# Patient Record
Sex: MALE | Race: BLACK OR AFRICAN AMERICAN | NOT HISPANIC OR LATINO | Employment: FULL TIME | URBAN - METROPOLITAN AREA
[De-identification: names, ages, dates, MRNs, and addresses within clinical notes are randomized per-mention and may not be internally consistent; named-entity substitution may affect disease eponyms.]

---

## 2017-03-06 ENCOUNTER — HOSPITAL ENCOUNTER (EMERGENCY)
Facility: HOSPITAL | Age: 62
Discharge: HOME | End: 2017-03-06
Attending: EMERGENCY MEDICINE
Payer: COMMERCIAL

## 2017-03-06 VITALS
OXYGEN SATURATION: 97 % | HEIGHT: 71 IN | WEIGHT: 180 LBS | HEART RATE: 67 BPM | RESPIRATION RATE: 16 BRPM | TEMPERATURE: 98 F | BODY MASS INDEX: 25.2 KG/M2 | DIASTOLIC BLOOD PRESSURE: 96 MMHG | SYSTOLIC BLOOD PRESSURE: 220 MMHG

## 2017-03-06 DIAGNOSIS — M10.042 ACUTE IDIOPATHIC GOUT OF LEFT HAND: Primary | ICD-10-CM

## 2017-03-06 PROCEDURE — 99283 EMERGENCY DEPT VISIT LOW MDM: CPT | Mod: 25

## 2017-03-06 PROCEDURE — 96372 THER/PROPH/DIAG INJ SC/IM: CPT

## 2017-03-06 PROCEDURE — 63600175 PHARM REV CODE 636 W HCPCS: Performed by: STUDENT IN AN ORGANIZED HEALTH CARE EDUCATION/TRAINING PROGRAM

## 2017-03-06 PROCEDURE — 99284 EMERGENCY DEPT VISIT MOD MDM: CPT | Mod: ,,, | Performed by: EMERGENCY MEDICINE

## 2017-03-06 RX ORDER — PREDNISONE 20 MG/1
40 TABLET ORAL DAILY
Qty: 10 TABLET | Refills: 0 | Status: SHIPPED | OUTPATIENT
Start: 2017-03-06 | End: 2017-03-11

## 2017-03-06 RX ORDER — KETOROLAC TROMETHAMINE 30 MG/ML
10 INJECTION, SOLUTION INTRAMUSCULAR; INTRAVENOUS
Status: COMPLETED | OUTPATIENT
Start: 2017-03-06 | End: 2017-03-06

## 2017-03-06 RX ORDER — TRIAMCINOLONE ACETONIDE 40 MG/ML
40 INJECTION, SUSPENSION INTRA-ARTICULAR; INTRAMUSCULAR
Status: COMPLETED | OUTPATIENT
Start: 2017-03-06 | End: 2017-03-06

## 2017-03-06 RX ADMIN — TRIAMCINOLONE ACETONIDE 40 MG: 40 INJECTION, SUSPENSION INTRA-ARTICULAR; INTRAMUSCULAR at 08:03

## 2017-03-06 RX ADMIN — KETOROLAC TROMETHAMINE 10 MG: 30 INJECTION, SOLUTION INTRAMUSCULAR at 08:03

## 2017-03-06 NOTE — DISCHARGE INSTRUCTIONS
Please take prednisone 40 mg for 5 days.   Continue home colchicine.   Do not take allopurinol during acute gout attack.

## 2017-03-06 NOTE — ED NOTES
Patient identifiers and allergies verified and correct.    LOC: The patient is awake, alert and aware of environment with an appropriate affect, the patient is oriented x 3 and speaking appropriately.    APPEARANCE: Patient is clean and well groomed, patient's clothing is properly fastened.    HEENT: Head/face symmetrical at rest and with movement; lips/oral mucosa pink, moist and intact; no edema; eyes/ears/nose no external drainage.     SKIN: The skin is warm and dry, color consistent with ethnicity, patient has normal skin turgor and moist mucus membranes, skin intact, no breakdown or bruising noted.    MUSCULOSKELETAL: Patient moving all extremities spontaneously, no deformities noted. Swelling to L hand noted. Tenderness to palpation. Decrease in muscle strength to L hand.     RESPIRATORY: Airway is open and patent, respirations are spontaneous, patient has a normal effort, no accessory muscle use noted, bilateral breath sounds clear.    CARDIAC/VASCULAR: Patient has a normal regular rhythm, no periphreal edema noted (besides L hand), capillary refill < 3 seconds. Pulses 2+.    ABDOMEN: Soft and non tender to palpation, no distention noted, normoactive bowel sounds present in all four quadrants.    NEUROLOGIC: PERRL, 3mm bilaterally, eyes open spontaneously, behavior appropriate to situation, follows commands, facial expression symmetrical, purposeful motor response noted, normal sensation in all extremities when touched with a finger.    Refer to flow sheet or progress note for vital signs. Call bell in reach. Bed locked and in lowest position, side rails up X1. Patient in no acute distress. Awaiting additional orders. Will continue to monitor patient closely.

## 2017-03-06 NOTE — ED PROVIDER NOTES
Encounter Date: 3/6/2017       History     Chief Complaint   Patient presents with    Hand Pain     hx gout     Review of patient's allergies indicates:  No Known Allergies  HPI Comments: Mr. Zhang is a 62 yo M with PMHx gout, HTN, and HLD, presents with 1 day hx of hand pain. Patient states he has eaten a lot of crawfish recently and his hand became swollen and painful last night. He put some Bengay on it and took some of his gout medications (he is not sure which ones), with no improvement. He can still move his hand, but it is very painful. Patient's gout history includes swelling in his toes, L elbow, and L hand. This hand swelling is similar to past episodes. Patient denies any bug bites or recent trauma to that hand. Last gout attack was 1-2 years ago and patient states the IM shots that were given helped him.       The history is provided by the patient.     Past Medical History:   Diagnosis Date    Gout 10/8/2015    Hyperlipidemia     Hypertension      Past Surgical History:   Procedure Laterality Date    NONE N/A 10/8/2015     Family History   Problem Relation Age of Onset    Stroke Sister     Cancer Neg Hx     Diabetes Neg Hx     Heart disease Neg Hx     Hypertension Neg Hx      Social History   Substance Use Topics    Smoking status: Never Smoker    Smokeless tobacco: None    Alcohol use 1.2 oz/week     2 Cans of beer per week      Comment: nightly     Review of Systems   Constitutional: Negative for chills and fever.   HENT: Negative.    Eyes: Negative.    Respiratory: Negative for cough and shortness of breath.    Cardiovascular: Negative for chest pain and palpitations.   Gastrointestinal: Negative for abdominal pain, nausea and vomiting.   Endocrine: Negative.    Genitourinary: Negative for difficulty urinating and dysuria.   Musculoskeletal: Positive for arthralgias, joint swelling and myalgias.   Skin: Negative for color change.   Neurological: Negative for weakness and  light-headedness.   Psychiatric/Behavioral: Negative.        Physical Exam   Initial Vitals   BP Pulse Resp Temp SpO2   03/06/17 0705 03/06/17 0705 03/06/17 0705 03/06/17 0705 03/06/17 0705   220/96 67 16 98.4 °F (36.9 °C) 97 %     Physical Exam    Constitutional: He appears well-developed and well-nourished. No distress.   HENT:   Head: Normocephalic and atraumatic.   Mouth/Throat: No oropharyngeal exudate.   No auricular tophi   Eyes: Conjunctivae are normal. Pupils are equal, round, and reactive to light.   Neck: Normal range of motion.   Cardiovascular: Normal rate, regular rhythm and normal heart sounds. Exam reveals no gallop and no friction rub.    No murmur heard.  Pulmonary/Chest: Breath sounds normal. He has no wheezes. He has no rales.   Abdominal: Soft. Bowel sounds are normal. He exhibits no distension. There is no tenderness.   Musculoskeletal:   L hand swelling on dorsal surface from wrist to fingertips. ROM limited due to pain and swelling. No open wounds. No erythema. Warm to touch.    Neurological: He is alert and oriented to person, place, and time.   Skin: Skin is warm. No rash noted.   Psychiatric: He has a normal mood and affect.         ED Course   Procedures  Labs Reviewed - No data to display          Medical Decision Making:   Initial Assessment:   Patient is a 62 yo M with hx of gout presents with L hand swelling similar to previous gout episodes with no hx of trauma.   ED Management:  IM kenalog and IM toradol given. Patient will be discharged with PO prednisone 40 mg for 5 days.   Other:   I have discussed this case with another health care provider.                   ED Course     Clinical Impression:   The encounter diagnosis was Acute idiopathic gout of left hand.    Disposition:   Disposition: Discharged       Sherley Marquez MD  Resident  03/06/17 4998

## 2017-03-06 NOTE — ED NOTES
Injection given as ordered and charted per MAR. Patient instructed to wait 15 additional minutes prior to leaving to monitor for reaction. Patient instructed to notify staff if reaction is suspected. Patient voiced understanding.

## 2017-03-06 NOTE — ED AVS SNAPSHOT
[facility]  [address]  [facility] [address]               Sami Zhang   3/6/2017  7:09 AM   ED    Description:  Male : 1955   Department:  [facility]           Your Care was Coordinated By:     Provider Role From To    Emory Zhang MD Attending Provider 17 0722 --    Sherley Marquez MD Resident 17 --      Reason for Visit     Hand Pain           ED Disposition     ED Disposition Condition Comment    Discharge             To Do List            These Medications        Disp Refills Start End    predniSONE (DELTASONE) 20 MG tablet 10 tablet 0 3/6/2017 3/11/2017    Take 2 tablets (40 mg total) by mouth once daily. - Oral    Pharmacy: [id number] - [facility] [id number] [place] [address] AT [facility] #: [phone]         [facility]     [facility] Care [facility] -  Assistance  Registered nurses in the [facility] provide clinical advisement, health education, appointment booking, and other advisory services.  Call for this free service at [phone].             Medications           Message regarding Medications     Verify the changes and/or additions to your medication regime listed below are the same as discussed with your clinician today.  If any of these changes or additions are incorrect, please notify your healthcare provider.        START taking these NEW medications        Refills    predniSONE (DELTASONE) 20 MG tablet 0    Sig: Take 2 tablets (40 mg total) by mouth once daily.    Class: Normal    Route: Oral      These medications were administered today        Dose Freq    triamcinolone acetonide injection 40 mg 40 mg ED 1 Time    Sig: Inject 1 mL (40 mg total) into the muscle ED 1 Time.    Class: Normal    Route: Intramuscular    ketorolac injection 10 mg 10 mg ED 1 Time    Sig: Inject 10 mg into the muscle ED 1 Time.    Class: Normal    Route:  "Intramuscular           Verify that the below list of medications is an accurate representation of the medications you are currently taking.  If none reported, the list may be blank. If incorrect, please contact your healthcare provider. Carry this list with you in case of emergency.           Current Medications     allopurinol (ZYLOPRIM) 100 MG tablet Take 100 mg by mouth 2 (two) times daily.    colchicine 0.6 mg tablet Take 1 tablet (0.6 mg total) by mouth 2 (two) times daily. FOR GOUT.    hydrocodone-acetaminophen 5-325mg (NORCO) 5-325 mg per tablet Take 1 tablet by mouth every 6 (six) hours as needed for Pain.    indomethacin (INDOCIN) 50 MG capsule Take 1 capsule (50 mg total) by mouth 3 (three) times daily with meals.    ketorolac injection 10 mg Inject 10 mg into the muscle ED 1 Time.    naproxen (NAPROSYN) 500 MG tablet Take 1 tablet (500 mg total) by mouth 2 (two) times daily with meals.    nebivolol (BYSTOLIC) 10 MG Tab Take 10 mg by mouth once daily.    ondansetron (ZOFRAN-ODT) 8 MG TbDL Take 1 tablet (8 mg total) by mouth every 8 (eight) hours as needed.    oxycodone-acetaminophen (PERCOCET) 5-325 mg per tablet Take 2 tablets by mouth every 6 (six) hours as needed for Pain.    predniSONE (DELTASONE) 20 MG tablet Take 2 tablets (40 mg total) by mouth once daily.    rosuvastatin (CRESTOR) 20 MG tablet Take 20 mg by mouth once daily.    triamcinolone acetonide injection 40 mg Inject 1 mL (40 mg total) into the muscle ED 1 Time.    valsartan-hydrochlorothiazide (DIOVAN-HCT) 160-12.5 mg per tablet Take 1 tablet by mouth once daily.    vitamin D 1000 units Tab Take 10,000 Units by mouth once a week.           Clinical Reference Information           Your Vitals Were     BP Pulse Temp Resp Height Weight    220/96 67 98.4 °F (36.9 °C) (Oral) 16 5' 11" (1.803 m) 81.6 kg (180 lb)    SpO2 BMI             97% 25.1 kg/m2         Allergies as of 3/6/2017     No Known Allergies      Immunizations Administered on Date " of Encounter - 3/6/2017     None      ED Micro, Lab, POCT     None      ED Imaging Orders     None        Discharge Instructions       Please take prednisone 40 mg for 5 days.   Continue home colchicine.   Do not take allopurinol during acute gout attack.     Discharge References/Attachments     GOUT (ENGLISH)      [facility] Sign-Up     Activating your [facility] account is as easy as 1-2-3!     1) Visit [email or web address], select Sign Up Now, enter this activation code and your date of birth, then select Next.  [email or web address]  Expires: 4/20/2017  7:50 AM      2) Create a username and password to use when you visit [facility] in the future and select a security question in case you lose your password and select Next.    3) Enter your e-mail address and click Sign Up!    Additional Information  If you have questions, please e-mail [email or web address] or call [phone] to talk to our [facility] staff. Remember, [facility] is NOT to be used for urgent needs. For medical emergencies, dial 911.          [facility] complies with applicable Federal civil rights laws and does not discriminate on the basis of race, color, national origin, age, disability, or sex.        Language Assistance Services     ATTENTION: Language assistance services are available, free of charge. Please call [phone].      ATENCIÓN: Si habla español, tiene a galvan disposición servicios gratuitos de asistencia lingüística. Llame al [phone].     CHÚ Ý: N?u b?n nói Ti?ng Vi?t, có các d?ch v? h? tr? ngôn ng? mi?n phí dành cho b?n. G?i s? [phone].

## 2017-06-26 ENCOUNTER — HOSPITAL ENCOUNTER (EMERGENCY)
Facility: HOSPITAL | Age: 62
Discharge: HOME | End: 2017-06-26
Attending: EMERGENCY MEDICINE
Payer: COMMERCIAL

## 2017-06-26 VITALS
BODY MASS INDEX: 25.2 KG/M2 | TEMPERATURE: 99 F | RESPIRATION RATE: 16 BRPM | HEART RATE: 61 BPM | DIASTOLIC BLOOD PRESSURE: 78 MMHG | WEIGHT: 180 LBS | SYSTOLIC BLOOD PRESSURE: 171 MMHG | OXYGEN SATURATION: 100 % | HEIGHT: 71 IN

## 2017-06-26 DIAGNOSIS — M79.675 PAIN OF LEFT GREAT TOE: ICD-10-CM

## 2017-06-26 DIAGNOSIS — M10.9 ACUTE GOUTY ARTHRITIS: Primary | ICD-10-CM

## 2017-06-26 PROCEDURE — 96372 THER/PROPH/DIAG INJ SC/IM: CPT

## 2017-06-26 PROCEDURE — 63600175 PHARM REV CODE 636 W HCPCS: Performed by: NURSE PRACTITIONER

## 2017-06-26 PROCEDURE — 99284 EMERGENCY DEPT VISIT MOD MDM: CPT | Mod: ,,, | Performed by: NURSE PRACTITIONER

## 2017-06-26 PROCEDURE — 99283 EMERGENCY DEPT VISIT LOW MDM: CPT | Mod: 25

## 2017-06-26 RX ORDER — TRIAMCINOLONE ACETONIDE 40 MG/ML
80 INJECTION, SUSPENSION INTRA-ARTICULAR; INTRAMUSCULAR
Status: COMPLETED | OUTPATIENT
Start: 2017-06-26 | End: 2017-06-26

## 2017-06-26 RX ORDER — COLCHICINE 0.6 MG/1
TABLET ORAL
Qty: 3 TABLET | Refills: 0 | Status: SHIPPED | OUTPATIENT
Start: 2017-06-26 | End: 2022-10-06

## 2017-06-26 RX ADMIN — TRIAMCINOLONE ACETONIDE 80 MG: 40 INJECTION, SUSPENSION INTRA-ARTICULAR; INTRAMUSCULAR at 08:06

## 2017-06-26 NOTE — ED PROVIDER NOTES
Encounter Date: 6/26/2017    SCRIBE #1 NOTE: I, Vicente Valdovinos, am scribing for, and in the presence of,  Mily lopez NP. I have scribed the entire note.       History     Chief Complaint   Patient presents with    Gout     Pt c/o pain to left big toe.     Time seen by provider: 8:48 AM    This is a 61 y.o. male with PMH of GOUT who presents for evaluation of left great toe pain w/ swelling for 2 days duration consistent with GOUT flare. Denies any CP, SOB, fever, chills, calf pain/tenderness, recent injury, falls or wounds. Also denies eating any foods which may have precipitated these sx. On Allopurinol at home which has not provided relief of his pain. Denies Hx of DM or Kidney problems. No further complaints or concerns at this time.         The history is provided by the patient and medical records.     Review of patient's allergies indicates:  No Known Allergies  Past Medical History:   Diagnosis Date    Arthritis     Gout 10/8/2015    Hyperlipidemia     Hypertension      Past Surgical History:   Procedure Laterality Date    NONE N/A 10/8/2015     Family History   Problem Relation Age of Onset    Stroke Sister     Cancer Neg Hx     Diabetes Neg Hx     Heart disease Neg Hx     Hypertension Neg Hx      Social History   Substance Use Topics    Smoking status: Never Smoker    Smokeless tobacco: Never Used    Alcohol use 1.2 oz/week     2 Cans of beer per week      Comment: nightly     Review of Systems   Constitutional: Negative for chills and fever.   HENT: Negative for sore throat.    Respiratory: Negative for shortness of breath.    Cardiovascular: Negative for chest pain and leg swelling.        (-) Calf pain/tenderness   Gastrointestinal: Negative for nausea.   Genitourinary: Negative for dysuria.   Musculoskeletal: Positive for arthralgias (to left great toe) and joint swelling (to left great toe). Negative for back pain.        (-) recent injury or fall   Skin: Negative for rash and wound.    Neurological: Negative for weakness.   Hematological: Does not bruise/bleed easily.       Physical Exam     Initial Vitals [06/26/17 0839]   BP Pulse Resp Temp SpO2   (!) 171/78 61 16 98.5 °F (36.9 °C) 100 %      MAP       109         Physical Exam   Nursing note and vitals reviewed.  Constitutional: Patient appears well-developed and well-nourished. Not diaphoretic. No distress.   HENT:   Head: Normocephalic and atraumatic.   Eyes: Conjunctivae are normal. No scleral icterus.   Neck: Normal range of motion. Neck supple.   Cardiovascular: Normal rate, regular rhythm and normal heart sounds. No calf swelling or tenderness.   Pulmonary/Chest: Breath sounds normal. No respiratory distress.  Abdominal: Soft. There is no tenderness.   Musculoskeletal: Slight swelling with tenderness and warmth to the left great MTP joint without any deformity or wound. Pedal pulse normal. Normal but guarded ROM secondary to pain.  Neurological: Alert and oriented to person, place, and time. Normal strength. No sensory deficit.   Skin: Skin is warm and dry. No rash noted. No erythema. No pallor.   Psychiatric: Normal mood and affect. Thought content normal.       ED Course   Procedures  Labs Reviewed - No data to display          Medical Decision Making:   History:   Old Medical Records: I decided to obtain old medical records.  Initial Assessment:   61 year old male with Hx of GOUT who presents for 2 day history of pain and swelling of the left great toe consistent with prior GOUT flares. Currently taking allopurinol as prescribed without improvement. He is afebrile, non toxic and in NAD. No evidence of septic joint or DVT. Will give IM kenalog in the ED and send home with a short course of colchicine. He is to follow up with his PCP.             Scribe Attestation:   Scribe #1: I performed the above scribed service and the documentation accurately describes the services I performed. I attest to the accuracy of the note.    Attending  Attestation:           Physician Attestation for Scribe:  Physician Attestation Statement for Scribe #1: I, Mily Sheppard NP, reviewed documentation, as scribed by Vicente Valdovinos in my presence, and it is both accurate and complete.                 ED Course     Clinical Impression:   The primary encounter diagnosis was Acute gouty arthritis. A diagnosis of Pain of left great toe was also pertinent to this visit.    Disposition:   Disposition: Discharged  Condition: Stable                        Mily Sheppard NP  06/26/17 1553

## 2021-02-26 ENCOUNTER — OFFICE VISIT (OUTPATIENT)
Dept: OPTOMETRY | Facility: CLINIC | Age: 66
End: 2021-02-26
Payer: COMMERCIAL

## 2021-02-26 DIAGNOSIS — H25.13 NUCLEAR SCLEROSIS, BILATERAL: ICD-10-CM

## 2021-02-26 DIAGNOSIS — H16.223 KERATOCONJUNCTIVITIS SICCA, NOT SPECIFIED AS SJÖGREN'S, BILATERAL: Primary | ICD-10-CM

## 2021-02-26 PROCEDURE — 99999 PR PBB SHADOW E&M-NEW PATIENT-LVL II: CPT | Mod: PBBFAC,,, | Performed by: OPTOMETRIST

## 2021-02-26 PROCEDURE — 99499 RISK ADDL DX/OHS AUDIT: ICD-10-PCS | Mod: S$GLB,,, | Performed by: OPTOMETRIST

## 2021-02-26 PROCEDURE — 99499 UNLISTED E&M SERVICE: CPT | Mod: S$GLB,,, | Performed by: OPTOMETRIST

## 2021-02-26 PROCEDURE — 92004 COMPRE OPH EXAM NEW PT 1/>: CPT | Mod: S$GLB,,, | Performed by: OPTOMETRIST

## 2021-02-26 PROCEDURE — 92004 PR EYE EXAM, NEW PATIENT,COMPREHESV: ICD-10-PCS | Mod: S$GLB,,, | Performed by: OPTOMETRIST

## 2021-02-26 PROCEDURE — 99999 PR PBB SHADOW E&M-NEW PATIENT-LVL II: ICD-10-PCS | Mod: PBBFAC,,, | Performed by: OPTOMETRIST

## 2021-03-15 ENCOUNTER — TELEPHONE (OUTPATIENT)
Dept: ADMINISTRATIVE | Facility: OTHER | Age: 66
End: 2021-03-15

## 2021-03-17 ENCOUNTER — IMMUNIZATION (OUTPATIENT)
Dept: INTERNAL MEDICINE | Facility: CLINIC | Age: 66
End: 2021-03-17
Payer: MEDICARE

## 2021-03-17 DIAGNOSIS — Z23 NEED FOR VACCINATION: Primary | ICD-10-CM

## 2021-03-17 PROCEDURE — 0001A COVID-19, MRNA, LNP-S, PF, 30 MCG/0.3 ML DOSE VACCINE: ICD-10-PCS | Mod: CV19,,, | Performed by: INTERNAL MEDICINE

## 2021-03-17 PROCEDURE — 0001A COVID-19, MRNA, LNP-S, PF, 30 MCG/0.3 ML DOSE VACCINE: CPT | Mod: CV19,,, | Performed by: INTERNAL MEDICINE

## 2021-03-17 PROCEDURE — 91300 COVID-19, MRNA, LNP-S, PF, 30 MCG/0.3 ML DOSE VACCINE: CPT | Mod: ,,, | Performed by: INTERNAL MEDICINE

## 2021-03-17 PROCEDURE — 91300 COVID-19, MRNA, LNP-S, PF, 30 MCG/0.3 ML DOSE VACCINE: ICD-10-PCS | Mod: ,,, | Performed by: INTERNAL MEDICINE

## 2021-04-07 ENCOUNTER — IMMUNIZATION (OUTPATIENT)
Dept: INTERNAL MEDICINE | Facility: CLINIC | Age: 66
End: 2021-04-07
Payer: MEDICARE

## 2021-04-07 DIAGNOSIS — Z23 NEED FOR VACCINATION: Primary | ICD-10-CM

## 2021-04-07 PROCEDURE — 0002A COVID-19, MRNA, LNP-S, PF, 30 MCG/0.3 ML DOSE VACCINE: CPT | Mod: HCNC,PBBFAC | Performed by: INTERNAL MEDICINE

## 2021-04-07 PROCEDURE — 91300 COVID-19, MRNA, LNP-S, PF, 30 MCG/0.3 ML DOSE VACCINE: CPT | Mod: HCNC,PBBFAC | Performed by: INTERNAL MEDICINE

## 2021-07-08 ENCOUNTER — OFFICE VISIT (OUTPATIENT)
Dept: OPTOMETRY | Facility: CLINIC | Age: 66
End: 2021-07-08
Payer: MEDICARE

## 2021-07-08 DIAGNOSIS — H52.201 HYPEROPIA WITH ASTIGMATISM AND PRESBYOPIA, RIGHT: ICD-10-CM

## 2021-07-08 DIAGNOSIS — H04.123 DRY EYE SYNDROME OF BOTH EYES: ICD-10-CM

## 2021-07-08 DIAGNOSIS — H53.021 REFRACTIVE AMBLYOPIA OF RIGHT EYE: ICD-10-CM

## 2021-07-08 DIAGNOSIS — H52.202 ASTIGMATISM OF LEFT EYE WITH PRESBYOPIA: ICD-10-CM

## 2021-07-08 DIAGNOSIS — H25.13 SENILE NUCLEAR SCLEROSIS, BILATERAL: ICD-10-CM

## 2021-07-08 DIAGNOSIS — H52.01 HYPEROPIA WITH ASTIGMATISM AND PRESBYOPIA, RIGHT: ICD-10-CM

## 2021-07-08 DIAGNOSIS — H43.393 VISUAL FLOATERS, BILATERAL: Primary | ICD-10-CM

## 2021-07-08 DIAGNOSIS — H52.4 HYPEROPIA WITH ASTIGMATISM AND PRESBYOPIA, RIGHT: ICD-10-CM

## 2021-07-08 DIAGNOSIS — H52.4 ASTIGMATISM OF LEFT EYE WITH PRESBYOPIA: ICD-10-CM

## 2021-07-08 PROCEDURE — 1126F PR PAIN SEVERITY QUANTIFIED, NO PAIN PRESENT: ICD-10-PCS | Mod: S$GLB,,, | Performed by: OPTOMETRIST

## 2021-07-08 PROCEDURE — 92015 DETERMINE REFRACTIVE STATE: CPT | Mod: S$GLB,,, | Performed by: OPTOMETRIST

## 2021-07-08 PROCEDURE — 99999 PR PBB SHADOW E&M-EST. PATIENT-LVL II: CPT | Mod: PBBFAC,,, | Performed by: OPTOMETRIST

## 2021-07-08 PROCEDURE — 99999 PR PBB SHADOW E&M-EST. PATIENT-LVL II: ICD-10-PCS | Mod: PBBFAC,,, | Performed by: OPTOMETRIST

## 2021-07-08 PROCEDURE — 3288F FALL RISK ASSESSMENT DOCD: CPT | Mod: CPTII,S$GLB,, | Performed by: OPTOMETRIST

## 2021-07-08 PROCEDURE — 92015 PR REFRACTION: ICD-10-PCS | Mod: S$GLB,,, | Performed by: OPTOMETRIST

## 2021-07-08 PROCEDURE — 1101F PT FALLS ASSESS-DOCD LE1/YR: CPT | Mod: CPTII,S$GLB,, | Performed by: OPTOMETRIST

## 2021-07-08 PROCEDURE — 1126F AMNT PAIN NOTED NONE PRSNT: CPT | Mod: S$GLB,,, | Performed by: OPTOMETRIST

## 2021-07-08 PROCEDURE — 3288F PR FALLS RISK ASSESSMENT DOCUMENTED: ICD-10-PCS | Mod: CPTII,S$GLB,, | Performed by: OPTOMETRIST

## 2021-07-08 PROCEDURE — 92014 COMPRE OPH EXAM EST PT 1/>: CPT | Mod: S$GLB,,, | Performed by: OPTOMETRIST

## 2021-07-08 PROCEDURE — 1101F PR PT FALLS ASSESS DOC 0-1 FALLS W/OUT INJ PAST YR: ICD-10-PCS | Mod: CPTII,S$GLB,, | Performed by: OPTOMETRIST

## 2021-07-08 PROCEDURE — 92014 PR EYE EXAM, EST PATIENT,COMPREHESV: ICD-10-PCS | Mod: S$GLB,,, | Performed by: OPTOMETRIST

## 2021-12-08 ENCOUNTER — IMMUNIZATION (OUTPATIENT)
Dept: INTERNAL MEDICINE | Facility: CLINIC | Age: 66
End: 2021-12-08
Payer: MEDICARE

## 2021-12-08 DIAGNOSIS — Z23 NEED FOR VACCINATION: Primary | ICD-10-CM

## 2021-12-08 PROCEDURE — 0004A COVID-19, MRNA, LNP-S, PF, 30 MCG/0.3 ML DOSE VACCINE: CPT | Mod: HCNC,PBBFAC | Performed by: INTERNAL MEDICINE

## 2022-08-29 ENCOUNTER — HOSPITAL ENCOUNTER (EMERGENCY)
Facility: HOSPITAL | Age: 67
Discharge: HOME | End: 2022-08-29
Attending: EMERGENCY MEDICINE
Payer: MEDICARE

## 2022-08-29 VITALS
HEIGHT: 71 IN | SYSTOLIC BLOOD PRESSURE: 168 MMHG | BODY MASS INDEX: 25.9 KG/M2 | OXYGEN SATURATION: 100 % | WEIGHT: 185 LBS | HEART RATE: 49 BPM | DIASTOLIC BLOOD PRESSURE: 72 MMHG | RESPIRATION RATE: 20 BRPM | TEMPERATURE: 98 F

## 2022-08-29 DIAGNOSIS — R11.10 VOMITING AND DIARRHEA: Primary | ICD-10-CM

## 2022-08-29 DIAGNOSIS — R19.7 VOMITING AND DIARRHEA: Primary | ICD-10-CM

## 2022-08-29 DIAGNOSIS — R10.9 ABDOMINAL PAIN: ICD-10-CM

## 2022-08-29 LAB
ALBUMIN SERPL BCP-MCNC: 3.9 G/DL (ref 3.5–5.2)
ALP SERPL-CCNC: 78 U/L (ref 55–135)
ALT SERPL W/O P-5'-P-CCNC: 24 U/L (ref 10–44)
ANION GAP SERPL CALC-SCNC: 8 MMOL/L (ref 8–16)
AST SERPL-CCNC: 29 U/L (ref 10–40)
BASOPHILS # BLD AUTO: 0.05 K/UL (ref 0–0.2)
BASOPHILS NFR BLD: 0.5 % (ref 0–1.9)
BILIRUB SERPL-MCNC: 1.1 MG/DL (ref 0.1–1)
BILIRUB UR QL STRIP: NEGATIVE
BUN SERPL-MCNC: 17 MG/DL (ref 8–23)
CALCIUM SERPL-MCNC: 10.1 MG/DL (ref 8.7–10.5)
CHLORIDE SERPL-SCNC: 107 MMOL/L (ref 95–110)
CLARITY UR REFRACT.AUTO: CLEAR
CO2 SERPL-SCNC: 26 MMOL/L (ref 23–29)
COLOR UR AUTO: YELLOW
CREAT SERPL-MCNC: 1 MG/DL (ref 0.5–1.4)
DIFFERENTIAL METHOD: ABNORMAL
EOSINOPHIL # BLD AUTO: 0.1 K/UL (ref 0–0.5)
EOSINOPHIL NFR BLD: 1.2 % (ref 0–8)
ERYTHROCYTE [DISTWIDTH] IN BLOOD BY AUTOMATED COUNT: 14.1 % (ref 11.5–14.5)
EST. GFR  (NO RACE VARIABLE): >60 ML/MIN/1.73 M^2
GLUCOSE SERPL-MCNC: 95 MG/DL (ref 70–110)
GLUCOSE UR QL STRIP: ABNORMAL
HCT VFR BLD AUTO: 41.5 % (ref 40–54)
HCV AB SERPL QL IA: NORMAL
HGB BLD-MCNC: 13.8 G/DL (ref 14–18)
HGB UR QL STRIP: NEGATIVE
HIV 1+2 AB+HIV1 P24 AG SERPL QL IA: NORMAL
IMM GRANULOCYTES # BLD AUTO: 0.05 K/UL (ref 0–0.04)
IMM GRANULOCYTES NFR BLD AUTO: 0.5 % (ref 0–0.5)
KETONES UR QL STRIP: NEGATIVE
LEUKOCYTE ESTERASE UR QL STRIP: NEGATIVE
LIPASE SERPL-CCNC: 19 U/L (ref 4–60)
LYMPHOCYTES # BLD AUTO: 1.9 K/UL (ref 1–4.8)
LYMPHOCYTES NFR BLD: 19.3 % (ref 18–48)
MCH RBC QN AUTO: 27.6 PG (ref 27–31)
MCHC RBC AUTO-ENTMCNC: 33.3 G/DL (ref 32–36)
MCV RBC AUTO: 83 FL (ref 82–98)
MONOCYTES # BLD AUTO: 1 K/UL (ref 0.3–1)
MONOCYTES NFR BLD: 10.1 % (ref 4–15)
NEUTROPHILS # BLD AUTO: 6.7 K/UL (ref 1.8–7.7)
NEUTROPHILS NFR BLD: 68.4 % (ref 38–73)
NITRITE UR QL STRIP: NEGATIVE
NRBC BLD-RTO: 0 /100 WBC
PH UR STRIP: 6 [PH] (ref 5–8)
PLATELET # BLD AUTO: 271 K/UL (ref 150–450)
PMV BLD AUTO: 10.8 FL (ref 9.2–12.9)
POTASSIUM SERPL-SCNC: 3.7 MMOL/L (ref 3.5–5.1)
PROT SERPL-MCNC: 7.4 G/DL (ref 6–8.4)
PROT UR QL STRIP: NEGATIVE
RBC # BLD AUTO: 5 M/UL (ref 4.6–6.2)
SODIUM SERPL-SCNC: 141 MMOL/L (ref 136–145)
SP GR UR STRIP: 1.02 (ref 1–1.03)
URN SPEC COLLECT METH UR: ABNORMAL
WBC # BLD AUTO: 9.79 K/UL (ref 3.9–12.7)

## 2022-08-29 PROCEDURE — 99284 PR EMERGENCY DEPT VISIT,LEVEL IV: ICD-10-PCS | Mod: ,,, | Performed by: EMERGENCY MEDICINE

## 2022-08-29 PROCEDURE — 99284 EMERGENCY DEPT VISIT MOD MDM: CPT | Mod: ,,, | Performed by: EMERGENCY MEDICINE

## 2022-08-29 PROCEDURE — 87389 HIV-1 AG W/HIV-1&-2 AB AG IA: CPT | Performed by: PHYSICIAN ASSISTANT

## 2022-08-29 PROCEDURE — 93005 ELECTROCARDIOGRAM TRACING: CPT

## 2022-08-29 PROCEDURE — 93010 ELECTROCARDIOGRAM REPORT: CPT | Mod: ,,, | Performed by: INTERNAL MEDICINE

## 2022-08-29 PROCEDURE — 81003 URINALYSIS AUTO W/O SCOPE: CPT | Performed by: EMERGENCY MEDICINE

## 2022-08-29 PROCEDURE — 80053 COMPREHEN METABOLIC PANEL: CPT | Performed by: EMERGENCY MEDICINE

## 2022-08-29 PROCEDURE — 93010 EKG 12-LEAD: ICD-10-PCS | Mod: ,,, | Performed by: INTERNAL MEDICINE

## 2022-08-29 PROCEDURE — 99284 EMERGENCY DEPT VISIT MOD MDM: CPT | Mod: 25

## 2022-08-29 PROCEDURE — 86803 HEPATITIS C AB TEST: CPT | Performed by: PHYSICIAN ASSISTANT

## 2022-08-29 PROCEDURE — 85025 COMPLETE CBC W/AUTO DIFF WBC: CPT | Performed by: EMERGENCY MEDICINE

## 2022-08-29 PROCEDURE — 83690 ASSAY OF LIPASE: CPT | Performed by: EMERGENCY MEDICINE

## 2022-08-29 RX ORDER — ONDANSETRON 4 MG/1
4 TABLET, FILM COATED ORAL EVERY 6 HOURS
Qty: 12 TABLET | Refills: 0 | Status: SHIPPED | OUTPATIENT
Start: 2022-08-29 | End: 2023-04-14 | Stop reason: ALTCHOICE

## 2022-08-29 NOTE — ED NOTES
Sami Zhang, an 66 y.o. male presents to the ED complaining of back pain, nausea and vomiting, abd pain x 3 weeks. Patient explains that his back pain began while he was sitting sideways watching TV. Denies any trauma to his back. Was using icy-hot without relief. The vomiting began around this time and he has not been able to keep any food or drink down.       Chief Complaint   Patient presents with    Abdominal Pain    Vomiting     Started week or 2 ago,lower back been hurting also     Review of patient's allergies indicates:  No Known Allergies  Past Medical History:   Diagnosis Date    Arthritis     Gout 10/8/2015    Hyperlipidemia     Hypertension

## 2022-08-29 NOTE — DISCHARGE INSTRUCTIONS
Your described Zofran for nausea.  Please follow-up with GI and Oncology for your stomach tumor. Please return to the ED if you have severe abdominal pain, uncontrolled vomiting, or other concerns.

## 2022-08-30 NOTE — ED PROVIDER NOTES
Encounter Date: 8/29/2022       History     Chief Complaint   Patient presents with    Abdominal Pain    Vomiting     Started week or 2 ago,lower back been hurting also     66-year-old male with history of arthritis, gout, hyperlipidemia, hypertension, presents to the ED for abdominal bloating, vomiting, diarrhea for 2 days.  Patient reports that he have a 2-3 episodes of vomiting and diarrhea since yesterday and today.  Patient reports intermittent diffuse abdominal pain and distension, that radiated to his lower back. Denies bloody of bile emesis, no hematochezia.  Patient denies fever, chill, shortness of breath.  Denies changes in his diet, or family member with similar symptoms.  Patient endorses chronic alcohol use, 2 shots of liquor per day. He was seen in another ED last month, he was found to have gastric mass, however, he did not follow-up with the specialists due to his insurance coverage.  Patient wishes to be referred to specialist at [facility].    Review of patient's allergies indicates:  No Known Allergies  Past Medical History:   Diagnosis Date    Arthritis     Gout 10/8/2015    Hyperlipidemia     Hypertension      Past Surgical History:   Procedure Laterality Date    NONE N/A 10/8/2015     Family History   Problem Relation Age of Onset    Stroke Sister     Cancer Neg Hx     Diabetes Neg Hx     Heart disease Neg Hx     Hypertension Neg Hx      Social History     Tobacco Use    Smoking status: Never    Smokeless tobacco: Never   Substance Use Topics    Alcohol use: Yes     Alcohol/week: 2.0 standard drinks     Types: 2 Cans of beer per week     Comment: occasionally drinks wine    Drug use: No     Review of Systems   Constitutional:  Negative for chills and fever.   HENT:  Negative for congestion and sore throat.    Eyes:  Negative for discharge and redness.   Respiratory:  Negative for cough and shortness of breath.    Cardiovascular:  Negative for chest pain and leg swelling.   Gastrointestinal:   Positive for abdominal pain, diarrhea, nausea and vomiting.   Genitourinary:  Negative for dysuria and flank pain.   Musculoskeletal:  Negative for back pain and neck pain.   Skin:  Negative for rash.   Neurological:  Negative for weakness and numbness.   Psychiatric/Behavioral:  Negative for behavioral problems and confusion.      Physical Exam     Initial Vitals [08/29/22 0951]   BP Pulse Resp Temp SpO2   (!) 162/74 67 18 98.3 °F (36.8 °C) 100 %      MAP       --         Physical Exam    Nursing note and vitals reviewed.  Constitutional: He appears well-developed. No distress.   HENT:   Head: Normocephalic and atraumatic.   Nose: Nose normal.   Eyes: Conjunctivae and EOM are normal. Pupils are equal, round, and reactive to light.   Neck: No JVD present.   Normal range of motion.  Cardiovascular:  Normal rate and regular rhythm.           Pulmonary/Chest: Breath sounds normal. No respiratory distress.   Abdominal: Abdomen is soft. He exhibits distension. There is no abdominal tenderness.   Musculoskeletal:         General: Normal range of motion.      Cervical back: Normal range of motion.     Neurological: He is alert and oriented to person, place, and time. No cranial nerve deficit.   Skin: Skin is warm and dry. Capillary refill takes less than 2 seconds.       ED Course   Procedures  Labs Reviewed   CBC W/ AUTO DIFFERENTIAL - Abnormal; Notable for the following components:       Result Value    Hemoglobin 13.8 (*)     Immature Grans (Abs) 0.05 (*)     All other components within normal limits   COMPREHENSIVE METABOLIC PANEL - Abnormal; Notable for the following components:    Total Bilirubin 1.1 (*)     All other components within normal limits   URINALYSIS, REFLEX TO URINE CULTURE - Abnormal; Notable for the following components:    Glucose, UA Trace (*)     All other components within normal limits    Narrative:     Specimen Source->Urine   HIV 1 / 2 ANTIBODY    Narrative:     Release to patient->Immediate    HEPATITIS C ANTIBODY    Narrative:     Release to patient->Immediate   LIPASE     EKG Readings: (Independently Interpreted)   Previous EKG: Compared with most recent EKG Rhythm: Normal Sinus Rhythm. Heart Rate: 54. Ectopy: No Ectopy. Conduction: Normal. ST Segments: Normal ST Segments. T Waves: Normal. Axis: Normal. Clinical Impression: Normal Sinus Rhythm     Imaging Results    None          Medications - No data to display  Medical Decision Making:   History:   Old Medical Records: I decided to obtain old medical records.  Old Records Summarized: records from another hospital.       <> Summary of Records: CT abdomen pelvis on July 18: 6.7 x 5.6 x 7.2 cm thick-walled cystic mass along the posterior wall of the gastric fundus resulting in mass effect. 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  Patient is well-appearing, afebrile, abdomen mildly distended, but soft, nontender to palpation, no signs of peritonitis  Differential Diagnosis:   Gastroenteritis, pancreatitis, hepatitis, doubt obstruction, diverticulitis.  Clinical Tests:   Lab Tests: Ordered and Reviewed  Medical Tests: Ordered and Reviewed  ED Management:  Patient EKG is normal sinus rhythm.  Lab results unremarkable, no leukocytosis, or acute anemia.  His presentation is likely a consistent with gastroenteritis.  However, patient has history of a gastric mass but no follow-up.  Refer patient to GI and Oncology for further investigation of the gastric mass.  Patient tolerated p.o. intake in the ED.  Prescribed Zofran for nausea.  Provided strict return to the ER precaution if patient unable to tolerate p.o. intake, increasing abdominal pain, or vomiting.All questions answered, no other concerns.          Attending Attestation:   Physician Attestation Statement for Resident:  As the  supervising MD   Physician Attestation Statement: I have personally seen and examined this patient.   I agree with the above history.  -:   As the supervising MD I agree with the above PE.     As the supervising MD I agree with the above treatment, course, plan, and disposition.                    ED Course as of 08/30/22 1828   Mon Aug 29, 2022   1237 Sodium: 141 [NC]   1237 Potassium: 3.7 [NC]   1237 BUN: 17 [NC]   1237 Creatinine: 1.0 [NC]   1237 WBC: 9.79 [NC]   1237 Hemoglobin(!): 13.8 [NC]   1237 Platelets: 271 [NC]   1237 Lipase: 19 [NC]      ED Course User Index  [NC] Michael Vallejo MD             Clinical Impression:   Final diagnoses:  [R10.9] Abdominal pain  [R11.10, R19.7] Vomiting and diarrhea (Primary)        ED Disposition Condition    Discharge Stable          ED Prescriptions       Medication Sig Dispense Start Date End Date Auth. Provider    ondansetron (ZOFRAN) 4 MG tablet Take 1 tablet (4 mg total) by mouth every 6 (six) hours. 12 tablet 8/29/2022 -- Michael Vallejo MD          Follow-up Information       Follow up With Specialties Details Why Contact Info Additional Information    Mitch Gatica Jr., MD Cardiology In 1 week  [address] [facility] [address]  [phone]       [facility] - Surgery Surgery Schedule an appointment as soon as possible for a visit   [facility] [facility] [phone] Donny Cantu, Todd Cantu and Wali Moreno clinics are located on the 2nd Floor of the [facility] ([address] Entrance). Surface lot parking is available in front of the [facility].    [facility] - Gi Center Atrium 4th Fl Gastroenterology Schedule an appointment as soon as possible for a visit   [facility] [phone] GI Center & Urology - Main Building, 4th Floor Please park in [facility] and take Atrium elevator             Michael Vallejo MD  Resident  08/29/22 2037       Hallie Thurston,  MD  08/30/22 182

## 2022-09-02 ENCOUNTER — TELEPHONE (OUTPATIENT)
Dept: GASTROENTEROLOGY | Facility: CLINIC | Age: 67
End: 2022-09-02
Payer: MEDICARE

## 2022-09-02 NOTE — TELEPHONE ENCOUNTER
----- Message from Donald Ulrich sent at 9/2/2022  9:24 AM CDT -----  Regarding: call bk about the appt you sched      The Pt states that he would like a call back to see if you could sched him for an appt sooner for his Hosp F/U    Please contact the Pt.    Ph # [phone]

## 2022-09-06 ENCOUNTER — TELEPHONE (OUTPATIENT)
Dept: SURGERY | Facility: CLINIC | Age: 67
End: 2022-09-06
Payer: MEDICARE

## 2022-09-06 DIAGNOSIS — K31.89 GASTRIC MASS: Primary | ICD-10-CM

## 2022-09-06 NOTE — TELEPHONE ENCOUNTER
----- Message from Farrah Millan, RN sent at 9/2/2022 11:03 AM CDT -----  Regarding: FW: hosp f/u - referral in chart    ----- Message -----  From: Donald Ulrich  Sent: 9/2/2022   9:32 AM CDT  To: Rene MEDRANO Staff, Alondra Brooks Staff, #  Subject: hosp f/u - referral in chart                         The Pt states that he would like a call back to sched his hosp f/u - referral in chart for-    Abdominal pain [R10.9]  Vomiting and diarrhea [R11.10, R19.7]     # [phone]

## 2022-09-07 ENCOUNTER — TELEPHONE (OUTPATIENT)
Dept: ENDOSCOPY | Facility: HOSPITAL | Age: 67
End: 2022-09-07
Payer: MEDICARE

## 2022-09-07 DIAGNOSIS — K31.89 GASTRIC MASS: Primary | ICD-10-CM

## 2022-09-07 NOTE — TELEPHONE ENCOUNTER
Telephoned pt to schedule EGD/EUS.  Spoke with pt's wife, Bhavesh, and procedure scheduled for.  Pt is fully vaccinated.  Reviewed medical history and medications.  Instructed on procedure and preparation.  Pt's wife verbalized understanding.  Instructions written down by pt's wife and read back.

## 2022-09-09 ENCOUNTER — HOSPITAL ENCOUNTER (OUTPATIENT)
Facility: HOSPITAL | Age: 67
Discharge: HOME | End: 2022-09-09
Attending: INTERNAL MEDICINE | Admitting: INTERNAL MEDICINE
Payer: MEDICARE

## 2022-09-09 ENCOUNTER — ANESTHESIA (OUTPATIENT)
Dept: ENDOSCOPY | Facility: HOSPITAL | Age: 67
End: 2022-09-09
Payer: MEDICARE

## 2022-09-09 ENCOUNTER — ANESTHESIA EVENT (OUTPATIENT)
Dept: ENDOSCOPY | Facility: HOSPITAL | Age: 67
End: 2022-09-09
Payer: MEDICARE

## 2022-09-09 VITALS
BODY MASS INDEX: 25.9 KG/M2 | HEIGHT: 71 IN | RESPIRATION RATE: 16 BRPM | WEIGHT: 185 LBS | HEART RATE: 57 BPM | TEMPERATURE: 98 F | SYSTOLIC BLOOD PRESSURE: 169 MMHG | DIASTOLIC BLOOD PRESSURE: 79 MMHG | OXYGEN SATURATION: 99 %

## 2022-09-09 DIAGNOSIS — K31.89 GASTRIC MASS: ICD-10-CM

## 2022-09-09 PROCEDURE — 88342 IMHCHEM/IMCYTCHM 1ST ANTB: CPT | Performed by: PATHOLOGY

## 2022-09-09 PROCEDURE — D9220A PRA ANESTHESIA: Mod: ANES,,, | Performed by: STUDENT IN AN ORGANIZED HEALTH CARE EDUCATION/TRAINING PROGRAM

## 2022-09-09 PROCEDURE — 25000003 PHARM REV CODE 250: Performed by: INTERNAL MEDICINE

## 2022-09-09 PROCEDURE — 88305 TISSUE EXAM BY PATHOLOGIST: ICD-10-PCS | Mod: 26,,, | Performed by: PATHOLOGY

## 2022-09-09 PROCEDURE — 88341 IMHCHEM/IMCYTCHM EA ADD ANTB: CPT | Mod: 26,,, | Performed by: PATHOLOGY

## 2022-09-09 PROCEDURE — 88305 TISSUE EXAM BY PATHOLOGIST: CPT | Mod: 26,,, | Performed by: PATHOLOGY

## 2022-09-09 PROCEDURE — 63600175 PHARM REV CODE 636 W HCPCS: Performed by: NURSE ANESTHETIST, CERTIFIED REGISTERED

## 2022-09-09 PROCEDURE — D9220A PRA ANESTHESIA: ICD-10-PCS | Mod: CRNA,,, | Performed by: NURSE ANESTHETIST, CERTIFIED REGISTERED

## 2022-09-09 PROCEDURE — 88173 PR  INTERPRETATION OF FNA SMEAR: ICD-10-PCS | Mod: 26,,, | Performed by: PATHOLOGY

## 2022-09-09 PROCEDURE — 88173 CYTOPATH EVAL FNA REPORT: CPT | Mod: 26,,, | Performed by: PATHOLOGY

## 2022-09-09 PROCEDURE — 88305 TISSUE EXAM BY PATHOLOGIST: CPT | Performed by: PATHOLOGY

## 2022-09-09 PROCEDURE — 43242 PR UPGI ENDOSCOPY,FN NEEDLE BX,GUIDED: ICD-10-PCS | Mod: ,,, | Performed by: INTERNAL MEDICINE

## 2022-09-09 PROCEDURE — D9220A PRA ANESTHESIA: ICD-10-PCS | Mod: ANES,,, | Performed by: STUDENT IN AN ORGANIZED HEALTH CARE EDUCATION/TRAINING PROGRAM

## 2022-09-09 PROCEDURE — 88381 MICRODISSECTION MANUAL: CPT | Performed by: PATHOLOGY

## 2022-09-09 PROCEDURE — 27202131 HC NEEDLE, FNB SINGLE (ANY): Performed by: INTERNAL MEDICINE

## 2022-09-09 PROCEDURE — 25000003 PHARM REV CODE 250: Performed by: NURSE ANESTHETIST, CERTIFIED REGISTERED

## 2022-09-09 PROCEDURE — 37000009 HC ANESTHESIA EA ADD 15 MINS: Performed by: INTERNAL MEDICINE

## 2022-09-09 PROCEDURE — 88341 PR IHC OR ICC EACH ADD'L SINGLE ANTIBODY  STAINPR: ICD-10-PCS | Mod: 26,,, | Performed by: PATHOLOGY

## 2022-09-09 PROCEDURE — 88341 IMHCHEM/IMCYTCHM EA ADD ANTB: CPT | Mod: 59 | Performed by: PATHOLOGY

## 2022-09-09 PROCEDURE — D9220A PRA ANESTHESIA: Mod: CRNA,,, | Performed by: NURSE ANESTHETIST, CERTIFIED REGISTERED

## 2022-09-09 PROCEDURE — 37000008 HC ANESTHESIA 1ST 15 MINUTES: Performed by: INTERNAL MEDICINE

## 2022-09-09 PROCEDURE — 88173 CYTOPATH EVAL FNA REPORT: CPT | Performed by: PATHOLOGY

## 2022-09-09 PROCEDURE — 88342 IMHCHEM/IMCYTCHM 1ST ANTB: CPT | Mod: 26,,, | Performed by: PATHOLOGY

## 2022-09-09 PROCEDURE — 81272 KIT GENE TARGETED SEQ ANALYS: CPT | Performed by: PATHOLOGY

## 2022-09-09 PROCEDURE — 88342 CHG IMMUNOCYTOCHEMISTRY: ICD-10-PCS | Mod: 26,,, | Performed by: PATHOLOGY

## 2022-09-09 PROCEDURE — 81314 PDGFRA GENE: CPT | Performed by: PATHOLOGY

## 2022-09-09 PROCEDURE — 43242 EGD US FINE NEEDLE BX/ASPIR: CPT | Performed by: INTERNAL MEDICINE

## 2022-09-09 PROCEDURE — 43242 EGD US FINE NEEDLE BX/ASPIR: CPT | Mod: ,,, | Performed by: INTERNAL MEDICINE

## 2022-09-09 RX ORDER — SODIUM CHLORIDE 9 MG/ML
INJECTION, SOLUTION INTRAVENOUS CONTINUOUS
Status: DISCONTINUED | OUTPATIENT
Start: 2022-09-09 | End: 2022-09-09 | Stop reason: HOSPADM

## 2022-09-09 RX ORDER — PROPOFOL 10 MG/ML
INJECTION, EMULSION INTRAVENOUS
Status: DISCONTINUED | OUTPATIENT
Start: 2022-09-09 | End: 2022-09-09

## 2022-09-09 RX ORDER — LIDOCAINE HCL/PF 100 MG/5ML
SYRINGE (ML) INTRAVENOUS
Status: DISCONTINUED | OUTPATIENT
Start: 2022-09-09 | End: 2022-09-09

## 2022-09-09 RX ORDER — ONDANSETRON 2 MG/ML
4 INJECTION INTRAMUSCULAR; INTRAVENOUS DAILY PRN
Status: DISCONTINUED | OUTPATIENT
Start: 2022-09-09 | End: 2022-09-09 | Stop reason: HOSPADM

## 2022-09-09 RX ORDER — SODIUM CHLORIDE 0.9 % (FLUSH) 0.9 %
10 SYRINGE (ML) INJECTION
Status: DISCONTINUED | OUTPATIENT
Start: 2022-09-09 | End: 2022-09-09 | Stop reason: HOSPADM

## 2022-09-09 RX ORDER — PROPOFOL 10 MG/ML
INJECTION, EMULSION INTRAVENOUS CONTINUOUS PRN
Status: DISCONTINUED | OUTPATIENT
Start: 2022-09-09 | End: 2022-09-09

## 2022-09-09 RX ADMIN — GLYCOPYRROLATE 0.2 MG: 0.2 INJECTION, SOLUTION INTRAMUSCULAR; INTRAVITREAL at 09:09

## 2022-09-09 RX ADMIN — Medication 100 MG: at 09:09

## 2022-09-09 RX ADMIN — PROPOFOL 40 MG: 10 INJECTION, EMULSION INTRAVENOUS at 10:09

## 2022-09-09 RX ADMIN — PROPOFOL 80 MG: 10 INJECTION, EMULSION INTRAVENOUS at 09:09

## 2022-09-09 RX ADMIN — SODIUM CHLORIDE: 0.9 INJECTION, SOLUTION INTRAVENOUS at 09:09

## 2022-09-09 RX ADMIN — PROPOFOL 30 MG: 10 INJECTION, EMULSION INTRAVENOUS at 10:09

## 2022-09-09 RX ADMIN — PROPOFOL 200 MCG/KG/MIN: 10 INJECTION, EMULSION INTRAVENOUS at 09:09

## 2022-09-09 NOTE — H&P
Short Stay Endoscopy History and Physical    PCP - Mitch Gatica Jr, MD  Referring Physician - Todd Cantu MD  [address] [place] [address]    Procedure - eus  ASA - per anesthesia  Mallampati - per anesthesia  History of Anesthesia problems - no  Family history Anesthesia problems -  no   Plan of anesthesia - General    HPI:  This is a 66 y.o. male here for evaluation of: gastric lesion    Reflux - no  Dysphagia - no  Abdominal pain - no  Diarrhea - no    ROS:  Constitutional: No fevers, chills, No weight loss  CV: No chest pain  Pulm: No cough, No shortness of breath  Ophtho: No vision changes  GI: see HPI  Derm: No rash    Medical History:  has a past medical history of Arthritis, Gout (10/8/2015), Hyperlipidemia, and Hypertension.    Surgical History:  has a past surgical history that includes NONE (N/A, 10/8/2015).    Family History: family history includes Stroke in his sister..    Social History:  reports that he has never smoked. He has never used smokeless tobacco. He reports current alcohol use of about 2.0 standard drinks per week. He reports that he does not use drugs.    Review of patient's allergies indicates:  No Known Allergies    Medications:   Medications Prior to Admission   Medication Sig Dispense Refill Last Dose    allopurinol (ZYLOPRIM) 100 MG tablet Take 100 mg by mouth 2 (two) times daily.   9/9/2022    CALCIUM CARBONATE/VITAMIN D3 (VITAMIN D-3 ORAL) Take 10,000 Units by mouth once a week.   9/9/2022    nebivolol (BYSTOLIC) 10 MG Tab Take 10 mg by mouth once daily.   9/9/2022    rosuvastatin (CRESTOR) 20 MG tablet Take 20 mg by mouth once daily.   9/9/2022    valsartan-hydrochlorothiazide (DIOVAN-HCT) 160-12.5 mg per tablet Take 1 tablet by mouth once daily.   9/9/2022    vitamin D 1000 units Tab Take 10,000 Units by mouth once a week.   9/9/2022    colchicine 0.6 mg tablet Take two tablets orally x one dose, then take one tablet orally one hour later. 3 tablet 0  Unknown    ondansetron (ZOFRAN) 4 MG tablet Take 1 tablet (4 mg total) by mouth every 6 (six) hours. 12 tablet 0 More than a month       Physical Exam:    Vital Signs:   Vitals:    09/09/22 0913   BP: (!) 175/82   Pulse: 66   Resp: 18   Temp: 98.1 °F (36.7 °C)       General Appearance: Well appearing in no acute distress    Labs:  Lab Results   Component Value Date    WBC 9.79 08/29/2022    HGB 13.8 (L) 08/29/2022    HCT 41.5 08/29/2022     08/29/2022    ALT 24 08/29/2022    AST 29 08/29/2022     08/29/2022    K 3.7 08/29/2022     08/29/2022    CREATININE 1.0 08/29/2022    BUN 17 08/29/2022    CO2 26 08/29/2022    INR 1.0 07/01/2016       I have explained the risks and benefits of this endoscopic procedure to the patient including but not limited to bleeding, inflammation, infection, perforation, and death.      Yosvany Paula MD

## 2022-09-09 NOTE — TRANSFER OF CARE
"Anesthesia Transfer of Care Note    Patient: Sami Zhang    Procedure(s) Performed: Procedure(s) (LRB):  EGD (ESOPHAGOGASTRODUODENOSCOPY) (N/A)  ULTRASOUND, UPPER GI TRACT, ENDOSCOPIC (N/A)    Patient location: [facility]    Anesthesia Type: general    Transport from OR: Transported from OR on room air with adequate spontaneous ventilation    Post pain: adequate analgesia    Post assessment: no apparent anesthetic complications    Post vital signs: stable    Level of consciousness: awake    Nausea/Vomiting: no nausea/vomiting    Complications: none    Transfer of care protocol was followed      Last vitals:   Visit Vitals  /65   Pulse 69   Temp 36   Resp 16   Ht 5' 11" (1.803 m)   Wt 83.9 kg (185 lb)   SpO2 100%   BMI 25.80 kg/m²     "

## 2022-09-09 NOTE — ANESTHESIA PREPROCEDURE EVALUATION
09/09/2022  Sami Zhang is a 66 y.o., male.  Pre-operative evaluation for Procedure(s) (LRB):  EGD (ESOPHAGOGASTRODUODENOSCOPY) (N/A)  ULTRASOUND, UPPER GI TRACT, ENDOSCOPIC (N/A)    Sami Zhang is a 66 y.o. male     Patient Active Problem List   Diagnosis    Laryngeal nodule       Review of patient's allergies indicates:  No Known Allergies    No current facility-administered medications on file prior to encounter.     Current Outpatient Medications on File Prior to Encounter   Medication Sig Dispense Refill    allopurinol (ZYLOPRIM) 100 MG tablet Take 100 mg by mouth 2 (two) times daily.      CALCIUM CARBONATE/VITAMIN D3 (VITAMIN D-3 ORAL) Take 10,000 Units by mouth once a week.      colchicine 0.6 mg tablet Take two tablets orally x one dose, then take one tablet orally one hour later. 3 tablet 0    nebivolol (BYSTOLIC) 10 MG Tab Take 10 mg by mouth once daily.      ondansetron (ZOFRAN) 4 MG tablet Take 1 tablet (4 mg total) by mouth every 6 (six) hours. 12 tablet 0    rosuvastatin (CRESTOR) 20 MG tablet Take 20 mg by mouth once daily.      valsartan-hydrochlorothiazide (DIOVAN-HCT) 160-12.5 mg per tablet Take 1 tablet by mouth once daily.      vitamin D 1000 units Tab Take 10,000 Units by mouth once a week.         Past Surgical History:   Procedure Laterality Date    NONE N/A 10/8/2015       Social History     Socioeconomic History    Marital status:    Tobacco Use    Smoking status: Never    Smokeless tobacco: Never   Substance and Sexual Activity    Alcohol use: Yes     Alcohol/week: 2.0 standard drinks     Types: 2 Cans of beer per week     Comment: occasionally drinks wine    Drug use: No    Sexual activity: Yes     Partners: Female     Birth control/protection: None         CBC: No results for input(s): WBC, RBC, HGB, HCT, PLT, MCV, MCH, MCHC in the last 72 hours.    CMP: No  results for input(s): NA, K, CL, CO2, BUN, CREATININE, GLU, MG, PHOS, CALCIUM, ALBUMIN, PROT, ALKPHOS, ALT, AST, BILITOT in the last 72 hours.    INR  No results for input(s): PT, INR, PROTIME, APTT in the last 72 hours.        Diagnostic Studies:      EKD Echo:  No results found for this or any previous visit.        Pre-op Assessment    I have reviewed the Patient Summary Reports.     I have reviewed the Nursing Notes. I have reviewed the NPO Status.   I have reviewed the Medications.     Review of Systems      Physical Exam    Airway:  Mallampati: II   Mouth Opening: Normal  TM Distance: Normal  Tongue: Normal        Anesthesia Plan  Type of Anesthesia, risks & benefits discussed:    Anesthesia Type: Gen Natural Airway  Intra-op Monitoring Plan: Standard ASA Monitors  Post Op Pain Control Plan: multimodal analgesia  Induction:  IV  Airway Plan: Direct, Post-Induction  Informed Consent: Informed consent signed with the Patient and all parties understand the risks and agree with anesthesia plan.  All questions answered.   ASA Score: 2  Day of Surgery Review of History & Physical: H&P Update referred to the surgeon/provider.    Ready For Surgery From Anesthesia Perspective.     .

## 2022-09-09 NOTE — PROGRESS NOTES
Discharge instructions reviewed w/ pt and wife, verbalized understanding. Pt in NADN.No complaints at this time. Tolerated liquids w/ no issues. To be d/c'd home w/ wife.

## 2022-09-09 NOTE — [employer]ATION PATIENT INSTRUCTIONS
Discharge Summary/Instructions after an Endoscopic Procedure  Patient Name: Sami Zhang  Patient MRN: [id number]  Patient YOB: 1955 Friday, September 9, 2022  Yosvany Paula MD  Dear patient,  As a result of recent federal legislation (The Federal Cures Act), you may   receive lab or pathology results from your procedure in your [facility]   account before your physician is able to contact you. Your physician or   their representative will relay the results to you with their   recommendations at their soonest availability.  Thank you,  RESTRICTIONS:  During your procedure today, you received medications for sedation.  These   medications may affect your judgment, balance and coordination.  Therefore,   for 24 hours, you have the following restrictions:   - DO NOT drive a car, operate machinery, make legal/financial decisions,   sign important papers or drink alcohol.    ACTIVITY:  Today: no heavy lifting, straining or running due to procedural   sedation/anesthesia.  The following day: return to full activity including work.  DIET:  Eat and drink normally unless instructed otherwise.     TREATMENT FOR COMMON SIDE EFFECTS:  - Mild abdominal pain, nausea, belching, bloating or excessive gas:  rest,   eat lightly and use a heating pad.  - Sore Throat: treat with throat lozenges and/or gargle with warm salt   water.  - Because air was used during the procedure, expelling large amounts of air   from your rectum or belching is normal.  - If a bowel prep was taken, you may not have a bowel movement for 1-3 days.    This is normal.  SYMPTOMS TO WATCH FOR AND REPORT TO YOUR PHYSICIAN:  1. Abdominal pain or bloating, other than gas cramps.  2. Chest pain.  3. Back pain.  4. Signs of infection such as: chills or fever occurring within 24 hours   after the procedure.  5. Rectal bleeding, which would show as bright red, maroon, or black stools.   (A tablespoon of blood from the rectum is not serious, especially if    hemorrhoids are present.)  6. Vomiting.  7. Weakness or dizziness.  GO DIRECTLY TO THE NEAREST EMERGENCY ROOM IF YOU HAVE ANY OF THE FOLLOWING:      Difficulty breathing              Chills and/or fever over 101 F   Persistent vomiting and/or vomiting blood   Severe abdominal pain   Severe chest pain   Black, tarry stools   Bleeding- more than one tablespoon   Any other symptom or condition that you feel may need urgent attention  Your doctor recommends these additional instructions:  If any biopsies were taken, your doctors clinic will contact you in 1 to 2   weeks with any results.  - Discharge patient to home.   - Resume previous diet.   - Continue present medications.   - Return to referring physician as previously scheduled.  For questions, problems or results please call your physician - Yosvany Paula MD at Work:  [phone].  [facility], EMERGENCY ROOM PHONE NUMBER: [phone]  IF A COMPLICATION OR EMERGENCY SITUATION ARISES AND YOU ARE UNABLE TO REACH   YOUR PHYSICIAN - GO DIRECTLY TO THE EMERGENCY ROOM.  Yosvany Paula MD  9/9/2022 10:19:59 AM  This report has been verified and signed electronically.  Dear patient,  As a result of recent federal legislation (The Federal Cures Act), you may   receive lab or pathology results from your procedure in your [facility]   account before your physician is able to contact you. Your physician or   their representative will relay the results to you with their   recommendations at their soonest availability.  Thank you,  PR[employer]ATION

## 2022-09-12 ENCOUNTER — HOSPITAL ENCOUNTER (OUTPATIENT)
Dept: RADIOLOGY | Facility: HOSPITAL | Age: 67
Discharge: HOME | End: 2022-09-12
Attending: SURGERY
Payer: MEDICARE

## 2022-09-12 DIAGNOSIS — K31.89 GASTRIC MASS: ICD-10-CM

## 2022-09-12 PROCEDURE — 74177 CT ABDOMEN PELVIS WITH CONTRAST: ICD-10-PCS | Mod: 26,,, | Performed by: RADIOLOGY

## 2022-09-12 PROCEDURE — A9698 NON-RAD CONTRAST MATERIALNOC: HCPCS | Performed by: SURGERY

## 2022-09-12 PROCEDURE — 74177 CT ABD & PELVIS W/CONTRAST: CPT | Mod: TC

## 2022-09-12 PROCEDURE — 25500020 PHARM REV CODE 255: Performed by: SURGERY

## 2022-09-12 PROCEDURE — 74177 CT ABD & PELVIS W/CONTRAST: CPT | Mod: 26,,, | Performed by: RADIOLOGY

## 2022-09-12 RX ADMIN — IOHEXOL 100 ML: 350 INJECTION, SOLUTION INTRAVENOUS at 03:09

## 2022-09-12 RX ADMIN — Medication 450 ML: at 03:09

## 2022-09-13 ENCOUNTER — OFFICE VISIT (OUTPATIENT)
Dept: SURGERY | Facility: CLINIC | Age: 67
End: 2022-09-13
Payer: MEDICARE

## 2022-09-13 ENCOUNTER — TELEPHONE (OUTPATIENT)
Dept: PREADMISSION TESTING | Facility: HOSPITAL | Age: 67
End: 2022-09-13
Payer: MEDICARE

## 2022-09-13 VITALS
DIASTOLIC BLOOD PRESSURE: 86 MMHG | OXYGEN SATURATION: 99 % | SYSTOLIC BLOOD PRESSURE: 175 MMHG | WEIGHT: 187.19 LBS | HEART RATE: 64 BPM | HEIGHT: 71 IN | BODY MASS INDEX: 26.21 KG/M2

## 2022-09-13 DIAGNOSIS — R11.10 VOMITING AND DIARRHEA: ICD-10-CM

## 2022-09-13 DIAGNOSIS — K31.89 GASTRIC MASS: Primary | ICD-10-CM

## 2022-09-13 DIAGNOSIS — R19.7 VOMITING AND DIARRHEA: ICD-10-CM

## 2022-09-13 PROCEDURE — 99204 PR OFFICE/OUTPT VISIT, NEW, LEVL IV, 45-59 MIN: ICD-10-PCS | Mod: S$GLB,,, | Performed by: SURGERY

## 2022-09-13 PROCEDURE — 99999 PR PBB SHADOW E&M-EST. PATIENT-LVL IV: CPT | Mod: PBBFAC,,, | Performed by: SURGERY

## 2022-09-13 PROCEDURE — 99999 PR PBB SHADOW E&M-EST. PATIENT-LVL IV: ICD-10-PCS | Mod: PBBFAC,,, | Performed by: SURGERY

## 2022-09-13 PROCEDURE — 3079F DIAST BP 80-89 MM HG: CPT | Mod: CPTII,S$GLB,, | Performed by: SURGERY

## 2022-09-13 PROCEDURE — 1101F PT FALLS ASSESS-DOCD LE1/YR: CPT | Mod: CPTII,S$GLB,, | Performed by: SURGERY

## 2022-09-13 PROCEDURE — 1126F AMNT PAIN NOTED NONE PRSNT: CPT | Mod: CPTII,S$GLB,, | Performed by: SURGERY

## 2022-09-13 PROCEDURE — 3288F PR FALLS RISK ASSESSMENT DOCUMENTED: ICD-10-PCS | Mod: CPTII,S$GLB,, | Performed by: SURGERY

## 2022-09-13 PROCEDURE — 3079F PR MOST RECENT DIASTOLIC BLOOD PRESSURE 80-89 MM HG: ICD-10-PCS | Mod: CPTII,S$GLB,, | Performed by: SURGERY

## 2022-09-13 PROCEDURE — 3008F BODY MASS INDEX DOCD: CPT | Mod: CPTII,S$GLB,, | Performed by: SURGERY

## 2022-09-13 PROCEDURE — 4010F PR ACE/ARB THEARPY RXD/TAKEN: ICD-10-PCS | Mod: CPTII,S$GLB,, | Performed by: SURGERY

## 2022-09-13 PROCEDURE — 3077F PR MOST RECENT SYSTOLIC BLOOD PRESSURE >= 140 MM HG: ICD-10-PCS | Mod: CPTII,S$GLB,, | Performed by: SURGERY

## 2022-09-13 PROCEDURE — 1126F PR PAIN SEVERITY QUANTIFIED, NO PAIN PRESENT: ICD-10-PCS | Mod: CPTII,S$GLB,, | Performed by: SURGERY

## 2022-09-13 PROCEDURE — 3288F FALL RISK ASSESSMENT DOCD: CPT | Mod: CPTII,S$GLB,, | Performed by: SURGERY

## 2022-09-13 PROCEDURE — 4010F ACE/ARB THERAPY RXD/TAKEN: CPT | Mod: CPTII,S$GLB,, | Performed by: SURGERY

## 2022-09-13 PROCEDURE — 3077F SYST BP >= 140 MM HG: CPT | Mod: CPTII,S$GLB,, | Performed by: SURGERY

## 2022-09-13 PROCEDURE — 1101F PR PT FALLS ASSESS DOC 0-1 FALLS W/OUT INJ PAST YR: ICD-10-PCS | Mod: CPTII,S$GLB,, | Performed by: SURGERY

## 2022-09-13 PROCEDURE — 3008F PR BODY MASS INDEX (BMI) DOCUMENTED: ICD-10-PCS | Mod: CPTII,S$GLB,, | Performed by: SURGERY

## 2022-09-13 PROCEDURE — 99204 OFFICE O/P NEW MOD 45 MIN: CPT | Mod: S$GLB,,, | Performed by: SURGERY

## 2022-09-13 NOTE — ANESTHESIA POSTPROCEDURE EVALUATION
Anesthesia Post Evaluation    Patient: Sami Zhang    Procedure(s) Performed: Procedure(s) (LRB):  EGD (ESOPHAGOGASTRODUODENOSCOPY) (N/A)  ULTRASOUND, UPPER GI TRACT, ENDOSCOPIC (N/A)    Final Anesthesia Type: general      Patient location during evaluation: PACU  Patient participation: Yes- Able to Participate  Level of consciousness: awake and alert, awake and oriented  Post-procedure vital signs: reviewed and stable  Pain management: adequate  Airway patency: patent    PONV status at discharge: No PONV  Anesthetic complications: no      Cardiovascular status: blood pressure returned to baseline, hemodynamically stable and stable  Respiratory status: unassisted, spontaneous ventilation and room air  Hydration status: euvolemic  Follow-up not needed.          Vitals Value Taken Time   /79 09/09/22 1054   Temp 36.4 °C (97.5 °F) 09/09/22 1054   Pulse 57 09/09/22 1055   Resp 33 09/09/22 1055   SpO2 100 % 09/09/22 1055   Vitals shown include unvalidated device data.      No case tracking events are documented in the log.      Pain/Ayde Score: No data recorded

## 2022-09-13 NOTE — PROGRESS NOTES
Encounter Date:  2022    Patient ID: Sami Zhang  Age:  66 y.o. :  1955    Chief Complaint:  No chief complaint on file.      History:    Mr. Zhang is a 66 y.o. male who presents with cystic gastric mass (8x9cm) with central necrosis identified first in 2022. He has epigastric gas pains beginning at the beginning of that month. Care delayed due to insurance issues.    Has mild night sweats off and on for a few months. 6 lbs weight loss over the last couple months.    He has undergone EUS with biopsy. Path pending.    HTN, HLD and gout on meds    Bilateral knee surgery. No problems with anesthesia.    No family history of cancer.    Worked with [employer] for [employer]. Now retired. Mows grass with push mower. Does need to take a break for SOB occasionally, but no chest pain.    Has two lila-tzu.        Past Medical History:   Diagnosis Date    Arthritis     Gout 10/8/2015    Hyperlipidemia     Hypertension      Past Surgical History:   Procedure Laterality Date    ENDOSCOPIC ULTRASOUND OF UPPER GASTROINTESTINAL TRACT N/A 2022    Procedure: ULTRASOUND, UPPER GI TRACT, ENDOSCOPIC;  Surgeon: Yosvany Paula MD;  [facility]  Service: Endoscopy;  Laterality: N/A;  The patient need an EGD/EUS (radial) for gastric mass. Main. Urgent. 60 minutes. Referring:   Todd Cantu MD   Thanks,   Evan Farr MD    ESOPHAGOGASTRODUODENOSCOPY N/A 2022    Procedure: EGD (ESOPHAGOGASTRODUODENOSCOPY);  Surgeon: Yosvany Paula MD;  Location: [facility] ([facility]);  Service: Endoscopy;  Laterality: N/A;  The patient need an EGD/EUS (radial) for gastric mass. Main. Urgent. 60 minutes. Referring:   Todd Cantu MD   Thanks,   Evan Farr MD    Pt is fully vaccinated-DS  22-Instructions written down by pt's wife and read back.-DS    NONE N/A 10/8/2015     Current Outpatient Medications on File Prior to Visit   Medication Sig Dispense Refill    allopurinol (ZYLOPRIM) 100 MG tablet Take  100 mg by mouth 2 (two) times daily.      CALCIUM CARBONATE/VITAMIN D3 (VITAMIN D-3 ORAL) Take 10,000 Units by mouth once a week.      colchicine 0.6 mg tablet Take two tablets orally x one dose, then take one tablet orally one hour later. 3 tablet 0    nebivolol (BYSTOLIC) 10 MG Tab Take 10 mg by mouth once daily.      ondansetron (ZOFRAN) 4 MG tablet Take 1 tablet (4 mg total) by mouth every 6 (six) hours. 12 tablet 0    rosuvastatin (CRESTOR) 20 MG tablet Take 20 mg by mouth once daily.      valsartan-hydrochlorothiazide (DIOVAN-HCT) 160-12.5 mg per tablet Take 1 tablet by mouth once daily.      vitamin D 1000 units Tab Take 10,000 Units by mouth once a week.       Current Facility-Administered Medications on File Prior to Visit   Medication Dose Route Frequency Provider Last Rate Last Admin    [COMPLETED] barium (READI-CAT 2) suspension 450 mL  450 mL Oral ONCE PRN Todd Cantu MD   450 mL at 09/12/22 1536    [COMPLETED] iohexoL (OMNIPAQUE 350) injection 100 mL  100 mL Intravenous ONCE PRN Todd Cantu MD   100 mL at 09/12/22 1535     Review of patient's allergies indicates:  No Known Allergies    Family History:  His family history includes Stroke in his sister.     Social History:  He reports that he has never smoked. He has never used smokeless tobacco. He reports current alcohol use of about 2.0 standard drinks per week. He reports that he does not use drugs.     ROS:     Review of Systems   Constitutional:  Positive for chills and unexpected weight change. Negative for activity change and fever.   HENT: Negative.     Eyes: Negative.    Respiratory:  Positive for shortness of breath. Negative for cough.    Cardiovascular: Negative.  Negative for chest pain and palpitations.   Gastrointestinal:  Positive for abdominal pain. Negative for abdominal distention, constipation, diarrhea, nausea and vomiting.   Endocrine: Negative.    Genitourinary: Negative.  Negative for dysuria.   Musculoskeletal:  "Negative.    Skin: Negative.    Allergic/Immunologic: Negative.    Neurological: Negative.    Hematological: Negative.    Psychiatric/Behavioral: Negative.     Pertinent positive/negatives detailed in HPI, all other systems negative.     Physical Exam:  BP (!) 175/86 (BP Location: Left arm, Patient Position: Sitting)   Pulse 64   Ht 5' 11" (1.803 m)   Wt 84.9 kg (187 lb 2.7 oz)   SpO2 99%   BMI 26.11 kg/m²     Physical Exam  Constitutional:       Appearance: Normal appearance.   Cardiovascular:      Rate and Rhythm: Normal rate and regular rhythm.   Pulmonary:      Effort: Pulmonary effort is normal. No respiratory distress.   Abdominal:      Palpations: Abdomen is soft.      Comments: Umbilical hernia  No incisions   Musculoskeletal:      Comments: No lower extremity ederma   Skin:     General: Skin is warm and dry.      Capillary Refill: Capillary refill takes less than 2 seconds.   Neurological:      General: No focal deficit present.      Mental Status: He is alert and oriented to person, place, and time.     Data:     Radiology:  I personally reviewed these images:   CT 9/12 - 9 x 8 cm gastric mass with central necrosis with imaging differential of adenocarcinoma, sarcoma or lymphoma    Endoscopy:  EUS 9/9 shows submucosal mass with no bleeding stigmata, intedeterminate layer of origin, FNA performed    Labs:  Labs reviewed. Mild anemia (Hgb 13.8) and mild hyperbilirubinema (Tbili 1.1)    Pathology:  pending FNA      ICD-10-CM ICD-9-CM    1. Gastric mass  K31.89 537.89       Plan       Plan:  F/u biopsy results  Plan pending       W. Klaus Jones MD   General Surgery, PGY-5      I have seen the patient, reviewed the attached resident or CONRADO's history and physical, assessment and plan. I have personally interviewed and examined the patient at bedside, reviewed the chart, relevant imaging/labs and agree with the findings.      Assuming GIST would favor neoadj gleevac given size/location.  There are two " indeterminate liver lesion that need attention on f/u.      Kostas López MD, FACS  Surgical Oncology  [facility] [place], [place]  Office: [phone]  Fax: [phone]

## 2022-09-15 ENCOUNTER — TELEPHONE (OUTPATIENT)
Dept: GASTROENTEROLOGY | Facility: HOSPITAL | Age: 67
End: 2022-09-15
Payer: MEDICARE

## 2022-09-15 ENCOUNTER — TELEPHONE (OUTPATIENT)
Dept: PREADMISSION TESTING | Facility: HOSPITAL | Age: 67
End: 2022-09-15
Payer: MEDICARE

## 2022-09-15 NOTE — TELEPHONE ENCOUNTER
The FNA of the lesion revealed a GIST  I called and relayed the information.     He has an appt with Dr López on 9/27.  He should keep that appt to discuss therapy

## 2022-09-19 ENCOUNTER — DOCUMENTATION ONLY (OUTPATIENT)
Dept: HEMATOLOGY/ONCOLOGY | Facility: CLINIC | Age: 67
End: 2022-09-19
Payer: MEDICARE

## 2022-09-19 DIAGNOSIS — C49.A0 GASTROINTESTINAL STROMAL TUMOR (GIST): Primary | ICD-10-CM

## 2022-09-19 NOTE — PROGRESS NOTES
Upper GI Tumor Board  Cancer Genetics Summary    Cancer Genetics Provider Present:  Rodrigo Riddle DNP    Patient ID:  Rich Zhang     1955    MRN [id number]      Date of Upper GI Tumor Board:  2022  Presenting Provider(s):  QIAN López MD    Cancer Genetics Impression:  66 y.o. biological male diagnosed with GIST.    Cancer Genetics Recommendation:  Cancer Genetics consult for further cancer genetic risk assessment, genetic counseling, and potentially genetic testing.  Regarding GIST, genes to consider testing (list not exhaustive):  SDHB, NF1.    Intervention:  Reached out to the presenting provider with this recommendation.

## 2022-09-26 DIAGNOSIS — D63.0 ANEMIA IN NEOPLASTIC DISEASE: ICD-10-CM

## 2022-09-26 DIAGNOSIS — C49.A0 GASTROINTESTINAL STROMAL TUMOR (GIST): Primary | ICD-10-CM

## 2022-09-26 PROBLEM — C49.A2 GASTROINTESTINAL STROMAL TUMOR (GIST) OF STOMACH: Status: ACTIVE | Noted: 2022-09-13

## 2022-09-26 NOTE — ASSESSMENT & PLAN NOTE
Labs drawn from  Right antecubital area. 23 gauge WONC used. Gauze and Tape/Band-Aid dressing applied. Site appears clean dry and intact. Patient tolerated procedure well, no adverse reactions noted. Instructed patient to call with any questions or concerns.     Next appointment scheduled: 7/08/19              - c-KIT and PDGFRA mtuational analyses pending  - Will start neoadjuvant imatinib 400mg po daily while awaiting mutational analyses  - FU one to two weeks after starting imatinib  - Repeat CT torso three months after starting imatinib to re-evaluate for surgery

## 2022-09-26 NOTE — PLAN OF CARE
START ON PATHWAY REGIMEN - Sarcoma    [id number]        Imatinib (Gleevec)     **Always confirm dose/schedule in your pharmacy ordering system**    Patient Characteristics:  GIST: Gastric and Omental, Surgical Candidate - Preoperative Therapy, Tumor Size    > 3 cm  Histology/Anatomic Site: GIST: Gastric and Omental  Mitotic rate: High  Therapeutic Status: Surgical Candidate - Preoperative Therapy  AJCC T Category: T3  AJCC M Category: M0  AJCC N Category: N0  AJCC 8 Stage Grouping: IIIA  Tumor Size: > 3 cm  Intent of Therapy:  Curative Intent, Discussed with Patient

## 2022-09-27 ENCOUNTER — OFFICE VISIT (OUTPATIENT)
Dept: HEMATOLOGY/ONCOLOGY | Facility: CLINIC | Age: 67
End: 2022-09-27
Payer: MEDICARE

## 2022-09-27 ENCOUNTER — LAB VISIT (OUTPATIENT)
Dept: LAB | Facility: HOSPITAL | Age: 67
End: 2022-09-27
Attending: HOSPITALIST
Payer: MEDICARE

## 2022-09-27 ENCOUNTER — SPECIALTY PHARMACY (OUTPATIENT)
Dept: PHARMACY | Facility: CLINIC | Age: 67
End: 2022-09-27
Payer: MEDICARE

## 2022-09-27 VITALS
BODY MASS INDEX: 26.54 KG/M2 | SYSTOLIC BLOOD PRESSURE: 169 MMHG | OXYGEN SATURATION: 100 % | WEIGHT: 190.25 LBS | RESPIRATION RATE: 16 BRPM | DIASTOLIC BLOOD PRESSURE: 87 MMHG | HEART RATE: 57 BPM

## 2022-09-27 DIAGNOSIS — C49.A2 GASTROINTESTINAL STROMAL TUMOR (GIST) OF STOMACH: Primary | ICD-10-CM

## 2022-09-27 DIAGNOSIS — I10 PRIMARY HYPERTENSION: ICD-10-CM

## 2022-09-27 DIAGNOSIS — C49.A0 GASTROINTESTINAL STROMAL TUMOR (GIST): ICD-10-CM

## 2022-09-27 LAB
ALBUMIN SERPL BCP-MCNC: 3.6 G/DL (ref 3.5–5.2)
ALP SERPL-CCNC: 87 U/L (ref 55–135)
ALT SERPL W/O P-5'-P-CCNC: 19 U/L (ref 10–44)
ANION GAP SERPL CALC-SCNC: 8 MMOL/L (ref 8–16)
AST SERPL-CCNC: 21 U/L (ref 10–40)
BASOPHILS # BLD AUTO: 0.06 K/UL (ref 0–0.2)
BASOPHILS NFR BLD: 0.6 % (ref 0–1.9)
BILIRUB SERPL-MCNC: 0.4 MG/DL (ref 0.1–1)
BUN SERPL-MCNC: 12 MG/DL (ref 8–23)
CALCIUM SERPL-MCNC: 9.3 MG/DL (ref 8.7–10.5)
CHLORIDE SERPL-SCNC: 107 MMOL/L (ref 95–110)
CO2 SERPL-SCNC: 25 MMOL/L (ref 23–29)
CREAT SERPL-MCNC: 1 MG/DL (ref 0.5–1.4)
DIFFERENTIAL METHOD: ABNORMAL
EOSINOPHIL # BLD AUTO: 0.3 K/UL (ref 0–0.5)
EOSINOPHIL NFR BLD: 2.6 % (ref 0–8)
ERYTHROCYTE [DISTWIDTH] IN BLOOD BY AUTOMATED COUNT: 14 % (ref 11.5–14.5)
EST. GFR  (NO RACE VARIABLE): >60 ML/MIN/1.73 M^2
GLUCOSE SERPL-MCNC: 80 MG/DL (ref 70–110)
HCT VFR BLD AUTO: 39.3 % (ref 40–54)
HGB BLD-MCNC: 12 G/DL (ref 14–18)
IMM GRANULOCYTES # BLD AUTO: 0.06 K/UL (ref 0–0.04)
IMM GRANULOCYTES NFR BLD AUTO: 0.6 % (ref 0–0.5)
LYMPHOCYTES # BLD AUTO: 2.2 K/UL (ref 1–4.8)
LYMPHOCYTES NFR BLD: 22.1 % (ref 18–48)
MCH RBC QN AUTO: 25.9 PG (ref 27–31)
MCHC RBC AUTO-ENTMCNC: 30.5 G/DL (ref 32–36)
MCV RBC AUTO: 85 FL (ref 82–98)
MONOCYTES # BLD AUTO: 0.9 K/UL (ref 0.3–1)
MONOCYTES NFR BLD: 9.3 % (ref 4–15)
NEUTROPHILS # BLD AUTO: 6.4 K/UL (ref 1.8–7.7)
NEUTROPHILS NFR BLD: 64.8 % (ref 38–73)
NRBC BLD-RTO: 0 /100 WBC
PLATELET # BLD AUTO: 272 K/UL (ref 150–450)
PMV BLD AUTO: 11.7 FL (ref 9.2–12.9)
POTASSIUM SERPL-SCNC: 3.4 MMOL/L (ref 3.5–5.1)
PROT SERPL-MCNC: 6.7 G/DL (ref 6–8.4)
RBC # BLD AUTO: 4.64 M/UL (ref 4.6–6.2)
SODIUM SERPL-SCNC: 140 MMOL/L (ref 136–145)
WBC # BLD AUTO: 9.87 K/UL (ref 3.9–12.7)

## 2022-09-27 PROCEDURE — 1160F PR REVIEW ALL MEDS BY PRESCRIBER/CLIN PHARMACIST DOCUMENTED: ICD-10-PCS | Mod: CPTII,S$GLB,, | Performed by: HOSPITALIST

## 2022-09-27 PROCEDURE — 3288F FALL RISK ASSESSMENT DOCD: CPT | Mod: CPTII,S$GLB,, | Performed by: HOSPITALIST

## 2022-09-27 PROCEDURE — 99999 PR PBB SHADOW E&M-EST. PATIENT-LVL IV: CPT | Mod: PBBFAC,,, | Performed by: HOSPITALIST

## 2022-09-27 PROCEDURE — 3079F DIAST BP 80-89 MM HG: CPT | Mod: CPTII,S$GLB,, | Performed by: HOSPITALIST

## 2022-09-27 PROCEDURE — 1101F PR PT FALLS ASSESS DOC 0-1 FALLS W/OUT INJ PAST YR: ICD-10-PCS | Mod: CPTII,S$GLB,, | Performed by: HOSPITALIST

## 2022-09-27 PROCEDURE — 3008F PR BODY MASS INDEX (BMI) DOCUMENTED: ICD-10-PCS | Mod: CPTII,S$GLB,, | Performed by: HOSPITALIST

## 2022-09-27 PROCEDURE — 1160F RVW MEDS BY RX/DR IN RCRD: CPT | Mod: CPTII,S$GLB,, | Performed by: HOSPITALIST

## 2022-09-27 PROCEDURE — 99499 UNLISTED E&M SERVICE: CPT | Mod: HCNC,S$GLB,, | Performed by: HOSPITALIST

## 2022-09-27 PROCEDURE — 99205 OFFICE O/P NEW HI 60 MIN: CPT | Mod: S$GLB,,, | Performed by: HOSPITALIST

## 2022-09-27 PROCEDURE — 80053 COMPREHEN METABOLIC PANEL: CPT | Performed by: HOSPITALIST

## 2022-09-27 PROCEDURE — 3079F PR MOST RECENT DIASTOLIC BLOOD PRESSURE 80-89 MM HG: ICD-10-PCS | Mod: CPTII,S$GLB,, | Performed by: HOSPITALIST

## 2022-09-27 PROCEDURE — 1126F PR PAIN SEVERITY QUANTIFIED, NO PAIN PRESENT: ICD-10-PCS | Mod: CPTII,S$GLB,, | Performed by: HOSPITALIST

## 2022-09-27 PROCEDURE — 3008F BODY MASS INDEX DOCD: CPT | Mod: CPTII,S$GLB,, | Performed by: HOSPITALIST

## 2022-09-27 PROCEDURE — 1159F MED LIST DOCD IN RCRD: CPT | Mod: CPTII,S$GLB,, | Performed by: HOSPITALIST

## 2022-09-27 PROCEDURE — 1126F AMNT PAIN NOTED NONE PRSNT: CPT | Mod: CPTII,S$GLB,, | Performed by: HOSPITALIST

## 2022-09-27 PROCEDURE — 3288F PR FALLS RISK ASSESSMENT DOCUMENTED: ICD-10-PCS | Mod: CPTII,S$GLB,, | Performed by: HOSPITALIST

## 2022-09-27 PROCEDURE — 3077F PR MOST RECENT SYSTOLIC BLOOD PRESSURE >= 140 MM HG: ICD-10-PCS | Mod: CPTII,S$GLB,, | Performed by: HOSPITALIST

## 2022-09-27 PROCEDURE — 36415 COLL VENOUS BLD VENIPUNCTURE: CPT | Performed by: HOSPITALIST

## 2022-09-27 PROCEDURE — 3077F SYST BP >= 140 MM HG: CPT | Mod: CPTII,S$GLB,, | Performed by: HOSPITALIST

## 2022-09-27 PROCEDURE — 1159F PR MEDICATION LIST DOCUMENTED IN MEDICAL RECORD: ICD-10-PCS | Mod: CPTII,S$GLB,, | Performed by: HOSPITALIST

## 2022-09-27 PROCEDURE — 99205 PR OFFICE/OUTPT VISIT, NEW, LEVL V, 60-74 MIN: ICD-10-PCS | Mod: S$GLB,,, | Performed by: HOSPITALIST

## 2022-09-27 PROCEDURE — 99499 RISK ADDL DX/OHS AUDIT: ICD-10-PCS | Mod: HCNC,S$GLB,, | Performed by: HOSPITALIST

## 2022-09-27 PROCEDURE — 1101F PT FALLS ASSESS-DOCD LE1/YR: CPT | Mod: CPTII,S$GLB,, | Performed by: HOSPITALIST

## 2022-09-27 PROCEDURE — 85025 COMPLETE CBC W/AUTO DIFF WBC: CPT | Performed by: HOSPITALIST

## 2022-09-27 PROCEDURE — 99999 PR PBB SHADOW E&M-EST. PATIENT-LVL IV: ICD-10-PCS | Mod: PBBFAC,,, | Performed by: HOSPITALIST

## 2022-09-27 RX ORDER — IMATINIB MESYLATE 400 MG/1
400 TABLET, FILM COATED ORAL DAILY
Qty: 30 TABLET | Refills: 11 | Status: SHIPPED | OUTPATIENT
Start: 2022-09-27 | End: 2022-11-08 | Stop reason: SDUPTHER

## 2022-09-27 NOTE — PROGRESS NOTES
MEDICAL ONCOLOGY - NEW PATIENT VISIT    Best Contact Phone Number(s): [phone] (home) [phone] (work)     Cancer/Stage/TNM:    Cancer Staging   Gastrointestinal stromal tumor (GIST) of stomach  Staging form: Gastrointestinal Stromal Tumor - Gastric and Omental GIST, AJCC 8th Edition  - Clinical stage from 9/9/2022: Stage IIIA (cT3, cN0, cM0, Mitotic Rate: High) - Signed by Enrique Mcnair MD on 9/26/2022       Reason for visit: Mr. Zhang is a 66 year old man with HTN diagnosed with GIST by EUS on 9/9/22. He presents to medical oncology clinic for initial visit and to consider neoadjuvant imatinib.    History has been obtained by chart review and discussion with the patient.    HPI:   Patient reports several months of progressive abdominal bloating and 'gassiness' after eating. He reports weight loss during this time, but cannot quantify. No mel nausea or abdominal pain. His appetite has actually been quite good. Otherwise no fevers or chills. No CP, SOB or cough. He does note dark stool over the last three days, but no hematochezia. Stool is not tarry or sticky. No dizziness or light headedness. No other concerns.    Oncology History   Gastrointestinal stromal tumor (GIST) of stomach   7/18/2022 Imaging Significant Findings    Presented to [facility] ED with abdominal pain. CT a/p found a 6.7 x 5.6 x 7.2 cm cystic mass in the gastric fundus.     9/9/2022 Cancer Staged    Staging form: Gastrointestinal Stromal Tumor - Gastric and Omental GIST, AJCC 8th Edition  - Clinical stage from 9/9/2022: Stage IIIA (cT3, cN0, cM0, Mitotic Rate: High)       9/9/2022 Procedure    EGD/EUS fins a 6.2x8.0 cm cystic mass in the cardia. Pathology shows GIST with 'focal/frequent mitoses' although exact quantification could not be determined.      9/12/2022 Imaging Significant Findings    CT a/p redemonstrates 9.3x8 cm gastric mass with associated necrosis. No LAD. No liver lesions.     9/13/2022 Initial Diagnosis     Gastrointestinal stromal tumor (GIST) of stomach     9/15/2022 Imaging Significant Findings    CT chest only shows incidental 3mm LLL nodule. No evidence of metastatic disease.          Review of Systems   Constitutional:  Positive for unexpected weight change. Negative for appetite change, chills and fever.   HENT:  Negative for hearing loss, mouth sores, nosebleeds, sore throat and trouble swallowing.    Eyes:  Negative for pain and visual disturbance.   Respiratory:  Negative for cough and shortness of breath.    Cardiovascular:  Negative for chest pain, palpitations and leg swelling.   Gastrointestinal:  Positive for abdominal distention (with post prandial bloating). Negative for abdominal pain, blood in stool, constipation, diarrhea, nausea and vomiting.   Genitourinary:  Negative for difficulty urinating and dysuria.   Musculoskeletal:  Negative for arthralgias and joint swelling.   Skin:  Negative for rash.   Neurological:  Negative for dizziness, weakness, light-headedness and headaches.   Hematological:  Negative for adenopathy. Does not bruise/bleed easily.      Past Medical History:   Diagnosis Date    Arthritis     Gout 10/8/2015    Hyperlipidemia     Hypertension         Past Surgical History:   Procedure Laterality Date    ENDOSCOPIC ULTRASOUND OF UPPER GASTROINTESTINAL TRACT N/A 9/9/2022    Procedure: ULTRASOUND, UPPER GI TRACT, ENDOSCOPIC;  Surgeon: Yosvany Paula MD;  Location: [facility] ([address] [place] [address]);  Service: Endoscopy;  Laterality: N/A;  The patient need an EGD/EUS (radial) for gastric mass. Main. Urgent. 60 minutes. Referring:   Todd Cantu MD   ThanksEvan MD    ESOPHAGOGASTRODUODENOSCOPY N/A 9/9/2022    Procedure: EGD (ESOPHAGOGASTRODUODENOSCOPY);  Surgeon: Yosvany Paula MD;  Location: [address]);  Service: Endoscopy;  Laterality: N/A;  The patient need an EGD/EUS (radial) for gastric mass. Main. Urgent. 60 minutes. Referring:   MD Estefani Campos,   Evan  MD Yordan    Pt is fully vaccinated-DS  9/7/22-Instructions written down by pt's wife and read back.-DS    NONE N/A 10/8/2015        Family History   Problem Relation Age of Onset    Stroke Sister 60    Cancer Neg Hx     Diabetes Neg Hx     Heart disease Neg Hx     Hypertension Neg Hx         Social History     Tobacco Use    Smoking status: Never    Smokeless tobacco: Never   Substance Use Topics    Alcohol use: Yes     Alcohol/week: 2.0 standard drinks     Types: 2 Cans of beer per week     Comment: occasionally drinks wine        I have reviewed and updated the patient's past medical, surgical, family and social histories.    Review of patient's allergies indicates:  No Known Allergies     Current Outpatient Medications   Medication Sig Dispense Refill    allopurinol (ZYLOPRIM) 100 MG tablet Take 100 mg by mouth 2 (two) times daily.      CALCIUM CARBONATE/VITAMIN D3 (VITAMIN D-3 ORAL) Take 10,000 Units by mouth once a week.      colchicine 0.6 mg tablet Take two tablets orally x one dose, then take one tablet orally one hour later. 3 tablet 0    nebivolol (BYSTOLIC) 10 MG Tab Take 10 mg by mouth once daily.      ondansetron (ZOFRAN) 4 MG tablet Take 1 tablet (4 mg total) by mouth every 6 (six) hours. 12 tablet 0    rosuvastatin (CRESTOR) 20 MG tablet Take 20 mg by mouth once daily.      valsartan-hydrochlorothiazide (DIOVAN-HCT) 160-12.5 mg per tablet Take 1 tablet by mouth once daily.      vitamin D 1000 units Tab Take 10,000 Units by mouth once a week.      imatinib (GLEEVEC) 400 MG Tab Take 1 tablet (400 mg total) by mouth once daily. 30 tablet 11     No current facility-administered medications for this visit.        Physical Exam:   BP (!) 169/87 (BP Location: Left arm, Patient Position: Sitting)   Pulse (!) 57   Resp 16   Wt 86.3 kg (190 lb 4.1 oz)   SpO2 100%   BMI 26.54 kg/m²      ECOG Performance status: 0            Physical Exam  Constitutional:       General: He is not in acute distress.      Appearance: Normal appearance.   HENT:      Head: Normocephalic.      Nose: Nose normal.      Mouth/Throat:      Mouth: Mucous membranes are moist.      Pharynx: Oropharynx is clear. No oropharyngeal exudate or posterior oropharyngeal erythema.   Eyes:      General: No scleral icterus.     Extraocular Movements: Extraocular movements intact.      Conjunctiva/sclera: Conjunctivae normal.      Pupils: Pupils are equal, round, and reactive to light.   Cardiovascular:      Rate and Rhythm: Normal rate and regular rhythm.      Heart sounds: No murmur heard.    No friction rub. No gallop.   Pulmonary:      Effort: Pulmonary effort is normal. No respiratory distress.      Breath sounds: Normal breath sounds. No wheezing, rhonchi or rales.   Abdominal:      General: There is no distension.      Palpations: Abdomen is soft.      Tenderness: There is no abdominal tenderness. There is no guarding or rebound.   Musculoskeletal:         General: No swelling. Normal range of motion.      Cervical back: Normal range of motion and neck supple.      Right lower leg: No edema.      Left lower leg: No edema.   Lymphadenopathy:      Cervical: No cervical adenopathy.   Skin:     General: Skin is warm and dry.      Coloration: Skin is not jaundiced.      Findings: No lesion or rash.   Neurological:      General: No focal deficit present.      Mental Status: He is alert and oriented to person, place, and time.      Cranial Nerves: No cranial nerve deficit.      Motor: No weakness.   Psychiatric:         Mood and Affect: Mood normal.         Behavior: Behavior normal.         Thought Content: Thought content normal.         Labs:   Recent Results (from the past 48 hour(s))   CBC Auto Differential    Collection Time: 09/27/22  1:16 PM   Result Value Ref Range    WBC 9.87 3.90 - 12.70 K/uL    RBC 4.64 4.60 - 6.20 M/uL    Hemoglobin 12.0 (L) 14.0 - 18.0 g/dL    Hematocrit 39.3 (L) 40.0 - 54.0 %    MCV 85 82 - 98 fL    MCH 25.9 (L) 27.0 - 31.0  pg    MCHC 30.5 (L) 32.0 - 36.0 g/dL    RDW 14.0 11.5 - 14.5 %    Platelets 272 150 - 450 K/uL    MPV 11.7 9.2 - 12.9 fL    Immature Granulocytes 0.6 (H) 0.0 - 0.5 %    Gran # (ANC) 6.4 1.8 - 7.7 K/uL    Immature Grans (Abs) 0.06 (H) 0.00 - 0.04 K/uL    Lymph # 2.2 1.0 - 4.8 K/uL    Mono # 0.9 0.3 - 1.0 K/uL    Eos # 0.3 0.0 - 0.5 K/uL    Baso # 0.06 0.00 - 0.20 K/uL    nRBC 0 0 /100 WBC    Gran % 64.8 38.0 - 73.0 %    Lymph % 22.1 18.0 - 48.0 %    Mono % 9.3 4.0 - 15.0 %    Eosinophil % 2.6 0.0 - 8.0 %    Basophil % 0.6 0.0 - 1.9 %    Differential Method Automated    CMP    Collection Time: 09/27/22  1:16 PM   Result Value Ref Range    Sodium 140 136 - 145 mmol/L    Potassium 3.4 (L) 3.5 - 5.1 mmol/L    Chloride 107 95 - 110 mmol/L    CO2 25 23 - 29 mmol/L    Glucose 80 70 - 110 mg/dL    BUN 12 8 - 23 mg/dL    Creatinine 1.0 0.5 - 1.4 mg/dL    Calcium 9.3 8.7 - 10.5 mg/dL    Total Protein 6.7 6.0 - 8.4 g/dL    Albumin 3.6 3.5 - 5.2 g/dL    Total Bilirubin 0.4 0.1 - 1.0 mg/dL    Alkaline Phosphatase 87 55 - 135 U/L    AST 21 10 - 40 U/L    ALT 19 10 - 44 U/L    Anion Gap 8 8 - 16 mmol/L    eGFR >60.0 >60 mL/min/1.73 m^2        I have reviewed the pertinent labs which are notable for mildly progressive normocytic anemia.    Imaging:       CT  I have personally reviewed the imaging which is notable for CT a/9 on 9/12/22 with necrotic 9.3 x 8 cm gastric mass. No LAD. CT chest on 9/15 with no metastatic disease. Incidental 3mm lung nodule.    Path:   Reviewed pathology as documented in oncology history      Assessment and Plan:   1. Gastrointestinal stromal tumor (GIST) of stomach  Overview:  Patient with relatively large and symptomatic gastric GIST, which has grown on imaging from 7/18/22 to 9/12/22. No imaging evidence of luis or distant metastatic disease. Mitotic rate not quantified, but given clinical behavior and noted cellularity and frequent mitosis would consider higher risk.     Assessment & Plan:  - c-KIT  and PDGFRA mtuational analyses pending  - Will start neoadjuvant imatinib 400mg po daily while awaiting mutational analyses  - FU one to two weeks after starting imatinib  - Repeat CT torso three months after starting imatinib to re-evaluate for surgery    Orders:  -     imatinib (GLEEVEC) 400 MG Tab    2. Primary hypertension  Overview:  Patient on HCTZ, nebivolol, and valsartan.    Assessment & Plan:  - Will need close monitoring on imatinib             Follow up:   Route Chart for Scheduling    Med Onc Chart Routing      Follow up with physician 2 weeks. FU with Dr. Mcnair on 10/13/22 (or approximately 1 week after starting imatinb)   Follow up with CONRADO    Infusion scheduling note    Injection scheduling note    Labs CBC and CMP   Lab interval:  ferritin, TIBC, iron   Imaging    Pharmacy appointment    Other referrals               The above information has been reviewed with the patient and all questions have been answered to their apparent satisfaction.  They understand that they can call the clinic with any questions.    Enrique Mcnair MD  Hematology/Oncology  [facility]

## 2022-09-27 NOTE — TELEPHONE ENCOUNTER
Therapy appropriate.     PA submitted via Epic portal. Awaiting determination.     Tracy, this is DANIA HUTTON with [facility].  We are working on your prescription that your doctor has sent us. We will be working with your insurance to get this approved for you. We will be calling you along the way with updates on your medication.  If you have any questions, you can reach us at [phone].    Welcome call outcome: Patient/caregiver reached

## 2022-09-27 NOTE — PATIENT INSTRUCTIONS
We are treating a cancer called GI-stromal tumor (GIST). We have no evidence that the cancer has spread. The treatment will ultimately surgery. However we oftentimes use a pill called imatinib to shrink the tumor prior to surgery and reduce the risk of the cancer coming back.    - Start imatinib 400mg by mouth daily - our pharmacy team will coordinate with your insurance. You will hear from them in a few days  - See me ~1-2 weeks after starting imatinib  - Repeat CT scan in 3 months and then re-evaluate for surgery

## 2022-09-28 NOTE — TELEPHONE ENCOUNTER
BENEFIT INVESTIGATION:  [employer] Medicare plan.   [facility] in network  Patient is in initial stage of coverage.   Estimated co pay: $498.96  Co pay assistance required. Forwarded to FA team for review.

## 2022-09-28 NOTE — TELEPHONE ENCOUNTER
Imatinib PA approved. Authorized from January 1, 2022 to December 31, 2022    Copay $498.96. routing to BI/FA

## 2022-09-30 NOTE — TELEPHONE ENCOUNTER
Outgoing call to patient regarding Gleevec prescription we received. Informed patients wife that Gleevec has been approved through insurance with a high copay. No grants available currently and no  assistance for Gleevec. CAGNO/OCI may be an option for first fill. Explained that CAGNO is not guaranteed every month and can assit with  max of $125 per fill and OCI is limited to $1000 per patient/per lifetime. Wife voiced understanding and would like [facility] to proceed with CAGNO/OCI. She did not have income information on hand and says she will callback today to provide annual income for CAGNO application.    IF PATIENT CALLS BACK WE WILL NEED HH SIZE AND ANNUAL INCOME FOR CAGNO APPLICATION.

## 2022-10-04 ENCOUNTER — DOCUMENTATION ONLY (OUTPATIENT)
Dept: HEMATOLOGY/ONCOLOGY | Facility: CLINIC | Age: 67
End: 2022-10-04
Payer: MEDICARE

## 2022-10-04 ENCOUNTER — TELEPHONE (OUTPATIENT)
Dept: HEMATOLOGY/ONCOLOGY | Facility: CLINIC | Age: 67
End: 2022-10-04
Payer: MEDICARE

## 2022-10-04 NOTE — TELEPHONE ENCOUNTER
Sent request to [employer] to see if they can assist with $498.96 copay for patients refill. Determination pending.

## 2022-10-04 NOTE — TELEPHONE ENCOUNTER
Called and spoke teri Gallardo and his wife. Appointment scheduled for Wednesday 11/30 for 9:30am. They voiced thanks and understanding of the appt date, time, and location.    ----- Message from Rodrigo Riddle DNP sent at 9/22/2022 11:35 AM CDT -----  Alyssa, can you please schedule pt in cancer genetics per the below and my tumor board note? Thanks!  -MD Rodrigo Arzola DNP  Go for it         Previous Messages     ----- Message -----   From: Rodrigo Riddle DNP   Sent: 9/19/2022   2:22 PM CDT   To: Kostas López MD     Hi Dr. López,     I attended [facility] Tumor Board today and recommend the following patients be seen in Cancer Genetics (please refer to my notes in Epic as needed):       [id number]       Please let me know if you have any questions.   Otherwise, please place referral to Cancer Genetics ([id number]) and we'll schedule the patient from the work queue, or simply reply to this message letting me know it's OK for my team to contact the patient to set up their visit.     Thank you,   Rodrigo Riddle

## 2022-10-04 NOTE — TELEPHONE ENCOUNTER
[employer] approved cost transfer in the amount of $250 to assist with patients Gleevec copay. [employer] will no longer be able to assist after this fill.      Sent message to [employer] SW, Babita Cordova to see if [employer] can assist with remaining copay amount of $248.96 this month. Determination pending.

## 2022-10-04 NOTE — PROGRESS NOTES
Social Work Consult    Name:Sami Zhang  : 1955  MRN: [id number]    Referral:Medication Assistance    SW received consult from Jaymie Gomez CPhT. Patient having difficulties paying for gleevec, $489.96. [facility] assisting with $250 and Jaymie is asking if PAF can cover remaining $239.96. Patient has not used PAF prior, Cost Transfer complete and emailed back to Jaymie.     Jaymie planning on covering cost next 2 scripts and will then reach out to [facility].

## 2022-10-04 NOTE — TELEPHONE ENCOUNTER
OCI approved cost transfer in the amount of $248.96 to assist with patients Gleevec copay. Once patient exhaust OCI funds RX should be transferred to [facility] where Gleevec would cost $39 for 30 day supply . Pending for initial consult.

## 2022-10-05 ENCOUNTER — SPECIALTY PHARMACY (OUTPATIENT)
Dept: PHARMACY | Facility: CLINIC | Age: 67
End: 2022-10-05
Payer: MEDICARE

## 2022-10-05 DIAGNOSIS — C49.A2 GASTROINTESTINAL STROMAL TUMOR (GIST) OF STOMACH: Primary | ICD-10-CM

## 2022-10-06 LAB
ONEOME COMMENT: NORMAL
ONEOME METHOD: NORMAL

## 2022-10-06 RX ORDER — ICOSAPENT ETHYL 500 MG/1
2 CAPSULE ORAL 2 TIMES DAILY
COMMUNITY

## 2022-10-06 NOTE — TELEPHONE ENCOUNTER
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 male, who is followed by the specialty pharmacy service for management and education.    Recent Encounters       Date Type Provider Description    10/05/2022 Specialty Pharmacy Nayely Moyer PharmD Initial Clinical Assessment    09/27/2022 Specialty Pharmacy DANIA HUTTON PharmD Referral Authorization          Clinical call attempts since last clinical assessment   10/5/2022  7:32 PM - Specialty Pharmacy - Clinical Assessment by Nayely Moyer PharmD     Current Outpatient Medications   Medication Sig Note    icosapent ethyL (VASCEPA) 0.5 gram Cap Take 2 capsules by mouth once daily.     multivitamin capsule Take 1 capsule by mouth once daily.     allopurinol (ZYLOPRIM) 100 MG tablet Take 100 mg by mouth 2 (two) times daily.     CALCIUM CARBONATE/VITAMIN D3 (VITAMIN D-3 ORAL) Take 10,000 Units by mouth once a week.     colchicine 0.6 mg tablet Take two tablets orally x one dose, then take one tablet orally one hour later. 10/6/2022: Pt not taking    imatinib (GLEEVEC) 400 MG Tab Take 1 tablet (400 mg total) by mouth once daily.     nebivolol (BYSTOLIC) 10 MG Tab Take 10 mg by mouth once daily.     ondansetron (ZOFRAN) 4 MG tablet Take 1 tablet (4 mg total) by mouth every 6 (six) hours.     rosuvastatin (CRESTOR) 20 MG tablet Take 20 mg by mouth once daily.     valsartan-hydrochlorothiazide (DIOVAN-HCT) 160-12.5 mg per tablet Take 1 tablet by mouth once daily.     vitamin D 1000 units Tab Take 10,000 Units by mouth once a week.    Last reviewed on 10/6/2022 10:50 AM by Nayely Moyer PharmD    Review of patient's allergies indicates:  No Known AllergiesLast reviewed on   10/6/2022 10:50 AM by Nayely Moyer    Drug Interactions    Drug interactions evaluated: yes  Clinically relevant drug interactions identified: no  Provided the patient with educational material regarding drug interactions: not applicable         Adverse Effects    *All other systems reviewed and are negative       Assessment Questions - Documented Responses      Flowsheet Row Most Recent Value   Assessment    Medication Reconciliation completed for patient Yes   During the past 4 weeks, has patient missed any activities due to condition or medication? No   During the past 4 weeks, did patient have any of the following urgent care visits? None   Goals of Therapy Status Discussed (new start)   Status of the patients ability to self-administer: Is Able   All education points have been covered with patient? Yes, supplemental printed education provided   Welcome packet contents reviewed and discussed with patient? Yes   Assesment completed? Yes   Plan Therapy being initiated   Do you need to open a clinical intervention (i-vent)? No   Do you want to schedule first shipment? Yes   Medication #1 Assessment Info    Patient status New medication, New to [facility]   Is this medication appropriate for the patient? Yes   Is this medication effective? Not yet started          Refill Questions - Documented Responses      Flowsheet Row Most Recent Value   Patient Availability and HIPAA Verification    Does patient want to proceed with activity? Yes   HIPAA/medical authority confirmed? Yes   Relationship to patient of person spoken to? Self   Refill Screening Questions    When does the patient need to receive the medication? 10/07/22   Refill Delivery Questions    How will the patient receive the medication? [facility]   When does the patient need to receive the medication? 10/07/22   Shipping Address Home   Address in [facility] confirmed and updated if neccessary? Yes   Expected Copay ($) 498.96   Is the patient able to afford  "the medication copay? Yes   Payment Method invoice (approval required)  [CAG[employer] for $250 then [employer] for the rest]   Days supply of Refill 30   Supplies needed? No supplies needed   Refill activity completed? Yes   Refill activity plan Refill scheduled   Shipment/Pickup Date: 10/06/22            Objective    He has a past medical history of Arthritis, Gout (10/8/2015), Hyperlipidemia, and Hypertension.    Tried/failed medications: none    BP Readings from Last 4 Encounters:   09/27/22 (!) 169/87   09/13/22 (!) 175/86   09/09/22 (!) 169/79   08/29/22 (!) 168/72     Ht Readings from Last 4 Encounters:   09/13/22 5' 11" (1.803 m)   09/09/22 5' 11" (1.803 m)   08/29/22 5' 11" (1.803 m)   06/26/17 5' 11" (1.803 m)     Wt Readings from Last 4 Encounters:   09/27/22 86.3 kg (190 lb 4.1 oz)   09/13/22 84.9 kg (187 lb 2.7 oz)   09/09/22 83.9 kg (185 lb)   08/29/22 83.9 kg (185 lb)     Recent Labs   Lab Result Units 09/27/22  1316 08/29/22  1145   RBC M/uL 4.64 5.00   Hemoglobin g/dL 12.0 L 13.8 L   Hematocrit % 39.3 L 41.5   WBC K/uL 9.87 9.79   Gran # (ANC) K/uL 6.4 6.7   Gran % % 64.8 68.4   Platelets K/uL 272 271   Sodium mmol/L 140 141   Potassium mmol/L 3.4 L 3.7   Chloride mmol/L 107 107   Glucose mg/dL 80 95   BUN mg/dL 12 17   Creatinine mg/dL 1.0 1.0   Calcium mg/dL 9.3 10.1   Total Protein g/dL 6.7 7.4   Albumin g/dL 3.6 3.9   Total Bilirubin mg/dL 0.4 1.1 H   Alkaline Phosphatase U/L 87 78   AST U/L 21 29   ALT U/L 19 24     The goals of cancer treatment include:  Achieving remission of cancer, if possible  Reducing tumor size and spread of cancer, if remission is not possible  Minimizing pain and symptoms of the cancer  Preventing infection and other complications of treatment  Promoting adequate nutrition  Encouraging proper hydration  Improving or maintaining quality of life  Maintaining optimal therapy adherence  Minimizing and managing side effects    Goals of Therapy Status: Discussed (new " start)    Assessment/Plan  Patient plans to start therapy on 10/07/22      Indication, dosage, appropriateness, effectiveness, safety and convenience of his specialty medication(s) were reviewed today.     Patient Education   Patient received education on the following:   Expectations and possible outcomes of therapy  Proper use, timely administration, and missed dose management  Duration of therapy  Side effects, including prevention, minimization, and management  Contraindications and safety precautions  New or changed medications, including prescribe and over the counter medications and supplements  Reviews recommended vaccinations, as appropriate  Storage, safe handling, and disposal    Pt not taking colchicine. No other DDI.    Tasks added this encounter   10/30/2022 - Refill Call (Auto Added)  3/28/2023 - Clinical - Follow Up Assesement (180 day)   Tasks due within next 3 months   No tasks due.     Nayely Moyer, PharmD  [facility]  [address] [place] [id number]  Phone: [phone]  Fax: [phone]

## 2022-10-07 ENCOUNTER — TELEPHONE (OUTPATIENT)
Dept: HEMATOLOGY/ONCOLOGY | Facility: CLINIC | Age: 67
End: 2022-10-07
Payer: MEDICARE

## 2022-10-07 NOTE — TELEPHONE ENCOUNTER
Spoke to wife, told her they can start it today. She asked if they could wait until tomorrow morning as he supposed to take it at the same time every day. I told her that would be fine as well.

## 2022-10-07 NOTE — TELEPHONE ENCOUNTER
"----- Message from Cassie Mejia sent at 10/7/2022  3:00 PM CDT -----  Regarding: Medication  Contact: Sami  Consult/Advisory:       Name Of Caller: Sami    Contact Preference?:[phone]         Does patient feel the need to be seen today?No      What is the nature of the call?:Pt wife is calling to found out if pt start his medication today?       Additional Notes:  "Thank you for all that you do for our patients'"      "

## 2022-10-12 NOTE — ASSESSMENT & PLAN NOTE
- FU cKIT and PDGFRA mutational analysis  - Con't imatinib 400mg po daily  - Repeat labs weekly for first month of treatment  - FU with MD in two weeks to monitor BP and volume status  - Repeat CT torso in 3 months to evaluate response and reconsider surgery

## 2022-10-12 NOTE — PROGRESS NOTES
MEDICAL ONCOLOGY FOLLOW-UP VISIT.     Best Contact Phone Number(s): [phone] (home) [phone] (work)     Cancer/Stage/TNM:    Cancer Staging   Gastrointestinal stromal tumor (GIST) of stomach  Staging form: Gastrointestinal Stromal Tumor - Gastric and Omental GIST, AJCC 8th Edition  - Clinical stage from 9/9/2022: Stage IIIA (cT3, cN0, cM0, Mitotic Rate: High) - Signed by Enrique Mcnair MD on 9/26/2022       Reason for visit: Mr. Zhang is a 66 year old man with HTN diagnosed with GIST by EUS on 9/9/22. He presents to medical oncology clinic after starting neoadjuvant imatinib.      Interval History:   Today patient reports starting taking the imatinib 5 days ago. Denies any new symptoms. No leg swelling. No new shortness of breath. No eye pain or increased lacrimation. No nausea or diarrhea. Appetite is good. Energy is good. Weight is stable, slightly up ~5 lbs. No other concerns.     Review of Systems   Constitutional:  Negative for appetite change, chills and fever.   HENT:  Negative for mouth sores, nosebleeds, sore throat and trouble swallowing.    Eyes:  Negative for pain, discharge, redness and visual disturbance.   Respiratory:  Negative for cough and shortness of breath.    Cardiovascular:  Negative for chest pain, palpitations and leg swelling.   Gastrointestinal:  Negative for abdominal pain, blood in stool, constipation, diarrhea, nausea and vomiting.   Genitourinary:  Negative for difficulty urinating and dysuria.   Musculoskeletal:  Negative for arthralgias and joint swelling.   Skin:  Negative for rash.   Neurological:  Negative for dizziness, weakness, light-headedness and headaches.   Hematological:  Negative for adenopathy. Does not bruise/bleed easily.      Oncology History   Gastrointestinal stromal tumor (GIST) of stomach   7/18/2022 Imaging Significant Findings    Presented to [facility] ED with abdominal pain. CT a/p found a 6.7 x 5.6 x 7.2 cm cystic mass in the gastric fundus.     9/9/2022  "Procedure    EGD/EUS fins a 6.2x8.0 cm cystic mass in the cardia. Pathology shows GIST with 'focal/frequent mitoses' although exact quantification could not be determined.      9/12/2022 Imaging Significant Findings    CT a/p redemonstrates 9.3x8 cm gastric mass with associated necrosis. No LAD. No liver lesions.     9/15/2022 Imaging Significant Findings    CT chest only shows incidental 3mm LLL nodule. No evidence of metastatic disease.     10/8/2022 -  Chemotherapy    Start imatinib            Physical Exam:   /62 (BP Location: Left arm, Patient Position: Sitting, BP Method: Medium (Automatic))   Pulse (!) 50   Temp 98.2 °F (36.8 °C) (Oral)   Resp 18   Ht 5' 11" (1.803 m)   Wt 86.5 kg (190 lb 11.2 oz)   BMI 26.60 kg/m²      ECOG Performance Status: (foot note - ECOG PS provided by [employer]cology [employer]) 0 - Asymptomatic    Physical Exam  Constitutional:       General: He is not in acute distress.     Appearance: Normal appearance.   HENT:      Head: Normocephalic.      Nose: Nose normal.      Mouth/Throat:      Mouth: Mucous membranes are moist.      Pharynx: Oropharynx is clear. No oropharyngeal exudate or posterior oropharyngeal erythema.   Eyes:      General: No scleral icterus.     Extraocular Movements: Extraocular movements intact.      Conjunctiva/sclera: Conjunctivae normal.      Pupils: Pupils are equal, round, and reactive to light.   Cardiovascular:      Rate and Rhythm: Normal rate and regular rhythm.      Heart sounds: No murmur heard.    No friction rub. No gallop.   Pulmonary:      Effort: Pulmonary effort is normal. No respiratory distress.      Breath sounds: Normal breath sounds. No wheezing, rhonchi or rales.   Abdominal:      General: There is no distension.      Palpations: Abdomen is soft.      Tenderness: There is no abdominal tenderness. There is no guarding or rebound.   Musculoskeletal:         General: No swelling. Normal range of motion.      Cervical back: " Normal range of motion and neck supple.      Right lower leg: No edema.      Left lower leg: No edema.   Lymphadenopathy:      Cervical: No cervical adenopathy.   Skin:     General: Skin is warm and dry.      Coloration: Skin is not jaundiced.      Findings: No lesion or rash.   Neurological:      General: No focal deficit present.      Mental Status: He is alert and oriented to person, place, and time.      Cranial Nerves: No cranial nerve deficit.      Motor: No weakness.   Psychiatric:         Mood and Affect: Mood normal.         Behavior: Behavior normal.         Thought Content: Thought content normal.         Labs:   Recent Results (from the past 48 hour(s))   IRON AND TIBC    Collection Time: 10/13/22  2:31 PM   Result Value Ref Range    Iron 27 (L) 45 - 160 ug/dL    Transferrin 273 200 - 375 mg/dL    TIBC 404 250 - 450 ug/dL    Saturated Iron 7 (L) 20 - 50 %   Reticulocytes    Collection Time: 10/13/22  2:31 PM   Result Value Ref Range    Retic 3.3 (H) 0.4 - 2.0 %   CBC Oncology    Collection Time: 10/13/22  2:31 PM   Result Value Ref Range    WBC 8.35 3.90 - 12.70 K/uL    RBC 3.98 (L) 4.60 - 6.20 M/uL    Hemoglobin 10.4 (L) 14.0 - 18.0 g/dL    Hematocrit 34.0 (L) 40.0 - 54.0 %    MCV 85 82 - 98 fL    MCH 26.1 (L) 27.0 - 31.0 pg    MCHC 30.6 (L) 32.0 - 36.0 g/dL    RDW 14.6 (H) 11.5 - 14.5 %    Platelets 284 150 - 450 K/uL    MPV 10.9 9.2 - 12.9 fL    Gran # (ANC) 5.5 1.8 - 7.7 K/uL    Immature Grans (Abs) 0.02 0.00 - 0.04 K/uL   Comprehensive Metabolic Panel    Collection Time: 10/13/22  2:31 PM   Result Value Ref Range    Sodium 136 136 - 145 mmol/L    Potassium 3.5 3.5 - 5.1 mmol/L    Chloride 105 95 - 110 mmol/L    CO2 27 23 - 29 mmol/L    Glucose 106 70 - 110 mg/dL    BUN 12 8 - 23 mg/dL    Creatinine 1.3 0.5 - 1.4 mg/dL    Calcium 9.4 8.7 - 10.5 mg/dL    Total Protein 6.6 6.0 - 8.4 g/dL    Albumin 3.5 3.5 - 5.2 g/dL    Total Bilirubin 0.5 0.1 - 1.0 mg/dL    Alkaline Phosphatase 87 55 - 135 U/L    AST  22 10 - 40 U/L    ALT 17 10 - 44 U/L    Anion Gap 4 (L) 8 - 16 mmol/L    eGFR 59.8 (A) >60 mL/min/1.73 m^2        Imaging: No new imaging    CT Chest Without Contrast  Narrative: EXAMINATION:  CT CHEST WITHOUT CONTRAST    CLINICAL HISTORY:  gastric mass; Other diseases of stomach and duodenum    TECHNIQUE:  Low dose axial images, sagittal and coronal reformations were obtained from the thoracic inlet to the lung bases. Contrast was not administered.    COMPARISON:  CT 09/12/2022    FINDINGS:  Structures at the base of the neck show no significant abnormalities.    No abnormal axillary or mediastinal lymph node enlargement.  Thoracic aorta is normal in caliber.  There is mild coronary and aortic calcifications.  Heart is normal in size.  No significant pericardial fluid.    Trachea and central airways are patent.  Lungs are expanded and show no mass, consolidation, effusion or pneumothorax.  Mild bandlike densities in the right middle lobe, left lower lobe and lingula either relate to mild atelectasis or scarring.  3 mm right lower lobe pulmonary nodule (image 346 series 4).  No other worrisome nodules or masses.    Limited review of the upper abdomen shows patient's known large gastric mass consistent with gastrointestinal stromal tumor (GIST).  Impression: No evidence of metastatic disease to the chest.    3 mm right lower lobe pulmonary nodule.  This is likely incidental and can be followed with routine surveillance imaging.    Large mass in the stomach consistent with patient's known gastrointestinal stromal tumor.    Electronically signed by: Yuri Frost MD  Date:    09/15/2022  Time:    15:41            Diagnoses:       1. Gastrointestinal stromal tumor (GIST) of stomach    2. Primary hypertension          Assessment and Plan:     1. Gastrointestinal stromal tumor (GIST) of stomach  Overview:  Patient with relatively large and symptomatic gastric GIST, which has grown on imaging from 7/18/22 to 9/12/22. No  imaging evidence of luis or distant metastatic disease. Mitotic rate not quantified, but given clinical behavior and noted cellularity and frequent mitosis we consider higher risk. Started neoadjuvant imatinib 10/08/2022.    Assessment & Plan:  - FU cKIT and PDGFRA mutational analysis  - Con't imatinib 400mg po daily  - FU two weeks to monitor BP and volume status  - Repeat CT torso in 3 months to evaluate response and reconsider surgery      2. Primary hypertension  Overview:  Patient on HCTZ, nebivolol, and valsartan.    Assessment & Plan:  - Close monitoring on imatinib           Route Chart for Scheduling    Med Onc Chart Routing      Follow up with physician 2 weeks. FU Dr. Mcnair in two weeks   Follow up with [facility]    Infusion scheduling note    Injection scheduling note    Labs CBC and CMP   Lab interval:     Imaging    Pharmacy appointment    Other referrals              Enrique Mcnair MD  Hematology/Oncology  [facility]

## 2022-10-13 ENCOUNTER — OFFICE VISIT (OUTPATIENT)
Dept: HEMATOLOGY/ONCOLOGY | Facility: CLINIC | Age: 67
End: 2022-10-13
Payer: MEDICARE

## 2022-10-13 ENCOUNTER — LAB VISIT (OUTPATIENT)
Dept: LAB | Facility: HOSPITAL | Age: 67
End: 2022-10-13
Attending: HOSPITALIST
Payer: MEDICARE

## 2022-10-13 VITALS
SYSTOLIC BLOOD PRESSURE: 138 MMHG | HEART RATE: 50 BPM | BODY MASS INDEX: 26.7 KG/M2 | WEIGHT: 190.69 LBS | TEMPERATURE: 98 F | DIASTOLIC BLOOD PRESSURE: 62 MMHG | RESPIRATION RATE: 18 BRPM | HEIGHT: 71 IN

## 2022-10-13 DIAGNOSIS — I10 PRIMARY HYPERTENSION: ICD-10-CM

## 2022-10-13 DIAGNOSIS — C49.A2 GASTROINTESTINAL STROMAL TUMOR (GIST) OF STOMACH: ICD-10-CM

## 2022-10-13 DIAGNOSIS — C49.A2 GASTROINTESTINAL STROMAL TUMOR (GIST) OF STOMACH: Primary | ICD-10-CM

## 2022-10-13 DIAGNOSIS — D63.0 ANEMIA IN NEOPLASTIC DISEASE: ICD-10-CM

## 2022-10-13 LAB
ALBUMIN SERPL BCP-MCNC: 3.5 G/DL (ref 3.5–5.2)
ALP SERPL-CCNC: 87 U/L (ref 55–135)
ALT SERPL W/O P-5'-P-CCNC: 17 U/L (ref 10–44)
ANION GAP SERPL CALC-SCNC: 4 MMOL/L (ref 8–16)
AST SERPL-CCNC: 22 U/L (ref 10–40)
BILIRUB SERPL-MCNC: 0.5 MG/DL (ref 0.1–1)
BUN SERPL-MCNC: 12 MG/DL (ref 8–23)
CALCIUM SERPL-MCNC: 9.4 MG/DL (ref 8.7–10.5)
CHLORIDE SERPL-SCNC: 105 MMOL/L (ref 95–110)
CO2 SERPL-SCNC: 27 MMOL/L (ref 23–29)
CREAT SERPL-MCNC: 1.3 MG/DL (ref 0.5–1.4)
ERYTHROCYTE [DISTWIDTH] IN BLOOD BY AUTOMATED COUNT: 14.6 % (ref 11.5–14.5)
EST. GFR  (NO RACE VARIABLE): 59.8 ML/MIN/1.73 M^2
FERRITIN SERPL-MCNC: 233 NG/ML (ref 20–300)
GLUCOSE SERPL-MCNC: 106 MG/DL (ref 70–110)
HCT VFR BLD AUTO: 34 % (ref 40–54)
HGB BLD-MCNC: 10.4 G/DL (ref 14–18)
IMM GRANULOCYTES # BLD AUTO: 0.02 K/UL (ref 0–0.04)
IRON SERPL-MCNC: 27 UG/DL (ref 45–160)
MCH RBC QN AUTO: 26.1 PG (ref 27–31)
MCHC RBC AUTO-ENTMCNC: 30.6 G/DL (ref 32–36)
MCV RBC AUTO: 85 FL (ref 82–98)
NEUTROPHILS # BLD AUTO: 5.5 K/UL (ref 1.8–7.7)
PLATELET # BLD AUTO: 284 K/UL (ref 150–450)
PMV BLD AUTO: 10.9 FL (ref 9.2–12.9)
POTASSIUM SERPL-SCNC: 3.5 MMOL/L (ref 3.5–5.1)
PROT SERPL-MCNC: 6.6 G/DL (ref 6–8.4)
RBC # BLD AUTO: 3.98 M/UL (ref 4.6–6.2)
RETICS/RBC NFR AUTO: 3.3 % (ref 0.4–2)
SATURATED IRON: 7 % (ref 20–50)
SODIUM SERPL-SCNC: 136 MMOL/L (ref 136–145)
TOTAL IRON BINDING CAPACITY: 404 UG/DL (ref 250–450)
TRANSFERRIN SERPL-MCNC: 273 MG/DL (ref 200–375)
WBC # BLD AUTO: 8.35 K/UL (ref 3.9–12.7)

## 2022-10-13 PROCEDURE — 85027 COMPLETE CBC AUTOMATED: CPT | Performed by: HOSPITALIST

## 2022-10-13 PROCEDURE — 99999 PR PBB SHADOW E&M-EST. PATIENT-LVL IV: ICD-10-PCS | Mod: PBBFAC,,, | Performed by: HOSPITALIST

## 2022-10-13 PROCEDURE — 85045 AUTOMATED RETICULOCYTE COUNT: CPT | Performed by: HOSPITALIST

## 2022-10-13 PROCEDURE — 3288F FALL RISK ASSESSMENT DOCD: CPT | Mod: CPTII,S$GLB,, | Performed by: HOSPITALIST

## 2022-10-13 PROCEDURE — 1160F RVW MEDS BY RX/DR IN RCRD: CPT | Mod: CPTII,S$GLB,, | Performed by: HOSPITALIST

## 2022-10-13 PROCEDURE — 99214 OFFICE O/P EST MOD 30 MIN: CPT | Mod: S$GLB,,, | Performed by: HOSPITALIST

## 2022-10-13 PROCEDURE — 3075F SYST BP GE 130 - 139MM HG: CPT | Mod: CPTII,S$GLB,, | Performed by: HOSPITALIST

## 2022-10-13 PROCEDURE — 3078F PR MOST RECENT DIASTOLIC BLOOD PRESSURE < 80 MM HG: ICD-10-PCS | Mod: CPTII,S$GLB,, | Performed by: HOSPITALIST

## 2022-10-13 PROCEDURE — 99499 UNLISTED E&M SERVICE: CPT | Mod: S$GLB,,, | Performed by: HOSPITALIST

## 2022-10-13 PROCEDURE — 1159F MED LIST DOCD IN RCRD: CPT | Mod: CPTII,S$GLB,, | Performed by: HOSPITALIST

## 2022-10-13 PROCEDURE — 3078F DIAST BP <80 MM HG: CPT | Mod: CPTII,S$GLB,, | Performed by: HOSPITALIST

## 2022-10-13 PROCEDURE — 3075F PR MOST RECENT SYSTOLIC BLOOD PRESS GE 130-139MM HG: ICD-10-PCS | Mod: CPTII,S$GLB,, | Performed by: HOSPITALIST

## 2022-10-13 PROCEDURE — 99499 RISK ADDL DX/OHS AUDIT: ICD-10-PCS | Mod: S$GLB,,, | Performed by: HOSPITALIST

## 2022-10-13 PROCEDURE — 80053 COMPREHEN METABOLIC PANEL: CPT | Performed by: HOSPITALIST

## 2022-10-13 PROCEDURE — 1160F PR REVIEW ALL MEDS BY PRESCRIBER/CLIN PHARMACIST DOCUMENTED: ICD-10-PCS | Mod: CPTII,S$GLB,, | Performed by: HOSPITALIST

## 2022-10-13 PROCEDURE — 1126F AMNT PAIN NOTED NONE PRSNT: CPT | Mod: CPTII,S$GLB,, | Performed by: HOSPITALIST

## 2022-10-13 PROCEDURE — 1101F PT FALLS ASSESS-DOCD LE1/YR: CPT | Mod: CPTII,S$GLB,, | Performed by: HOSPITALIST

## 2022-10-13 PROCEDURE — 1101F PR PT FALLS ASSESS DOC 0-1 FALLS W/OUT INJ PAST YR: ICD-10-PCS | Mod: CPTII,S$GLB,, | Performed by: HOSPITALIST

## 2022-10-13 PROCEDURE — 1126F PR PAIN SEVERITY QUANTIFIED, NO PAIN PRESENT: ICD-10-PCS | Mod: CPTII,S$GLB,, | Performed by: HOSPITALIST

## 2022-10-13 PROCEDURE — 99999 PR PBB SHADOW E&M-EST. PATIENT-LVL IV: CPT | Mod: PBBFAC,,, | Performed by: HOSPITALIST

## 2022-10-13 PROCEDURE — 82728 ASSAY OF FERRITIN: CPT | Performed by: HOSPITALIST

## 2022-10-13 PROCEDURE — 84466 ASSAY OF TRANSFERRIN: CPT | Performed by: HOSPITALIST

## 2022-10-13 PROCEDURE — 3288F PR FALLS RISK ASSESSMENT DOCUMENTED: ICD-10-PCS | Mod: CPTII,S$GLB,, | Performed by: HOSPITALIST

## 2022-10-13 PROCEDURE — 36415 COLL VENOUS BLD VENIPUNCTURE: CPT | Performed by: HOSPITALIST

## 2022-10-13 PROCEDURE — 99214 PR OFFICE/OUTPT VISIT, EST, LEVL IV, 30-39 MIN: ICD-10-PCS | Mod: S$GLB,,, | Performed by: HOSPITALIST

## 2022-10-13 PROCEDURE — 1159F PR MEDICATION LIST DOCUMENTED IN MEDICAL RECORD: ICD-10-PCS | Mod: CPTII,S$GLB,, | Performed by: HOSPITALIST

## 2022-10-13 NOTE — PATIENT INSTRUCTIONS
We are treating a GIST (GI stromal tumor) with imatinib for several months before re-evaluating for surgical resection. You have started the imatinib and are tolerating it well.    - Please check bloodwork next week on 10/20/22 to evaluate for worsening anemia  - Monitor for any leg swelling or increasing shortness of breath  - Follow up in two weeks with Dr. Mcnair on 10/27/22

## 2022-10-19 LAB
FINAL PATHOLOGIC DIAGNOSIS: ABNORMAL
Lab: ABNORMAL
SUPPLEMENTAL DIAGNOSIS: ABNORMAL

## 2022-10-20 ENCOUNTER — LAB VISIT (OUTPATIENT)
Dept: LAB | Facility: HOSPITAL | Age: 67
End: 2022-10-20
Attending: HOSPITALIST
Payer: MEDICARE

## 2022-10-20 DIAGNOSIS — C49.A2 GASTROINTESTINAL STROMAL TUMOR (GIST) OF STOMACH: ICD-10-CM

## 2022-10-20 LAB
ALBUMIN SERPL BCP-MCNC: 3.7 G/DL (ref 3.5–5.2)
ALP SERPL-CCNC: 96 U/L (ref 55–135)
ALT SERPL W/O P-5'-P-CCNC: 17 U/L (ref 10–44)
ANION GAP SERPL CALC-SCNC: 6 MMOL/L (ref 8–16)
AST SERPL-CCNC: 21 U/L (ref 10–40)
BILIRUB SERPL-MCNC: 0.6 MG/DL (ref 0.1–1)
BUN SERPL-MCNC: 12 MG/DL (ref 8–23)
CALCIUM SERPL-MCNC: 9.4 MG/DL (ref 8.7–10.5)
CHLORIDE SERPL-SCNC: 108 MMOL/L (ref 95–110)
CO2 SERPL-SCNC: 27 MMOL/L (ref 23–29)
CREAT SERPL-MCNC: 1.3 MG/DL (ref 0.5–1.4)
ERYTHROCYTE [DISTWIDTH] IN BLOOD BY AUTOMATED COUNT: 14 % (ref 11.5–14.5)
EST. GFR  (NO RACE VARIABLE): 59.8 ML/MIN/1.73 M^2
GLUCOSE SERPL-MCNC: 102 MG/DL (ref 70–110)
HCT VFR BLD AUTO: 36.9 % (ref 40–54)
HGB BLD-MCNC: 10.9 G/DL (ref 14–18)
IMM GRANULOCYTES # BLD AUTO: 0.01 K/UL (ref 0–0.04)
MCH RBC QN AUTO: 25.6 PG (ref 27–31)
MCHC RBC AUTO-ENTMCNC: 29.5 G/DL (ref 32–36)
MCV RBC AUTO: 87 FL (ref 82–98)
NEUTROPHILS # BLD AUTO: 4.8 K/UL (ref 1.8–7.7)
PLATELET # BLD AUTO: 350 K/UL (ref 150–450)
PMV BLD AUTO: 11.1 FL (ref 9.2–12.9)
POTASSIUM SERPL-SCNC: 3.8 MMOL/L (ref 3.5–5.1)
PROT SERPL-MCNC: 7.1 G/DL (ref 6–8.4)
RBC # BLD AUTO: 4.26 M/UL (ref 4.6–6.2)
SODIUM SERPL-SCNC: 141 MMOL/L (ref 136–145)
WBC # BLD AUTO: 6.94 K/UL (ref 3.9–12.7)

## 2022-10-20 PROCEDURE — 36415 COLL VENOUS BLD VENIPUNCTURE: CPT | Performed by: HOSPITALIST

## 2022-10-20 PROCEDURE — 85027 COMPLETE CBC AUTOMATED: CPT | Performed by: HOSPITALIST

## 2022-10-20 PROCEDURE — 80053 COMPREHEN METABOLIC PANEL: CPT | Performed by: HOSPITALIST

## 2022-10-26 ENCOUNTER — TELEPHONE (OUTPATIENT)
Dept: HEMATOLOGY/ONCOLOGY | Facility: CLINIC | Age: 67
End: 2022-10-26
Payer: MEDICARE

## 2022-10-26 NOTE — PROGRESS NOTES
MEDICAL ONCOLOGY FOLLOW-UP VISIT.     Best Contact Phone Number(s): [phone] (home) [phone] (work)     Cancer/Stage/TNM:    Cancer Staging   Gastrointestinal stromal tumor (GIST) of stomach  Staging form: Gastrointestinal Stromal Tumor - Gastric and Omental GIST, AJCC 8th Edition  - Clinical stage from 9/9/2022: Stage IIIA (cT3, cN0, cM0, Mitotic Rate: High) - Signed by Enrique Mcnair MD on 9/26/2022       Reason for visit: Mr. Zhang is a 66 year old man with HTN diagnosed with GIST by EUS on 9/9/22. He presents to medical oncology clinic for routine follow up on neoadjuvant imatinib.      Interval History:     Patient continues to tolerate imatinib well. He has no leg edema or shortness of breath. No eye discomfort. No N/V. No diarrhea. No rash. Weight stable at 190.  He does have a dry cough over the last three weeks. No fevers. No SOB. No sick contacts. No other concerns.    Review of Systems   Constitutional:  Negative for appetite change, chills and fever.   HENT:  Negative for mouth sores, nosebleeds, sore throat and trouble swallowing.    Eyes:  Negative for pain, discharge, redness and visual disturbance.   Respiratory:  Positive for cough. Negative for shortness of breath.    Cardiovascular:  Negative for chest pain, palpitations and leg swelling.   Gastrointestinal:  Negative for abdominal pain, blood in stool, constipation, diarrhea, nausea and vomiting.   Genitourinary:  Negative for difficulty urinating and dysuria.   Musculoskeletal:  Negative for arthralgias and joint swelling.   Skin:  Negative for rash.   Neurological:  Negative for dizziness, weakness, light-headedness and headaches.   Hematological:  Negative for adenopathy. Does not bruise/bleed easily.      Oncology History   Gastrointestinal stromal tumor (GIST) of stomach   7/18/2022 Imaging Significant Findings    Presented to [facility] with abdominal pain. CT a/p found a 6.7 x 5.6 x 7.2 cm cystic mass in the gastric fundus.    "  9/9/2022 Procedure    EGD/EUS fins a 6.2x8.0 cm cystic mass in the cardia. Pathology shows GIST with 'focal/frequent mitoses' although exact quantification could not be determined.      9/12/2022 Imaging Significant Findings    CT a/p redemonstrates 9.3x8 cm gastric mass with associated necrosis. No LAD. No liver lesions.     9/15/2022 Imaging Significant Findings    CT chest only shows incidental 3mm LLL nodule. No evidence of metastatic disease.     10/8/2022 -  Chemotherapy    Start imatinib            Physical Exam:   BP (!) 155/70 (BP Location: Left arm, Patient Position: Sitting, BP Method: Large (Automatic))   Pulse (!) 56   Temp 99 °F (37.2 °C) (Oral)   Resp 18   Ht 5' 11" (1.803 m)   Wt 86.6 kg (190 lb 14.7 oz)   SpO2 99%   BMI 26.63 kg/m²      ECOG Performance Status: (foot note - ECOG PS provided by Eastern Cooperative Oncology Group) 0 - Asymptomatic    Physical Exam  Constitutional:       General: He is not in acute distress.     Appearance: Normal appearance.   HENT:      Head: Normocephalic.      Nose: Nose normal.      Mouth/Throat:      Mouth: Mucous membranes are moist.      Pharynx: Oropharynx is clear. No oropharyngeal exudate or posterior oropharyngeal erythema.   Eyes:      General: No scleral icterus.     Extraocular Movements: Extraocular movements intact.      Conjunctiva/sclera: Conjunctivae normal.      Pupils: Pupils are equal, round, and reactive to light.   Cardiovascular:      Rate and Rhythm: Normal rate and regular rhythm.   Pulmonary:      Effort: Pulmonary effort is normal. No respiratory distress.   Abdominal:      General: There is no distension.      Palpations: Abdomen is soft.      Tenderness: There is no abdominal tenderness.   Musculoskeletal:         General: No swelling. Normal range of motion.      Cervical back: Normal range of motion and neck supple.      Right lower leg: No edema.      Left lower leg: No edema.   Lymphadenopathy:      Cervical: No cervical " adenopathy.   Skin:     General: Skin is warm and dry.      Coloration: Skin is not jaundiced.      Findings: No rash.   Neurological:      General: No focal deficit present.      Mental Status: He is alert and oriented to person, place, and time.      Cranial Nerves: No cranial nerve deficit.      Motor: No weakness.   Psychiatric:         Mood and Affect: Mood normal.         Behavior: Behavior normal.         Thought Content: Thought content normal.         Labs:  Lab Visit on 10/27/2022   Component Date Value Ref Range Status    WBC 10/27/2022 7.29  3.90 - 12.70 K/uL Final    RBC 10/27/2022 4.04 (L)  4.60 - 6.20 M/uL Final    Hemoglobin 10/27/2022 10.2 (L)  14.0 - 18.0 g/dL Final    Hematocrit 10/27/2022 34.3 (L)  40.0 - 54.0 % Final    MCV 10/27/2022 85  82 - 98 fL Final    MCH 10/27/2022 25.2 (L)  27.0 - 31.0 pg Final    MCHC 10/27/2022 29.7 (L)  32.0 - 36.0 g/dL Final    RDW 10/27/2022 13.8  11.5 - 14.5 % Final    Platelets 10/27/2022 300  150 - 450 K/uL Final    MPV 10/27/2022 11.2  9.2 - 12.9 fL Final    Gran # (ANC) 10/27/2022 4.4  1.8 - 7.7 K/uL Final    Comment: The ANC is based on a white cell differential from an   automated cell counter. It has not been microscopically   reviewed for the presence of abnormal cells. Clinical   correlation is required.      Immature Grans (Abs) 10/27/2022 0.02  0.00 - 0.04 K/uL Final    Comment: Mild elevation in immature granulocytes is non specific and   can be seen in a variety of conditions including stress response,   acute inflammation, trauma and pregnancy. Correlation with other   laboratory and clinical findings is essential.             Imaging: Reviewed CXR from today with no acute process    X-Ray Chest PA And Lateral  Narrative: EXAMINATION:  XR CHEST PA AND LATERAL    CLINICAL HISTORY:  Cough, unspecified    TECHNIQUE:  PA and lateral views of the chest were performed.    COMPARISON:  07/01/2016    FINDINGS:  The heart size is upper normal.  Mediastinal  contour is normal.  Lungs are expanded and clear.  No lung consolidation or pleural fluid is identified.  The skeletal structures are intact.  Impression: No acute cardiopulmonary disease    Electronically signed by: Donald Perez MD  Date:    10/27/2022  Time:    15:02            Diagnoses:       1. Gastrointestinal stromal tumor (GIST) of stomach    2. Primary hypertension    3. Cough, unspecified type            Assessment and Plan:     1. Gastrointestinal stromal tumor (GIST) of stomach  Overview:  Patient with relatively large and symptomatic gastric GIST, which has grown on imaging from 7/18/22 to 9/12/22. No imaging evidence of luis or distant metastatic disease. Mitotic rate not quantified, but given clinical behavior and noted cellularity and frequent mitosis we consider higher risk. 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 Started neoadjuvant imatinib 400mg po daily 10/08/2022.    Assessment & Plan:  Continues to tolerate treatment well.    - Continue imatinib at current dose  - Repeat labs in 2 weeks  - FU MD in 4 weeks  - Repeat CT scan and surgical evaluation in 01/2023      2. Primary hypertension  Overview:  Patient on HCTZ, nebivolol, and valsartan.    Assessment & Plan:  - Slightly elevated today  - Patient to monitor closely      3. Cough, unspecified type  Overview:  Acute cough noted 10/2022    Assessment & Plan:  CXR today negative  - CTM    Orders:  -     X-Ray Chest PA And Lateral; Future; Expected date: 10/27/2022           Route Chart for Scheduling    Med Onc Chart Routing      Follow up with physician 4 weeks.   Follow up with [facility]    Infusion scheduling note    Injection scheduling note    Labs CBC and CMP   Lab interval:  Lab appt in two weeks; again in 4 weeks with MD visit   Imaging    Pharmacy appointment    Other referrals              Enrique Mcnair MD  Hematology/Oncology  [facility]

## 2022-10-27 ENCOUNTER — HOSPITAL ENCOUNTER (OUTPATIENT)
Dept: RADIOLOGY | Facility: HOSPITAL | Age: 67
Discharge: HOME | End: 2022-10-27
Attending: HOSPITALIST
Payer: MEDICARE

## 2022-10-27 ENCOUNTER — OFFICE VISIT (OUTPATIENT)
Dept: HEMATOLOGY/ONCOLOGY | Facility: CLINIC | Age: 67
End: 2022-10-27
Payer: MEDICARE

## 2022-10-27 VITALS
TEMPERATURE: 99 F | HEART RATE: 56 BPM | HEIGHT: 71 IN | DIASTOLIC BLOOD PRESSURE: 70 MMHG | WEIGHT: 190.94 LBS | RESPIRATION RATE: 18 BRPM | SYSTOLIC BLOOD PRESSURE: 155 MMHG | BODY MASS INDEX: 26.73 KG/M2 | OXYGEN SATURATION: 99 %

## 2022-10-27 DIAGNOSIS — R05.9 COUGH, UNSPECIFIED TYPE: ICD-10-CM

## 2022-10-27 DIAGNOSIS — C49.A2 GASTROINTESTINAL STROMAL TUMOR (GIST) OF STOMACH: Primary | ICD-10-CM

## 2022-10-27 DIAGNOSIS — I10 PRIMARY HYPERTENSION: ICD-10-CM

## 2022-10-27 PROCEDURE — 3078F PR MOST RECENT DIASTOLIC BLOOD PRESSURE < 80 MM HG: ICD-10-PCS | Mod: CPTII,S$GLB,, | Performed by: HOSPITALIST

## 2022-10-27 PROCEDURE — 99999 PR PBB SHADOW E&M-EST. PATIENT-LVL V: CPT | Mod: PBBFAC,,, | Performed by: HOSPITALIST

## 2022-10-27 PROCEDURE — 3288F PR FALLS RISK ASSESSMENT DOCUMENTED: ICD-10-PCS | Mod: CPTII,S$GLB,, | Performed by: HOSPITALIST

## 2022-10-27 PROCEDURE — 1159F MED LIST DOCD IN RCRD: CPT | Mod: CPTII,S$GLB,, | Performed by: HOSPITALIST

## 2022-10-27 PROCEDURE — 1126F AMNT PAIN NOTED NONE PRSNT: CPT | Mod: CPTII,S$GLB,, | Performed by: HOSPITALIST

## 2022-10-27 PROCEDURE — 1101F PR PT FALLS ASSESS DOC 0-1 FALLS W/OUT INJ PAST YR: ICD-10-PCS | Mod: CPTII,S$GLB,, | Performed by: HOSPITALIST

## 2022-10-27 PROCEDURE — 3077F SYST BP >= 140 MM HG: CPT | Mod: CPTII,S$GLB,, | Performed by: HOSPITALIST

## 2022-10-27 PROCEDURE — 3077F PR MOST RECENT SYSTOLIC BLOOD PRESSURE >= 140 MM HG: ICD-10-PCS | Mod: CPTII,S$GLB,, | Performed by: HOSPITALIST

## 2022-10-27 PROCEDURE — 71046 X-RAY EXAM CHEST 2 VIEWS: CPT | Mod: TC

## 2022-10-27 PROCEDURE — 99215 OFFICE O/P EST HI 40 MIN: CPT | Mod: S$GLB,,, | Performed by: HOSPITALIST

## 2022-10-27 PROCEDURE — 3078F DIAST BP <80 MM HG: CPT | Mod: CPTII,S$GLB,, | Performed by: HOSPITALIST

## 2022-10-27 PROCEDURE — 3288F FALL RISK ASSESSMENT DOCD: CPT | Mod: CPTII,S$GLB,, | Performed by: HOSPITALIST

## 2022-10-27 PROCEDURE — 99499 RISK ADDL DX/OHS AUDIT: ICD-10-PCS | Mod: S$GLB,,, | Performed by: HOSPITALIST

## 2022-10-27 PROCEDURE — 99215 PR OFFICE/OUTPT VISIT, EST, LEVL V, 40-54 MIN: ICD-10-PCS | Mod: S$GLB,,, | Performed by: HOSPITALIST

## 2022-10-27 PROCEDURE — 1159F PR MEDICATION LIST DOCUMENTED IN MEDICAL RECORD: ICD-10-PCS | Mod: CPTII,S$GLB,, | Performed by: HOSPITALIST

## 2022-10-27 PROCEDURE — 1101F PT FALLS ASSESS-DOCD LE1/YR: CPT | Mod: CPTII,S$GLB,, | Performed by: HOSPITALIST

## 2022-10-27 PROCEDURE — 71046 X-RAY EXAM CHEST 2 VIEWS: CPT | Mod: 26,,, | Performed by: RADIOLOGY

## 2022-10-27 PROCEDURE — 99999 PR PBB SHADOW E&M-EST. PATIENT-LVL V: ICD-10-PCS | Mod: PBBFAC,,, | Performed by: HOSPITALIST

## 2022-10-27 PROCEDURE — 71046 XR CHEST PA AND LATERAL: ICD-10-PCS | Mod: 26,,, | Performed by: RADIOLOGY

## 2022-10-27 PROCEDURE — 1126F PR PAIN SEVERITY QUANTIFIED, NO PAIN PRESENT: ICD-10-PCS | Mod: CPTII,S$GLB,, | Performed by: HOSPITALIST

## 2022-10-27 PROCEDURE — 99499 UNLISTED E&M SERVICE: CPT | Mod: S$GLB,,, | Performed by: HOSPITALIST

## 2022-10-27 NOTE — PATIENT INSTRUCTIONS
Tolerating imatinib well. Continue current dose of 400mg daily.    - Chest xray today for cough  - Repeat labs in two weeks  - FU with Dr. Mcnair in 4 weeks  - Repeat CT scan and surgery appointment in January 2023

## 2022-10-27 NOTE — ASSESSMENT & PLAN NOTE
Continues to tolerate treatment well.    - Continue imatinib at current dose  - Repeat labs in 2 weeks  - FU MD in 4 weeks  - Repeat CT scan and surgical evaluation in 01/2023

## 2022-10-28 ENCOUNTER — TELEPHONE (OUTPATIENT)
Dept: HEMATOLOGY/ONCOLOGY | Facility: CLINIC | Age: 67
End: 2022-10-28
Payer: MEDICARE

## 2022-10-28 NOTE — TELEPHONE ENCOUNTER
----- Message from Tiny Rodriguez RN sent at 10/28/2022 12:03 PM CDT -----  Regarding: FW: Pharmacy change    ----- Message -----  From: Alexandra Joseph  Sent: 10/28/2022  11:01 AM CDT  To: Enrique Mcnair Staff  Subject: Pharmacy change                                  Reason for Call:    Pharmacy calling for assistance    Name of caller: Griselda    [facility] [phone]       Need new prescription as pt requests to change pharmacy

## 2022-10-28 NOTE — TELEPHONE ENCOUNTER
Returned call to [facility] pharmacy. They were asking for another prescription for his Gleevec. I asked if it could just be transferred from [facility] because it has already been approved. She said the patient would have to call to get it transferred. I told her I would contact patient to tell them to call Specialty. I called and spoke to patient's wife and gave her the number to call for transfer.

## 2022-10-31 ENCOUNTER — SPECIALTY PHARMACY (OUTPATIENT)
Dept: PHARMACY | Facility: CLINIC | Age: 67
End: 2022-10-31
Payer: MEDICARE

## 2022-10-31 NOTE — TELEPHONE ENCOUNTER
Specialty Pharmacy - Refill Coordination    Specialty Medication Orders Linked to Encounter      Flowsheet Row Most Recent Value   Medication #1 imatinib (GLEEVEC) 400 MG Tab (Order#[id number], Rx#[id number])            Refill Questions - Documented Responses      Flowsheet Row Most Recent Value   Patient Availability and HIPAA Verification    Does patient want to proceed with activity? Yes   HIPAA/medical authority confirmed? Yes   Relationship to patient of person spoken to? Self   Refill Screening Questions    Changes to allergies? No   Changes to medications? No   New conditions since last clinic visit? No   Unplanned office visit, urgent care, ED, or hospital admission in the last 4 weeks? No   How does patient/caregiver feel medication is working? Good   Financial problems or insurance changes? No   How many doses of your specialty medications were missed in the last 4 weeks? 0   Would patient like to speak to a pharmacist? No   When does the patient need to receive the medication? 11/06/22   Refill Delivery Questions    How will the patient receive the medication? [facility]   When does the patient need to receive the medication? 11/06/22   Shipping Address Home   Address in [facility] confirmed and updated if neccessary? Yes   Expected Copay ($) 498.96   Is the patient able to afford the medication copay? Yes   Days supply of Refill 30   Supplies needed? No supplies needed   Refill activity completed? Yes   Refill activity plan Refill scheduled   Shipment/Pickup Date: 11/03/22            Current Outpatient Medications   Medication Sig    allopurinol (ZYLOPRIM) 100 MG tablet Take 100 mg by mouth 2 (two) times daily.    CALCIUM CARBONATE/VITAMIN D3 (VITAMIN D-3 ORAL) Take 10,000 Units by mouth once a week.    icosapent ethyL (VASCEPA) 0.5 gram Cap Take 2 capsules by mouth once daily.    imatinib (GLEEVEC) 400 MG Tab Take 1 tablet (400 mg total) by mouth once daily.    multivitamin capsule Take 1 capsule by  mouth once daily.    nebivolol (BYSTOLIC) 10 MG Tab Take 10 mg by mouth once daily.    ondansetron (ZOFRAN) 4 MG tablet Take 1 tablet (4 mg total) by mouth every 6 (six) hours.    rosuvastatin (CRESTOR) 20 MG tablet Take 20 mg by mouth once daily.    valsartan-hydrochlorothiazide (DIOVAN-HCT) 160-12.5 mg per tablet Take 1 tablet by mouth once daily.    vitamin D 1000 units Tab Take 10,000 Units by mouth once a week.   Last reviewed on 10/27/2022  2:22 PM by Belia Zhang MA    Review of patient's allergies indicates:  No Known Allergies Last reviewed on  10/27/2022 2:22 PM by Belia Zhang      Tasks added this encounter   11/29/2022 - Refill Call (Auto Added)   Tasks due within next 3 months   No tasks due.     Karen Marrero, PharmD  [facility]  [address] [place] [id number]  Phone: [phone]  Fax: [phone]

## 2022-11-01 NOTE — TELEPHONE ENCOUNTER
Sent message to [employer] to request [employer] funds for patients refill. Determination pending. [facility] no longer able to assist.

## 2022-11-02 ENCOUNTER — DOCUMENTATION ONLY (OUTPATIENT)
Dept: HEMATOLOGY/ONCOLOGY | Facility: CLINIC | Age: 67
End: 2022-11-02
Payer: MEDICARE

## 2022-11-02 NOTE — PROGRESS NOTES
Social Work Consult    Name:Sami Zhang  : 1955  MRN: [id number]    Referral:Medication Assistance    SW received consult from Laila Anglin CPhT. Patient is requesting assistance with the cost of his Gleevec, $498.96.    SW completed cost transfer and emailed to Laila Agnlin and Jaymie Gomez CPhT.     Patient has $261.08 remaining in PAF.

## 2022-11-03 NOTE — TELEPHONE ENCOUNTER
[employer] approved cost transfer in the amount of $498.96 for patients refill. Cost transfer form received.

## 2022-11-08 ENCOUNTER — TELEPHONE (OUTPATIENT)
Dept: ENDOSCOPY | Facility: HOSPITAL | Age: 67
End: 2022-11-08
Payer: MEDICARE

## 2022-11-08 ENCOUNTER — OFFICE VISIT (OUTPATIENT)
Dept: GASTROENTEROLOGY | Facility: CLINIC | Age: 67
End: 2022-11-08
Payer: MEDICARE

## 2022-11-08 VITALS
DIASTOLIC BLOOD PRESSURE: 72 MMHG | BODY MASS INDEX: 26.74 KG/M2 | HEIGHT: 71 IN | WEIGHT: 191 LBS | SYSTOLIC BLOOD PRESSURE: 133 MMHG | HEART RATE: 55 BPM

## 2022-11-08 DIAGNOSIS — Z12.11 COLON CANCER SCREENING: Primary | ICD-10-CM

## 2022-11-08 DIAGNOSIS — C49.A2 GASTROINTESTINAL STROMAL TUMOR (GIST) OF STOMACH: ICD-10-CM

## 2022-11-08 DIAGNOSIS — Z12.11 SPECIAL SCREENING FOR MALIGNANT NEOPLASMS, COLON: Primary | ICD-10-CM

## 2022-11-08 PROCEDURE — 99999 PR PBB SHADOW E&M-EST. PATIENT-LVL III: CPT | Mod: PBBFAC,,, | Performed by: INTERNAL MEDICINE

## 2022-11-08 PROCEDURE — 3288F PR FALLS RISK ASSESSMENT DOCUMENTED: ICD-10-PCS | Mod: CPTII,S$GLB,, | Performed by: INTERNAL MEDICINE

## 2022-11-08 PROCEDURE — 3078F DIAST BP <80 MM HG: CPT | Mod: CPTII,S$GLB,, | Performed by: INTERNAL MEDICINE

## 2022-11-08 PROCEDURE — 1101F PT FALLS ASSESS-DOCD LE1/YR: CPT | Mod: CPTII,S$GLB,, | Performed by: INTERNAL MEDICINE

## 2022-11-08 PROCEDURE — 3078F PR MOST RECENT DIASTOLIC BLOOD PRESSURE < 80 MM HG: ICD-10-PCS | Mod: CPTII,S$GLB,, | Performed by: INTERNAL MEDICINE

## 2022-11-08 PROCEDURE — 99999 PR PBB SHADOW E&M-EST. PATIENT-LVL III: ICD-10-PCS | Mod: PBBFAC,,, | Performed by: INTERNAL MEDICINE

## 2022-11-08 PROCEDURE — 3075F PR MOST RECENT SYSTOLIC BLOOD PRESS GE 130-139MM HG: ICD-10-PCS | Mod: CPTII,S$GLB,, | Performed by: INTERNAL MEDICINE

## 2022-11-08 PROCEDURE — 99203 PR OFFICE/OUTPT VISIT, NEW, LEVL III, 30-44 MIN: ICD-10-PCS | Mod: S$GLB,,, | Performed by: INTERNAL MEDICINE

## 2022-11-08 PROCEDURE — 1159F MED LIST DOCD IN RCRD: CPT | Mod: CPTII,S$GLB,, | Performed by: INTERNAL MEDICINE

## 2022-11-08 PROCEDURE — 1101F PR PT FALLS ASSESS DOC 0-1 FALLS W/OUT INJ PAST YR: ICD-10-PCS | Mod: CPTII,S$GLB,, | Performed by: INTERNAL MEDICINE

## 2022-11-08 PROCEDURE — 3288F FALL RISK ASSESSMENT DOCD: CPT | Mod: CPTII,S$GLB,, | Performed by: INTERNAL MEDICINE

## 2022-11-08 PROCEDURE — 1126F PR PAIN SEVERITY QUANTIFIED, NO PAIN PRESENT: ICD-10-PCS | Mod: CPTII,S$GLB,, | Performed by: INTERNAL MEDICINE

## 2022-11-08 PROCEDURE — 1126F AMNT PAIN NOTED NONE PRSNT: CPT | Mod: CPTII,S$GLB,, | Performed by: INTERNAL MEDICINE

## 2022-11-08 PROCEDURE — 3008F PR BODY MASS INDEX (BMI) DOCUMENTED: ICD-10-PCS | Mod: CPTII,S$GLB,, | Performed by: INTERNAL MEDICINE

## 2022-11-08 PROCEDURE — 1159F PR MEDICATION LIST DOCUMENTED IN MEDICAL RECORD: ICD-10-PCS | Mod: CPTII,S$GLB,, | Performed by: INTERNAL MEDICINE

## 2022-11-08 PROCEDURE — 3008F BODY MASS INDEX DOCD: CPT | Mod: CPTII,S$GLB,, | Performed by: INTERNAL MEDICINE

## 2022-11-08 PROCEDURE — 3075F SYST BP GE 130 - 139MM HG: CPT | Mod: CPTII,S$GLB,, | Performed by: INTERNAL MEDICINE

## 2022-11-08 PROCEDURE — 99203 OFFICE O/P NEW LOW 30 MIN: CPT | Mod: S$GLB,,, | Performed by: INTERNAL MEDICINE

## 2022-11-08 RX ORDER — IMATINIB MESYLATE 400 MG/1
400 TABLET, FILM COATED ORAL DAILY
Qty: 30 TABLET | Refills: 11 | Status: SHIPPED | OUTPATIENT
Start: 2022-11-08 | End: 2022-11-29 | Stop reason: SDUPTHER

## 2022-11-08 RX ORDER — POLYETHYLENE GLYCOL 3350, SODIUM SULFATE ANHYDROUS, SODIUM BICARBONATE, SODIUM CHLORIDE, POTASSIUM CHLORIDE 236; 22.74; 6.74; 5.86; 2.97 G/4L; G/4L; G/4L; G/4L; G/4L
4 POWDER, FOR SOLUTION ORAL ONCE
Qty: 4000 ML | Refills: 0 | Status: SHIPPED | OUTPATIENT
Start: 2022-11-08 | End: 2022-11-08

## 2022-11-08 NOTE — PROGRESS NOTES
Reason for visit:Colon cancer screening, Gastric GIST    HPI:  is a 66 year old gentleman with gastric GIST. Medical history includes HTN, HLP, Gout and hypertensive heart disease. He has a large and symptomatic gastric GIST, which has grown on imaging from 7/18/22 to 9/12/22. No imaging evidence of luis or distant metastatic disease. He is currently undergoing neoadjuvant imatinib with surgical consult in Jan 2023.  Denies any dysphagia, odynophagia, nausea, vomiting, heartburn or acid regurgitation. No abdominal pains, changes in bowel pattern, blood/ mucus in stool or unintentional weight loss. No melena or maroon stools. No recent changes in diet or medications. No family history of IBD, Celiac disease or GI malignancy. No regular NSAIDs (none) , alcohol (occasional) or tobacco use (none). No recent antibiotic use, travels or sick contacts. No prior history of C.diff.    Past medical, surgical, social and family history reviewed in epic    Medication allergies reviewed in epic    Review of systems:    Constitutional:  No fever, no chills, no weight loss, appetite is normal  Eyes:  No visual changes or red eyes  ENT:  No odynophagia or hoarseness of voice  Cardiovascular:  No angina or palpitation  Respiratory:  No shortness breath or wheezing  Genitourinary:  No dysuria or frequency  Musculoskeletal:  No myalgias or arthralgias  Skin:  No pruritus or eczema  Neurologic:  No headache or seizures  Psychiatric:  No anxiety depression  Gastrointestinal:  See HPI    Physical exam:  Vitals see epic, awake, alert, oriented x3 in no acute distress    Neck:  Supple, no carotid bruit, no cervical adenopathy  Abdomen:  Obese, soft, nontender, nondistended, no masses palpable, no hepatosplenomegaly detected, bowel sounds are normal, no ascites clinically detectable  Eyes:  Conjunctivae anicteric, not injected  ENT:  Oral mucosa moist  Cardiovascular:  S1, S2 normal, no murmurs, no gallops, no abdominal bruits  heard  Respiratory:  Bilateral air entry equal, no rhonchi, no crackles, normal effort  Skin:  No palmar erythema or spider angiomata  Neurologic:  No asterixis or tremors  Psychiatric:  Affect appropriate, proper judgment, proper insight, oriented to place and time  Lower extremities:  No pedal edema    Recent labs, imaging and endoscopy reports reviewed.      Impression: Average risk for CRC    Recommendations:   Schedule Colonoscopy

## 2022-11-08 NOTE — TELEPHONE ENCOUNTER
"----- Message from Alvaro Russo sent at 11/8/2022  9:53 AM CST -----  RX Name and Strength:  imatinib (GLEEVEC) 400 MG Tab           How is the patient currently taking it?  Take 1 tablet (400 mg total) by mouth once daily. - Oral          Is this a 30 day or 90 day Rx?  30 tablet        Preferred Pharmacy with phone number:    [facility] - [place], [place] - [address]  [address] [address]  Phone: [phone] Fax: [phone]          Local or Mail Order:  Mail        Ordering Provider: Xu        Contact Preference: [phone]          Additional Information: Pharmacy request      "Thank you for all that you do for our patients"       "

## 2022-11-11 ENCOUNTER — TELEPHONE (OUTPATIENT)
Dept: HEMATOLOGY/ONCOLOGY | Facility: CLINIC | Age: 67
End: 2022-11-11

## 2022-11-16 NOTE — ADDENDUM NOTE
Addended by: MAXINE NELSON on: 10/6/2022 10:58 AM     Modules accepted: Orders    
16-Nov-2022 21:24

## 2022-11-25 ENCOUNTER — LAB VISIT (OUTPATIENT)
Dept: LAB | Facility: HOSPITAL | Age: 67
End: 2022-11-25
Attending: HOSPITALIST
Payer: MEDICARE

## 2022-11-25 ENCOUNTER — OFFICE VISIT (OUTPATIENT)
Dept: HEMATOLOGY/ONCOLOGY | Facility: CLINIC | Age: 67
End: 2022-11-25
Payer: MEDICARE

## 2022-11-25 ENCOUNTER — PATIENT MESSAGE (OUTPATIENT)
Dept: HEMATOLOGY/ONCOLOGY | Facility: CLINIC | Age: 67
End: 2022-11-25
Payer: MEDICARE

## 2022-11-25 VITALS
HEART RATE: 55 BPM | WEIGHT: 191.81 LBS | TEMPERATURE: 99 F | HEIGHT: 71 IN | BODY MASS INDEX: 26.85 KG/M2 | RESPIRATION RATE: 18 BRPM | DIASTOLIC BLOOD PRESSURE: 63 MMHG | OXYGEN SATURATION: 99 % | SYSTOLIC BLOOD PRESSURE: 135 MMHG

## 2022-11-25 DIAGNOSIS — C49.A2 GASTROINTESTINAL STROMAL TUMOR (GIST) OF STOMACH: Primary | ICD-10-CM

## 2022-11-25 DIAGNOSIS — I10 PRIMARY HYPERTENSION: ICD-10-CM

## 2022-11-25 DIAGNOSIS — C49.A2 GASTROINTESTINAL STROMAL TUMOR (GIST) OF STOMACH: ICD-10-CM

## 2022-11-25 PROBLEM — R05.9 COUGH: Status: RESOLVED | Noted: 2022-10-27 | Resolved: 2022-11-25

## 2022-11-25 LAB
ALBUMIN SERPL BCP-MCNC: 3.7 G/DL (ref 3.5–5.2)
ALP SERPL-CCNC: 87 U/L (ref 55–135)
ALT SERPL W/O P-5'-P-CCNC: 21 U/L (ref 10–44)
ANION GAP SERPL CALC-SCNC: 9 MMOL/L (ref 8–16)
AST SERPL-CCNC: 21 U/L (ref 10–40)
BILIRUB SERPL-MCNC: 0.9 MG/DL (ref 0.1–1)
BUN SERPL-MCNC: 11 MG/DL (ref 8–23)
CALCIUM SERPL-MCNC: 9.2 MG/DL (ref 8.7–10.5)
CHLORIDE SERPL-SCNC: 107 MMOL/L (ref 95–110)
CO2 SERPL-SCNC: 25 MMOL/L (ref 23–29)
CREAT SERPL-MCNC: 1.1 MG/DL (ref 0.5–1.4)
ERYTHROCYTE [DISTWIDTH] IN BLOOD BY AUTOMATED COUNT: 15 % (ref 11.5–14.5)
EST. GFR  (NO RACE VARIABLE): >60 ML/MIN/1.73 M^2
GLUCOSE SERPL-MCNC: 124 MG/DL (ref 70–110)
HCT VFR BLD AUTO: 35.9 % (ref 40–54)
HGB BLD-MCNC: 10.9 G/DL (ref 14–18)
IMM GRANULOCYTES # BLD AUTO: 0.01 K/UL (ref 0–0.04)
MCH RBC QN AUTO: 25.5 PG (ref 27–31)
MCHC RBC AUTO-ENTMCNC: 30.4 G/DL (ref 32–36)
MCV RBC AUTO: 84 FL (ref 82–98)
NEUTROPHILS # BLD AUTO: 3.5 K/UL (ref 1.8–7.7)
PLATELET # BLD AUTO: 238 K/UL (ref 150–450)
PMV BLD AUTO: 11 FL (ref 9.2–12.9)
POTASSIUM SERPL-SCNC: 3.5 MMOL/L (ref 3.5–5.1)
PROT SERPL-MCNC: 6.7 G/DL (ref 6–8.4)
RBC # BLD AUTO: 4.27 M/UL (ref 4.6–6.2)
SODIUM SERPL-SCNC: 141 MMOL/L (ref 136–145)
WBC # BLD AUTO: 5.71 K/UL (ref 3.9–12.7)

## 2022-11-25 PROCEDURE — 85027 COMPLETE CBC AUTOMATED: CPT | Performed by: HOSPITALIST

## 2022-11-25 PROCEDURE — 1159F MED LIST DOCD IN RCRD: CPT | Mod: CPTII,S$GLB,, | Performed by: HOSPITALIST

## 2022-11-25 PROCEDURE — 3075F PR MOST RECENT SYSTOLIC BLOOD PRESS GE 130-139MM HG: ICD-10-PCS | Mod: CPTII,S$GLB,, | Performed by: HOSPITALIST

## 2022-11-25 PROCEDURE — 99499 UNLISTED E&M SERVICE: CPT | Mod: S$GLB,,, | Performed by: HOSPITALIST

## 2022-11-25 PROCEDURE — 1126F AMNT PAIN NOTED NONE PRSNT: CPT | Mod: CPTII,S$GLB,, | Performed by: HOSPITALIST

## 2022-11-25 PROCEDURE — 3288F FALL RISK ASSESSMENT DOCD: CPT | Mod: CPTII,S$GLB,, | Performed by: HOSPITALIST

## 2022-11-25 PROCEDURE — 99214 OFFICE O/P EST MOD 30 MIN: CPT | Mod: S$GLB,,, | Performed by: HOSPITALIST

## 2022-11-25 PROCEDURE — 3075F SYST BP GE 130 - 139MM HG: CPT | Mod: CPTII,S$GLB,, | Performed by: HOSPITALIST

## 2022-11-25 PROCEDURE — 3078F DIAST BP <80 MM HG: CPT | Mod: CPTII,S$GLB,, | Performed by: HOSPITALIST

## 2022-11-25 PROCEDURE — 1101F PR PT FALLS ASSESS DOC 0-1 FALLS W/OUT INJ PAST YR: ICD-10-PCS | Mod: CPTII,S$GLB,, | Performed by: HOSPITALIST

## 2022-11-25 PROCEDURE — 36415 COLL VENOUS BLD VENIPUNCTURE: CPT | Performed by: HOSPITALIST

## 2022-11-25 PROCEDURE — 80053 COMPREHEN METABOLIC PANEL: CPT | Performed by: HOSPITALIST

## 2022-11-25 PROCEDURE — 3008F BODY MASS INDEX DOCD: CPT | Mod: CPTII,S$GLB,, | Performed by: HOSPITALIST

## 2022-11-25 PROCEDURE — 1126F PR PAIN SEVERITY QUANTIFIED, NO PAIN PRESENT: ICD-10-PCS | Mod: CPTII,S$GLB,, | Performed by: HOSPITALIST

## 2022-11-25 PROCEDURE — 99499 RISK ADDL DX/OHS AUDIT: ICD-10-PCS | Mod: S$GLB,,, | Performed by: HOSPITALIST

## 2022-11-25 PROCEDURE — 1101F PT FALLS ASSESS-DOCD LE1/YR: CPT | Mod: CPTII,S$GLB,, | Performed by: HOSPITALIST

## 2022-11-25 PROCEDURE — 3288F PR FALLS RISK ASSESSMENT DOCUMENTED: ICD-10-PCS | Mod: CPTII,S$GLB,, | Performed by: HOSPITALIST

## 2022-11-25 PROCEDURE — 3008F PR BODY MASS INDEX (BMI) DOCUMENTED: ICD-10-PCS | Mod: CPTII,S$GLB,, | Performed by: HOSPITALIST

## 2022-11-25 PROCEDURE — 99214 PR OFFICE/OUTPT VISIT, EST, LEVL IV, 30-39 MIN: ICD-10-PCS | Mod: S$GLB,,, | Performed by: HOSPITALIST

## 2022-11-25 PROCEDURE — 99999 PR PBB SHADOW E&M-EST. PATIENT-LVL IV: CPT | Mod: PBBFAC,,, | Performed by: HOSPITALIST

## 2022-11-25 PROCEDURE — 1159F PR MEDICATION LIST DOCUMENTED IN MEDICAL RECORD: ICD-10-PCS | Mod: CPTII,S$GLB,, | Performed by: HOSPITALIST

## 2022-11-25 PROCEDURE — 3078F PR MOST RECENT DIASTOLIC BLOOD PRESSURE < 80 MM HG: ICD-10-PCS | Mod: CPTII,S$GLB,, | Performed by: HOSPITALIST

## 2022-11-25 PROCEDURE — 99999 PR PBB SHADOW E&M-EST. PATIENT-LVL IV: ICD-10-PCS | Mod: PBBFAC,,, | Performed by: HOSPITALIST

## 2022-11-25 RX ORDER — LOPERAMIDE HCL 2 MG
TABLET ORAL
Qty: 90 TABLET | Refills: 11 | Status: SHIPPED | OUTPATIENT
Start: 2022-11-25 | End: 2023-04-14 | Stop reason: SDUPTHER

## 2022-11-25 NOTE — PROGRESS NOTES
MEDICAL ONCOLOGY FOLLOW-UP VISIT.     Best Contact Phone Number(s): [phone] (home) [phone] (work)     Cancer/Stage/TNM:    Cancer Staging   Gastrointestinal stromal tumor (GIST) of stomach  Staging form: Gastrointestinal Stromal Tumor - Gastric and Omental GIST, AJCC 8th Edition  - Clinical stage from 9/9/2022: Stage IIIA (cT3, cN0, cM0, Mitotic Rate: High) - Signed by Enrique Mcnair MD on 9/26/2022       Reason for visit: Mr. Zhang is a 66 year old man with HTN diagnosed with GIST by EUS on 9/9/22. He started neoadjuvant imatinib on 10/8/22. He presents to medical oncology clinic for routine follow up on neoadjuvant imatinib.      Interval History:     Reports loose bowels over the last two weeks. Three to four episodes medium volume watery stool. No blood. No abdominal pain. No fevers or chills. Weight stable at 190. Using Pepto Bismol.    Mild perioral rash. No increased lacrimation or eye issues. No cracking or peeling of hand and feet. BP well controlled on current regimen. Recently got flu and COVID booster shot without incident.      Review of Systems   Constitutional:  Negative for appetite change, chills and fever.   HENT:  Negative for mouth sores, sore throat and trouble swallowing.    Eyes:  Negative for pain, discharge, redness and visual disturbance.   Respiratory:  Negative for cough and shortness of breath.    Cardiovascular:  Negative for chest pain, palpitations and leg swelling.   Gastrointestinal:  Positive for diarrhea. Negative for abdominal pain, blood in stool, constipation, nausea and vomiting.   Genitourinary:  Negative for difficulty urinating and dysuria.   Musculoskeletal:  Negative for arthralgias and joint swelling.   Skin:  Negative for rash.   Neurological:  Negative for dizziness, weakness, light-headedness and headaches.      Oncology History   Gastrointestinal stromal tumor (GIST) of stomach   7/18/2022 Imaging Significant Findings    Presented to [facility] with  "abdominal pain. CT a/p found a 6.7 x 5.6 x 7.2 cm cystic mass in the gastric fundus.     9/9/2022 Procedure    EGD/EUS fins a 6.2x8.0 cm cystic mass in the cardia. Pathology shows GIST with 'focal/frequent mitoses' although exact quantification could not be determined.      9/12/2022 Imaging Significant Findings    CT a/p redemonstrates 9.3x8 cm gastric mass with associated necrosis. No LAD. No liver lesions.     9/15/2022 Imaging Significant Findings    CT chest only shows incidental 3mm LLL nodule. No evidence of metastatic disease.     10/8/2022 -  Chemotherapy    Start imatinib            Physical Exam:   /63 (BP Location: Left arm, Patient Position: Sitting, BP Method: Medium (Automatic))   Pulse (!) 55   Temp 98.8 °F (37.1 °C) (Oral)   Resp 18   Ht 5' 11" (1.803 m)   Wt 87 kg (191 lb 12.8 oz)   SpO2 99%   BMI 26.75 kg/m²      ECOG Performance Status: (foot note - ECOG PS provided by Eastern Cooperative Oncology Group) 0 - Asymptomatic    Physical Exam  Constitutional:       General: He is not in acute distress.     Appearance: Normal appearance.   HENT:      Head: Normocephalic.      Nose: Nose normal.      Mouth/Throat:      Mouth: Mucous membranes are moist.      Pharynx: Oropharynx is clear. No oropharyngeal exudate or posterior oropharyngeal erythema.   Eyes:      General: No scleral icterus.     Extraocular Movements: Extraocular movements intact.      Conjunctiva/sclera: Conjunctivae normal.      Pupils: Pupils are equal, round, and reactive to light.   Cardiovascular:      Rate and Rhythm: Normal rate and regular rhythm.   Pulmonary:      Effort: Pulmonary effort is normal. No respiratory distress.   Abdominal:      General: There is no distension.      Palpations: Abdomen is soft.      Tenderness: There is no abdominal tenderness.   Musculoskeletal:         General: No swelling. Normal range of motion.      Cervical back: Normal range of motion and neck supple.      Right lower leg: No " edema.      Left lower leg: No edema.   Lymphadenopathy:      Cervical: No cervical adenopathy.   Skin:     General: Skin is warm and dry.      Coloration: Skin is not jaundiced.      Findings: No rash.   Neurological:      General: No focal deficit present.      Mental Status: He is alert and oriented to person, place, and time.      Motor: No weakness.   Psychiatric:         Mood and Affect: Mood normal.         Behavior: Behavior normal.         Thought Content: Thought content normal.         Labs:  Lab Visit on 11/25/2022   Component Date Value Ref Range Status    WBC 11/25/2022 5.71  3.90 - 12.70 K/uL Final    RBC 11/25/2022 4.27 (L)  4.60 - 6.20 M/uL Final    Hemoglobin 11/25/2022 10.9 (L)  14.0 - 18.0 g/dL Final    Hematocrit 11/25/2022 35.9 (L)  40.0 - 54.0 % Final    MCV 11/25/2022 84  82 - 98 fL Final    MCH 11/25/2022 25.5 (L)  27.0 - 31.0 pg Final    MCHC 11/25/2022 30.4 (L)  32.0 - 36.0 g/dL Final    RDW 11/25/2022 15.0 (H)  11.5 - 14.5 % Final    Platelets 11/25/2022 238  150 - 450 K/uL Final    MPV 11/25/2022 11.0  9.2 - 12.9 fL Final    Gran # (ANC) 11/25/2022 3.5  1.8 - 7.7 K/uL Final    Comment: The ANC is based on a white cell differential from an   automated cell counter. It has not been microscopically   reviewed for the presence of abnormal cells. Clinical   correlation is required.      Immature Grans (Abs) 11/25/2022 0.01  0.00 - 0.04 K/uL Final    Comment: Mild elevation in immature granulocytes is non specific and   can be seen in a variety of conditions including stress response,   acute inflammation, trauma and pregnancy. Correlation with other   laboratory and clinical findings is essential.      Sodium 11/25/2022 141  136 - 145 mmol/L Final    Potassium 11/25/2022 3.5  3.5 - 5.1 mmol/L Final    Chloride 11/25/2022 107  95 - 110 mmol/L Final    CO2 11/25/2022 25  23 - 29 mmol/L Final    Glucose 11/25/2022 124 (H)  70 - 110 mg/dL Final    BUN 11/25/2022 11  8 - 23 mg/dL Final     Creatinine 11/25/2022 1.1  0.5 - 1.4 mg/dL Final    Calcium 11/25/2022 9.2  8.7 - 10.5 mg/dL Final    Total Protein 11/25/2022 6.7  6.0 - 8.4 g/dL Final    Albumin 11/25/2022 3.7  3.5 - 5.2 g/dL Final    Total Bilirubin 11/25/2022 0.9  0.1 - 1.0 mg/dL Final    Comment: For infants and newborns, interpretation of results should be based  on gestational age, weight and in agreement with clinical  observations.    Premature Infant recommended reference ranges:  Up to 24 hours.............<8.0 mg/dL  Up to 48 hours............<12.0 mg/dL  3-5 days..................<15.0 mg/dL  6-29 days.................<15.0 mg/dL      Alkaline Phosphatase 11/25/2022 87  55 - 135 U/L Final    AST 11/25/2022 21  10 - 40 U/L Final    ALT 11/25/2022 21  10 - 44 U/L Final    Anion Gap 11/25/2022 9  8 - 16 mmol/L Final    eGFR 11/25/2022 >60.0  >60 mL/min/1.73 m^2 Final           Imaging: Reviewed CXR from today with no acute process    X-Ray Chest PA And Lateral  Narrative: EXAMINATION:  XR CHEST PA AND LATERAL    CLINICAL HISTORY:  Cough, unspecified    TECHNIQUE:  PA and lateral views of the chest were performed.    COMPARISON:  07/01/2016    FINDINGS:  The heart size is upper normal.  Mediastinal contour is normal.  Lungs are expanded and clear.  No lung consolidation or pleural fluid is identified.  The skeletal structures are intact.  Impression: No acute cardiopulmonary disease    Electronically signed by: Donald Perez MD  Date:    10/27/2022  Time:    15:02            Diagnoses:       1. Gastrointestinal stromal tumor (GIST) of stomach    2. Primary hypertension              Assessment and Plan:     1. Gastrointestinal stromal tumor (GIST) of stomach  Overview:  Patient with relatively large and symptomatic gastric GIST, which has grown on imaging from 7/18/22 to 9/12/22. No imaging evidence of luis or distant metastatic disease. Mitotic rate not quantified, but given clinical behavior and noted cellularity and frequent mitosis  we consider higher risk. 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 Started neoadjuvant imatinib 400mg po daily 10/08/2022.    Assessment & Plan:  Continues to tolerate treatment relatively well. Diarrhea likely side effect of imatinib; seems relatively mild.    - Immodium prn for diarrhea  - Otherwise continue current dose  - FU in 4 weeks  - Plan to reimage first week of January followed by surgical re-evaluation    Orders:  -     loperamide (IMODIUM A-D) 2 mg Tab; Take two tabs at the onset of loose stools.  Then take one tab after every subsequent loose stool.  Dispense: 90 tablet; Refill: 11    2. Primary hypertension  Overview:  Patient on HCTZ, nebivolol, and valsartan.    Assessment & Plan:  - Relatively well controlled today; CTM               Route Chart for Scheduling    Med Onc Chart Routing      Follow up with physician . GASPER Mcnair 12/22/22   Follow up with [facility]    Infusion scheduling note    Injection scheduling note    Labs CBC and CMP   Lab interval:     Imaging    Pharmacy appointment    Other referrals              Enrique Mcnair MD  Hematology/Oncology  [facility]

## 2022-11-25 NOTE — PATIENT INSTRUCTIONS
Continue to tolerate imatinib therapy well. Mild to moderate diarrhea is likely due to the medication. Recommend starting loperamide. Monitor for increasing abdominal pain, fever, or worsening/severe diarrhea. Otherwise continue imatinib at current dose.    Will follow up in ~4 weeks with Dr. Mcnair before [place].    Repeat CT imaging the first week of January to evaluate response to treatment.

## 2022-11-25 NOTE — ASSESSMENT & PLAN NOTE
Continues to tolerate treatment relatively well. Diarrhea likely side effect of imatinib; seems relatively mild.    - Immodium prn for diarrhea  - Otherwise continue current dose  - FU in 4 weeks  - Plan to reimage first week of January followed by surgical re-evaluation

## 2022-11-29 ENCOUNTER — SPECIALTY PHARMACY (OUTPATIENT)
Dept: PHARMACY | Facility: CLINIC | Age: 67
End: 2022-11-29
Payer: MEDICARE

## 2022-11-29 DIAGNOSIS — C49.A2 GASTROINTESTINAL STROMAL TUMOR (GIST) OF STOMACH: ICD-10-CM

## 2022-11-29 RX ORDER — IMATINIB MESYLATE 400 MG/1
400 TABLET, FILM COATED ORAL DAILY
Qty: 30 TABLET | Refills: 11 | Status: SHIPPED | OUTPATIENT
Start: 2022-11-29 | End: 2022-11-30 | Stop reason: SDUPTHER

## 2022-11-29 RX ORDER — IMATINIB MESYLATE 400 MG/1
400 TABLET, FILM COATED ORAL DAILY
Qty: 30 TABLET | Refills: 11 | Status: CANCELLED | OUTPATIENT
Start: 2022-11-29 | End: 2023-11-29

## 2022-11-29 NOTE — TELEPHONE ENCOUNTER
Outgoing to call regarding Gleevec refill, rx needs to be resent it was reg, Jeana Moyer request for resend of rx,. Informed pt spouse that we are in contact to get rx resent, and will; follow up

## 2022-11-29 NOTE — PROGRESS NOTES
"Cancer Genetics  Hereditary and High-Risk Clinic  Department of Hematology and Oncology  [facility]    [facility]    Date of Service:  22  Visit Provider:  Rodrigo Riddle DNP  Collaborating Physician:  Cely Dixon MD    Patient ID  Name: Sami Zhang    : 1955    MRN: [id number]      Referring Provider  Kostas López MD  [address] [place] [address]    SUBJECTIVE      Chief Complaint: Genetic Evaluation    History of Present Illness (HPI):  Sami Zhang ("Sami"), 66 y.o., assigned male sex at birth, is established with the [facility] of [facility] but new to me.  He was referred by  Dr. López with Surgery  for cancer genetic risk assessment, genetic counseling, and potentially genetic testing.      Focused Medical History  Germline cancer genetic testing:  No  Cancer:  See immediately below  Gastrointestinal stromal tumor (GIST) of the gastric cardia, diagnosed via biopsy performed on 2022 (age 66y) upper endoscopic ultrasound (EUS), with subsequent tumor testing revealing KIT alteration [id number] (exon11)/p.[id number] ([id number]) and no reportable PDGFRA alteration; according to medical oncology provider's note, "relatively large and symptomatic gastric GIST, which has grown on imaging from 22 to 22. No imaging evidence of luis or distant metastatic disease. Mitotic rate not quantified, but given clinical behavior and noted cellularity and frequent mitosis we consider higher risk."  Undergoing neoadjuvant imatinib therapy.  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  If it is appearing small/illegible on your screen, expand this note window horizontally. **    Hereditary cancer genetic testing in blood relatives:  No  Other than noted, no known history of cancer in relatives depicted in the pedigree or in:  maternal extended family; descendants of nieces/nephews.  Other than noted, no known family benign tumors or colon polyps.  Unaware of or limited knowledge of cancer/tumor history of:  paternal first cousins, paternal extended family.    Review of Systems  See HPI.       OBJECTIVE      Past Medical History:   Diagnosis Date    Arthritis     Gout 10/8/2015    Hyperlipidemia     Hypertension      Patient Active Problem List    Diagnosis Date Noted    Primary hypertension 09/27/2022    Gastrointestinal stromal tumor (GIST) of stomach 09/13/2022    Laryngeal nodule 07/05/2016      Physical Exam  Very pleasant patient.  Accompanied by his wife, Laura, who is also very pleasant.  Vitals signs:  Reviewed:  Vitals:    11/30/22 0926   PainSc: 0-No pain   Distress score:  Reviewed: (0/10).  Constitutional      Appearance:  Appears well developed and well nourished. No distress.   Pulmonary     Effort:  Normal.  Neurological     Mental Status:  Alert and oriented.     Coordination:  Normal.   Psychiatric         Mood and Affect:  Normal.     Thought Content:  Normal.     Speech:  Normal.     Behavior:  Normal.     Judgment:  Normal.  Genetics-specific     It is my assessment that the patient is ready to proceed with cancer genetic testing from a psychosocial perspective.    CANCER GENETIC COUNSELING      Cancer " Genetics     Germline cancer genetic testing is the testing of genes associated with cancer, known as cancer susceptibility genes.  Just as these genes are inherited from parents--one copy of each gene from each parent--mutations in these genes can be inherited, as well.  A mutation in a cancer susceptibility gene adversely affects the gene's ability to prevent cancer; therefore, carriers of cancer susceptibility gene mutations may be at increased risk for certain cancers.     Causes of Cancer    Only approximately 5%-10% of cancers are caused by an inherited cancer susceptibility gene mutation; rather, the majority of cancers are sporadic.  Causes of sporadic cancers may include environmental risk factors, lifestyle risk factors, and non-modifiable risk factors.  It is important to note that members of a family often share not only their genetics but also other risk factors, including environmental and lifestyle risk factors, so cancers can be familial.     Potential Results of Genetic Testing, and Their Implications     Potential results of genetic testing include positive, negative, and variant of unknown significance (VUS).    Positive:  A positive result indicates the presence of at least one clinically significant gene mutation, and the individual's associated cancer risks vary depending upon the cancer susceptibility gene(s) in which there is/are a mutation(s).  With a positive result, depending upon the specific result and the individual's clinical history, modified risk management may be recommended, including measures for risk reduction and/or surveillance; however, there are not always effective strategies for modified risk management.  Negative:  A negative result indicates that no clinically significant mutations were identified in the gene(s) tested.   VUS:  A VUS indicates that there is not presently enough data for the laboratory to make a determination as to whether the genetic variant is clinically  significant.  VUSs are not typically acted upon clinically.       Genetic Mutation Inheritance     When an individual tests positive for a gene mutation, his first-degree relatives each have a 50% chance of carrying the same mutation, and other, more distant blood relatives can also be at risk of carrying the same mutation.       Genetic Discrimination     Genetic discrimination occurs when individuals are discriminated against on the basis of their genetic information.    The Genetic Information Nondiscrimination Act of 2008 (BRENT) is U.S. federal legislation that provides some protections against use of an individual's genetic information by their health insurer and by their employer.      Title I of BRENT prohibits most health insurers from utilizing an individual's genetic information to make decisions regarding insurance eligibility or premium charges.  This protection does not apply to health insurance obtained through a job with the , and it is unclear whether it applies to health insurance obtained through the Federal Employees Health Benefits Plan.    Title II of BRENT prohibits covered entities, in many cases, from requesting or requiring the genetic information of employees and applicants and from using said information to make employment decisions.  This protection does not apply to employers with fewer than 15 employees or to the .    BRENT does not protect individuals from genetic discrimination by any other type of policy or entity, including but not limited to life insurance, disability insurance, long-term care insurance,  benefits, and  Health Services benefits.    Genetic Testing Logistics     An outside laboratory would perform the testing after a blood sample is collected here at the [facility] or a saliva sample is collected.    There is a potential for the patient to incur out-of-pocket costs related to genetic testing.    One can expect this genetic  "testing to take approximately three weeks to result.    Post-test genetic counseling can be conducted once the genetic testing results are available.     Cancer Genetics Impression    From a clinical standpoint, hereditary cancer/tumor susceptibility gene testing related to the patient's personal diagnosis of a GIST, along with the unknowns regarding his paternal family cancer/tumor history, would be reasonable.  Sami would like to pursue a benefits investigation to learn more about his cost for the test prior to proceeding, and he has completed verbal and written informed consent to do so.  Focused versus broad hereditary cancer/tumor susceptibility gene testing was discussed, and patient agrees to proceed with a benefits investigation for the latter.    ASSESSMENT / PLAN        ICD-10-CM ICD-9-CM   1. Encounter for nonprocreative genetic counseling  Z71.83 V26.33   2. Gastrointestinal stromal tumor (GIST) of cardia of stomach  C49.A2 238.1     1. Encounter for nonprocreative genetic counseling  Sami Zhang ("Sami"), 66 y.o., presented today for cancer genetic risk assessment, genetic counseling, and potentially genetic testing.  Cancer genetic risk assessment and pre-test cancer genetic counseling were conducted.  From a clinical standpoint, hereditary cancer/tumor susceptibility gene testing related to the patient's personal diagnosis of a GIST, along with the unknowns regarding his paternal family cancer/tumor history, would be reasonable.  Sami would like to pursue a benefits investigation to learn more about his cost for the test prior to proceeding, and he has completed verbal and written informed consent to do so.  Focused versus broad hereditary cancer/tumor susceptibility gene testing was discussed, and patient agrees to proceed with a benefits investigation for the latter.    PRE-SAMPLE BENEFITS INVESTIGATION  Testing lab: [facility]   Test panel: [facility] Multi-Cancer + RNA Panel (Core:  BRCA 1/2; " Indication for testing:  GIST)   Verbal informed consent: Obtained   Written informed consent: Obtained   Specimen type: N/A  (Patient denies blood disorders that would necessitate a skin fibroblast specimen)   Specimen collection by: N/A   Specimen collection date: N/A   Results expected by: Approximately 2-3 weeks after the genetic testing lab receives the specimen   Results disclosure plan: Post-test visit if positive or complex result; otherwise, results letter or optional post-test visit         - Sent request for pre-sample benefits investigation to [facility] with request for their response; awaiting response.    2. Gastrointestinal stromal tumor (GIST) of cardia of stomach  - See #1 above.     Follow-up:  To be determined.    Questions were encouraged and answered to the patient's satisfaction, and he verbalized understanding of the information and agreement with the plan.           Approximately 42 minutes were spent face-to-face with the patient.  Approximately 52 minutes in total were spent on this encounter, which includes face-to-face time and non-face-to-face time preparing to see the patient (e.g., review of tests), obtaining and/or reviewing separately obtained history, documenting clinical information in the electronic or other health record, independently interpreting results (not separately reported) and communicating results to the patient/family/caregiver, or care coordination (not separately reported).         Rodrigo Riddle, DNP, APRN, FNP-BC, AOCNP, CGRA  Nurse Practitioner, Hereditary and High-Risk Clinic  Department of Hematology and Oncology  [facility]    [facility]

## 2022-11-30 ENCOUNTER — PATIENT MESSAGE (OUTPATIENT)
Dept: HEMATOLOGY/ONCOLOGY | Facility: CLINIC | Age: 67
End: 2022-11-30

## 2022-11-30 ENCOUNTER — OFFICE VISIT (OUTPATIENT)
Dept: HEMATOLOGY/ONCOLOGY | Facility: CLINIC | Age: 67
End: 2022-11-30
Payer: MEDICARE

## 2022-11-30 DIAGNOSIS — C49.A2 GASTROINTESTINAL STROMAL TUMOR (GIST) OF STOMACH: ICD-10-CM

## 2022-11-30 DIAGNOSIS — Z71.83 ENCOUNTER FOR NONPROCREATIVE GENETIC COUNSELING: Primary | ICD-10-CM

## 2022-11-30 DIAGNOSIS — C49.A2 GASTROINTESTINAL STROMAL TUMOR (GIST) OF CARDIA OF STOMACH: ICD-10-CM

## 2022-11-30 PROCEDURE — 99999 PR PBB SHADOW E&M-EST. PATIENT-LVL II: ICD-10-PCS | Mod: PBBFAC,,, | Performed by: NURSE PRACTITIONER

## 2022-11-30 PROCEDURE — 3288F FALL RISK ASSESSMENT DOCD: CPT | Mod: CPTII,S$GLB,, | Performed by: NURSE PRACTITIONER

## 2022-11-30 PROCEDURE — 99215 PR OFFICE/OUTPT VISIT, EST, LEVL V, 40-54 MIN: ICD-10-PCS | Mod: S$GLB,,, | Performed by: NURSE PRACTITIONER

## 2022-11-30 PROCEDURE — 1159F MED LIST DOCD IN RCRD: CPT | Mod: CPTII,S$GLB,, | Performed by: NURSE PRACTITIONER

## 2022-11-30 PROCEDURE — 1101F PR PT FALLS ASSESS DOC 0-1 FALLS W/OUT INJ PAST YR: ICD-10-PCS | Mod: CPTII,S$GLB,, | Performed by: NURSE PRACTITIONER

## 2022-11-30 PROCEDURE — 3288F PR FALLS RISK ASSESSMENT DOCUMENTED: ICD-10-PCS | Mod: CPTII,S$GLB,, | Performed by: NURSE PRACTITIONER

## 2022-11-30 PROCEDURE — 1159F PR MEDICATION LIST DOCUMENTED IN MEDICAL RECORD: ICD-10-PCS | Mod: CPTII,S$GLB,, | Performed by: NURSE PRACTITIONER

## 2022-11-30 PROCEDURE — 99999 PR PBB SHADOW E&M-EST. PATIENT-LVL II: CPT | Mod: PBBFAC,,, | Performed by: NURSE PRACTITIONER

## 2022-11-30 PROCEDURE — 1101F PT FALLS ASSESS-DOCD LE1/YR: CPT | Mod: CPTII,S$GLB,, | Performed by: NURSE PRACTITIONER

## 2022-11-30 PROCEDURE — 1126F PR PAIN SEVERITY QUANTIFIED, NO PAIN PRESENT: ICD-10-PCS | Mod: CPTII,S$GLB,, | Performed by: NURSE PRACTITIONER

## 2022-11-30 PROCEDURE — 1126F AMNT PAIN NOTED NONE PRSNT: CPT | Mod: CPTII,S$GLB,, | Performed by: NURSE PRACTITIONER

## 2022-11-30 PROCEDURE — 99215 OFFICE O/P EST HI 40 MIN: CPT | Mod: S$GLB,,, | Performed by: NURSE PRACTITIONER

## 2022-11-30 RX ORDER — IMATINIB MESYLATE 400 MG/1
400 TABLET, FILM COATED ORAL DAILY
Qty: 30 TABLET | Refills: 11 | Status: SHIPPED | OUTPATIENT
Start: 2022-11-30 | End: 2022-11-30 | Stop reason: SDUPTHER

## 2022-11-30 RX ORDER — IMATINIB MESYLATE 400 MG/1
400 TABLET, FILM COATED ORAL DAILY
Qty: 30 TABLET | Refills: 11 | Status: SHIPPED | OUTPATIENT
Start: 2022-11-30 | End: 2022-12-01 | Stop reason: SDUPTHER

## 2022-11-30 RX ORDER — IMATINIB MESYLATE 400 MG/1
400 TABLET, FILM COATED ORAL DAILY
Qty: 30 TABLET | Refills: 11 | Status: CANCELLED | OUTPATIENT
Start: 2022-11-30 | End: 2023-11-30

## 2022-11-30 NOTE — TELEPHONE ENCOUNTER
----- Message from Mireya Rene PharmD sent at 11/30/2022 12:06 PM CST -----  Regarding: Imatinib refills  Hi,    It looks like the refill request we sent on yesterday was denied and sent to [facility]. Can you send patient's imatinib to [facility]? [facility]     Thank You,     Mireya Rene PharmD

## 2022-12-01 DIAGNOSIS — C49.A2 GASTROINTESTINAL STROMAL TUMOR (GIST) OF STOMACH: ICD-10-CM

## 2022-12-01 RX ORDER — IMATINIB MESYLATE 400 MG/1
400 TABLET, FILM COATED ORAL DAILY
Qty: 30 TABLET | Refills: 11 | Status: CANCELLED | OUTPATIENT
Start: 2022-12-01 | End: 2023-12-01

## 2022-12-01 RX ORDER — IMATINIB MESYLATE 400 MG/1
400 TABLET, FILM COATED ORAL DAILY
Qty: 30 TABLET | Refills: 11 | Status: SHIPPED | OUTPATIENT
Start: 2022-12-01 | End: 2023-08-28 | Stop reason: SDUPTHER

## 2022-12-01 NOTE — TELEPHONE ENCOUNTER
----- Message from Mireya Rene PharmD sent at 12/1/2022  4:03 PM CST -----  Regarding: RE: Imatinib refills  Hey wilber,    This prescription has been discontinued again. Can you all resend to [facility] please?   ----- Message -----  From: ISAEL Thompson  Sent: 11/30/2022   1:45 PM CST  To: Mireya Rene PharmD  Subject: RE: Imatinib refills                             I just resent it to [facility]. Dr. Mcnair is out of the office this week.     ----- Message -----  From: Mireya Rene PharmD  Sent: 11/30/2022  12:08 PM CST  To: Wicho Carpio Staff, Enrique Mcnair Staff  Subject: Imatinib refills                                 Hi,    It looks like the refill request we sent on yesterday was denied and sent to [facility]. Can you send patient's imatinib to [facility]? I sent another refill request to Dr. Mcnair's [facility] for review.     Thank You,     Mireya Rene PharmD

## 2022-12-02 ENCOUNTER — TELEPHONE (OUTPATIENT)
Dept: HEMATOLOGY/ONCOLOGY | Facility: CLINIC | Age: 67
End: 2022-12-02

## 2022-12-02 NOTE — TELEPHONE ENCOUNTER
Imatinib 400 mg has been transferred to [facility]- one of the dispensing pharmacies for [employer]. Patient should receive an e-mail at [email or web address] within 24 hours which he can reply to and have imatinib shipped to him.    I have informed wife of the information above and will also notify patient's provider. I will call pt in 1 week to confirm receipt of imatinib.     Note: [facility] and [facility] are both pharmacies pharmacies for [facility]. When patient's provider chooses to send refills, they will need to send to [facility] at [phone].

## 2022-12-02 NOTE — TELEPHONE ENCOUNTER
----- Message from Mireya Rene PharmD sent at 12/2/2022  2:37 PM CST -----  Regarding: RE: Imatinib refills  Hi,    This message is to notify you that we have transferred Mr. Zhang's imatinib to [facility] pharmacy which is the dispensing pharmacy for [facility].     Mr. Zhang's copay is over $400 when filled at [facility] and he has exhausted available funding to cover the cost. [facility] allows patient's to fill medications like Gleevec without insurance. His estimated cost is ~$39 when filled at [facility]. Please note, If imatinib is needed in the future, please  send to [facility] at [phone].     Thank You,     Mireya Rene PharmD       ----- Message -----  From: ISAEL Thompson  Sent: 12/1/2022   4:35 PM CST  To: Mireya Rene PharmD  Subject: RE: Imatinib refills                             Sent!  ----- Message -----  From: Mireya Rene PharmD  Sent: 12/1/2022   4:04 PM CST  To: ISAEL Thompson, Enrique Mcnair Staff  Subject: RE: Imatinib refills                             Tamara merino,    This prescription has been discontinued again. Can you all resend to [facility] please?   ----- Message -----  From: ISAEL Thompson  Sent: 11/30/2022   1:45 PM CST  To: Mireya Rene PharmD  Subject: RE: Imatinib refills                             I just resent it to [facility]. Dr. Mcnair is out of the office this week.     ----- Message -----  From: Mireya Rene PharmD  Sent: 11/30/2022  12:08 PM CST  To: Wicho Carpio Staff, Enrique Mcnair Staff  Subject: Imatinib refills                                 Hi,    It looks like the refill request we sent on yesterday was denied and sent to [facility]. Can you send patient's imatinib to [facility]? [facility]     Thank You,     Mireya Rene PharmD

## 2022-12-02 NOTE — TELEPHONE ENCOUNTER
Patient's wife confirmed he has 4 tablets on hand, enough through 12/6/2022. Patient has about $252 of [facility] remaining. Patient's copay is $434.80, therefore he will be responsible for 184.80.   Patient's wife states they can not afford this medication ,, therefore she requested the RX should be transferred to [facility] where Gleevec would cost $39 for 30 day supply.     Will transfer then notify pt and provider.

## 2022-12-02 NOTE — TELEPHONE ENCOUNTER
Sobia at [facility] states [facility] is an online self service pharmacy. She suggested patient create an online account at [facility] prior to transfer of Gleevec to there dispensing pharmacy [facility] ([phone]).     Patient's wife agreed to create an online account then call me back so that I may transfer Gleevec to [facility].

## 2022-12-07 NOTE — TELEPHONE ENCOUNTER
Outgoing call to [facility] to check status of imatinib per [facility] request.     According to [facility], he set up delivery online on 12/5 and was emailed a tracking number. [facility] does not deliver overnight for [facility] medications. [facility] uses [facility]PS priority mail, so it takes up to 7 days for delivery. Patient should expect delivery by Monday 12/12.     Pt's wife notified of the information above and I have informed Cyndi Pitt, CNS patient is currently out of medication.

## 2022-12-08 ENCOUNTER — PATIENT MESSAGE (OUTPATIENT)
Dept: HEMATOLOGY/ONCOLOGY | Facility: CLINIC | Age: 67
End: 2022-12-08
Payer: MEDICARE

## 2022-12-09 ENCOUNTER — TELEPHONE (OUTPATIENT)
Dept: HEMATOLOGY/ONCOLOGY | Facility: CLINIC | Age: 67
End: 2022-12-09
Payer: MEDICARE

## 2022-12-09 NOTE — TELEPHONE ENCOUNTER
"----- Message from Alvaro Moneton sent at 12/7/2022 10:05 AM CST -----  Consult/Advisory:          Name Of Caller: Bhavesh Zhang (Spouse)       Contact Preference?:  [phone] (Mobile)      Provider Name: Sweetie / Xu       Does patient feel the need to be seen today? No      What is the nature of the call?: Calling to speak w/ nurse. Stating pt still hasn't received his imatinib (GLEEVEC) 400 MG Tab          Additional Notes:  "Thank you for all that you do for our patients"      "

## 2022-12-09 NOTE — TELEPHONE ENCOUNTER
Spoke with patient's wife. States he ran out of Gleevec on Tuesday but received prescription yesterday. Only missed 1 day. Main side effect is diarrhea, notes improvement today. Reviewed directions for how to take imodium as needed. Will reach out to office for any additional questions/concerns that arise.

## 2022-12-12 NOTE — TELEPHONE ENCOUNTER
Patient's wife confirmed receipt of Gleevec from [facility]. Closing encounter.

## 2022-12-15 ENCOUNTER — ANESTHESIA EVENT (OUTPATIENT)
Dept: ENDOSCOPY | Facility: HOSPITAL | Age: 67
End: 2022-12-15
Payer: MEDICARE

## 2022-12-15 ENCOUNTER — HOSPITAL ENCOUNTER (OUTPATIENT)
Facility: HOSPITAL | Age: 67
Discharge: HOME | End: 2022-12-15
Attending: INTERNAL MEDICINE | Admitting: INTERNAL MEDICINE
Payer: MEDICARE

## 2022-12-15 ENCOUNTER — ANESTHESIA (OUTPATIENT)
Dept: ENDOSCOPY | Facility: HOSPITAL | Age: 67
End: 2022-12-15
Payer: MEDICARE

## 2022-12-15 VITALS
OXYGEN SATURATION: 100 % | WEIGHT: 185 LBS | SYSTOLIC BLOOD PRESSURE: 109 MMHG | DIASTOLIC BLOOD PRESSURE: 61 MMHG | TEMPERATURE: 98 F | HEIGHT: 71 IN | RESPIRATION RATE: 16 BRPM | HEART RATE: 59 BPM | BODY MASS INDEX: 25.9 KG/M2

## 2022-12-15 DIAGNOSIS — Z12.11 SPECIAL SCREENING FOR MALIGNANT NEOPLASMS, COLON: Primary | ICD-10-CM

## 2022-12-15 PROCEDURE — 63600175 PHARM REV CODE 636 W HCPCS: Mod: HCNC | Performed by: NURSE ANESTHETIST, CERTIFIED REGISTERED

## 2022-12-15 PROCEDURE — 45385 COLONOSCOPY W/LESION REMOVAL: CPT | Mod: PT,HCNC | Performed by: INTERNAL MEDICINE

## 2022-12-15 PROCEDURE — E9220 PRA ENDO ANESTHESIA: ICD-10-PCS | Mod: PT,HCNC,ANES, | Performed by: STUDENT IN AN ORGANIZED HEALTH CARE EDUCATION/TRAINING PROGRAM

## 2022-12-15 PROCEDURE — 25000003 PHARM REV CODE 250: Mod: HCNC | Performed by: NURSE ANESTHETIST, CERTIFIED REGISTERED

## 2022-12-15 PROCEDURE — 45385 COLONOSCOPY W/LESION REMOVAL: CPT | Mod: PT,HCNC,, | Performed by: INTERNAL MEDICINE

## 2022-12-15 PROCEDURE — E9220 PRA ENDO ANESTHESIA: ICD-10-PCS | Mod: PT,HCNC,CRNA, | Performed by: NURSE ANESTHETIST, CERTIFIED REGISTERED

## 2022-12-15 PROCEDURE — 45385 PR COLONOSCOPY,REMV LESN,SNARE: ICD-10-PCS | Mod: PT,HCNC,, | Performed by: INTERNAL MEDICINE

## 2022-12-15 PROCEDURE — E9220 PRA ENDO ANESTHESIA: HCPCS | Mod: PT,HCNC,CRNA, | Performed by: NURSE ANESTHETIST, CERTIFIED REGISTERED

## 2022-12-15 PROCEDURE — 88305 TISSUE EXAM BY PATHOLOGIST: CPT | Mod: 59,HCNC | Performed by: PATHOLOGY

## 2022-12-15 PROCEDURE — E9220 PRA ENDO ANESTHESIA: HCPCS | Mod: PT,HCNC,ANES, | Performed by: STUDENT IN AN ORGANIZED HEALTH CARE EDUCATION/TRAINING PROGRAM

## 2022-12-15 PROCEDURE — 88305 TISSUE EXAM BY PATHOLOGIST: ICD-10-PCS | Mod: 26,,, | Performed by: PATHOLOGY

## 2022-12-15 PROCEDURE — 27201089 HC SNARE, DISP (ANY): Mod: HCNC | Performed by: INTERNAL MEDICINE

## 2022-12-15 PROCEDURE — 37000008 HC ANESTHESIA 1ST 15 MINUTES: Mod: HCNC | Performed by: INTERNAL MEDICINE

## 2022-12-15 PROCEDURE — 88305 TISSUE EXAM BY PATHOLOGIST: CPT | Mod: 26,,, | Performed by: PATHOLOGY

## 2022-12-15 PROCEDURE — 37000009 HC ANESTHESIA EA ADD 15 MINS: Mod: HCNC | Performed by: INTERNAL MEDICINE

## 2022-12-15 RX ORDER — SODIUM CHLORIDE 9 MG/ML
INJECTION, SOLUTION INTRAVENOUS CONTINUOUS
Status: DISCONTINUED | OUTPATIENT
Start: 2022-12-15 | End: 2022-12-15 | Stop reason: HOSPADM

## 2022-12-15 RX ORDER — LIDOCAINE HYDROCHLORIDE 20 MG/ML
INJECTION INTRAVENOUS
Status: DISCONTINUED | OUTPATIENT
Start: 2022-12-15 | End: 2022-12-15

## 2022-12-15 RX ORDER — PROPOFOL 10 MG/ML
VIAL (ML) INTRAVENOUS
Status: DISCONTINUED | OUTPATIENT
Start: 2022-12-15 | End: 2022-12-15

## 2022-12-15 RX ORDER — PROPOFOL 10 MG/ML
VIAL (ML) INTRAVENOUS CONTINUOUS PRN
Status: DISCONTINUED | OUTPATIENT
Start: 2022-12-15 | End: 2022-12-15

## 2022-12-15 RX ADMIN — PROPOFOL 60 MG: 10 INJECTION, EMULSION INTRAVENOUS at 10:12

## 2022-12-15 RX ADMIN — Medication 150 MCG/KG/MIN: at 10:12

## 2022-12-15 RX ADMIN — SODIUM CHLORIDE: 9 INJECTION, SOLUTION INTRAVENOUS at 09:12

## 2022-12-15 RX ADMIN — LIDOCAINE HYDROCHLORIDE 30 MG: 20 INJECTION INTRAVENOUS at 10:12

## 2022-12-15 NOTE — ANESTHESIA PREPROCEDURE EVALUATION
12/15/2022  Pre-operative evaluation for Procedure(s) (LRB):  COLONOSCOPY (N/A)    Sami Zhang is a 67 y.o. male     Patient Active Problem List   Diagnosis    Laryngeal nodule    Gastrointestinal stromal tumor (GIST) of stomach    Primary hypertension       Review of patient's allergies indicates:  No Known Allergies    No current facility-administered medications on file prior to encounter.     Current Outpatient Medications on File Prior to Encounter   Medication Sig Dispense Refill    allopurinol (ZYLOPRIM) 100 MG tablet Take 100 mg by mouth 2 (two) times daily.      CALCIUM CARBONATE/VITAMIN D3 (VITAMIN D-3 ORAL) Take 10,000 Units by mouth once a week.      icosapent ethyL (VASCEPA) 0.5 gram Cap Take 2 capsules by mouth once daily.      multivitamin capsule Take 1 capsule by mouth once daily.      nebivolol (BYSTOLIC) 10 MG Tab Take 10 mg by mouth once daily.      ondansetron (ZOFRAN) 4 MG tablet Take 1 tablet (4 mg total) by mouth every 6 (six) hours. 12 tablet 0    rosuvastatin (CRESTOR) 20 MG tablet Take 20 mg by mouth once daily.      valsartan-hydrochlorothiazide (DIOVAN-HCT) 160-12.5 mg per tablet Take 1 tablet by mouth once daily.      vitamin D 1000 units Tab Take 10,000 Units by mouth once a week.         Past Surgical History:   Procedure Laterality Date    ENDOSCOPIC ULTRASOUND OF UPPER GASTROINTESTINAL TRACT N/A 9/9/2022    Procedure: ULTRASOUND, UPPER GI TRACT, ENDOSCOPIC;  Surgeon: Yosvany Paula MD;  Location: [facility] ([address] [place] [address]);  Service: Endoscopy;  Laterality: N/A;  The patient need an EGD/EUS (radial) for gastric mass. Main. Urgent. 60 minutes. Referring:   Todd Cantu MD   Thanks,   Evan Farr MD    ESOPHAGOGASTRODUODENOSCOPY N/A 9/9/2022    Procedure: EGD (ESOPHAGOGASTRODUODENOSCOPY);  Surgeon: Yosvany Paula MD;  Location: [facility] ([address] [place] [address]);  Service:  Endoscopy;  Laterality: N/A;  The patient need an EGD/EUS (radial) for gastric mass. Main. Urgent. 60 minutes. Referring:   Todd Cantu MD   Thanks,   Evan Farr MD    Pt is fully vaccinated-DS  22-Instructions written down by pt's wife and read back.-DS    NONE N/A 10/8/2015       Social History     Socioeconomic History    Marital status:    Occupational History    Occupation: Retired from [employer]   Tobacco Use    Smoking status: Never    Smokeless tobacco: Never   Substance and Sexual Activity    Alcohol use: Not Currently     Alcohol/week: 2.0 standard drinks     Types: 2 Cans of beer per week     Comment: occasionally drinks wine    Drug use: No    Sexual activity: Not Currently     Partners: Female     Birth control/protection: None         CBC: No results for input(s): WBC, RBC, HGB, HCT, PLT, MCV, MCH, MCHC in the last 72 hours.    CMP: No results for input(s): NA, K, CL, CO2, BUN, CREATININE, GLU, MG, PHOS, CALCIUM, ALBUMIN, PROT, ALKPHOS, ALT, AST, BILITOT in the last 72 hours.    INR  No results for input(s): PT, INR, PROTIME, APTT in the last 72 hours.        Diagnostic Studies:      EKD Echo:  No results found for this or any previous visit.      Pre-op Assessment    I have reviewed the Patient Summary Reports.     I have reviewed the Nursing Notes. I have reviewed the NPO Status.   I have reviewed the Medications.     Review of Systems  Cardiovascular:   Hypertension        Physical Exam  General: Well nourished and Cooperative    Airway:  Mallampati: II   Mouth Opening: Normal  TM Distance: Normal  Tongue: Normal  Neck ROM: Normal ROM    Chest/Lungs:  Clear to auscultation, Normal Respiratory Rate    Heart:  Rate: Normal  Rhythm: Regular Rhythm  Sounds: Normal        Anesthesia Plan  Type of Anesthesia, risks & benefits discussed:    Anesthesia Type: Gen Natural Airway  Intra-op Monitoring Plan: Standard ASA Monitors  Post Op Pain Control Plan: multimodal analgesia  and IV/PO Opioids PRN  Induction:  IV  Airway Plan: Direct and Video, Post-Induction  Informed Consent: Informed consent signed with the Patient and all parties understand the risks and agree with anesthesia plan.  All questions answered.   ASA Score: 2    Ready For Surgery From Anesthesia Perspective.     .

## 2022-12-15 NOTE — H&P
Short Stay Endoscopy History and Physical    PCP - Mitch Gatica Jr, MD    Procedure - Colonoscopy  Sedation: GA  ASA - per anesthesia  Mallampati - per anesthesia  History of Anesthesia problems - no  Family history Anesthesia problems -  no     HPI:  This is a 67 y.o. male here for evaluation of : Screening for CRC    Reflux - no  Dysphagia - no  Abdominal pain - no  Diarrhea - no    ROS:  Constitutional: No fevers, chills, No weight loss  ENT: No allergies  CV: No chest pain  Pulm: No cough, No shortness of breath  Ophtho: No vision changes  GI: see HPI  Medical History:  has a past medical history of Arthritis, Gout (10/8/2015), Hyperlipidemia, and Hypertension.    Surgical History:  has a past surgical history that includes NONE (N/A, 10/8/2015); Esophagogastroduodenoscopy (N/A, 9/9/2022); and Endoscopic ultrasound of upper gastrointestinal tract (N/A, 9/9/2022).    Family History: family history includes Other in an other family member; Stroke (age of onset: 60) in his sister.. Otherwise no colon cancer, inflammatory bowel disease, or GI malignancies.    Social History:  reports that he has never smoked. He has never used smokeless tobacco. He reports that he does not currently use alcohol after a past usage of about 2.0 standard drinks per week. He reports that he does not use drugs.    Review of patient's allergies indicates:  No Known Allergies    Medications:   Medications Prior to Admission   Medication Sig Dispense Refill Last Dose    allopurinol (ZYLOPRIM) 100 MG tablet Take 100 mg by mouth 2 (two) times daily.       CALCIUM CARBONATE/VITAMIN D3 (VITAMIN D-3 ORAL) Take 10,000 Units by mouth once a week.       icosapent ethyL (VASCEPA) 0.5 gram Cap Take 2 capsules by mouth once daily.       imatinib (GLEEVEC) 400 MG Tab Take 1 tablet (400 mg total) by mouth once daily. 30 tablet 11     loperamide (IMODIUM A-D) 2 mg Tab Take two tabs at the onset of loose stools.  Then take one tab after every  subsequent loose stool. 90 tablet 11     multivitamin capsule Take 1 capsule by mouth once daily.       nebivolol (BYSTOLIC) 10 MG Tab Take 10 mg by mouth once daily.       ondansetron (ZOFRAN) 4 MG tablet Take 1 tablet (4 mg total) by mouth every 6 (six) hours. 12 tablet 0     rosuvastatin (CRESTOR) 20 MG tablet Take 20 mg by mouth once daily.       valsartan-hydrochlorothiazide (DIOVAN-HCT) 160-12.5 mg per tablet Take 1 tablet by mouth once daily.       vitamin D 1000 units Tab Take 10,000 Units by mouth once a week.          Objective Findings:    Vital Signs: Per nursing notes.    Physical Exam:  General Appearance: Well appearing in no acute distress  Head:   Normocephalic, without obvious abnormality  Eyes:    No scleral icterus  Airway: Open  Neck: No restriction in mobility  Lungs: CTA bilaterally in anterior and posterior fields, no wheezes, no crackles.  Heart:  Regular rate and rhythm, S1, S2 normal, no murmurs heard  Abdomen: Soft, non tender, non distended      Labs:  Lab Results   Component Value Date    WBC 5.71 11/25/2022    HGB 10.9 (L) 11/25/2022    HCT 35.9 (L) 11/25/2022     11/25/2022    ALT 21 11/25/2022    AST 21 11/25/2022     11/25/2022    K 3.5 11/25/2022     11/25/2022    CREATININE 1.1 11/25/2022    BUN 11 11/25/2022    CO2 25 11/25/2022    INR 1.0 07/01/2016         I have explained the risks and benefits of endoscopy procedures to the patient including but not limited to bleeding, perforation, infection, and death.    Thank you so much for allowing me to participate in the care of Sami Bran MD

## 2022-12-15 NOTE — PROVATION PATIENT INSTRUCTIONS
Discharge Summary/Instructions after an Endoscopic Procedure  Patient Name: Sami Zhang  Patient MRN: [id number]  Patient YOB: 1955  Thursday, December 15, 2022  Sean Bran MD  Dear patient,  As a result of recent federal legislation (The Federal Cures Act), you may   receive lab or pathology results from your procedure in your [facility]   account before your physician is able to contact you. Your physician or   their representative will relay the results to you with their   recommendations at their soonest availability.  Thank you,  RESTRICTIONS:  During your procedure today, you received medications for sedation.  These   medications may affect your judgment, balance and coordination.  Therefore,   for 24 hours, you have the following restrictions:   - DO NOT drive a car, operate machinery, make legal/financial decisions,   sign important papers or drink alcohol.    ACTIVITY:  Today: no heavy lifting, straining or running due to procedural   sedation/anesthesia.  The following day: return to full activity including work.  DIET:  Eat and drink normally unless instructed otherwise.     TREATMENT FOR COMMON SIDE EFFECTS:  - Mild abdominal pain, nausea, belching, bloating or excessive gas:  rest,   eat lightly and use a heating pad.  - Sore Throat: treat with throat lozenges and/or gargle with warm salt   water.  - Because air was used during the procedure, expelling large amounts of air   from your rectum or belching is normal.  - If a bowel prep was taken, you may not have a bowel movement for 1-3 days.    This is normal.  SYMPTOMS TO WATCH FOR AND REPORT TO YOUR PHYSICIAN:  1. Abdominal pain or bloating, other than gas cramps.  2. Chest pain.  3. Back pain.  4. Signs of infection such as: chills or fever occurring within 24 hours   after the procedure.  5. Rectal bleeding, which would show as bright red, maroon, or black stools.   (A tablespoon of blood from the rectum is not serious, especially  if   hemorrhoids are present.)  6. Vomiting.  7. Weakness or dizziness.  GO DIRECTLY TO THE NEAREST EMERGENCY ROOM IF YOU HAVE ANY OF THE FOLLOWING:      Difficulty breathing              Chills and/or fever over 101 F   Persistent vomiting and/or vomiting blood   Severe abdominal pain   Severe chest pain   Black, tarry stools   Bleeding- more than one tablespoon   Any other symptom or condition that you feel may need urgent attention  Your doctor recommends these additional instructions:  If any biopsies were taken, your doctors clinic will contact you in 1 to 2   weeks with any results.  - Patient has a contact number available for emergencies.  The signs and   symptoms of potential delayed complications were discussed with the   patient.  Return to normal activities tomorrow.  Written discharge   instructions were provided to the patient.   - Discharge patient to home.   - Resume previous diet.   - Continue present medications.   - Await pathology results.   - Repeat colonoscopy in 3 years for surveillance.   For questions, problems or results please call your physician - Sean Bran MD at Work:  [phone].  [facility], EMERGENCY ROOM PHONE NUMBER: [phone]  IF A COMPLICATION OR EMERGENCY SITUATION ARISES AND YOU ARE UNABLE TO REACH   YOUR PHYSICIAN - GO DIRECTLY TO THE EMERGENCY ROOM.  Sean Bran MD  12/15/2022 10:25:44 AM  This report has been verified and signed electronically.  Dear patient,  As a result of recent federal legislation (The Federal Cures Act), you may   receive lab or pathology results from your procedure in your [facility]   account before your physician is able to contact you. Your physician or   their representative will relay the results to you with their   recommendations at their soonest availability.  Thank you,  PROVATION

## 2022-12-15 NOTE — ANESTHESIA POSTPROCEDURE EVALUATION
Anesthesia Post Evaluation    Patient: Sami Zhang    Procedure(s) Performed: Procedure(s) (LRB):  COLONOSCOPY (N/A)    Final Anesthesia Type: general      Patient location during evaluation: PACU  Patient participation: Yes- Able to Participate  Level of consciousness: awake and alert  Post-procedure vital signs: reviewed and stable  Pain management: adequate  Airway patency: patent  SIDRA mitigation strategies: Multimodal analgesia  PONV status at discharge: No PONV  Anesthetic complications: no      Cardiovascular status: blood pressure returned to baseline and hemodynamically stable  Respiratory status: unassisted  Hydration status: euvolemic  Follow-up not needed.          Vitals Value Taken Time   /57 12/15/22 1035   Temp 36.5 °C (97.7 °F) 12/15/22 1025   Pulse 79 12/15/22 1035   Resp 16 12/15/22 1035   SpO2 100 % 12/15/22 1035         No case tracking events are documented in the log.      Pain/Ayde Score: Ayde Score: 10 (12/15/2022 10:35 AM)

## 2022-12-15 NOTE — TRANSFER OF CARE
"Anesthesia Transfer of Care Note    Patient: Sami Zhang    Procedure(s) Performed: Procedure(s) (LRB):  COLONOSCOPY (N/A)    Patient location: PACU    Anesthesia Type: general    Transport from OR: Transported from OR on room air with adequate spontaneous ventilation    Post pain: adequate analgesia    Post assessment: no apparent anesthetic complications and tolerated procedure well    Post vital signs: stable    Level of consciousness: awake, alert and oriented    Nausea/Vomiting: no nausea/vomiting    Complications: none    Transfer of care protocol was followed      Last vitals:   Visit Vitals  BP (!) 115/57 (BP Location: Left arm, Patient Position: Lying)   Pulse 64   Temp 36.5 °C (97.7 °F) (Oral)   Resp 16   Ht 5' 11" (1.803 m)   Wt 83.9 kg (185 lb)   SpO2 99%   BMI 25.80 kg/m²     "

## 2022-12-22 ENCOUNTER — LAB VISIT (OUTPATIENT)
Dept: LAB | Facility: HOSPITAL | Age: 67
End: 2022-12-22
Attending: HOSPITALIST
Payer: MEDICARE

## 2022-12-22 ENCOUNTER — OFFICE VISIT (OUTPATIENT)
Dept: HEMATOLOGY/ONCOLOGY | Facility: CLINIC | Age: 67
End: 2022-12-22
Payer: MEDICARE

## 2022-12-22 VITALS
OXYGEN SATURATION: 100 % | RESPIRATION RATE: 18 BRPM | HEART RATE: 50 BPM | SYSTOLIC BLOOD PRESSURE: 124 MMHG | DIASTOLIC BLOOD PRESSURE: 58 MMHG | HEIGHT: 71 IN | WEIGHT: 191.69 LBS | BODY MASS INDEX: 26.84 KG/M2

## 2022-12-22 DIAGNOSIS — I10 PRIMARY HYPERTENSION: Primary | ICD-10-CM

## 2022-12-22 DIAGNOSIS — C49.A2 GASTROINTESTINAL STROMAL TUMOR (GIST) OF STOMACH: ICD-10-CM

## 2022-12-22 LAB
ALBUMIN SERPL BCP-MCNC: 3.7 G/DL (ref 3.5–5.2)
ALP SERPL-CCNC: 93 U/L (ref 55–135)
ALT SERPL W/O P-5'-P-CCNC: 18 U/L (ref 10–44)
ANION GAP SERPL CALC-SCNC: 9 MMOL/L (ref 8–16)
AST SERPL-CCNC: 22 U/L (ref 10–40)
BILIRUB SERPL-MCNC: 0.8 MG/DL (ref 0.1–1)
BUN SERPL-MCNC: 15 MG/DL (ref 8–23)
CALCIUM SERPL-MCNC: 9.5 MG/DL (ref 8.7–10.5)
CHLORIDE SERPL-SCNC: 107 MMOL/L (ref 95–110)
CO2 SERPL-SCNC: 26 MMOL/L (ref 23–29)
CREAT SERPL-MCNC: 1.3 MG/DL (ref 0.5–1.4)
ERYTHROCYTE [DISTWIDTH] IN BLOOD BY AUTOMATED COUNT: 16.4 % (ref 11.5–14.5)
EST. GFR  (NO RACE VARIABLE): 59.5 ML/MIN/1.73 M^2
GLUCOSE SERPL-MCNC: 95 MG/DL (ref 70–110)
HCT VFR BLD AUTO: 35.3 % (ref 40–54)
HGB BLD-MCNC: 10.7 G/DL (ref 14–18)
IMM GRANULOCYTES # BLD AUTO: 0.01 K/UL (ref 0–0.04)
MCH RBC QN AUTO: 24.9 PG (ref 27–31)
MCHC RBC AUTO-ENTMCNC: 30.3 G/DL (ref 32–36)
MCV RBC AUTO: 82 FL (ref 82–98)
NEUTROPHILS # BLD AUTO: 3.9 K/UL (ref 1.8–7.7)
PLATELET # BLD AUTO: 244 K/UL (ref 150–450)
PMV BLD AUTO: 10.8 FL (ref 9.2–12.9)
POTASSIUM SERPL-SCNC: 3.8 MMOL/L (ref 3.5–5.1)
PROT SERPL-MCNC: 6.5 G/DL (ref 6–8.4)
RBC # BLD AUTO: 4.29 M/UL (ref 4.6–6.2)
SODIUM SERPL-SCNC: 142 MMOL/L (ref 136–145)
WBC # BLD AUTO: 7.09 K/UL (ref 3.9–12.7)

## 2022-12-22 PROCEDURE — 99999 PR PBB SHADOW E&M-EST. PATIENT-LVL IV: CPT | Mod: PBBFAC,HCNC,, | Performed by: HOSPITALIST

## 2022-12-22 PROCEDURE — 99499 UNLISTED E&M SERVICE: CPT | Mod: S$GLB,,, | Performed by: HOSPITALIST

## 2022-12-22 PROCEDURE — 3288F FALL RISK ASSESSMENT DOCD: CPT | Mod: HCNC,CPTII,S$GLB, | Performed by: HOSPITALIST

## 2022-12-22 PROCEDURE — 3074F SYST BP LT 130 MM HG: CPT | Mod: HCNC,CPTII,S$GLB, | Performed by: HOSPITALIST

## 2022-12-22 PROCEDURE — 99999 PR PBB SHADOW E&M-EST. PATIENT-LVL IV: ICD-10-PCS | Mod: PBBFAC,HCNC,, | Performed by: HOSPITALIST

## 2022-12-22 PROCEDURE — 3008F BODY MASS INDEX DOCD: CPT | Mod: HCNC,CPTII,S$GLB, | Performed by: HOSPITALIST

## 2022-12-22 PROCEDURE — 1159F PR MEDICATION LIST DOCUMENTED IN MEDICAL RECORD: ICD-10-PCS | Mod: HCNC,CPTII,S$GLB, | Performed by: HOSPITALIST

## 2022-12-22 PROCEDURE — 3008F PR BODY MASS INDEX (BMI) DOCUMENTED: ICD-10-PCS | Mod: HCNC,CPTII,S$GLB, | Performed by: HOSPITALIST

## 2022-12-22 PROCEDURE — 3078F PR MOST RECENT DIASTOLIC BLOOD PRESSURE < 80 MM HG: ICD-10-PCS | Mod: HCNC,CPTII,S$GLB, | Performed by: HOSPITALIST

## 2022-12-22 PROCEDURE — 3288F PR FALLS RISK ASSESSMENT DOCUMENTED: ICD-10-PCS | Mod: HCNC,CPTII,S$GLB, | Performed by: HOSPITALIST

## 2022-12-22 PROCEDURE — 99214 OFFICE O/P EST MOD 30 MIN: CPT | Mod: HCNC,S$GLB,, | Performed by: HOSPITALIST

## 2022-12-22 PROCEDURE — 1126F AMNT PAIN NOTED NONE PRSNT: CPT | Mod: HCNC,CPTII,S$GLB, | Performed by: HOSPITALIST

## 2022-12-22 PROCEDURE — 99499 RISK ADDL DX/OHS AUDIT: ICD-10-PCS | Mod: S$GLB,,, | Performed by: HOSPITALIST

## 2022-12-22 PROCEDURE — 1159F MED LIST DOCD IN RCRD: CPT | Mod: HCNC,CPTII,S$GLB, | Performed by: HOSPITALIST

## 2022-12-22 PROCEDURE — 1101F PT FALLS ASSESS-DOCD LE1/YR: CPT | Mod: HCNC,CPTII,S$GLB, | Performed by: HOSPITALIST

## 2022-12-22 PROCEDURE — 1101F PR PT FALLS ASSESS DOC 0-1 FALLS W/OUT INJ PAST YR: ICD-10-PCS | Mod: HCNC,CPTII,S$GLB, | Performed by: HOSPITALIST

## 2022-12-22 PROCEDURE — 3078F DIAST BP <80 MM HG: CPT | Mod: HCNC,CPTII,S$GLB, | Performed by: HOSPITALIST

## 2022-12-22 PROCEDURE — 80053 COMPREHEN METABOLIC PANEL: CPT | Mod: HCNC | Performed by: HOSPITALIST

## 2022-12-22 PROCEDURE — 1126F PR PAIN SEVERITY QUANTIFIED, NO PAIN PRESENT: ICD-10-PCS | Mod: HCNC,CPTII,S$GLB, | Performed by: HOSPITALIST

## 2022-12-22 PROCEDURE — 85027 COMPLETE CBC AUTOMATED: CPT | Mod: HCNC | Performed by: HOSPITALIST

## 2022-12-22 PROCEDURE — 36415 COLL VENOUS BLD VENIPUNCTURE: CPT | Mod: HCNC | Performed by: HOSPITALIST

## 2022-12-22 PROCEDURE — 3074F PR MOST RECENT SYSTOLIC BLOOD PRESSURE < 130 MM HG: ICD-10-PCS | Mod: HCNC,CPTII,S$GLB, | Performed by: HOSPITALIST

## 2022-12-22 PROCEDURE — 99214 PR OFFICE/OUTPT VISIT, EST, LEVL IV, 30-39 MIN: ICD-10-PCS | Mod: HCNC,S$GLB,, | Performed by: HOSPITALIST

## 2022-12-22 NOTE — PATIENT INSTRUCTIONS
You continue to tolerate imatinib well. No signficant side effects aside from mild to moderate diarrhea controlled with immodium. We will plan to repeat CT scan in one month prior to your next visit. At that time can decide between proceeding to surgery or waiting a few more months to allow the imatinib to further shrink the tumor.    - FU in 4 weeks with repeat CT scan

## 2022-12-22 NOTE — ASSESSMENT & PLAN NOTE
Tolerating imatinb therapy well    - Conttinue imatinib 400mg po daily  - Repeat CT scan in one month  - Will review with surgical oncology after repeat imaging

## 2022-12-23 LAB
FINAL PATHOLOGIC DIAGNOSIS: NORMAL
GROSS: NORMAL
Lab: NORMAL

## 2023-01-05 ENCOUNTER — TELEPHONE (OUTPATIENT)
Dept: GASTROENTEROLOGY | Facility: CLINIC | Age: 68
End: 2023-01-05
Payer: MEDICARE

## 2023-01-05 NOTE — TELEPHONE ENCOUNTER
----- Message from Sean Bran MD sent at 1/5/2023 12:11 PM CST -----  Please notify patient, the colon polyps were benign.

## 2023-01-12 ENCOUNTER — TELEPHONE (OUTPATIENT)
Dept: HEMATOLOGY/ONCOLOGY | Facility: CLINIC | Age: 68
End: 2023-01-12
Payer: MEDICARE

## 2023-01-12 NOTE — TELEPHONE ENCOUNTER
Spoke with patient's wife and scheduled appointment for 01/24/2022 with Dr. López; Provided patient with appointment time and date, address of [facility] facility and direct line to navigator. All questions and concerns addressed.

## 2023-01-19 ENCOUNTER — LAB VISIT (OUTPATIENT)
Dept: LAB | Facility: HOSPITAL | Age: 68
End: 2023-01-19
Attending: HOSPITALIST
Payer: MEDICARE

## 2023-01-19 ENCOUNTER — OFFICE VISIT (OUTPATIENT)
Dept: HEMATOLOGY/ONCOLOGY | Facility: CLINIC | Age: 68
End: 2023-01-19
Payer: MEDICARE

## 2023-01-19 VITALS
OXYGEN SATURATION: 100 % | HEART RATE: 47 BPM | RESPIRATION RATE: 18 BRPM | DIASTOLIC BLOOD PRESSURE: 68 MMHG | WEIGHT: 192 LBS | TEMPERATURE: 99 F | SYSTOLIC BLOOD PRESSURE: 150 MMHG | BODY MASS INDEX: 26.88 KG/M2 | HEIGHT: 71 IN

## 2023-01-19 DIAGNOSIS — C49.A2 GASTROINTESTINAL STROMAL TUMOR (GIST) OF STOMACH: Primary | ICD-10-CM

## 2023-01-19 DIAGNOSIS — C49.A2 GASTROINTESTINAL STROMAL TUMOR (GIST) OF STOMACH: ICD-10-CM

## 2023-01-19 DIAGNOSIS — I10 PRIMARY HYPERTENSION: ICD-10-CM

## 2023-01-19 DIAGNOSIS — E04.1 THYROID NODULE: ICD-10-CM

## 2023-01-19 LAB
ALBUMIN SERPL BCP-MCNC: 4 G/DL (ref 3.5–5.2)
ALP SERPL-CCNC: 79 U/L (ref 55–135)
ALT SERPL W/O P-5'-P-CCNC: 18 U/L (ref 10–44)
ANION GAP SERPL CALC-SCNC: 7 MMOL/L (ref 8–16)
AST SERPL-CCNC: 23 U/L (ref 10–40)
BILIRUB SERPL-MCNC: 1.6 MG/DL (ref 0.1–1)
BUN SERPL-MCNC: 11 MG/DL (ref 8–23)
CALCIUM SERPL-MCNC: 9.7 MG/DL (ref 8.7–10.5)
CHLORIDE SERPL-SCNC: 107 MMOL/L (ref 95–110)
CO2 SERPL-SCNC: 28 MMOL/L (ref 23–29)
CREAT SERPL-MCNC: 1.2 MG/DL (ref 0.5–1.4)
ERYTHROCYTE [DISTWIDTH] IN BLOOD BY AUTOMATED COUNT: 18.8 % (ref 11.5–14.5)
EST. GFR  (NO RACE VARIABLE): >60 ML/MIN/1.73 M^2
GLUCOSE SERPL-MCNC: 114 MG/DL (ref 70–110)
HCT VFR BLD AUTO: 35 % (ref 40–54)
HGB BLD-MCNC: 10.8 G/DL (ref 14–18)
IMM GRANULOCYTES # BLD AUTO: 0.01 K/UL (ref 0–0.04)
MCH RBC QN AUTO: 25.2 PG (ref 27–31)
MCHC RBC AUTO-ENTMCNC: 30.9 G/DL (ref 32–36)
MCV RBC AUTO: 82 FL (ref 82–98)
NEUTROPHILS # BLD AUTO: 3.5 K/UL (ref 1.8–7.7)
PLATELET # BLD AUTO: 223 K/UL (ref 150–450)
PMV BLD AUTO: 10.7 FL (ref 9.2–12.9)
POTASSIUM SERPL-SCNC: 3.7 MMOL/L (ref 3.5–5.1)
PROT SERPL-MCNC: 6.7 G/DL (ref 6–8.4)
RBC # BLD AUTO: 4.28 M/UL (ref 4.6–6.2)
SODIUM SERPL-SCNC: 142 MMOL/L (ref 136–145)
WBC # BLD AUTO: 6.2 K/UL (ref 3.9–12.7)

## 2023-01-19 PROCEDURE — 3288F PR FALLS RISK ASSESSMENT DOCUMENTED: ICD-10-PCS | Mod: HCNC,CPTII,S$GLB, | Performed by: HOSPITALIST

## 2023-01-19 PROCEDURE — 3288F FALL RISK ASSESSMENT DOCD: CPT | Mod: HCNC,CPTII,S$GLB, | Performed by: HOSPITALIST

## 2023-01-19 PROCEDURE — 1101F PR PT FALLS ASSESS DOC 0-1 FALLS W/OUT INJ PAST YR: ICD-10-PCS | Mod: HCNC,CPTII,S$GLB, | Performed by: HOSPITALIST

## 2023-01-19 PROCEDURE — 99999 PR PBB SHADOW E&M-EST. PATIENT-LVL IV: CPT | Mod: PBBFAC,HCNC,, | Performed by: HOSPITALIST

## 2023-01-19 PROCEDURE — 99499 RISK ADDL DX/OHS AUDIT: ICD-10-PCS | Mod: S$GLB,,, | Performed by: HOSPITALIST

## 2023-01-19 PROCEDURE — 99999 PR PBB SHADOW E&M-EST. PATIENT-LVL IV: ICD-10-PCS | Mod: PBBFAC,HCNC,, | Performed by: HOSPITALIST

## 2023-01-19 PROCEDURE — 3077F PR MOST RECENT SYSTOLIC BLOOD PRESSURE >= 140 MM HG: ICD-10-PCS | Mod: HCNC,CPTII,S$GLB, | Performed by: HOSPITALIST

## 2023-01-19 PROCEDURE — 1126F PR PAIN SEVERITY QUANTIFIED, NO PAIN PRESENT: ICD-10-PCS | Mod: HCNC,CPTII,S$GLB, | Performed by: HOSPITALIST

## 2023-01-19 PROCEDURE — 1101F PT FALLS ASSESS-DOCD LE1/YR: CPT | Mod: HCNC,CPTII,S$GLB, | Performed by: HOSPITALIST

## 2023-01-19 PROCEDURE — 3078F DIAST BP <80 MM HG: CPT | Mod: HCNC,CPTII,S$GLB, | Performed by: HOSPITALIST

## 2023-01-19 PROCEDURE — 3077F SYST BP >= 140 MM HG: CPT | Mod: HCNC,CPTII,S$GLB, | Performed by: HOSPITALIST

## 2023-01-19 PROCEDURE — 99214 PR OFFICE/OUTPT VISIT, EST, LEVL IV, 30-39 MIN: ICD-10-PCS | Mod: HCNC,S$GLB,, | Performed by: HOSPITALIST

## 2023-01-19 PROCEDURE — 3008F BODY MASS INDEX DOCD: CPT | Mod: HCNC,CPTII,S$GLB, | Performed by: HOSPITALIST

## 2023-01-19 PROCEDURE — 99499 UNLISTED E&M SERVICE: CPT | Mod: S$GLB,,, | Performed by: HOSPITALIST

## 2023-01-19 PROCEDURE — 4010F PR ACE/ARB THEARPY RXD/TAKEN: ICD-10-PCS | Mod: HCNC,CPTII,S$GLB, | Performed by: HOSPITALIST

## 2023-01-19 PROCEDURE — 36415 COLL VENOUS BLD VENIPUNCTURE: CPT | Mod: HCNC | Performed by: HOSPITALIST

## 2023-01-19 PROCEDURE — 3008F PR BODY MASS INDEX (BMI) DOCUMENTED: ICD-10-PCS | Mod: HCNC,CPTII,S$GLB, | Performed by: HOSPITALIST

## 2023-01-19 PROCEDURE — 4010F ACE/ARB THERAPY RXD/TAKEN: CPT | Mod: HCNC,CPTII,S$GLB, | Performed by: HOSPITALIST

## 2023-01-19 PROCEDURE — 99214 OFFICE O/P EST MOD 30 MIN: CPT | Mod: HCNC,S$GLB,, | Performed by: HOSPITALIST

## 2023-01-19 PROCEDURE — 85027 COMPLETE CBC AUTOMATED: CPT | Mod: HCNC | Performed by: HOSPITALIST

## 2023-01-19 PROCEDURE — 1126F AMNT PAIN NOTED NONE PRSNT: CPT | Mod: HCNC,CPTII,S$GLB, | Performed by: HOSPITALIST

## 2023-01-19 PROCEDURE — 80053 COMPREHEN METABOLIC PANEL: CPT | Mod: HCNC | Performed by: HOSPITALIST

## 2023-01-19 PROCEDURE — 3078F PR MOST RECENT DIASTOLIC BLOOD PRESSURE < 80 MM HG: ICD-10-PCS | Mod: HCNC,CPTII,S$GLB, | Performed by: HOSPITALIST

## 2023-01-19 RX ORDER — VALSARTAN 160 MG/1
160 TABLET ORAL DAILY
Qty: 30 TABLET | Refills: 11 | Status: SHIPPED | OUTPATIENT
Start: 2023-01-19 | End: 2024-01-19

## 2023-01-19 RX ORDER — HYDROCHLOROTHIAZIDE 25 MG/1
25 TABLET ORAL DAILY
Qty: 30 TABLET | Refills: 11 | Status: SHIPPED | OUTPATIENT
Start: 2023-01-19 | End: 2023-10-18

## 2023-01-19 NOTE — ASSESSMENT & PLAN NOTE
- Repeat imaging after 3 months of imatinib with good reduction in size of tumor  - Will review at [facility] and with surgery team to discuss continued neoadjuvant imatinib vs proceeding to surgery  - Will need adjuvant imatinib after surgical resection

## 2023-01-19 NOTE — ASSESSMENT & PLAN NOTE
- Remains elevated  - Increased HCTZ to 25mg po daily  - Con't valsartan 160mg po daily and nebivolol 10mg po daily

## 2023-01-19 NOTE — PATIENT INSTRUCTIONS
You continue to tolerate the imatinib treatment will with minimal side effects. Recent CT scan shows good shrinkage of the tumor in your stomach. Will review with Dr. López to consider surgery vs additional treatment before surgery.     Given elevated blood pressure will increase you HCTZ dose. Also given incidental thyroid nodules will schedule non-urgent thyroid US.    - Stop combined valsartan-HCTZ pill and start the new separate pills for valsartan 160mg daily and HCTZ 25mg daily  - Thyroid US  - FU with Dr. López next week.  - FU with Dr. Mcnair in 4 weeks

## 2023-01-19 NOTE — PROGRESS NOTES
MEDICAL ONCOLOGY FOLLOW-UP VISIT.     Best Contact Phone Number(s): [phone] (home) [phone] (work)     Cancer/Stage/TNM:    Cancer Staging   Gastrointestinal stromal tumor (GIST) of stomach  Staging form: Gastrointestinal Stromal Tumor - Gastric and Omental GIST, AJCC 8th Edition  - Clinical stage from 9/9/2022: Stage IIIA (cT3, cN0, cM0, Mitotic Rate: High) - Signed by Enrique Mcnair MD on 9/26/2022         Reason for visit: Mr. Zhang is a 66 year old man with HTN diagnosed with GIST by EUS on 9/9/22. He started neoadjuvant imatinib on 10/8/22. He presents to medical oncology clinic for routine follow up on neoadjuvant imatinib.      Interval History:     Underwent CT torso on 1/17/23. Gastric mass down to 6.5x2.5cm from 9.3x8.0cm.    Overall patient feels well. Contineues to have intermittent diarrhea about twice a week. Had ongoing bloating most days taking Gas-X to modest benefit. No CP, SOB or cough. No nausea or vomiting. No abdominal pain. No leg swelling. No eye discomfort or increased lacrimation. Good compliance with imatinib.       Oncology History   Gastrointestinal stromal tumor (GIST) of stomach   7/18/2022 Imaging Significant Findings    Presented to [facility] with abdominal pain. CT a/p found a 6.7 x 5.6 x 7.2 cm cystic mass in the gastric fundus.     9/9/2022 Procedure    EGD/EUS fins a 6.2x8.0 cm cystic mass in the cardia. Pathology shows GIST with 'focal/frequent mitoses' although exact quantification could not be determined.      9/12/2022 Imaging Significant Findings    CT a/p redemonstrates 9.3x8 cm gastric mass with associated necrosis. No LAD. No liver lesions.     9/15/2022 Imaging Significant Findings    CT chest only shows incidental 3mm LLL nodule. No evidence of metastatic disease.     10/8/2022 -  Chemotherapy    Start imatinib     1/17/2023 Imaging Significant Findings    CT torso; Decreased size of gastric mass 6.5 x 2.5cm from 9.3 x 8.0cm.            Physical Exam:   BP (!)  "150/68 (BP Location: Left arm, Patient Position: Sitting, BP Method: Medium (Automatic))   Pulse (!) 47   Temp 98.6 °F (37 °C) (Oral)   Resp 18   Ht 5' 11" (1.803 m)   Wt 87.1 kg (192 lb 0.3 oz)   SpO2 100%   BMI 26.78 kg/m²      ECOG Performance Status: (foot note - ECOG PS provided by Eastern Co[employer]cology Group) 0 - Asymptomatic    Physical Exam  Constitutional:       General: He is not in acute distress.     Appearance: Normal appearance.   HENT:      Head: Normocephalic.      Mouth/Throat:      Mouth: Mucous membranes are moist.      Pharynx: Oropharynx is clear. No oropharyngeal exudate or posterior oropharyngeal erythema.   Eyes:      General: No scleral icterus.     Extraocular Movements: Extraocular movements intact.      Conjunctiva/sclera: Conjunctivae normal.      Pupils: Pupils are equal, round, and reactive to light.   Cardiovascular:      Rate and Rhythm: Normal rate and regular rhythm.   Pulmonary:      Effort: Pulmonary effort is normal. No respiratory distress.   Abdominal:      General: There is no distension.      Palpations: Abdomen is soft.      Tenderness: There is no abdominal tenderness.   Musculoskeletal:         General: No swelling. Normal range of motion.      Cervical back: Normal range of motion and neck supple.      Right lower leg: No edema.      Left lower leg: No edema.   Lymphadenopathy:      Cervical: No cervical adenopathy.   Skin:     General: Skin is warm and dry.      Coloration: Skin is not jaundiced.      Findings: No rash.   Neurological:      General: No focal deficit present.      Mental Status: He is alert and oriented to person, place, and time.      Motor: No weakness.   Psychiatric:         Mood and Affect: Mood normal.         Behavior: Behavior normal.         Thought Content: Thought content normal.         Labs:  Lab Visit on 01/19/2023   Component Date Value Ref Range Status    WBC 01/19/2023 6.20  3.90 - 12.70 K/uL Final    RBC 01/19/2023 4.28 (L) "  4.60 - 6.20 M/uL Final    Hemoglobin 01/19/2023 10.8 (L)  14.0 - 18.0 g/dL Final    Hematocrit 01/19/2023 35.0 (L)  40.0 - 54.0 % Final    MCV 01/19/2023 82  82 - 98 fL Final    MCH 01/19/2023 25.2 (L)  27.0 - 31.0 pg Final    MCHC 01/19/2023 30.9 (L)  32.0 - 36.0 g/dL Final    RDW 01/19/2023 18.8 (H)  11.5 - 14.5 % Final    Platelets 01/19/2023 223  150 - 450 K/uL Final    MPV 01/19/2023 10.7  9.2 - 12.9 fL Final    Gran # (ANC) 01/19/2023 3.5  1.8 - 7.7 K/uL Final    Comment: The ANC is based on a white cell differential from an   automated cell counter. It has not been microscopically   reviewed for the presence of abnormal cells. Clinical   correlation is required.      Immature Grans (Abs) 01/19/2023 0.01  0.00 - 0.04 K/uL Final    Comment: Mild elevation in immature granulocytes is non specific and   can be seen in a variety of conditions including stress response,   acute inflammation, trauma and pregnancy. Correlation with other   laboratory and clinical findings is essential.      Sodium 01/19/2023 142  136 - 145 mmol/L Final    Potassium 01/19/2023 3.7  3.5 - 5.1 mmol/L Final    Chloride 01/19/2023 107  95 - 110 mmol/L Final    CO2 01/19/2023 28  23 - 29 mmol/L Final    Glucose 01/19/2023 114 (H)  70 - 110 mg/dL Final    BUN 01/19/2023 11  8 - 23 mg/dL Final    Creatinine 01/19/2023 1.2  0.5 - 1.4 mg/dL Final    Calcium 01/19/2023 9.7  8.7 - 10.5 mg/dL Final    Total Protein 01/19/2023 6.7  6.0 - 8.4 g/dL Final    Albumin 01/19/2023 4.0  3.5 - 5.2 g/dL Final    Total Bilirubin 01/19/2023 1.6 (H)  0.1 - 1.0 mg/dL Final    Comment: For infants and newborns, interpretation of results should be based  on gestational age, weight and in agreement with clinical  observations.    Premature Infant recommended reference ranges:  Up to 24 hours.............<8.0 mg/dL  Up to 48 hours............<12.0 mg/dL  3-5 days..................<15.0 mg/dL  6-29 days.................<15.0 mg/dL      Alkaline Phosphatase  01/19/2023 79  55 - 135 U/L Final    AST 01/19/2023 23  10 - 40 U/L Final    ALT 01/19/2023 18  10 - 44 U/L Final    Anion Gap 01/19/2023 7 (L)  8 - 16 mmol/L Final    eGFR 01/19/2023 >60.0  >60 mL/min/1.73 m^2 Final           Imaging: I personally reviewed CT torso from 1/17/23    CT Chest Abdomen Pelvis With Contrast  Narrative: EXAMINATION:  CT CHEST ABDOMEN PELVIS WITH CONTRAST (XPD)    CLINICAL HISTORY:  Gastric cancer, assess treatment response; Gastrointestinal stromal tumor of stomach    TECHNIQUE:  Low dose axial images, sagittal and coronal reformations were obtained from the thoracic inlet to the pubic symphysis following the IV administration of 100 mL of Omnipaque 350 and the oral administration of 450 mL of Readi-Cat barium sulfate suspension.    COMPARISON:  CT chest dated 09/15/2022 and CT abdomen and pelvis dated 09/12/2022.    FINDINGS:  There are multiple thyroid nodules present with a 1.2 cm nodule within the left lobe.  Further evaluation with thyroid ultrasound examination is recommended.  Soft tissues at the base of the neck otherwise appear grossly unremarkable.  The heart does not appear enlarged.  There is atherosclerotic calcification identified within the coronary arteries in a multi-vessel distribution.  A small amount of atherosclerotic calcification is also present within the thoracic aorta without evidence for thoracic aortic aneurysm or aortic dissection.  No hilar or mediastinal adenopathy is identified.  No axillary adenopathy is identified.  There are mild dependent atelectatic changes within the lung bases.  There is respiratory motion on this examination.  The previously noted right lower lobe pulmonary nodule is not appreciated and this could be related to the respiratory motion.  No new pulmonary nodules are identified.  There is no evidence for pneumothorax or pleural effusions.  There is a 1.3 cm sclerotic focus within the left iliac bone adjacent to the sacroiliac joint  (image 143, series 8) which is unchanged when compared to the prior examination dated 09/12/2023.  This likely represents a benign bone island.  No new bone lesions are identified.    There is a 5 mm hypodensity within the dome of the right lobe of the liver laterally (image 18, series 8), stable.  There is a 9 mm hypodensity noted within the midline left lobe of the liver which also remains stable.  No new liver lesions are identified.  The gallbladder is present and appears grossly unremarkable.  There is no evidence for intrahepatic or extrahepatic biliary dilatation.  The portal venous system is patent.  The spleen and pancreas both appear unremarkable.  Since the prior examination the gastric wall mass has significantly decreased in size now measuring approximately 6.5 x 2.5 cm (image 45, series 8) compared to 9.3 x 8.0 cm on the prior examination.  The adrenal glands are not enlarged.  The kidneys are normal in size are noted to concentrate contrast symmetrically.  The abdominal aorta tapers normally without aneurysmal dilatation.  No para-aortic lymphadenopathy is identified.  The appendix is present and appears unremarkable.  No dilated loops of bowel are evident.  The urinary bladder is smooth walled and appears grossly unremarkable.  Prostate gland is mildly enlarged.  There is a fat containing right inguinal hernia.  There is no evidence for pelvic or inguinal lymphadenopathy.  No ascites is identified.  Impression: Gastric mass has decreased in size now measuring approximately 6.5 x 2.5 cm compared to 9.3 x 8.0 cm on the prior examination dated 09/12/2022.    Respiratory motion on today's examination is noted.  Previously noted 3 mm right lower lobe pulmonary nodule is not appreciated but this could be related to the respiratory motion.    Subcentimeter hepatic hypodensities, too small to adequately characterize.  These appear stable when compared to the prior examination.  No new liver lesions are  identified.    Multiple thyroid nodules measuring up to 1.2 cm.  Further evaluation with thyroid ultrasound examination should be considered.    Fat containing right inguinal hernia.    1.3 cm sclerotic focus within the left iliac bone, likely a benign bone island and stable.    Mild prostatomegaly.    Electronically signed by: Gil Arana MD  Date:    01/17/2023  Time:    12:41              Diagnoses:       1. Gastrointestinal stromal tumor (GIST) of stomach    2. Primary hypertension    3. Thyroid nodule                  Assessment and Plan:     1. Gastrointestinal stromal tumor (GIST) of stomach  Overview:  Patient with symptomatic gastric GIST, which progressed on imaging 7/18/22 to 9/12/22. No evidence of luis or distant metastatic disease. Mitotic rate not quantified, but given clinical behavior and noted cellularity and frequent mitosis, considered higher risk. 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 Started neoadjuvant imatinib 400mg po daily 10/08/2022 and tolerating well.    Assessment & Plan:  - Repeat imaging after 3 months of imatinib with good reduction in size of tumor  - Will review at  and with surgery team to discuss continued neoadjuvant imatinib vs proceeding to surgery  - Will need adjuvant imatinib after surgical resection      2. Primary hypertension  Overview:  Patient on HCTZ, nebivolol, and valsartan.    Assessment & Plan:  - Remains elevated  - Increased HCTZ to 25mg po daily  - Con't valsartan 160mg po daily and nebivolol 10mg po daily    Orders:  -     valsartan (DIOVAN) 160 MG tablet; Take 1 tablet (160 mg total) by mouth once daily.  Dispense: 30 tablet; Refill: 11  -     hydroCHLOROthiazide (HYDRODIURIL) 25 MG tablet; Take 1 tablet (25 mg total) by mouth once daily.  Dispense: 30 tablet; Refill: 11    3. Thyroid nodule  Assessment & Plan:  - Non urgent thyroid US    Orders:  -     US Thyroid; Future; Expected date: 01/19/2023                   Route Chart for Scheduling    Med Onc Chart  Routing      Follow up with physician 4 weeks.   Follow up with [facility]    Infusion scheduling note    Injection scheduling note    Labs CBC and CMP   Lab interval:     Imaging   Thyroid US   Pharmacy appointment    Other referrals              Enrique Mcnair MD  Hematology/Oncology  [facility]

## 2023-01-23 ENCOUNTER — TUMOR BOARD CONFERENCE (OUTPATIENT)
Dept: SURGERY | Facility: CLINIC | Age: 68
End: 2023-01-23

## 2023-01-23 NOTE — PROGRESS NOTES
[facility] UGI MULTIDISCIPLINARY TUMOR BOARD  PATIENT REVIEW FORM   ____________________________________________________________    CLINIC #: [id number]  DATE: 1/23/2023    DIAGNOSIS: stomach GIST    PRESENTER: Xu    PATIENT SUMMARY:   This 66 y/o gentleman presented with epigastric abd pain in 7/2022. Imaging identified large 9cm cystic gastric mass with central necrosis. He underwent EUS with bx in 9/2022 and pathology revealed GIST. He started neoadj Gleevec per Dr. Mcnair in 10/2022. Recent restaging CT scan reviewed - good response to tx, lesion smaller, now measuring 6.7 cm.     BOARD RECOMMENDATIONS:   Continue neoadj Gleevec    CONSULT NEEDED:     [] Surgery    [] Hem/Onc    [] Rad/Onc    [] Dietary                 [] Social Service    [] Psychology       [] AES  [] Radiology     Clinical Stage: Tumor 3 Node(s) 0 Metastasis 0      GROUP STAGE:  [] O    [] 1A    [] IB    [] IIA    [] IIB     [x] IIIA     [] IIIB     [] IIIC    []IV  [] Local recurrence     [] Regional recurrence     [] Distant recurrence   Metastatic site(s): none         [x] Lizbeth'[facility] Treatment Guidelines reviewed and care planned is consistent with guidelines.         (i.e., NCCN, NCI, PD, ACO, AUA, etc.)    PRESENTATION AT CANCER CONFERENCE:         [x] Prospective    [] Retrospective     [] Follow-Up

## 2023-01-24 ENCOUNTER — OFFICE VISIT (OUTPATIENT)
Dept: SURGERY | Facility: CLINIC | Age: 68
End: 2023-01-24
Payer: MEDICARE

## 2023-01-24 VITALS
WEIGHT: 193.44 LBS | OXYGEN SATURATION: 99 % | DIASTOLIC BLOOD PRESSURE: 71 MMHG | HEART RATE: 60 BPM | BODY MASS INDEX: 27.08 KG/M2 | SYSTOLIC BLOOD PRESSURE: 151 MMHG | HEIGHT: 71 IN | TEMPERATURE: 99 F

## 2023-01-24 DIAGNOSIS — C49.A2 GASTROINTESTINAL STROMAL TUMOR (GIST) OF STOMACH: Primary | ICD-10-CM

## 2023-01-24 PROCEDURE — 1160F PR REVIEW ALL MEDS BY PRESCRIBER/CLIN PHARMACIST DOCUMENTED: ICD-10-PCS | Mod: HCNC,CPTII,S$GLB, | Performed by: NURSE PRACTITIONER

## 2023-01-24 PROCEDURE — 3077F PR MOST RECENT SYSTOLIC BLOOD PRESSURE >= 140 MM HG: ICD-10-PCS | Mod: HCNC,CPTII,S$GLB, | Performed by: NURSE PRACTITIONER

## 2023-01-24 PROCEDURE — 99999 PR PBB SHADOW E&M-EST. PATIENT-LVL III: ICD-10-PCS | Mod: PBBFAC,HCNC,, | Performed by: NURSE PRACTITIONER

## 2023-01-24 PROCEDURE — 3078F PR MOST RECENT DIASTOLIC BLOOD PRESSURE < 80 MM HG: ICD-10-PCS | Mod: HCNC,CPTII,S$GLB, | Performed by: NURSE PRACTITIONER

## 2023-01-24 PROCEDURE — 1159F PR MEDICATION LIST DOCUMENTED IN MEDICAL RECORD: ICD-10-PCS | Mod: HCNC,CPTII,S$GLB, | Performed by: NURSE PRACTITIONER

## 2023-01-24 PROCEDURE — 3288F PR FALLS RISK ASSESSMENT DOCUMENTED: ICD-10-PCS | Mod: HCNC,CPTII,S$GLB, | Performed by: NURSE PRACTITIONER

## 2023-01-24 PROCEDURE — 1126F PR PAIN SEVERITY QUANTIFIED, NO PAIN PRESENT: ICD-10-PCS | Mod: HCNC,CPTII,S$GLB, | Performed by: NURSE PRACTITIONER

## 2023-01-24 PROCEDURE — 3077F SYST BP >= 140 MM HG: CPT | Mod: HCNC,CPTII,S$GLB, | Performed by: NURSE PRACTITIONER

## 2023-01-24 PROCEDURE — 99214 OFFICE O/P EST MOD 30 MIN: CPT | Mod: HCNC,S$GLB,, | Performed by: NURSE PRACTITIONER

## 2023-01-24 PROCEDURE — 99214 PR OFFICE/OUTPT VISIT, EST, LEVL IV, 30-39 MIN: ICD-10-PCS | Mod: HCNC,S$GLB,, | Performed by: NURSE PRACTITIONER

## 2023-01-24 PROCEDURE — 1159F MED LIST DOCD IN RCRD: CPT | Mod: HCNC,CPTII,S$GLB, | Performed by: NURSE PRACTITIONER

## 2023-01-24 PROCEDURE — 99999 PR PBB SHADOW E&M-EST. PATIENT-LVL III: CPT | Mod: PBBFAC,HCNC,, | Performed by: NURSE PRACTITIONER

## 2023-01-24 PROCEDURE — 1160F RVW MEDS BY RX/DR IN RCRD: CPT | Mod: HCNC,CPTII,S$GLB, | Performed by: NURSE PRACTITIONER

## 2023-01-24 PROCEDURE — 4010F PR ACE/ARB THEARPY RXD/TAKEN: ICD-10-PCS | Mod: HCNC,CPTII,S$GLB, | Performed by: NURSE PRACTITIONER

## 2023-01-24 PROCEDURE — 1101F PT FALLS ASSESS-DOCD LE1/YR: CPT | Mod: HCNC,CPTII,S$GLB, | Performed by: NURSE PRACTITIONER

## 2023-01-24 PROCEDURE — 3078F DIAST BP <80 MM HG: CPT | Mod: HCNC,CPTII,S$GLB, | Performed by: NURSE PRACTITIONER

## 2023-01-24 PROCEDURE — 1126F AMNT PAIN NOTED NONE PRSNT: CPT | Mod: HCNC,CPTII,S$GLB, | Performed by: NURSE PRACTITIONER

## 2023-01-24 PROCEDURE — 1101F PR PT FALLS ASSESS DOC 0-1 FALLS W/OUT INJ PAST YR: ICD-10-PCS | Mod: HCNC,CPTII,S$GLB, | Performed by: NURSE PRACTITIONER

## 2023-01-24 PROCEDURE — 3288F FALL RISK ASSESSMENT DOCD: CPT | Mod: HCNC,CPTII,S$GLB, | Performed by: NURSE PRACTITIONER

## 2023-01-24 PROCEDURE — 3008F BODY MASS INDEX DOCD: CPT | Mod: HCNC,CPTII,S$GLB, | Performed by: NURSE PRACTITIONER

## 2023-01-24 PROCEDURE — 4010F ACE/ARB THERAPY RXD/TAKEN: CPT | Mod: HCNC,CPTII,S$GLB, | Performed by: NURSE PRACTITIONER

## 2023-01-24 PROCEDURE — 3008F PR BODY MASS INDEX (BMI) DOCUMENTED: ICD-10-PCS | Mod: HCNC,CPTII,S$GLB, | Performed by: NURSE PRACTITIONER

## 2023-01-24 NOTE — PROGRESS NOTES
Encounter Date:  2023    Patient ID: Sami Zhang  Age:  67 y.o. :  1955    Chief Complaint:  followup of stomach GIST     2022: c/o epigastric pain, imaging found cystic gastric mass (9x8 cm) with central necrosis  2022: EUS with bx - GIST;  would favor neoadj gleevac given size/location.  There are two indeterminate liver lesion that need attention on f/u.   10/8/2022: started neoadjuvant imatinib    Interval History:  Mr. Zhang returns to clinic from [facility] with his wife. He was last seen in clinic by Dr. López in 2022. Since then, he has been on Gleevec per Dr. Mcnair, which has is tolerating quite well. Restaging CT scan shows good response to tx.   Overall he is feeling well today, no specific complaints. Reports a good appetite, eats 3 meals daily without N/V/D, has BM QD. Enjoys pork chops. Remains afebrile. Denies CP, SOB. He walks 2 dogs QAM, enjoys riding his bike and yard work with a push . Retired from [employer]. Sleeping well at night.     No prior abd sx  No anticoagulation  Never smoked    New Data:  Imaging:  Dr. López and I personally reviewed the following images:   2023: CT C/A/P:   Gastric mass has decreased in size now measuring approximately 6.5 x 2.5 cm compared to 9.3 x 8.0 cm on the prior examination dated 2022.     Respiratory motion on today's examination is noted.  Previously noted 3 mm right lower lobe pulmonary nodule is not appreciated but this could be related to the respiratory motion.     Subcentimeter hepatic hypodensities, too small to adequately characterize.  These appear stable when compared to the prior examination.  No new liver lesions are identified.     Multiple thyroid nodules measuring up to 1.2 cm.  Further evaluation with thyroid ultrasound examination should be considered.     Fat containing right inguinal hernia.     1.3 cm sclerotic focus within the left iliac bone, likely a benign bone island and stable.     Mild  prostatomegaly.    Labs:      Chemistry        Component Value Date/Time     01/19/2023 1255    K 3.7 01/19/2023 1255     01/19/2023 1255    CO2 28 01/19/2023 1255    BUN 11 01/19/2023 1255    CREATININE 1.2 01/19/2023 1255     (H) 01/19/2023 1255        Component Value Date/Time    CALCIUM 9.7 01/19/2023 1255    ALKPHOS 79 01/19/2023 1255    AST 23 01/19/2023 1255    ALT 18 01/19/2023 1255    BILITOT 1.6 (H) 01/19/2023 1255    ESTGFRAFRICA >60 07/01/2016 1205    EGFRNONAA >60 07/01/2016 1205        Lab Results   Component Value Date    WBC 6.20 01/19/2023    HGB 10.8 (L) 01/19/2023    HCT 35.0 (L) 01/19/2023    MCV 82 01/19/2023     01/19/2023 9/9/2022: Pathology:    ERIC-GASTRIC MASS, EUS-FNB: Consistent with gastrointestinal stromal tumor Immunostains  and CD34 have been performed and are positive SMA and S-100 are negative     Past Medical History:   Diagnosis Date    Arthritis     GIST (gastrointestinal stromal tumor), malignant 09/09/2022    Gout 10/08/2015    Hyperlipidemia     Hypertension      Past Surgical History:   Procedure Laterality Date    COLONOSCOPY N/A 12/15/2022    Procedure: COLONOSCOPY;  Surgeon: Sean Bran MD;  Location: [facility] (4TH FLR);  Service: Endoscopy;  Laterality: N/A;  inst handed-t  12/8/22- precall [facility]    ENDOSCOPIC ULTRASOUND OF UPPER GASTROINTESTINAL TRACT N/A 9/9/2022    Procedure: ULTRASOUND, UPPER GI TRACT, ENDOSCOPIC;  Surgeon: Yosvany Paula MD;  Location: [facility] (2ND FLR);  Service: Endoscopy;  Laterality: N/A;  The patient need an EGD/EUS (radial) for gastric mass. Main. Urgent. 60 minutes. Referring:   Todd Cantu MD   Thanks,   Evan Farr MD    ESOPHAGOGASTRODUODENOSCOPY N/A 9/9/2022    Procedure: EGD (ESOPHAGOGASTRODUODENOSCOPY);  Surgeon: Yosvany Paula MD;  Location: [facility] ([address] [place] [address]);  Service: Endoscopy;  Laterality: N/A;  The patient need an EGD/EUS (radial) for gastric mass. Main. Urgent. 60  minutes. Referring:   Todd Cantu MD   Thanks,   Evan Farr MD    Pt is fully vaccinated-DS  9/7/22-Instructions written down by pt's wife and read back.-DS    NONE N/A 10/8/2015     Current Outpatient Medications on File Prior to Visit   Medication Sig Dispense Refill    allopurinol (ZYLOPRIM) 100 MG tablet Take 100 mg by mouth 2 (two) times daily.      CALCIUM CARBONATE/VITAMIN D3 (VITAMIN D-3 ORAL) Take 10,000 Units by mouth once a week.      hydroCHLOROthiazide (HYDRODIURIL) 25 MG tablet Take 1 tablet (25 mg total) by mouth once daily. 30 tablet 11    icosapent ethyL (VASCEPA) 0.5 gram Cap Take 2 capsules by mouth once daily.      imatinib (GLEEVEC) 400 MG Tab Take 1 tablet (400 mg total) by mouth once daily. 30 tablet 11    loperamide (IMODIUM A-D) 2 mg Tab Take two tabs at the onset of loose stools.  Then take one tab after every subsequent loose stool. 90 tablet 11    multivitamin capsule Take 1 capsule by mouth once daily.      nebivolol (BYSTOLIC) 10 MG Tab Take 10 mg by mouth once daily.      ondansetron (ZOFRAN) 4 MG tablet Take 1 tablet (4 mg total) by mouth every 6 (six) hours. 12 tablet 0    rosuvastatin (CRESTOR) 20 MG tablet Take 20 mg by mouth once daily.      valsartan (DIOVAN) 160 MG tablet Take 1 tablet (160 mg total) by mouth once daily. 30 tablet 11    vitamin D 1000 units Tab Take 10,000 Units by mouth once a week.       No current facility-administered medications on file prior to visit.     Review of patient's allergies indicates:  No Known Allergies    Family History:  His family history includes Other in an other family member; Stroke (age of onset: 60) in his sister.     Social History:   reports that he has never smoked. He has never used smokeless tobacco. He reports that he does not currently use alcohol after a past usage of about 2.0 standard drinks per week. He reports that he does not use drugs.     ROS:    Pertinent positive/negatives detailed in HPI, all other systems  "negative.     Physical Exam:  BP (!) 151/71   Pulse 60   Temp 98.8 °F (37.1 °C)   Ht 5' 11" (1.803 m)   Wt 87.8 kg (193 lb 7.3 oz)   SpO2 99%   BMI 26.98 kg/m²     Constitutional:  Non-toxic, no acute distress.    Eyes:  Sclerae anicteric, gaze symmetrical  Neck:  Trachea midline,  FROM  Resp:  Easy work of breathing  Abd:  Soft, non-tender, no masses, no ascites  Musculoskeletal:  Ambulatory, normal gait, no muscle wasting.  Extremities are symmetrical without lymphedema.  Neuro:  No gross deficits  Psych:  Awake, alert, oriented.  Answers and asks questions appropriately      ICD-10-CM ICD-9-CM    1. Gastrointestinal stromal tumor (GIST) of stomach  C49.A2 238.1       Plan   This 66 y/o gentleman diagnosed with large 9x8 cm stomach GIST in 9/2022. He is tolerating neoadj Gleevec well and CT scan revealed good response to tx without progression of disease. Small indeterminate liver lesions appear stable on updated imaging - will continue to follow. Recommend continue neoadj Gleevec and rescan in ~ 3 months. May need EGD / diag lap for surgical planning (subtotal gastrectomy vs. Total gastrectomy). Encouraged patient to remain active and discussed importance of nutrition with a focus on protein prior to surgical intervention.     Follow up for imaging after finished chemotherapy.      Questions were asked and answered to patient and wife's satisfaction.    Discussed case with both Dr. López and Dr. Morillo who agree with the above plan of care.   Discussed with Dr. Mcnair who also agrees with continuing Gleevec.           Vero Crawford NP  Upper GI / Hepatobiliary Surgical Oncology  [facility] [place], [place]  Office: [phone]  Fax: [phone]        "

## 2023-02-02 ENCOUNTER — TELEPHONE (OUTPATIENT)
Dept: HEMATOLOGY/ONCOLOGY | Facility: CLINIC | Age: 68
End: 2023-02-02
Payer: MEDICARE

## 2023-02-02 NOTE — TELEPHONE ENCOUNTER
Spoke with Bhavesh, relayed the $0 OOP cost for genetic testing. She will talk to Sami about it and have him call back to confirm if he is ready to proceed.    ----- Message from Rodrigo Riddle DNP sent at 1/31/2023  4:26 PM CST -----  Regarding: RE: Does he want testing?  Alyssa, can you please call the patient with the below and let me know how he wishes to proceed?    Thank you!  Rodrigo  ----- Message -----  From: Radha Fournier PA-C  Sent: 1/31/2023   2:20 PM CST  To: Rodrigo Riddle DNP  Subject: Does he want testing?                            Judson Wallace,     This patient was presented at the 1/23/23 [facility] [place] [facility] tumor board.     You saw him on 11/30/22 and he wanted a benefits investigation.   On 12/8/22, you messaged him and told him his OOP cost would be $0 and you asked if he wanted to proceed with testing. He has not read that or any of his other [employer] messages, so you may need to ask Alyssa to reach out to him by phone.     Best regards,   Radha Fournier PA-C  Cancer Genetics

## 2023-02-03 ENCOUNTER — HOSPITAL ENCOUNTER (OUTPATIENT)
Dept: RADIOLOGY | Facility: HOSPITAL | Age: 68
Discharge: HOME | End: 2023-02-03
Attending: HOSPITALIST
Payer: MEDICARE

## 2023-02-03 ENCOUNTER — TELEPHONE (OUTPATIENT)
Dept: HEMATOLOGY/ONCOLOGY | Facility: CLINIC | Age: 68
End: 2023-02-03

## 2023-02-03 DIAGNOSIS — E04.1 THYROID NODULE: Primary | ICD-10-CM

## 2023-02-03 DIAGNOSIS — E04.1 THYROID NODULE: ICD-10-CM

## 2023-02-03 PROCEDURE — 76536 US EXAM OF HEAD AND NECK: CPT | Mod: 26,HCNC,, | Performed by: RADIOLOGY

## 2023-02-03 PROCEDURE — 76536 US EXAM OF HEAD AND NECK: CPT | Mod: TC,HCNC

## 2023-02-03 PROCEDURE — 76536 US THYROID: ICD-10-PCS | Mod: 26,HCNC,, | Performed by: RADIOLOGY

## 2023-02-03 NOTE — TELEPHONE ENCOUNTER
----- Message from Enrique Mcnair MD sent at 2/3/2023  2:21 PM CST -----  Hi all - can we schedule a thyroid FNA for Mr. Zhang? I put the order in Epic.    Farrah - can you let him know the radiologists are recommending sampling one of the thyroid nodules? Thanks! Enrique

## 2023-02-17 ENCOUNTER — OFFICE VISIT (OUTPATIENT)
Dept: HEMATOLOGY/ONCOLOGY | Facility: CLINIC | Age: 68
End: 2023-02-17
Payer: MEDICARE

## 2023-02-17 ENCOUNTER — LAB VISIT (OUTPATIENT)
Dept: LAB | Facility: HOSPITAL | Age: 68
End: 2023-02-17
Attending: HOSPITALIST
Payer: MEDICARE

## 2023-02-17 VITALS
RESPIRATION RATE: 18 BRPM | DIASTOLIC BLOOD PRESSURE: 68 MMHG | SYSTOLIC BLOOD PRESSURE: 153 MMHG | OXYGEN SATURATION: 100 % | WEIGHT: 192 LBS | HEART RATE: 48 BPM | HEIGHT: 71 IN | BODY MASS INDEX: 26.88 KG/M2

## 2023-02-17 DIAGNOSIS — Z79.899 DRUG-INDUCED IMMUNODEFICIENCY: ICD-10-CM

## 2023-02-17 DIAGNOSIS — E04.1 THYROID NODULE: Primary | ICD-10-CM

## 2023-02-17 DIAGNOSIS — E04.1 THYROID NODULE: ICD-10-CM

## 2023-02-17 DIAGNOSIS — C49.A2 GASTROINTESTINAL STROMAL TUMOR (GIST) OF STOMACH: ICD-10-CM

## 2023-02-17 DIAGNOSIS — E87.6 HYPOKALEMIA: ICD-10-CM

## 2023-02-17 DIAGNOSIS — I10 PRIMARY HYPERTENSION: ICD-10-CM

## 2023-02-17 DIAGNOSIS — C49.A2 GASTROINTESTINAL STROMAL TUMOR (GIST) OF STOMACH: Primary | ICD-10-CM

## 2023-02-17 DIAGNOSIS — D84.821 DRUG-INDUCED IMMUNODEFICIENCY: ICD-10-CM

## 2023-02-17 LAB
ALBUMIN SERPL BCP-MCNC: 3.9 G/DL (ref 3.5–5.2)
ALP SERPL-CCNC: 80 U/L (ref 55–135)
ALT SERPL W/O P-5'-P-CCNC: 16 U/L (ref 10–44)
ANION GAP SERPL CALC-SCNC: 9 MMOL/L (ref 8–16)
AST SERPL-CCNC: 23 U/L (ref 10–40)
BILIRUB SERPL-MCNC: 1.2 MG/DL (ref 0.1–1)
BUN SERPL-MCNC: 11 MG/DL (ref 8–23)
CALCIUM SERPL-MCNC: 9.7 MG/DL (ref 8.7–10.5)
CHLORIDE SERPL-SCNC: 105 MMOL/L (ref 95–110)
CO2 SERPL-SCNC: 31 MMOL/L (ref 23–29)
CREAT SERPL-MCNC: 1.2 MG/DL (ref 0.5–1.4)
ERYTHROCYTE [DISTWIDTH] IN BLOOD BY AUTOMATED COUNT: 18.5 % (ref 11.5–14.5)
EST. GFR  (NO RACE VARIABLE): >60 ML/MIN/1.73 M^2
GLUCOSE SERPL-MCNC: 101 MG/DL (ref 70–110)
HCT VFR BLD AUTO: 39.7 % (ref 40–54)
HGB BLD-MCNC: 12.1 G/DL (ref 14–18)
IMM GRANULOCYTES # BLD AUTO: 0.01 K/UL (ref 0–0.04)
MCH RBC QN AUTO: 26.1 PG (ref 27–31)
MCHC RBC AUTO-ENTMCNC: 30.5 G/DL (ref 32–36)
MCV RBC AUTO: 86 FL (ref 82–98)
NEUTROPHILS # BLD AUTO: 3.5 K/UL (ref 1.8–7.7)
PLATELET # BLD AUTO: 235 K/UL (ref 150–450)
PMV BLD AUTO: 11.2 FL (ref 9.2–12.9)
POTASSIUM SERPL-SCNC: 3.1 MMOL/L (ref 3.5–5.1)
PROT SERPL-MCNC: 6.7 G/DL (ref 6–8.4)
RBC # BLD AUTO: 4.63 M/UL (ref 4.6–6.2)
SODIUM SERPL-SCNC: 145 MMOL/L (ref 136–145)
WBC # BLD AUTO: 6.1 K/UL (ref 3.9–12.7)

## 2023-02-17 PROCEDURE — 1126F PR PAIN SEVERITY QUANTIFIED, NO PAIN PRESENT: ICD-10-PCS | Mod: HCNC,CPTII,S$GLB, | Performed by: HOSPITALIST

## 2023-02-17 PROCEDURE — 3077F SYST BP >= 140 MM HG: CPT | Mod: HCNC,CPTII,S$GLB, | Performed by: HOSPITALIST

## 2023-02-17 PROCEDURE — 4010F ACE/ARB THERAPY RXD/TAKEN: CPT | Mod: HCNC,CPTII,S$GLB, | Performed by: HOSPITALIST

## 2023-02-17 PROCEDURE — 3288F PR FALLS RISK ASSESSMENT DOCUMENTED: ICD-10-PCS | Mod: HCNC,CPTII,S$GLB, | Performed by: HOSPITALIST

## 2023-02-17 PROCEDURE — 3077F PR MOST RECENT SYSTOLIC BLOOD PRESSURE >= 140 MM HG: ICD-10-PCS | Mod: HCNC,CPTII,S$GLB, | Performed by: HOSPITALIST

## 2023-02-17 PROCEDURE — 99999 PR PBB SHADOW E&M-EST. PATIENT-LVL IV: ICD-10-PCS | Mod: PBBFAC,HCNC,, | Performed by: HOSPITALIST

## 2023-02-17 PROCEDURE — 1101F PR PT FALLS ASSESS DOC 0-1 FALLS W/OUT INJ PAST YR: ICD-10-PCS | Mod: HCNC,CPTII,S$GLB, | Performed by: HOSPITALIST

## 2023-02-17 PROCEDURE — 99499 RISK ADDL DX/OHS AUDIT: ICD-10-PCS | Mod: S$GLB,,, | Performed by: HOSPITALIST

## 2023-02-17 PROCEDURE — 1101F PT FALLS ASSESS-DOCD LE1/YR: CPT | Mod: HCNC,CPTII,S$GLB, | Performed by: HOSPITALIST

## 2023-02-17 PROCEDURE — 99214 OFFICE O/P EST MOD 30 MIN: CPT | Mod: HCNC,S$GLB,, | Performed by: HOSPITALIST

## 2023-02-17 PROCEDURE — 4010F PR ACE/ARB THEARPY RXD/TAKEN: ICD-10-PCS | Mod: HCNC,CPTII,S$GLB, | Performed by: HOSPITALIST

## 2023-02-17 PROCEDURE — 3008F BODY MASS INDEX DOCD: CPT | Mod: HCNC,CPTII,S$GLB, | Performed by: HOSPITALIST

## 2023-02-17 PROCEDURE — 1159F MED LIST DOCD IN RCRD: CPT | Mod: HCNC,CPTII,S$GLB, | Performed by: HOSPITALIST

## 2023-02-17 PROCEDURE — 3078F DIAST BP <80 MM HG: CPT | Mod: HCNC,CPTII,S$GLB, | Performed by: HOSPITALIST

## 2023-02-17 PROCEDURE — 1159F PR MEDICATION LIST DOCUMENTED IN MEDICAL RECORD: ICD-10-PCS | Mod: HCNC,CPTII,S$GLB, | Performed by: HOSPITALIST

## 2023-02-17 PROCEDURE — 99499 UNLISTED E&M SERVICE: CPT | Mod: S$GLB,,, | Performed by: HOSPITALIST

## 2023-02-17 PROCEDURE — 3008F PR BODY MASS INDEX (BMI) DOCUMENTED: ICD-10-PCS | Mod: HCNC,CPTII,S$GLB, | Performed by: HOSPITALIST

## 2023-02-17 PROCEDURE — 1126F AMNT PAIN NOTED NONE PRSNT: CPT | Mod: HCNC,CPTII,S$GLB, | Performed by: HOSPITALIST

## 2023-02-17 PROCEDURE — 3288F FALL RISK ASSESSMENT DOCD: CPT | Mod: HCNC,CPTII,S$GLB, | Performed by: HOSPITALIST

## 2023-02-17 PROCEDURE — 85027 COMPLETE CBC AUTOMATED: CPT | Mod: HCNC | Performed by: HOSPITALIST

## 2023-02-17 PROCEDURE — 99999 PR PBB SHADOW E&M-EST. PATIENT-LVL IV: CPT | Mod: PBBFAC,HCNC,, | Performed by: HOSPITALIST

## 2023-02-17 PROCEDURE — 99214 PR OFFICE/OUTPT VISIT, EST, LEVL IV, 30-39 MIN: ICD-10-PCS | Mod: HCNC,S$GLB,, | Performed by: HOSPITALIST

## 2023-02-17 PROCEDURE — 80053 COMPREHEN METABOLIC PANEL: CPT | Mod: HCNC | Performed by: HOSPITALIST

## 2023-02-17 PROCEDURE — 3078F PR MOST RECENT DIASTOLIC BLOOD PRESSURE < 80 MM HG: ICD-10-PCS | Mod: HCNC,CPTII,S$GLB, | Performed by: HOSPITALIST

## 2023-02-17 PROCEDURE — 36415 COLL VENOUS BLD VENIPUNCTURE: CPT | Mod: HCNC | Performed by: HOSPITALIST

## 2023-02-17 RX ORDER — POTASSIUM CHLORIDE 20 MEQ/1
20 TABLET, EXTENDED RELEASE ORAL DAILY
Qty: 30 TABLET | Refills: 0 | Status: SHIPPED | OUTPATIENT
Start: 2023-02-17 | End: 2023-02-17

## 2023-02-17 RX ORDER — POTASSIUM CHLORIDE 20 MEQ/1
40 TABLET, EXTENDED RELEASE ORAL DAILY
Qty: 60 TABLET | Refills: 0 | Status: SHIPPED | OUTPATIENT
Start: 2023-02-17 | End: 2023-04-14 | Stop reason: SDUPTHER

## 2023-02-17 NOTE — ASSESSMENT & PLAN NOTE
Recently increased HCTZ to 25mg. Remains high. Patient to monitor at home, if remains elevated next visit would increased nebivolol    - Consider incrasing nebivolol to 20mg daily if remains elevated

## 2023-02-17 NOTE — PROGRESS NOTES
MEDICAL ONCOLOGY FOLLOW-UP VISIT.     Best Contact Phone Number(s): [phone] (home) [phone] (work)     Cancer/Stage/TNM:    Cancer Staging   Gastrointestinal stromal tumor (GIST) of stomach  Staging form: Gastrointestinal Stromal Tumor - Gastric and Omental GIST, AJCC 8th Edition  - Clinical stage from 9/9/2022: Stage IIIA (cT3, cN0, cM0, Mitotic Rate: High) - Signed by Enrique Mcnair MD on 9/26/2022         Reason for visit: Mr. Zhang is a 66 year old man with HTN diagnosed with GIST by EUS on 9/9/22. He started neoadjuvant imatinib on 10/8/22. He presents to medical oncology clinic for routine follow up on neoadjuvant imatinib.      Interval History:     Patient continues to feel well with good compliance to imatinib. Appetite is good without nausea. Continues to have post-prandial bloating; uses Gas-X to good benefit. No signficant diarrhea. Weight is stable. No CP, SOB or cough. No leg swelling. No increased lacrimation. No known fevers.             Oncology History   Gastrointestinal stromal tumor (GIST) of stomach   7/18/2022 Imaging Significant Findings    Presented to [facility] ED with abdominal pain. CT a/p found a 6.7 x 5.6 x 7.2 cm cystic mass in the gastric fundus.     9/9/2022 Procedure    EGD/EUS fins a 6.2x8.0 cm cystic mass in the cardia. Pathology shows GIST with 'focal/frequent mitoses' although exact quantification could not be determined.      9/12/2022 Imaging Significant Findings    CT a/p redemonstrates 9.3x8 cm gastric mass with associated necrosis. No LAD. No liver lesions.     9/15/2022 Imaging Significant Findings    CT chest only shows incidental 3mm LLL nodule. No evidence of metastatic disease.     10/8/2022 -  Chemotherapy    Start imatinib     1/17/2023 Imaging Significant Findings    CT torso; Decreased size of gastric mass 6.5 x 2.5cm from 9.3 x 8.0cm.            Physical Exam:   BP (!) 153/68 (BP Location: Right arm, Patient Position: Sitting, BP Method: Large (Automatic))   " Pulse (!) 48   Resp 18   Ht 5' 11" (1.803 m)   Wt 87.1 kg (192 lb 0.3 oz)   SpO2 100%   BMI 26.78 kg/m²      ECOG Performance Status: (foot note - ECOG PS provided by [employer]) 0 - Asymptomatic    Physical Exam  Constitutional:       General: He is not in acute distress.     Appearance: Normal appearance.   HENT:      Head: Normocephalic.      Mouth/Throat:      Mouth: Mucous membranes are moist.      Pharynx: Oropharynx is clear. No oropharyngeal exudate or posterior oropharyngeal erythema.   Eyes:      General: No scleral icterus.     Extraocular Movements: Extraocular movements intact.      Conjunctiva/sclera: Conjunctivae normal.      Pupils: Pupils are equal, round, and reactive to light.   Cardiovascular:      Rate and Rhythm: Normal rate and regular rhythm.   Pulmonary:      Effort: Pulmonary effort is normal. No respiratory distress.   Abdominal:      General: There is no distension.      Palpations: Abdomen is soft.      Tenderness: There is no abdominal tenderness.   Musculoskeletal:         General: No swelling. Normal range of motion.      Cervical back: Normal range of motion and neck supple.      Right lower leg: No edema.      Left lower leg: No edema.   Lymphadenopathy:      Cervical: No cervical adenopathy.   Skin:     General: Skin is warm and dry.      Coloration: Skin is not jaundiced.      Findings: No rash.   Neurological:      General: No focal deficit present.      Mental Status: He is alert and oriented to person, place, and time.      Motor: No weakness.   Psychiatric:         Mood and Affect: Mood normal.         Behavior: Behavior normal.         Thought Content: Thought content normal.         Labs:  Lab Visit on 02/17/2023   Component Date Value Ref Range Status    WBC 02/17/2023 6.10  3.90 - 12.70 K/uL Final    RBC 02/17/2023 4.63  4.60 - 6.20 M/uL Final    Hemoglobin 02/17/2023 12.1 (L)  14.0 - 18.0 g/dL Final    Hematocrit 02/17/2023 39.7 (L)  40.0 - " 54.0 % Final    MCV 02/17/2023 86  82 - 98 fL Final    MCH 02/17/2023 26.1 (L)  27.0 - 31.0 pg Final    MCHC 02/17/2023 30.5 (L)  32.0 - 36.0 g/dL Final    RDW 02/17/2023 18.5 (H)  11.5 - 14.5 % Final    Platelets 02/17/2023 235  150 - 450 K/uL Final    MPV 02/17/2023 11.2  9.2 - 12.9 fL Final    Gran # (ANC) 02/17/2023 3.5  1.8 - 7.7 K/uL Final    Comment: The ANC is based on a white cell differential from an   automated cell counter. It has not been microscopically   reviewed for the presence of abnormal cells. Clinical   correlation is required.      Immature Grans (Abs) 02/17/2023 0.01  0.00 - 0.04 K/uL Final    Comment: Mild elevation in immature granulocytes is non specific and   can be seen in a variety of conditions including stress response,   acute inflammation, trauma and pregnancy. Correlation with other   laboratory and clinical findings is essential.      Sodium 02/17/2023 145  136 - 145 mmol/L Final    Potassium 02/17/2023 3.1 (L)  3.5 - 5.1 mmol/L Final    Chloride 02/17/2023 105  95 - 110 mmol/L Final    CO2 02/17/2023 31 (H)  23 - 29 mmol/L Final    Glucose 02/17/2023 101  70 - 110 mg/dL Final    BUN 02/17/2023 11  8 - 23 mg/dL Final    Creatinine 02/17/2023 1.2  0.5 - 1.4 mg/dL Final    Calcium 02/17/2023 9.7  8.7 - 10.5 mg/dL Final    Total Protein 02/17/2023 6.7  6.0 - 8.4 g/dL Final    Albumin 02/17/2023 3.9  3.5 - 5.2 g/dL Final    Total Bilirubin 02/17/2023 1.2 (H)  0.1 - 1.0 mg/dL Final    Comment: For infants and newborns, interpretation of results should be based  on gestational age, weight and in agreement with clinical  observations.    Premature Infant recommended reference ranges:  Up to 24 hours.............<8.0 mg/dL  Up to 48 hours............<12.0 mg/dL  3-5 days..................<15.0 mg/dL  6-29 days.................<15.0 mg/dL      Alkaline Phosphatase 02/17/2023 80  55 - 135 U/L Final    AST 02/17/2023 23  10 - 40 U/L Final    ALT 02/17/2023 16  10 - 44 U/L Final    Anion Gap  02/17/2023 9  8 - 16 mmol/L Final    eGFR 02/17/2023 >60.0  >60 mL/min/1.73 m^2 Final           Imaging: I personally reviewed CT torso from 1/17/23    US Thyroid  Narrative: EXAMINATION:  US THYROID    CLINICAL HISTORY:  Nontoxic single thyroid nodule    TECHNIQUE:  Ultrasound of the thyroid and cervical lymph nodes was performed.    COMPARISON:  None.    FINDINGS:  The thyroid is normal in size.  Right lobe of the thyroid measures up to 1 x 1.7 x 2.3 cm.  Left lobe of the thyroid measures 5.2 x 1.3 x 1.8 cm.  Normal thyroid parenchyma.    Cervical lymph nodes demonstrate normal morphology and size.    Nodule #1    Size: 1.5 x 1.4 x 1.5 cm    Location: Right thyroid lobe inferiorly    Composition: Solid or almost completely solid (2))    Echogenicity:Hypoechoic (2)    Shape: wider-than-tall (0)    Margins: smooth (0)    Echogenic foci: None (0)    TI-RADS category: TR 4.  This nodule meets criteria for fine-needle aspiration.    Nodule #1    Size: 0.4 x 0.5 x 0.5 cm    Location: Right thyroid lobe midportion    Composition: Solid or almost completely solid (2))    Echogenicity:Isoechoic (1)    Shape: wider-than-tall (0)    Margins: smooth (0)    Echogenic foci: None (0)    TI-RADS category: TR3 (3 points).  No dedicated follow-up warranted.    Nodule #1    Size: 1.2 x 0.9 x 0.9 cm    Location: Left thyroid lobe inferiorly    Composition: Almost completely solid (2))    Echogenicity:Hypoechoic (2)    Shape: wider-than-tall (0)    Margins: smooth (0)    Echogenic foci: None (0)    TI-RADS category: TR 4.  Follow-up ultrasound is recommended at 1, 2, 3, and 5 years.  Impression: Right thyroid lobe TIRADS 4 nodule which meets criteria for fine-needle aspiration.    Left lobe TIRADS 3 nodule.  Per ACR TIRADS criteria, dedicated follow-up in 1, 2, 3, and 5 years.    Additional thyroid nodules which do not meet criteria for surveillance or biopsy.    This report was flagged in Epic as abnormal.    Electronically signed by  resident: Truman Thomas  Date:    02/03/2023  Time:    13:04    Electronically signed by: Ishan Virk MD  Date:    02/03/2023  Time:    13:17              Diagnoses:       1. Gastrointestinal stromal tumor (GIST) of stomach    2. Hypokalemia    3. Primary hypertension    4. Thyroid nodule    5. Drug-induced immunodeficiency                    Assessment and Plan:     1. Gastrointestinal stromal tumor (GIST) of stomach  Overview:  Patient with symptomatic gastric GIST, which progressed on imaging 7/18/22 to 9/12/22. No evidence of luis or distant metastatic disease. Mitotic rate not quantified, but given clinical behavior and noted cellularity and frequent mitosis, considered higher risk. 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 Started neoadjuvant imatinib 400mg po daily 10/08/2022 and tolerating well with good response on interval imaging 01/2023.    Assessment & Plan:  Plan to complete 6 total months adjuvant treatment   - Continue imatinib at current dose  - FU 4 weeks for symptom and count check  - FU 8 weeks with repeat imaging      2. Hypokalemia  -     Discontinue: potassium chloride SA (K-DUR,KLOR-CON) 20 MEQ tablet; Take 1 tablet (20 mEq total) by mouth once daily.  Dispense: 30 tablet; Refill: 0  -     potassium chloride SA (K-DUR,KLOR-CON) 20 MEQ tablet; Take 2 tablets (40 mEq total) by mouth once daily.  Dispense: 60 tablet; Refill: 0    3. Primary hypertension  Overview:  Patient on HCTZ, nebivolol, and valsartan.    Assessment & Plan:  Recently increased HCTZ to 25mg. Remains high. Patient to monitor at home, if remains elevated next visit would increased nebivolol    - Consider incrasing nebivolol to 20mg daily if remains elevated      4. Thyroid nodule  Assessment & Plan:  TI-RADS 4 nodule on US 02/2023. Will schedule FNA.    - Schedule FNA biopsy      5. Drug-induced immunodeficiency  Assessment & Plan:  - Monitor for infection                       Route Chart for Scheduling    Med Onc Chart  Routing      Follow up with physician . 4/14/23   Follow up with [facility] 4 weeks. 3/17/23   Infusion scheduling note    Injection scheduling note    Labs CBC and CMP   Lab interval:     Imaging Other and CT chest abdomen pelvis   Please help schedule thyroid FNA biopsy; CT torso prior to Xu 4/14/23 visit   Pharmacy appointment    Other referrals              Enrique Mcnair MD  Hematology/Oncology  [facility]

## 2023-02-17 NOTE — ASSESSMENT & PLAN NOTE
Plan to complete 6 total months adjuvant treatment   - Continue imatinib at current dose  - FU 4 weeks for symptom and count check  - FU 8 weeks with repeat imaging

## 2023-02-17 NOTE — PATIENT INSTRUCTIONS
You continue to tolerate imatinib therapy well with minimal side effects. Excellent response by imaging in 01/2023. Continue current dose. Given low potassium levels start potassium 40meq daily. Monitor blood pressures at home, we will consider increasing nebivolol at next visit if remains elevated.  Will need to scheduled thyroid needle biopsy to evalute thyroid nodule.    - Start potassium supplement  - Monitor BP  - Schedule thyroid US  - Follow up in 4 weeks with repeat blood counts and BP check  - Repeat imaging in 8 weeks and surgical re-evaluation

## 2023-02-22 ENCOUNTER — TELEPHONE (OUTPATIENT)
Dept: INTERVENTIONAL RADIOLOGY/VASCULAR | Facility: CLINIC | Age: 68
End: 2023-02-22
Payer: MEDICARE

## 2023-03-03 ENCOUNTER — TELEPHONE (OUTPATIENT)
Dept: INTERVENTIONAL RADIOLOGY/VASCULAR | Facility: HOSPITAL | Age: 68
End: 2023-03-03
Payer: MEDICARE

## 2023-03-03 NOTE — NURSING
Spoke with pt's wife. Confirmed FNA on Monday 03/06 at [facility] with an arrival time of 1230. Wife states patient does not take any blood thinners. Instructed to take medications and eat as usual.

## 2023-03-06 ENCOUNTER — HOSPITAL ENCOUNTER (EMERGENCY)
Facility: HOSPITAL | Age: 68
Discharge: HOME | End: 2023-03-06
Attending: EMERGENCY MEDICINE
Payer: MEDICARE

## 2023-03-06 ENCOUNTER — HOSPITAL ENCOUNTER (OUTPATIENT)
Dept: INTERVENTIONAL RADIOLOGY/VASCULAR | Facility: HOSPITAL | Age: 68
Discharge: HOME | End: 2023-03-06
Attending: HOSPITALIST
Payer: MEDICARE

## 2023-03-06 VITALS
WEIGHT: 185 LBS | OXYGEN SATURATION: 99 % | HEART RATE: 73 BPM | DIASTOLIC BLOOD PRESSURE: 77 MMHG | BODY MASS INDEX: 25.9 KG/M2 | HEIGHT: 71 IN | TEMPERATURE: 98 F | SYSTOLIC BLOOD PRESSURE: 159 MMHG | RESPIRATION RATE: 16 BRPM

## 2023-03-06 VITALS
TEMPERATURE: 99 F | BODY MASS INDEX: 25.9 KG/M2 | HEART RATE: 57 BPM | RESPIRATION RATE: 18 BRPM | SYSTOLIC BLOOD PRESSURE: 166 MMHG | OXYGEN SATURATION: 100 % | WEIGHT: 185 LBS | HEIGHT: 71 IN | DIASTOLIC BLOOD PRESSURE: 79 MMHG

## 2023-03-06 DIAGNOSIS — H61.22 HEARING LOSS DUE TO CERUMEN IMPACTION, LEFT: ICD-10-CM

## 2023-03-06 DIAGNOSIS — E04.1 THYROID NODULE: ICD-10-CM

## 2023-03-06 DIAGNOSIS — H61.22 EXCESSIVE CERUMEN IN LEFT EAR CANAL: Primary | ICD-10-CM

## 2023-03-06 PROCEDURE — 88173 CYTOPATH EVAL FNA REPORT: CPT | Mod: HCNC | Performed by: PATHOLOGY

## 2023-03-06 PROCEDURE — 88172 PR  EVALUATION OF FNA SMEAR TO DETERMINE ADEQUACY, FIRST EVAL: ICD-10-PCS | Mod: 26,HCNC,, | Performed by: PATHOLOGY

## 2023-03-06 PROCEDURE — 88177 CYTP FNA EVAL EA ADDL: CPT | Mod: 26,HCNC,, | Performed by: PATHOLOGY

## 2023-03-06 PROCEDURE — 25000003 PHARM REV CODE 250: Mod: HCNC | Performed by: RADIOLOGY

## 2023-03-06 PROCEDURE — 10005 FNA BX W/US GDN 1ST LES: CPT | Mod: HCNC | Performed by: RADIOLOGY

## 2023-03-06 PROCEDURE — 88177 CYTP FNA EVAL EA ADDL: CPT | Mod: 59,HCNC | Performed by: PATHOLOGY

## 2023-03-06 PROCEDURE — A4550 SURGICAL TRAYS: HCPCS | Mod: HCNC

## 2023-03-06 PROCEDURE — 88173 PR  INTERPRETATION OF FNA SMEAR: ICD-10-PCS | Mod: 26,HCNC,, | Performed by: PATHOLOGY

## 2023-03-06 PROCEDURE — 88173 CYTOPATH EVAL FNA REPORT: CPT | Mod: 26,HCNC,, | Performed by: PATHOLOGY

## 2023-03-06 PROCEDURE — 88177 PR  EVALUATION OF FNA SMEAR TO DETERMINE ADEQUACY, EA ADD EVAL: ICD-10-PCS | Mod: 26,HCNC,, | Performed by: PATHOLOGY

## 2023-03-06 PROCEDURE — 10005 IR US FINE NEEDLE ASPIRATION BIOPSY, FIRST LESION: ICD-10-PCS | Mod: HCNC,,, | Performed by: RADIOLOGY

## 2023-03-06 PROCEDURE — 88172 CYTP DX EVAL FNA 1ST EA SITE: CPT | Mod: HCNC | Performed by: PATHOLOGY

## 2023-03-06 PROCEDURE — A4215 STERILE NEEDLE: HCPCS | Mod: HCNC

## 2023-03-06 PROCEDURE — 99282 EMERGENCY DEPT VISIT SF MDM: CPT | Mod: 25,HCNC

## 2023-03-06 PROCEDURE — 88172 CYTP DX EVAL FNA 1ST EA SITE: CPT | Mod: 26,HCNC,, | Performed by: PATHOLOGY

## 2023-03-06 PROCEDURE — 99283 EMERGENCY DEPT VISIT LOW MDM: CPT | Mod: HCNC,,, | Performed by: EMERGENCY MEDICINE

## 2023-03-06 PROCEDURE — 99283 PR EMERGENCY DEPT VISIT,LEVEL III: ICD-10-PCS | Mod: HCNC,,, | Performed by: EMERGENCY MEDICINE

## 2023-03-06 RX ORDER — LIDOCAINE HYDROCHLORIDE 10 MG/ML
INJECTION INFILTRATION; PERINEURAL
Status: COMPLETED | OUTPATIENT
Start: 2023-03-06 | End: 2023-03-06

## 2023-03-06 RX ADMIN — LIDOCAINE HYDROCHLORIDE 5 ML: 10 INJECTION, SOLUTION INFILTRATION; PERINEURAL at 01:03

## 2023-03-06 NOTE — DISCHARGE SUMMARY
Interventional Radiology Short Stay Discharge Summary      Admit Date: 3/6/2023  Discharge Date: 03/06/2023     Hospital Course: Uneventful    Discharge Diagnosis: Thyroid nodule s/p FNA today    Discharge Condition: Stable    Discharge Disposition: Home    Diet: Resume prior diet    Activity: Activity as tolerated    Follow-up: With referring provider      Lico AdhikariBanner  Pager [phone]

## 2023-03-06 NOTE — PROCEDURES
Interventional Radiology Immediate Post-Procedure Note    Pre-Op Diagnosis: Thyroid nodule  Post-Op Diagnosis: Same    Procedure: US-guided FNA    Procedure performed by: Lico Nunez MD  Assistants: None    Estimated Blood Loss: Minimal  Specimen Removed: No:     Findings/description of procedure:  25-ga FNA x4 taken from the target nodule in the RIGHT inferior thyroid. Adequacy of specimen confirmed.     No immediate complications. Patient tolerated procedure well. Please see full dictated procedure report for additional details and recommendations.      Lico Nunez MD  [facility]  Pager [phone]

## 2023-03-06 NOTE — ED PROVIDER NOTES
Encounter Date: 3/6/2023    SCRIBE #1 NOTE: I, Ellen Verde, am scribing for, and in the presence of,  Lex Rodriguez MD. I have scribed the following portions of the note - Other sections scribed: HPI, ROS, PE.   STAFF ATTENDING PHYSICIAN NOTE:  I provided and agree with the documentation provided by [place] on Sami Zhang.  ____________________  Hector YANI Rodriguez MD, [facility]  Emergency Medicine Staff  11:42 AM 3/6/2023    History     Chief Complaint   Patient presents with    Otalgia     Trouble hearing,      Time patient was seen by the provider: 11:30 AM      The patient is a 67 y.o. male with past medical history of HTN and HLD who presents to the ED with a complaint of left ear discomfort today. Describes his discomfort as though fluid is in the ear. Associated symptoms include decreased hearing. Denies ear pain and fever at this time. He endorses scratching and impacting the ear drum. No other exacerbating or alleviating factors.    The history is provided by the patient and medical records. No  was used.   Review of patient's allergies indicates:  No Known Allergies  Past Medical History:   Diagnosis Date    Arthritis     GIST (gastrointestinal stromal tumor), malignant 09/09/2022    Gout 10/08/2015    Hyperlipidemia     Hypertension      Past Surgical History:   Procedure Laterality Date    COLONOSCOPY N/A 12/15/2022    Procedure: COLONOSCOPY;  Surgeon: Sean Bran MD;  Location: [facility] (4TH FLR);  Service: Endoscopy;  Laterality: N/A;  inst handed-t  12/8/22- [facility]    ENDOSCOPIC ULTRASOUND OF UPPER GASTROINTESTINAL TRACT N/A 9/9/2022    Procedure: ULTRASOUND, UPPER GI TRACT, ENDOSCOPIC;  Surgeon: Yosvany Paula MD;  Location: [facility] (2ND FLR);  Service: Endoscopy;  Laterality: N/A;  The patient need an EGD/EUS (radial) for gastric mass. Main. Urgent. 60 minutes. Referring:   Todd Cantu MD   Thanks,   Evan Farr MD    ESOPHAGOGASTRODUODENOSCOPY N/A  9/9/2022    Procedure: EGD (ESOPHAGOGASTRODUODENOSCOPY);  Surgeon: Yosvany Paula MD;  [facility] ([address] [place] [address]);  Service: Endoscopy;  Laterality: N/A;  The patient need an EGD/EUS (radial) for gastric mass. Main. Urgent. 60 minutes. Referring:   Todd Cantu MD   Thanks,   Evan Farr MD    Pt is fully vaccinated-DS  9/7/22-Instructions written down by pt's wife and read back.-DS    NONE N/A 10/8/2015     Family History   Problem Relation Age of Onset    Stroke Sister 60    Other Other         brain/head tumor (about 1y ago??)    Cancer Neg Hx     Diabetes Neg Hx     Heart disease Neg Hx     Hypertension Neg Hx      Social History     Tobacco Use    Smoking status: Never    Smokeless tobacco: Never   Substance Use Topics    Alcohol use: Not Currently     Alcohol/week: 2.0 standard drinks     Types: 2 Cans of beer per week     Comment: occasionally drinks wine    Drug use: No     Review of Systems   Constitutional:  Negative for fever.   HENT:  Positive for hearing loss. Negative for sore throat.    Eyes:  Negative for visual disturbance.   Respiratory:  Negative for cough and shortness of breath.    Cardiovascular:  Negative for chest pain.   Gastrointestinal:  Negative for abdominal pain, diarrhea, nausea and vomiting.   Genitourinary:  Negative for dysuria.   Musculoskeletal:  Negative for neck pain.   Skin:  Negative for rash and wound.   Allergic/Immunologic: Negative for immunocompromised state.   Neurological:  Negative for syncope.   Psychiatric/Behavioral:  Negative for confusion.      Physical Exam     Initial Vitals [03/06/23 0957]   BP Pulse Resp Temp SpO2   (!) 147/76 68 16 98.3 °F (36.8 °C) 100 %      MAP       --         Physical Exam    Nursing note and vitals reviewed.    GENERAL: Calm; Cooperative; Well-appearing and Non-Toxic; Well-Nourished; NAD.  HEENT: AT/NC; PERRL, EOMI, Acuity & Fields Grossly Intact; speaking full sentences with no slurring of speech or drooling/inability to  tolerate oral secretions.   LEFT EAR:  No tenderness to palpation to tragus or auricle/peanut manipulation.  Cerumen noted and removed with curette with residual remaining. Left TM normal.  No tenderness to palpation or percussion of left mastoid region.  NECK: Supple, FROM with no meningismus, no accessory muscle use. No JVD or Carotid Bruits B/L.  THORAX/BACK: Atraumatic with NTTP. No midline TTP to C/T/LS spine; No CVA tenderness B/L. SLRT NEG.  HEART: Regular rate and rhythm, no M/G/T.  LUNGS: No Tachypnea, No Retractions, and CTA B/L with no W/R/R.  EXTREMITIES: FROM.   SKIN: Warm, Dry, No Skin Tears or Rashes.  VASCULAR: 2+ pulses Prox/Dist & Symmetrical with No delay.  NEUROLOGIC: AAOx3  ED Course   Procedures  Labs Reviewed - No data to display         Imaging Results    None          Medications - No data to display  Medical Decision Making:   History:   Old Medical Records: I decided to obtain old medical records.  Initial Assessment:   Well appearing, afebrile, atraumatic and hemodynamically stable male presents with left cerumen impaction versus billed to left auditory canal.  No suspicion for otitis externa or malignant otitis externa that would warrant topical or systemic antibiotics.  ____________________  Hector Rodriguez MD, [facility]  Emergency Medicine Staff  11:44 AM 3/6/2023    Clinical Tests:   Lab Tests: Ordered and Reviewed        Scribe Attestation:   Scribe #1: I performed the above scribed service and the documentation accurately describes the services I performed. I attest to the accuracy of the note.                   Clinical Impression:   Final diagnoses:  [H61.22] Excessive cerumen in left ear canal (Primary)  [H61.22] Hearing loss due to cerumen impaction, left        ED Disposition Condition    Discharge Stable          ED Prescriptions       Medication Sig Dispense Start Date End Date Auth. Provider    carbamide peroxide (DEBROX) 6.5 % otic solution Place 5 drops into the left ear 2 (two)  times daily. 15 mL 3/6/2023 -- Lex Rodriguez MD          Follow-up Information       Follow up With Specialties Details Why Contact Info    [facility] - Emergency Dept Emergency Medicine  If you develop fever greater than 101, pain to ear, pain to chewing food, trouble with balance, or your gait/ambulation. [address]  [facility] [address]  [phone]             Lex Rodriguez MD  03/06/23 1144

## 2023-03-06 NOTE — NURSING
Patient is discharged from IR. AVS is printed and reviewed. Upcoming appointments and the [facility] number is highlighted for convenience. The opportunity to ask questions is provided. Patient is ambulatory from the unit.

## 2023-03-06 NOTE — H&P
Interventional Radiology Pre-Procedure History & Physical      Chief Complaint/Reason for Referral: Thyroid nodule    History of Present Illness:  Sami Zhang is a 67 y.o. male who presents with a right thyroid nodule. Referred for FNA.    Past Medical History:   Diagnosis Date    Arthritis     GIST (gastrointestinal stromal tumor), malignant 09/09/2022    Gout 10/08/2015    Hyperlipidemia     Hypertension      Past Surgical History:   Procedure Laterality Date    COLONOSCOPY N/A 12/15/2022    Procedure: COLONOSCOPY;  Surgeon: Sean Bran MD;  Location: [facility] ENDO (4TH FLR);  Service: Endoscopy;  Laterality: N/A;  [facility] handed-t  12/8/22- precall completed-    ENDOSCOPIC ULTRASOUND OF UPPER GASTROINTESTINAL TRACT N/A 9/9/2022    Procedure: ULTRASOUND, UPPER GI TRACT, ENDOSCOPIC;  Surgeon: Yosvany Paula MD;  Location: [facility] ENDO (2ND FLR);  Service: Endoscopy;  Laterality: N/A;  The patient need an EGD/EUS (radial) for gastric mass. Main. Urgent. 60 minutes. Referring:   MD Estefani Campos Ricardo Romero, MD    ESOPHAGOGASTRODUODENOSCOPY N/A 9/9/2022    Procedure: EGD (ESOPHAGOGASTRODUODENOSCOPY);  Surgeon: Yosvany Paula MD;  Location: [facility] ENDO (2ND FLR);  Service: Endoscopy;  Laterality: N/A;  The patient need an EGD/EUS (radial) for gastric mass. Main. Urgent. 60 minutes. Referring:   MD Estefani Campos Ricardo Romero, MD    Pt is fully vaccinated-DS  9/7/22-Instructions written down by pt's wife and read back.-DS    NONE N/A 10/8/2015       Allergies:   Review of patient's allergies indicates:  No Known Allergies    Home Meds:   Prior to Admission medications    Medication Sig Start Date End Date Taking? Authorizing Provider   allopurinol (ZYLOPRIM) 100 MG tablet Take 100 mg by mouth 2 (two) times daily.   Yes Historical Provider   CALCIUM CARBONATE/VITAMIN D3 (VITAMIN D-3 ORAL) Take 10,000 Units by mouth once a week.   Yes Historical Provider   hydroCHLOROthiazide (HYDRODIURIL) 25 MG  tablet Take 1 tablet (25 mg total) by mouth once daily. 1/19/23 1/19/24 Yes Enrique Mcnair MD   imatinib (GLEEVEC) 400 MG Tab Take 1 tablet (400 mg total) by mouth once daily. 12/1/22 12/1/23 Yes ISAEL Thompson   loperamide (IMODIUM A-D) 2 mg Tab Take two tabs at the onset of loose stools.  Then take one tab after every subsequent loose stool. 11/25/22  Yes Enrique Mcnair MD   multivitamin capsule Take 1 capsule by mouth once daily.   Yes Historical Provider   nebivolol (BYSTOLIC) 10 MG Tab Take 10 mg by mouth once daily.   Yes Historical Provider   rosuvastatin (CRESTOR) 20 MG tablet Take 20 mg by mouth once daily.   Yes Historical Provider   valsartan (DIOVAN) 160 MG tablet Take 1 tablet (160 mg total) by mouth once daily. 1/19/23 1/19/24 Yes Enrique Mcnair MD   vitamin D 1000 units Tab Take 10,000 Units by mouth once a week.   Yes Historical Provider   carbamide peroxide (DEBROX) 6.5 % otic solution Place 5 drops into the left ear 2 (two) times daily. 3/6/23   Lex Rodriguez MD   icosapent ethyL (VASCEPA) 0.5 gram Cap Take 2 capsules by mouth once daily.    Historical Provider   ondansetron (ZOFRAN) 4 MG tablet Take 1 tablet (4 mg total) by mouth every 6 (six) hours. 8/29/22   Michael Vallejo MD   potassium chloride SA (K-DUR,KLOR-CON) 20 MEQ tablet Take 2 tablets (40 mEq total) by mouth once daily. 2/17/23 3/19/23  Enrique Mcnair MD       Anticoagulation/Antiplatelet Meds: no anticoagulation    Review of Systems:   Hematological: no known coagulopathies  Respiratory: no shortness of breath  Cardiovascular: no chest pain  Gastrointestinal: no abdominal pain  Genitourinary: no dysuria  Musculoskeletal: negative  Neurological: no TIA or stroke symptoms     Physical Exam:  Temp: 98.1 °F (36.7 °C) (03/06/23 1237)  Pulse: 73 (03/06/23 1320)  Resp: 16 (03/06/23 1320)  BP: (!) 159/77 (03/06/23 1320)  SpO2: 99 % (03/06/23 1320)    General: NAD  HEENT: Normocephalic, sclera anicteric, oropharynx  clear  Neck: Supple, no palpable lymphadenopathy  Heart: RRR  Lungs: Symmetric excursions, breathing unlabored  Abd: NTND, soft  Extremities: HART  Neuro: Gross nonfocal    Laboratory:  Lab Results   Component Value Date    INR 1.0 07/01/2016       Lab Results   Component Value Date    WBC 6.10 02/17/2023    HGB 12.1 (L) 02/17/2023    HCT 39.7 (L) 02/17/2023    MCV 86 02/17/2023     02/17/2023      Lab Results   Component Value Date     02/17/2023     02/17/2023    K 3.1 (L) 02/17/2023     02/17/2023    CO2 31 (H) 02/17/2023    BUN 11 02/17/2023    CREATININE 1.2 02/17/2023    CALCIUM 9.7 02/17/2023    ALT 16 02/17/2023    AST 23 02/17/2023    ALBUMIN 3.9 02/17/2023    BILITOT 1.2 (H) 02/17/2023       Imaging:  Thyroid US 2/3/23 reviewed.    Assessment/Plan:  67 y.o. male with a right thyroid nodule. Will undergo FNA today.    Sedation plan: None    Risks (including, but not limited to, pain, bleeding, infection, damage to nearby structures, treatment failure/recurrence, and the need for additional procedures), potential benefits, and alternatives were discussed with the patient. All questions were answered to the best of my abilities. The patient wishes to proceed. Written informed consent was obtained.      Andrew Marsala MD [facility]  Pager [phone]

## 2023-03-08 LAB
ADEQUACY: NORMAL
FINAL PATHOLOGIC DIAGNOSIS: NORMAL
Lab: NORMAL

## 2023-03-16 ENCOUNTER — TELEPHONE (OUTPATIENT)
Dept: HEMATOLOGY/ONCOLOGY | Facility: CLINIC | Age: 68
End: 2023-03-16
Payer: MEDICARE

## 2023-03-17 ENCOUNTER — OFFICE VISIT (OUTPATIENT)
Dept: HEMATOLOGY/ONCOLOGY | Facility: CLINIC | Age: 68
End: 2023-03-17
Payer: MEDICARE

## 2023-03-17 ENCOUNTER — LAB VISIT (OUTPATIENT)
Dept: LAB | Facility: HOSPITAL | Age: 68
End: 2023-03-17
Attending: HOSPITALIST
Payer: MEDICARE

## 2023-03-17 VITALS
RESPIRATION RATE: 18 BRPM | WEIGHT: 186.94 LBS | OXYGEN SATURATION: 100 % | SYSTOLIC BLOOD PRESSURE: 140 MMHG | BODY MASS INDEX: 26.17 KG/M2 | TEMPERATURE: 99 F | DIASTOLIC BLOOD PRESSURE: 63 MMHG | HEIGHT: 71 IN | HEART RATE: 50 BPM

## 2023-03-17 DIAGNOSIS — D63.0 ANEMIA IN NEOPLASTIC DISEASE: ICD-10-CM

## 2023-03-17 DIAGNOSIS — Z79.899 DRUG-INDUCED IMMUNODEFICIENCY: ICD-10-CM

## 2023-03-17 DIAGNOSIS — D84.821 DRUG-INDUCED IMMUNODEFICIENCY: ICD-10-CM

## 2023-03-17 DIAGNOSIS — R05.9 COUGH, UNSPECIFIED TYPE: ICD-10-CM

## 2023-03-17 DIAGNOSIS — C49.A2 GASTROINTESTINAL STROMAL TUMOR (GIST) OF STOMACH: ICD-10-CM

## 2023-03-17 DIAGNOSIS — E87.6 HYPOKALEMIA: ICD-10-CM

## 2023-03-17 DIAGNOSIS — I10 PRIMARY HYPERTENSION: ICD-10-CM

## 2023-03-17 DIAGNOSIS — C49.A2 GASTROINTESTINAL STROMAL TUMOR (GIST) OF STOMACH: Primary | ICD-10-CM

## 2023-03-17 DIAGNOSIS — E04.1 THYROID NODULE: ICD-10-CM

## 2023-03-17 LAB
ALBUMIN SERPL BCP-MCNC: 3.7 G/DL (ref 3.5–5.2)
ALP SERPL-CCNC: 82 U/L (ref 55–135)
ALT SERPL W/O P-5'-P-CCNC: 28 U/L (ref 10–44)
ANION GAP SERPL CALC-SCNC: 7 MMOL/L (ref 8–16)
AST SERPL-CCNC: 31 U/L (ref 10–40)
BILIRUB SERPL-MCNC: 1.1 MG/DL (ref 0.1–1)
BUN SERPL-MCNC: 11 MG/DL (ref 8–23)
CALCIUM SERPL-MCNC: 9.4 MG/DL (ref 8.7–10.5)
CHLORIDE SERPL-SCNC: 105 MMOL/L (ref 95–110)
CO2 SERPL-SCNC: 30 MMOL/L (ref 23–29)
CREAT SERPL-MCNC: 1.3 MG/DL (ref 0.5–1.4)
ERYTHROCYTE [DISTWIDTH] IN BLOOD BY AUTOMATED COUNT: 16.1 % (ref 11.5–14.5)
EST. GFR  (NO RACE VARIABLE): 59.5 ML/MIN/1.73 M^2
GLUCOSE SERPL-MCNC: 127 MG/DL (ref 70–110)
HCT VFR BLD AUTO: 38.5 % (ref 40–54)
HGB BLD-MCNC: 11.8 G/DL (ref 14–18)
IMM GRANULOCYTES # BLD AUTO: 0.01 K/UL (ref 0–0.04)
MCH RBC QN AUTO: 26.5 PG (ref 27–31)
MCHC RBC AUTO-ENTMCNC: 30.6 G/DL (ref 32–36)
MCV RBC AUTO: 86 FL (ref 82–98)
NEUTROPHILS # BLD AUTO: 2.6 K/UL (ref 1.8–7.7)
PLATELET # BLD AUTO: 247 K/UL (ref 150–450)
PMV BLD AUTO: 10.7 FL (ref 9.2–12.9)
POTASSIUM SERPL-SCNC: 3.4 MMOL/L (ref 3.5–5.1)
PROT SERPL-MCNC: 6.7 G/DL (ref 6–8.4)
RBC # BLD AUTO: 4.46 M/UL (ref 4.6–6.2)
SODIUM SERPL-SCNC: 142 MMOL/L (ref 136–145)
WBC # BLD AUTO: 4.48 K/UL (ref 3.9–12.7)

## 2023-03-17 PROCEDURE — 1160F RVW MEDS BY RX/DR IN RCRD: CPT | Mod: HCNC,CPTII,S$GLB, | Performed by: REGISTERED NURSE

## 2023-03-17 PROCEDURE — 99214 OFFICE O/P EST MOD 30 MIN: CPT | Mod: HCNC,S$GLB,, | Performed by: REGISTERED NURSE

## 2023-03-17 PROCEDURE — 1101F PR PT FALLS ASSESS DOC 0-1 FALLS W/OUT INJ PAST YR: ICD-10-PCS | Mod: HCNC,CPTII,S$GLB, | Performed by: REGISTERED NURSE

## 2023-03-17 PROCEDURE — 3288F PR FALLS RISK ASSESSMENT DOCUMENTED: ICD-10-PCS | Mod: HCNC,CPTII,S$GLB, | Performed by: REGISTERED NURSE

## 2023-03-17 PROCEDURE — 1126F AMNT PAIN NOTED NONE PRSNT: CPT | Mod: HCNC,CPTII,S$GLB, | Performed by: REGISTERED NURSE

## 2023-03-17 PROCEDURE — 3288F FALL RISK ASSESSMENT DOCD: CPT | Mod: HCNC,CPTII,S$GLB, | Performed by: REGISTERED NURSE

## 2023-03-17 PROCEDURE — 4010F PR ACE/ARB THEARPY RXD/TAKEN: ICD-10-PCS | Mod: HCNC,CPTII,S$GLB, | Performed by: REGISTERED NURSE

## 2023-03-17 PROCEDURE — 3078F PR MOST RECENT DIASTOLIC BLOOD PRESSURE < 80 MM HG: ICD-10-PCS | Mod: HCNC,CPTII,S$GLB, | Performed by: REGISTERED NURSE

## 2023-03-17 PROCEDURE — 80053 COMPREHEN METABOLIC PANEL: CPT | Mod: HCNC | Performed by: HOSPITALIST

## 2023-03-17 PROCEDURE — 1126F PR PAIN SEVERITY QUANTIFIED, NO PAIN PRESENT: ICD-10-PCS | Mod: HCNC,CPTII,S$GLB, | Performed by: REGISTERED NURSE

## 2023-03-17 PROCEDURE — 3077F PR MOST RECENT SYSTOLIC BLOOD PRESSURE >= 140 MM HG: ICD-10-PCS | Mod: HCNC,CPTII,S$GLB, | Performed by: REGISTERED NURSE

## 2023-03-17 PROCEDURE — 36415 COLL VENOUS BLD VENIPUNCTURE: CPT | Mod: HCNC | Performed by: HOSPITALIST

## 2023-03-17 PROCEDURE — 3008F BODY MASS INDEX DOCD: CPT | Mod: HCNC,CPTII,S$GLB, | Performed by: REGISTERED NURSE

## 2023-03-17 PROCEDURE — 3008F PR BODY MASS INDEX (BMI) DOCUMENTED: ICD-10-PCS | Mod: HCNC,CPTII,S$GLB, | Performed by: REGISTERED NURSE

## 2023-03-17 PROCEDURE — 99214 PR OFFICE/OUTPT VISIT, EST, LEVL IV, 30-39 MIN: ICD-10-PCS | Mod: HCNC,S$GLB,, | Performed by: REGISTERED NURSE

## 2023-03-17 PROCEDURE — 1159F PR MEDICATION LIST DOCUMENTED IN MEDICAL RECORD: ICD-10-PCS | Mod: HCNC,CPTII,S$GLB, | Performed by: REGISTERED NURSE

## 2023-03-17 PROCEDURE — 99999 PR PBB SHADOW E&M-EST. PATIENT-LVL IV: ICD-10-PCS | Mod: PBBFAC,HCNC,, | Performed by: REGISTERED NURSE

## 2023-03-17 PROCEDURE — 3078F DIAST BP <80 MM HG: CPT | Mod: HCNC,CPTII,S$GLB, | Performed by: REGISTERED NURSE

## 2023-03-17 PROCEDURE — 4010F ACE/ARB THERAPY RXD/TAKEN: CPT | Mod: HCNC,CPTII,S$GLB, | Performed by: REGISTERED NURSE

## 2023-03-17 PROCEDURE — 1160F PR REVIEW ALL MEDS BY PRESCRIBER/CLIN PHARMACIST DOCUMENTED: ICD-10-PCS | Mod: HCNC,CPTII,S$GLB, | Performed by: REGISTERED NURSE

## 2023-03-17 PROCEDURE — 3077F SYST BP >= 140 MM HG: CPT | Mod: HCNC,CPTII,S$GLB, | Performed by: REGISTERED NURSE

## 2023-03-17 PROCEDURE — 1159F MED LIST DOCD IN RCRD: CPT | Mod: HCNC,CPTII,S$GLB, | Performed by: REGISTERED NURSE

## 2023-03-17 PROCEDURE — 1101F PT FALLS ASSESS-DOCD LE1/YR: CPT | Mod: HCNC,CPTII,S$GLB, | Performed by: REGISTERED NURSE

## 2023-03-17 PROCEDURE — 99999 PR PBB SHADOW E&M-EST. PATIENT-LVL IV: CPT | Mod: PBBFAC,HCNC,, | Performed by: REGISTERED NURSE

## 2023-03-17 PROCEDURE — 85027 COMPLETE CBC AUTOMATED: CPT | Mod: HCNC | Performed by: HOSPITALIST

## 2023-03-17 RX ORDER — BENZONATATE 100 MG/1
100 CAPSULE ORAL 3 TIMES DAILY PRN
Qty: 30 CAPSULE | Refills: 0 | Status: SHIPPED | OUTPATIENT
Start: 2023-03-17 | End: 2023-04-14

## 2023-03-17 NOTE — PROGRESS NOTES
MEDICAL ONCOLOGY FOLLOW-UP VISIT.     Best Contact Phone Number(s): [phone] (home) [phone] (work)     Cancer/Stage/TNM:    Cancer Staging   Gastrointestinal stromal tumor (GIST) of stomach  Staging form: Gastrointestinal Stromal Tumor - Gastric and Omental GIST, AJCC 8th Edition  - Clinical stage from 9/9/2022: Stage IIIA (cT3, cN0, cM0, Mitotic Rate: High) - Signed by Enrique Mcnair MD on 9/26/2022         Reason for visit: Mr. Zhang is a 66 year old man with HTN diagnosed with GIST by EUS on 9/9/22. He started neoadjuvant imatinib on 10/8/22. He presents to medical oncology clinic for routine follow up on neoadjuvant imatinib.      Interval History:   Mr. Zhang continues to feel well without significant side effects from treatment. Reports daily compliance. Appetite and weight remain stable. Uses Gas-X with relief for post-prandial bloating. He has noticed a cough over the last week, which he attributes to weather change. Reports cough is intermittently productive with clear phlegm. He takes dayquil and uses halls PRN with moderate relief of symptoms. Wife is having similar symptoms as well. Denies fever/chills, SOB, CP, palpitations, N/V, C/D, pain, leg swelling, increased lacrimation, blood in urine/stool, paresthesias.      Oncology History   Gastrointestinal stromal tumor (GIST) of stomach   7/18/2022 Imaging Significant Findings    [place] [facility] [place]. CT a/p found a 6.7 x 5.6 x 7.2 cm cystic mass in the gastric fundus.     9/9/2022 Procedure    EGD/EUS fins a 6.2x8.0 cm cystic mass in the cardia. Pathology shows GIST with 'focal/frequent mitoses' although exact quantification could not be determined.      9/12/2022 Imaging Significant Findings    CT a/p redemonstrates 9.3x8 cm gastric mass with associated necrosis. No LAD. No liver lesions.     9/15/2022 Imaging Significant Findings    CT chest only shows incidental 3mm LLL nodule. No evidence of metastatic disease.    "  10/8/2022 -  Chemotherapy    Start imatinib     1/17/2023 Imaging Significant Findings    CT torso; Decreased size of gastric mass 6.5 x 2.5cm from 9.3 x 8.0cm.     1/23/2023 Tumor Conference       [facility] UGI MULTIDISCIPLINARY TUMOR BOARD  PATIENT REVIEW FORM   ____________________________________________________________    CLINIC #: [id number]  DATE: 1/23/2023    DIAGNOSIS: stomach GIST    PRESENTER: Xu    PATIENT SUMMARY:   This 66 y/o gentleman presented with epigastric abd pain in 7/2022. Imaging identified large 9cm cystic gastric mass with central necrosis. He underwent EUS with bx in 9/2022 and pathology revealed GIST. He started neoadj Gleevec per Dr. Mcnair in 10/2022. Recent restaging CT scan reviewed - good response to tx, lesion smaller, now measuring 6.7 cm.     BOARD RECOMMENDATIONS:   Continue neoadj Gleevec    CONSULT NEEDED:     [] Surgery    [] Hem/Onc    [] Rad/Onc    [] Dietary                 [] Social Service    [] Psychology       [] AES  [] Radiology     Clinical Stage: Tumor 3 Node(s) 0 Metastasis 0      GROUP STAGE:  [] O    [] 1A    [] IB    [] IIA    [] IIB     [x] IIIA     [] IIIB     [] IIIC    []IV  [] Local recurrence     [] Regional recurrence     [] Distant recurrence   Metastatic site(s): none         [x] Lizbeth'l Treatment Guidelines reviewed and care planned is consistent with guidelines.         (i.e., NCCN, NCI, PD, ACO, AUA, etc.)    PRESENTATION AT CANCER CONFERENCE:         [x] Prospective    [] Retrospective     [] Follow-Up          Physical Exam:   BP (!) 140/63 (BP Location: Left arm, Patient Position: Sitting, BP Method: Medium (Automatic))   Pulse (!) 50   Temp 98.8 °F (37.1 °C) (Oral)   Resp 18   Ht 5' 11" (1.803 m)   Wt 84.8 kg (186 lb 15.2 oz)   SpO2 100%   BMI 26.07 kg/m²      ECOG Performance Status: (foot note - ECOG PS provided by Eastern Cooperative Oncology Group) 0 - Asymptomatic    Physical Exam  Vitals reviewed.   Constitutional:  "      General: He is not in acute distress.     Appearance: Normal appearance. He is not ill-appearing, toxic-appearing or diaphoretic.      Comments: Presents with his wife.  Pleasant.   ECOG = 0.    HENT:      Head: Normocephalic and atraumatic.      Right Ear: External ear normal.      Left Ear: External ear normal.      Nose: Congestion present.      Mouth/Throat:      Mouth: Mucous membranes are moist.      Pharynx: Oropharynx is clear. No oropharyngeal exudate or posterior oropharyngeal erythema.   Eyes:      General: No scleral icterus.     Extraocular Movements: Extraocular movements intact.      Conjunctiva/sclera: Conjunctivae normal.      Pupils: Pupils are equal, round, and reactive to light.   Cardiovascular:      Rate and Rhythm: Regular rhythm. Bradycardia present.   Pulmonary:      Effort: Pulmonary effort is normal. No respiratory distress.      Breath sounds: No wheezing.   Abdominal:      General: There is no distension.      Palpations: Abdomen is soft.      Tenderness: There is no abdominal tenderness.   Musculoskeletal:         General: No swelling. Normal range of motion.      Cervical back: Normal range of motion and neck supple.      Right lower leg: No edema.      Left lower leg: No edema.   Lymphadenopathy:      Cervical: No cervical adenopathy.   Skin:     General: Skin is warm and dry.      Coloration: Skin is not jaundiced or pale.      Findings: No erythema or rash.   Neurological:      General: No focal deficit present.      Mental Status: He is alert and oriented to person, place, and time.      Cranial Nerves: No cranial nerve deficit.      Sensory: No sensory deficit.      Motor: No weakness.      Gait: Gait normal.   Psychiatric:         Mood and Affect: Mood normal.         Behavior: Behavior normal.         Thought Content: Thought content normal.         Labs:  Lab Visit on 03/17/2023   Component Date Value Ref Range Status    WBC 03/17/2023 4.48  3.90 - 12.70 K/uL Final    RBC  03/17/2023 4.46 (L)  4.60 - 6.20 M/uL Final    Hemoglobin 03/17/2023 11.8 (L)  14.0 - 18.0 g/dL Final    Hematocrit 03/17/2023 38.5 (L)  40.0 - 54.0 % Final    MCV 03/17/2023 86  82 - 98 fL Final    MCH 03/17/2023 26.5 (L)  27.0 - 31.0 pg Final    MCHC 03/17/2023 30.6 (L)  32.0 - 36.0 g/dL Final    RDW 03/17/2023 16.1 (H)  11.5 - 14.5 % Final    Platelets 03/17/2023 247  150 - 450 K/uL Final    MPV 03/17/2023 10.7  9.2 - 12.9 fL Final    Gran # (ANC) 03/17/2023 2.6  1.8 - 7.7 K/uL Final    Comment: The ANC is based on a white cell differential from an   automated cell counter. It has not been microscopically   reviewed for the presence of abnormal cells. Clinical   correlation is required.      Immature Grans (Abs) 03/17/2023 0.01  0.00 - 0.04 K/uL Final    Comment: Mild elevation in immature granulocytes is non specific and   can be seen in a variety of conditions including stress response,   acute inflammation, trauma and pregnancy. Correlation with other   laboratory and clinical findings is essential.      Sodium 03/17/2023 142  136 - 145 mmol/L Final    Potassium 03/17/2023 3.4 (L)  3.5 - 5.1 mmol/L Final    Chloride 03/17/2023 105  95 - 110 mmol/L Final    CO2 03/17/2023 30 (H)  23 - 29 mmol/L Final    Glucose 03/17/2023 127 (H)  70 - 110 mg/dL Final    BUN 03/17/2023 11  8 - 23 mg/dL Final    Creatinine 03/17/2023 1.3  0.5 - 1.4 mg/dL Final    Calcium 03/17/2023 9.4  8.7 - 10.5 mg/dL Final    Total Protein 03/17/2023 6.7  6.0 - 8.4 g/dL Final    Albumin 03/17/2023 3.7  3.5 - 5.2 g/dL Final    Total Bilirubin 03/17/2023 1.1 (H)  0.1 - 1.0 mg/dL Final    Comment: For infants and newborns, interpretation of results should be based  on gestational age, weight and in agreement with clinical  observations.    Premature Infant recommended reference ranges:  Up to 24 hours.............<8.0 mg/dL  Up to 48 hours............<12.0 mg/dL  3-5 days..................<15.0 mg/dL  6-29 days.................<15.0 mg/dL       Alkaline Phosphatase 03/17/2023 82  55 - 135 U/L Final    AST 03/17/2023 31  10 - 40 U/L Final    ALT 03/17/2023 28  10 - 44 U/L Final    Anion Gap 03/17/2023 7 (L)  8 - 16 mmol/L Final    eGFR 03/17/2023 59.5 (A)  >60 mL/min/1.73 m^2 Final       Imaging: I personally reviewed CT torso from 1/17/23    IR US FNA Biopsy, 1st Lesion  Narrative: EXAMINATION:  Image-guided biopsy    Procedural Personnel    Attending physician(s): Lico Nunez MD    Fellow physician(s): None    Resident physician(s): None    Advanced practice provider(s): None    Pre-procedure diagnosis: Thyroid nodule    Post-procedure diagnosis: Same    Indication: Histopathologic diagnosis    Previous biopsy of same target (QCDR): No    Additional clinical history: None    Complications: No immediate complications.    CLINICAL HISTORY:  67-year-old man with a suspicious right thyroid nodule.  Referred for FNA.    TECHNIQUE:  Percutaneous ultrasound-guided fine needle aspiration biopsy    COMPARISON:  Thyroid ultrasound 02/03/2023    FINDINGS:  Pre-procedure    Reference imaging for biopsy target: None    Consent: Informed consent for the procedure was obtained and time-out was performed prior to the procedure.    Preparation: The site was prepared and draped using maximal sterile barrier technique including cutaneous antisepsis.    Anesthesia/sedation    Level of anesthesia/sedation: No sedation    Anesthesia/sedation administered by: Independent trained observer under attending supervision with continuous monitoring of the patient's level of consciousness and physiologic status.    Total intra-service sedation time (minutes): 0    Biopsy    Limited thyroid ultrasound was performed.  The target nodule was re-identified measuring 1.8 x 1.8 x 0.5 cm the right lower thyroid.  Lidocaine 1% local anesthesia was administered.  Under real-time ultrasound guidance, multiple fine needle aspiration passes were made.  Specimens were given directly to the  pathologist.    Fine needle aspiration device: Syringe with needle    Fine needle size: 25 gauge    Number of FNA specimens: 4    On-site biopsy touch preparation: Yes    Additional sampling recommendations: None    Preliminary assessment of sample adequacy: Adequate    Needle removal    The biopsy needle was removed and a sterile dressing was applied.    Tract embolization: None    Contrast    None    Radiation Dose    None    Additional Details    Additional description of procedure: None    Equipment details: None    Specimens removed: Biopsy specimens, as above.    Estimated blood loss (mL): Less than 10    Standardized report: [email or web address]  Impression: Successful ultrasound-guided fine-needle aspiration biopsy of 1.8 cm nodule in the right inferior thyroid.    Plan:    Specimen(s) sent for evaluation.    Attestation    Signer name: Lico Nunez MD    I attest that I was present for the entire procedure. I reviewed the stored images and agree with the report as written.    Electronically signed by: Lico Nunez  Date:    03/07/2023  Time:    15:08     Diagnoses:       1. Gastrointestinal stromal tumor (GIST) of stomach    2. Thyroid nodule    3. Cough, unspecified type    4. Primary hypertension    5. Drug-induced immunodeficiency    6. Anemia in neoplastic disease    7. Hypokalemia       Assessment and Plan:     1. Gastrointestinal stromal tumor (GIST) of stomach  Overview:  Patient with symptomatic gastric GIST, which progressed on imaging 7/18/22 to 9/12/22. No evidence of luis or distant metastatic disease. Mitotic rate not quantified, but given clinical behavior and noted cellularity and frequent mitosis, considered higher risk. 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 Started neoadjuvant imatinib 400mg po daily 10/08/2022 and tolerating well with good response on interval imaging 01/2023.    Assessment & Plan:  Continue plan to complete 6 months total adjuvant treatment with imatinib.   Keep imatinib at current  dose. Tolerating well overall.   RTC in 4 weeks with labs and repeat imaging to see Dr. Mcnair to discuss next steps.     Orders:  -     benzonatate (TESSALON) 100 MG capsule; Take 1 capsule (100 mg total) by mouth 3 (three) times daily as needed for Cough.  Dispense: 30 capsule; Refill: 0    2. Thyroid nodule  Assessment & Plan:  TI-RADS 4 nodule on US 2/2023.   Pathology 3/6 revealed benign follicular nodule.   Monitor.       3. Cough, unspecified type  -     benzonatate (TESSALON) 100 MG capsule; Take 1 capsule (100 mg total) by mouth 3 (three) times daily as needed for Cough.  Dispense: 30 capsule; Refill: 0    4. Primary hypertension  Overview:  Patient on HCTZ, nebivolol, and valsartan.    Assessment & Plan:  BP mildly elevated in clinic today. Asymptomatic.   If worse at next visit, can consider increasing nebivolol to 20 mg daily.   Encouraged to continue monitoring at home.       5. Drug-induced immunodeficiency  Assessment & Plan:  Monitor for infection.   ANC normal, 2.6.      6. Anemia in neoplastic disease    7. Hypokalemia    Patient is in agreement with the proposed treatment plan. All questions were answered to the patient's satisfaction. Pt knows to call clinic if anything is needed before the next clinic visit.    Patient discussed with collaborating physician, Dr. Mcnair.    At least 30 minutes were spent today on this encounter including face to face time with the patient, data gathering/interpretation and documentation.       Cyndi Pitt, MSN, APRN, ACCNS-  Hematology and Medical Oncology  Clinical Nurse Specialist to Dr. Sands, Dr. Dixon & Dr. Holland      Route Chart for Scheduling    Med Onc Chart Routing      Follow up with physician 4 weeks. keep scan, lab, and provider visit as scheduled in 4 weeks.   Follow up with [facility]    Infusion scheduling note    Injection scheduling note    Labs    Imaging    Pharmacy appointment    Other referrals

## 2023-03-17 NOTE — ASSESSMENT & PLAN NOTE
BP mildly elevated in clinic today. Asymptomatic.   If worse at next visit, can consider increasing nebivolol to 20 mg daily.   Encouraged to continue monitoring at home.

## 2023-03-17 NOTE — ASSESSMENT & PLAN NOTE
Continue plan to complete 6 months total adjuvant treatment with imatinib.   Keep imatinib at current dose. Tolerating well overall.   RTC in 4 weeks with labs and repeat imaging to see Dr. Mcnair to discuss next steps.

## 2023-04-12 ENCOUNTER — TELEPHONE (OUTPATIENT)
Dept: HEMATOLOGY/ONCOLOGY | Facility: CLINIC | Age: 68
End: 2023-04-12
Payer: MEDICARE

## 2023-04-12 ENCOUNTER — HOSPITAL ENCOUNTER (OUTPATIENT)
Dept: RADIOLOGY | Facility: HOSPITAL | Age: 68
Discharge: HOME | End: 2023-04-12
Attending: HOSPITALIST
Payer: MEDICARE

## 2023-04-12 DIAGNOSIS — C49.A2 GASTROINTESTINAL STROMAL TUMOR (GIST) OF STOMACH: ICD-10-CM

## 2023-04-12 PROCEDURE — 71260 CT CHEST ABDOMEN PELVIS WITH CONTRAST (XPD): ICD-10-PCS | Mod: 26,,, | Performed by: RADIOLOGY

## 2023-04-12 PROCEDURE — 74177 CT ABD & PELVIS W/CONTRAST: CPT | Mod: 26,,, | Performed by: RADIOLOGY

## 2023-04-12 PROCEDURE — 71260 CT THORAX DX C+: CPT | Mod: 26,,, | Performed by: RADIOLOGY

## 2023-04-12 PROCEDURE — 74177 CT ABD & PELVIS W/CONTRAST: CPT | Mod: TC

## 2023-04-12 PROCEDURE — 71260 CT THORAX DX C+: CPT | Mod: TC

## 2023-04-12 PROCEDURE — 74177 CT CHEST ABDOMEN PELVIS WITH CONTRAST (XPD): ICD-10-PCS | Mod: 26,,, | Performed by: RADIOLOGY

## 2023-04-12 PROCEDURE — 25500020 PHARM REV CODE 255: Performed by: HOSPITALIST

## 2023-04-12 RX ADMIN — IOHEXOL 100 ML: 350 INJECTION, SOLUTION INTRAVENOUS at 01:04

## 2023-04-12 NOTE — TELEPHONE ENCOUNTER
Spoke with patient and scheduled appointment for 04/18/2023 with Vero Sandy and Dr. López; Provided patient with appointment time and date, address of [facility] facility and direct line to navigator. All questions and concerns addressed.

## 2023-04-12 NOTE — ED TRIAGE NOTES
Presents with L hand pain starting at knuckles and goes up arm. Arm is described as warm, pain 10/10. Denies numbness and tingling. Denies fever. History of gout and concerned this is a flare up. No trauma that he is aware of.   
Patient requests all Lab, Cardiology, and Radiology Results on their Discharge Instructions

## 2023-04-14 ENCOUNTER — LAB VISIT (OUTPATIENT)
Dept: LAB | Facility: HOSPITAL | Age: 68
End: 2023-04-14
Attending: HOSPITALIST
Payer: MEDICARE

## 2023-04-14 ENCOUNTER — OFFICE VISIT (OUTPATIENT)
Dept: HEMATOLOGY/ONCOLOGY | Facility: CLINIC | Age: 68
End: 2023-04-14
Payer: MEDICARE

## 2023-04-14 VITALS
BODY MASS INDEX: 23.77 KG/M2 | OXYGEN SATURATION: 100 % | DIASTOLIC BLOOD PRESSURE: 71 MMHG | WEIGHT: 169.75 LBS | HEIGHT: 71 IN | RESPIRATION RATE: 18 BRPM | HEART RATE: 51 BPM | SYSTOLIC BLOOD PRESSURE: 138 MMHG

## 2023-04-14 DIAGNOSIS — C49.A2 GASTROINTESTINAL STROMAL TUMOR (GIST) OF STOMACH: Primary | ICD-10-CM

## 2023-04-14 DIAGNOSIS — C49.A2 GASTROINTESTINAL STROMAL TUMOR (GIST) OF STOMACH: ICD-10-CM

## 2023-04-14 DIAGNOSIS — E87.6 HYPOKALEMIA: ICD-10-CM

## 2023-04-14 DIAGNOSIS — E04.1 THYROID NODULE: ICD-10-CM

## 2023-04-14 DIAGNOSIS — I10 PRIMARY HYPERTENSION: ICD-10-CM

## 2023-04-14 LAB
ALBUMIN SERPL BCP-MCNC: 3.7 G/DL (ref 3.5–5.2)
ALP SERPL-CCNC: 72 U/L (ref 55–135)
ALT SERPL W/O P-5'-P-CCNC: 26 U/L (ref 10–44)
ANION GAP SERPL CALC-SCNC: 13 MMOL/L (ref 8–16)
AST SERPL-CCNC: 31 U/L (ref 10–40)
BILIRUB SERPL-MCNC: 0.9 MG/DL (ref 0.1–1)
BUN SERPL-MCNC: 13 MG/DL (ref 8–23)
CALCIUM SERPL-MCNC: 9.4 MG/DL (ref 8.7–10.5)
CHLORIDE SERPL-SCNC: 103 MMOL/L (ref 95–110)
CO2 SERPL-SCNC: 27 MMOL/L (ref 23–29)
CREAT SERPL-MCNC: 1 MG/DL (ref 0.5–1.4)
ERYTHROCYTE [DISTWIDTH] IN BLOOD BY AUTOMATED COUNT: 16.1 % (ref 11.5–14.5)
EST. GFR  (NO RACE VARIABLE): >60 ML/MIN/1.73 M^2
GLUCOSE SERPL-MCNC: 84 MG/DL (ref 70–110)
HCT VFR BLD AUTO: 36.4 % (ref 40–54)
HGB BLD-MCNC: 11.4 G/DL (ref 14–18)
IMM GRANULOCYTES # BLD AUTO: 0.01 K/UL (ref 0–0.04)
MCH RBC QN AUTO: 27.5 PG (ref 27–31)
MCHC RBC AUTO-ENTMCNC: 31.3 G/DL (ref 32–36)
MCV RBC AUTO: 88 FL (ref 82–98)
NEUTROPHILS # BLD AUTO: 4.1 K/UL (ref 1.8–7.7)
PLATELET # BLD AUTO: 207 K/UL (ref 150–450)
PMV BLD AUTO: 11.6 FL (ref 9.2–12.9)
POTASSIUM SERPL-SCNC: 3 MMOL/L (ref 3.5–5.1)
PROT SERPL-MCNC: 6.5 G/DL (ref 6–8.4)
RBC # BLD AUTO: 4.14 M/UL (ref 4.6–6.2)
SODIUM SERPL-SCNC: 143 MMOL/L (ref 136–145)
WBC # BLD AUTO: 7.53 K/UL (ref 3.9–12.7)

## 2023-04-14 PROCEDURE — 80053 COMPREHEN METABOLIC PANEL: CPT | Performed by: HOSPITALIST

## 2023-04-14 PROCEDURE — 99499 UNLISTED E&M SERVICE: CPT | Mod: S$GLB,,, | Performed by: HOSPITALIST

## 2023-04-14 PROCEDURE — 99214 PR OFFICE/OUTPT VISIT, EST, LEVL IV, 30-39 MIN: ICD-10-PCS | Mod: S$GLB,,, | Performed by: HOSPITALIST

## 2023-04-14 PROCEDURE — 99214 OFFICE O/P EST MOD 30 MIN: CPT | Mod: S$GLB,,, | Performed by: HOSPITALIST

## 2023-04-14 PROCEDURE — 3078F PR MOST RECENT DIASTOLIC BLOOD PRESSURE < 80 MM HG: ICD-10-PCS | Mod: CPTII,S$GLB,, | Performed by: HOSPITALIST

## 2023-04-14 PROCEDURE — 99999 PR PBB SHADOW E&M-EST. PATIENT-LVL III: CPT | Mod: PBBFAC,,, | Performed by: HOSPITALIST

## 2023-04-14 PROCEDURE — 1126F AMNT PAIN NOTED NONE PRSNT: CPT | Mod: CPTII,S$GLB,, | Performed by: HOSPITALIST

## 2023-04-14 PROCEDURE — 3288F PR FALLS RISK ASSESSMENT DOCUMENTED: ICD-10-PCS | Mod: CPTII,S$GLB,, | Performed by: HOSPITALIST

## 2023-04-14 PROCEDURE — 3075F PR MOST RECENT SYSTOLIC BLOOD PRESS GE 130-139MM HG: ICD-10-PCS | Mod: CPTII,S$GLB,, | Performed by: HOSPITALIST

## 2023-04-14 PROCEDURE — 99999 PR PBB SHADOW E&M-EST. PATIENT-LVL III: ICD-10-PCS | Mod: PBBFAC,,, | Performed by: HOSPITALIST

## 2023-04-14 PROCEDURE — 1101F PT FALLS ASSESS-DOCD LE1/YR: CPT | Mod: CPTII,S$GLB,, | Performed by: HOSPITALIST

## 2023-04-14 PROCEDURE — 3078F DIAST BP <80 MM HG: CPT | Mod: CPTII,S$GLB,, | Performed by: HOSPITALIST

## 2023-04-14 PROCEDURE — 1101F PR PT FALLS ASSESS DOC 0-1 FALLS W/OUT INJ PAST YR: ICD-10-PCS | Mod: CPTII,S$GLB,, | Performed by: HOSPITALIST

## 2023-04-14 PROCEDURE — 4010F ACE/ARB THERAPY RXD/TAKEN: CPT | Mod: CPTII,S$GLB,, | Performed by: HOSPITALIST

## 2023-04-14 PROCEDURE — 1126F PR PAIN SEVERITY QUANTIFIED, NO PAIN PRESENT: ICD-10-PCS | Mod: CPTII,S$GLB,, | Performed by: HOSPITALIST

## 2023-04-14 PROCEDURE — 99499 RISK ADDL DX/OHS AUDIT: ICD-10-PCS | Mod: S$GLB,,, | Performed by: HOSPITALIST

## 2023-04-14 PROCEDURE — 3288F FALL RISK ASSESSMENT DOCD: CPT | Mod: CPTII,S$GLB,, | Performed by: HOSPITALIST

## 2023-04-14 PROCEDURE — 36415 COLL VENOUS BLD VENIPUNCTURE: CPT | Performed by: HOSPITALIST

## 2023-04-14 PROCEDURE — 3008F PR BODY MASS INDEX (BMI) DOCUMENTED: ICD-10-PCS | Mod: CPTII,S$GLB,, | Performed by: HOSPITALIST

## 2023-04-14 PROCEDURE — 3008F BODY MASS INDEX DOCD: CPT | Mod: CPTII,S$GLB,, | Performed by: HOSPITALIST

## 2023-04-14 PROCEDURE — 4010F PR ACE/ARB THEARPY RXD/TAKEN: ICD-10-PCS | Mod: CPTII,S$GLB,, | Performed by: HOSPITALIST

## 2023-04-14 PROCEDURE — 3075F SYST BP GE 130 - 139MM HG: CPT | Mod: CPTII,S$GLB,, | Performed by: HOSPITALIST

## 2023-04-14 PROCEDURE — 85027 COMPLETE CBC AUTOMATED: CPT | Performed by: HOSPITALIST

## 2023-04-14 RX ORDER — POTASSIUM CHLORIDE 20 MEQ/1
40 TABLET, EXTENDED RELEASE ORAL DAILY
Qty: 60 TABLET | Refills: 0 | Status: SHIPPED | OUTPATIENT
Start: 2023-04-14 | End: 2023-05-26 | Stop reason: SDUPTHER

## 2023-04-14 RX ORDER — LOPERAMIDE HCL 2 MG
TABLET ORAL
Qty: 90 TABLET | Refills: 11 | Status: SHIPPED | OUTPATIENT
Start: 2023-04-14

## 2023-04-14 NOTE — PATIENT INSTRUCTIONS
Based on recent CT scan, the GIST tumor in your stomach continues to shrink due to the imatinib treatment. You have been on treatment for 6 months now, will have you follow up with Dr. López next week to discuss surgical plans.    If surgery happens, we would plan to continue the imatinib for at least three years if tolerated.    Otherwise tolerating imatinib fairly well aside from ongoing diarrhea.    - Continue imatinib 400mg daily  - RX for loperamide (Immodium) called into your local pharmacy  - Restart postassium 20meq tablets twice daily    Follow up with Dr. Mcnair in 6 weeks, or about a month after surgery if performed. Can adjust appts as needed

## 2023-04-14 NOTE — ASSESSMENT & PLAN NOTE
- Con't imatinib 400mg daily  - Has follow up next week with Dr. López to discuss surgical options  - Plan to complete at least 3 years of imatinib following any surgery

## 2023-04-14 NOTE — PROGRESS NOTES
MEDICAL ONCOLOGY FOLLOW-UP VISIT.     Best Contact Phone Number(s): [phone] (home) [phone] (work)     Cancer/Stage/TNM:    Cancer Staging   Gastrointestinal stromal tumor (GIST) of stomach  Staging form: Gastrointestinal Stromal Tumor - Gastric and Omental GIST, AJCC 8th Edition  - Clinical stage from 9/9/2022: Stage IIIA (cT3, cN0, cM0, Mitotic Rate: High) - Signed by Enrique Mcnair MD on 9/26/2022         Reason for visit: Mr. 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 He started neoadjuvant imatinib on 10/8/22. He presents to medical oncology clinic for routine follow up on neoadjuvant imatinib.      Interval History:     CT torso 4/12/23 shows slight decrease in size of gastric mass. He is scheduled to see Dr. López next week.    Patient notes intermittent diarrhea over the last month. He has diarrhea most days of the week, can have up to 2-3 episodes. No abdominal pain. Appetite is fair and no nausea. Weight is down about 10 lbs. No CP or SOB. Has ongoing chronic cough for several months which is stable. No relief with benzonatate. Recent CT without signficant pulmonary pathology.     Thyroid biopsy 3/6/23 showed only a benign follicular nodule.        Oncology History   Gastrointestinal stromal tumor (GIST) of stomach   7/18/2022 Imaging Significant Findings    Presented to [facility] ED with abdominal pain. CT a/p found a 6.7 x 5.6 x 7.2 cm cystic mass in the gastric fundus.     9/9/2022 Procedure    EGD/EUS fins a 6.2x8.0 cm cystic mass in the cardia. Pathology shows GIST with 'focal/frequent mitoses' although exact quantification could not be determined.     KIT exon 11 [id number]     9/12/2022 Imaging Significant Findings    CT a/p redemonstrates 9.3x8 cm gastric mass with associated necrosis. No LAD. No liver lesions.     9/15/2022 Imaging Significant Findings    CT chest only shows incidental 3mm LLL nodule. No evidence of  metastatic disease.     10/8/2022 -  Chemotherapy    Start imatinib     1/17/2023 Imaging Significant Findings    CT torso; Decreased size of gastric mass 6.5 x 2.5cm from 9.3 x 8.0cm.     1/23/2023 Tumor Conference       [facility] UGI MULTIDISCIPLINARY TUMOR BOARD  PATIENT REVIEW FORM   ____________________________________________________________    CLINIC #: [id number]  DATE: 1/23/2023    DIAGNOSIS: stomach GIST    PRESENTER: Xu    PATIENT SUMMARY:   This 68 y/o gentleman presented with epigastric abd pain in 7/2022. Imaging identified large 9cm cystic gastric mass with central necrosis. He underwent EUS with bx in 9/2022 and pathology revealed GIST. He started neoadj Gleevec per Dr. Mcnair in 10/2022. Recent restaging CT scan reviewed - good response to tx, lesion smaller, now measuring 6.7 cm.     BOARD RECOMMENDATIONS:   Continue neoadj Gleevec    CONSULT NEEDED:     [] Surgery    [] Hem/Onc    [] Rad/Onc    [] Dietary                 [] Social Service    [] Psychology       [] AES  [] Radiology     Clinical Stage: Tumor 3 Node(s) 0 Metastasis 0      GROUP STAGE:  [] O    [] 1A    [] IB    [] IIA    [] IIB     [x] IIIA     [] IIIB     [] IIIC    []IV  [] Local recurrence     [] Regional recurrence     [] Distant recurrence   Metastatic site(s): none         [x] Lizbeth'l Treatment Guidelines reviewed and care planned is consistent with guidelines.         (i.e., NCCN, NCI, PD, ACO, AUA, etc.)    PRESENTATION AT CANCER CONFERENCE:         [x] Prospective    [] Retrospective     [] Follow-Up       4/12/2023 Imaging Significant Findings    CT torso:  1. Exam limited by motion artifact.  2. Slight decreased size of gastric wall mass.  3. Stable mildly enlarged lymph node in the lindsay hepatis measuring 1.2 cm, stable compared to CT from 09/12/2022.  4. Few subcentimeter hepatic hypodensities, too small to characterize.  5. Thyroid nodules better characterized on CT from 01/17/2023.            Physical  "Exam:   /71 (BP Location: Right arm, Patient Position: Sitting, BP Method: Medium (Automatic))   Pulse (!) 51   Resp 18   Ht 5' 11" (1.803 m)   Wt 77 kg (169 lb 12.1 oz)   SpO2 100%   BMI 23.68 kg/m²      ECOG Performance Status: (foot note - ECOG PS provided by [employer] Co[employer]cology Group) 0 - Asymptomatic    Physical Exam  Constitutional:       General: He is not in acute distress.     Appearance: Normal appearance.   HENT:      Head: Normocephalic.      Mouth/Throat:      Mouth: Mucous membranes are moist.      Pharynx: Oropharynx is clear. No oropharyngeal exudate or posterior oropharyngeal erythema.   Eyes:      General: No scleral icterus.     Extraocular Movements: Extraocular movements intact.      Conjunctiva/sclera: Conjunctivae normal.      Pupils: Pupils are equal, round, and reactive to light.   Cardiovascular:      Rate and Rhythm: Normal rate and regular rhythm.   Pulmonary:      Effort: Pulmonary effort is normal. No respiratory distress.   Abdominal:      General: There is no distension.      Palpations: Abdomen is soft.      Tenderness: There is no abdominal tenderness.   Musculoskeletal:         General: No swelling. Normal range of motion.      Cervical back: Normal range of motion and neck supple.      Right lower leg: No edema.      Left lower leg: No edema.   Lymphadenopathy:      Cervical: No cervical adenopathy.   Skin:     General: Skin is warm and dry.      Coloration: Skin is not jaundiced.      Findings: No rash.   Neurological:      General: No focal deficit present.      Mental Status: He is alert and oriented to person, place, and time.      Motor: No weakness.   Psychiatric:         Mood and Affect: Mood normal.         Behavior: Behavior normal.         Thought Content: Thought content normal.         Labs:  Lab Visit on 04/14/2023   Component Date Value Ref Range Status    WBC 04/14/2023 7.53  3.90 - 12.70 K/uL Final    RBC 04/14/2023 4.14 (L)  4.60 - 6.20 M/uL " Final    Hemoglobin 04/14/2023 11.4 (L)  14.0 - 18.0 g/dL Final    Hematocrit 04/14/2023 36.4 (L)  40.0 - 54.0 % Final    MCV 04/14/2023 88  82 - 98 fL Final    MCH 04/14/2023 27.5  27.0 - 31.0 pg Final    MCHC 04/14/2023 31.3 (L)  32.0 - 36.0 g/dL Final    RDW 04/14/2023 16.1 (H)  11.5 - 14.5 % Final    Platelets 04/14/2023 207  150 - 450 K/uL Final    MPV 04/14/2023 11.6  9.2 - 12.9 fL Final    Gran # (ANC) 04/14/2023 4.1  1.8 - 7.7 K/uL Final    Comment: The ANC is based on a white cell differential from an   automated cell counter. It has not been microscopically   reviewed for the presence of abnormal cells. Clinical   correlation is required.      Immature Grans (Abs) 04/14/2023 0.01  0.00 - 0.04 K/uL Final    Comment: Mild elevation in immature granulocytes is non specific and   can be seen in a variety of conditions including stress response,   acute inflammation, trauma and pregnancy. Correlation with other   laboratory and clinical findings is essential.      Sodium 04/14/2023 143  136 - 145 mmol/L Final    Potassium 04/14/2023 3.0 (L)  3.5 - 5.1 mmol/L Final    Chloride 04/14/2023 103  95 - 110 mmol/L Final    CO2 04/14/2023 27  23 - 29 mmol/L Final    Glucose 04/14/2023 84  70 - 110 mg/dL Final    BUN 04/14/2023 13  8 - 23 mg/dL Final    Creatinine 04/14/2023 1.0  0.5 - 1.4 mg/dL Final    Calcium 04/14/2023 9.4  8.7 - 10.5 mg/dL Final    Total Protein 04/14/2023 6.5  6.0 - 8.4 g/dL Final    Albumin 04/14/2023 3.7  3.5 - 5.2 g/dL Final    Total Bilirubin 04/14/2023 0.9  0.1 - 1.0 mg/dL Final    Comment: For infants and newborns, interpretation of results should be based  on gestational age, weight and in agreement with clinical  observations.    Premature Infant recommended reference ranges:  Up to 24 hours.............<8.0 mg/dL  Up to 48 hours............<12.0 mg/dL  3-5 days..................<15.0 mg/dL  6-29 days.................<15.0 mg/dL      Alkaline Phosphatase 04/14/2023 72  55 - 135 U/L Final     AST 04/14/2023 31  10 - 40 U/L Final    ALT 04/14/2023 26  10 - 44 U/L Final    Anion Gap 04/14/2023 13  8 - 16 mmol/L Final    eGFR 04/14/2023 >60.0  >60 mL/min/1.73 m^2 Final           Imaging: I personally reviewed CT torso from 1/17/23    CT Chest Abdomen Pelvis With Contrast  Narrative: EXAMINATION:  CT CHEST ABDOMEN PELVIS WITH CONTRAST (XPD)    CLINICAL HISTORY:  Gastric cancer, assess treatment response; Gastrointestinal stromal tumor of stomach    TECHNIQUE:  Low dose axial images, sagittal and coronal reformations were obtained from the neck base to the pubic symphysis after the administration of 100 cc Omnipaque 350 intravenous contrast.  Oral contrast was not administered.    COMPARISON:  CT chest abdomen pelvis 01/17/2023 and CT chest abdomen pelvis 09/12/2022    FINDINGS:  Exam limited by motion artifact.    Base of Neck: Bilateral thyroid nodules.    CHEST:    -Heart: Normal size. No pericardial effusion.  Calcification of the aortic valve annulus.  Multi-vessel calcific atherosclerosis of the coronary arteries.    -Pulmonary vasculature: Pulmonary arteries distribute normally.  There are four pulmonary veins.    -Ginette/Mediastinum: No pathologic luis enlargement.    -Trachea and Proximal airways: Patent.    -Lungs/Pleura: Symmetrically expanded without consolidation, pneumothorax, or mass.  No pleural effusion or thickening.    -Esophagus: Normal course and caliber.  Air in the esophagus either reflux or dysmotility.    ABDOMEN:    - Liver: Normal in size and attenuation.  Few subcentimeter hepatic hypodensities, too small to characterize.  Mildly enlarged lymph node in the lindsay hepatis measuring 1.2 cm (axial series 3, image 56), stable dating back to CT from 09/12/2022..    - Gallbladder: No calcified gallstones.  No wall thickening or pericholecystic fluid.    - Bile Ducts: No intra or extrahepatic biliary ductal dilatation.    - Stomach/Duodenum: Gastric wall mass measuring 5.4 x 3.0 cm  (axial series 3, image 47), previously 6.5 x 2.5 cm.    - Spleen: Unremarkable.    - Pancreas: Unremarkable.    - Adrenals: Unremarkable.    - Kidneys/ureters/urinary bladder: Normal in size and location.  Subcentimeter right renal hypodensity, too small to characterize.  No hydronephrosis or stones.  The ureters appear normal in course and caliber without evidence of ureteral dilatation.  The urinary bladder is unremarkable.    - Retroperitoneum: No significant adenopathy.    PELVIS:    - Reproductive: The prostate is enlarged measuring 5.5 cm.    - Other: No pelvic adenopathy, free fluid, or mass.    BOWEL/MESENTERY:    No evidence of bowel obstruction or inflammatory process. The appendix is unremarkable. Large stool ball within the rectum.    VASCULATURE: Left-sided aortic arch with 3 branch vessels.  No aneurysm and mild calcific atherosclerosis.  The portal vein, SMV, and splenic vein are patent.  The celiac artery, SMA, bilateral renal arteries, and PERLA are patent.    BONES: Degenerative changes of the osseous structures.  No acute fracture or bony destructive process.  Sclerotic focus measuring 1.3 cm within the left iliac bone (axial series 3, image 125), previously 1.3 cm and favored to represent benign bone island.  No new osseous lesions are identified.    EXTRATHORACIC/EXTRAPERITONEAL SOFT TISSUES: Small right fat and fluid containing inguinal hernia.  Small left fat containing inguinal hernia.  Small fat containing umbilical hernia.  Mild bilateral gynecomastia.  Impression: 1. Exam limited by motion artifact.  2. Slight decreased size of gastric wall mass.  3. Stable mildly enlarged lymph node in the lindsay hepatis measuring 1.2 cm, stable compared to CT from 09/12/2022.  4. Few subcentimeter hepatic hypodensities, too small to characterize.  5. Thyroid nodules better characterized on CT from 01/17/2023.    Electronically signed by resident: Hallie  Knafl  Date:    04/12/2023  Time:    13:23    Electronically signed by: Wallace Oliav MD  Date:    04/12/2023  Time:    13:52              Diagnoses:       1. Gastrointestinal stromal tumor (GIST) of stomach    2. Primary hypertension    3. Thyroid nodule    4. Hypokalemia                      Assessment and Plan:     1. Gastrointestinal stromal tumor (GIST) of stomach  Overview:  Patient with symptomatic gastric GIST, which progressed on imaging 7/18/22 to 9/12/22. No evidence of luis or distant metastatic disease. Mitotic rate not quantified, but given clinical behavior and noted cellularity and frequent mitosis, considered higher risk. 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 Started neoadjuvant imatinib 400mg po daily 10/08/2022 and tolerating well with good response on interval imaging 01/2023 and 04/2023.    Assessment & Plan:  - Con't imatinib 400mg daily  - Has follow up next week with Dr. López to discuss surgical options  - Plan to complete at least 3 years of imatinib following any surgery    Orders:  -     loperamide (IMODIUM A-D) 2 mg Tab; Take two tabs at the onset of loose stools.  Then take one tab after every subsequent loose stool.  Dispense: 90 tablet; Refill: 11    2. Primary hypertension  Overview:  Patient on HCTZ, nebivolol, and valsartan.    Assessment & Plan:  - Well controlled today  - CTM      3. Thyroid nodule  Overview:  Biopsy 3/6/23 showed a benign follicular nodule.      4. Hypokalemia  -     potassium chloride SA (K-DUR,KLOR-CON) 20 MEQ tablet; Take 2 tablets (40 mEq total) by mouth once daily.  Dispense: 60 tablet; Refill: 0                       Route Chart for Scheduling    Med Onc Chart Routing      Follow up with physician 6 weeks.   Follow up with [facility]    Infusion scheduling note    Injection scheduling note    Labs CBC and CMP   Scheduling:  Preferred lab:  Lab interval:     Imaging    Pharmacy appointment    Other referrals                 Enrique Mcnair MD  Hematology/Oncology  [facility]

## 2023-04-18 ENCOUNTER — OFFICE VISIT (OUTPATIENT)
Dept: SURGERY | Facility: CLINIC | Age: 68
End: 2023-04-18
Payer: MEDICARE

## 2023-04-18 VITALS
WEIGHT: 187.63 LBS | HEART RATE: 57 BPM | BODY MASS INDEX: 26.27 KG/M2 | HEIGHT: 71 IN | DIASTOLIC BLOOD PRESSURE: 73 MMHG | OXYGEN SATURATION: 100 % | SYSTOLIC BLOOD PRESSURE: 155 MMHG

## 2023-04-18 DIAGNOSIS — C49.A2 GASTROINTESTINAL STROMAL TUMOR (GIST) OF STOMACH: Primary | ICD-10-CM

## 2023-04-18 PROCEDURE — 3008F PR BODY MASS INDEX (BMI) DOCUMENTED: ICD-10-PCS | Mod: HCNC,CPTII,S$GLB, | Performed by: NURSE PRACTITIONER

## 2023-04-18 PROCEDURE — 99999 PR PBB SHADOW E&M-EST. PATIENT-LVL IV: CPT | Mod: PBBFAC,HCNC,, | Performed by: NURSE PRACTITIONER

## 2023-04-18 PROCEDURE — 3077F PR MOST RECENT SYSTOLIC BLOOD PRESSURE >= 140 MM HG: ICD-10-PCS | Mod: HCNC,CPTII,S$GLB, | Performed by: NURSE PRACTITIONER

## 2023-04-18 PROCEDURE — 1101F PT FALLS ASSESS-DOCD LE1/YR: CPT | Mod: HCNC,CPTII,S$GLB, | Performed by: NURSE PRACTITIONER

## 2023-04-18 PROCEDURE — 1159F PR MEDICATION LIST DOCUMENTED IN MEDICAL RECORD: ICD-10-PCS | Mod: HCNC,CPTII,S$GLB, | Performed by: NURSE PRACTITIONER

## 2023-04-18 PROCEDURE — 1159F MED LIST DOCD IN RCRD: CPT | Mod: HCNC,CPTII,S$GLB, | Performed by: NURSE PRACTITIONER

## 2023-04-18 PROCEDURE — 3288F FALL RISK ASSESSMENT DOCD: CPT | Mod: HCNC,CPTII,S$GLB, | Performed by: NURSE PRACTITIONER

## 2023-04-18 PROCEDURE — 1101F PR PT FALLS ASSESS DOC 0-1 FALLS W/OUT INJ PAST YR: ICD-10-PCS | Mod: HCNC,CPTII,S$GLB, | Performed by: NURSE PRACTITIONER

## 2023-04-18 PROCEDURE — 3077F SYST BP >= 140 MM HG: CPT | Mod: HCNC,CPTII,S$GLB, | Performed by: NURSE PRACTITIONER

## 2023-04-18 PROCEDURE — 4010F ACE/ARB THERAPY RXD/TAKEN: CPT | Mod: HCNC,CPTII,S$GLB, | Performed by: NURSE PRACTITIONER

## 2023-04-18 PROCEDURE — 99214 OFFICE O/P EST MOD 30 MIN: CPT | Mod: HCNC,S$GLB,, | Performed by: NURSE PRACTITIONER

## 2023-04-18 PROCEDURE — 4010F PR ACE/ARB THEARPY RXD/TAKEN: ICD-10-PCS | Mod: HCNC,CPTII,S$GLB, | Performed by: NURSE PRACTITIONER

## 2023-04-18 PROCEDURE — 3078F DIAST BP <80 MM HG: CPT | Mod: HCNC,CPTII,S$GLB, | Performed by: NURSE PRACTITIONER

## 2023-04-18 PROCEDURE — 99999 PR PBB SHADOW E&M-EST. PATIENT-LVL IV: ICD-10-PCS | Mod: PBBFAC,HCNC,, | Performed by: NURSE PRACTITIONER

## 2023-04-18 PROCEDURE — 1126F PR PAIN SEVERITY QUANTIFIED, NO PAIN PRESENT: ICD-10-PCS | Mod: HCNC,CPTII,S$GLB, | Performed by: NURSE PRACTITIONER

## 2023-04-18 PROCEDURE — 3008F BODY MASS INDEX DOCD: CPT | Mod: HCNC,CPTII,S$GLB, | Performed by: NURSE PRACTITIONER

## 2023-04-18 PROCEDURE — 99214 PR OFFICE/OUTPT VISIT, EST, LEVL IV, 30-39 MIN: ICD-10-PCS | Mod: HCNC,S$GLB,, | Performed by: NURSE PRACTITIONER

## 2023-04-18 PROCEDURE — 3288F PR FALLS RISK ASSESSMENT DOCUMENTED: ICD-10-PCS | Mod: HCNC,CPTII,S$GLB, | Performed by: NURSE PRACTITIONER

## 2023-04-18 PROCEDURE — 1126F AMNT PAIN NOTED NONE PRSNT: CPT | Mod: HCNC,CPTII,S$GLB, | Performed by: NURSE PRACTITIONER

## 2023-04-18 PROCEDURE — 3078F PR MOST RECENT DIASTOLIC BLOOD PRESSURE < 80 MM HG: ICD-10-PCS | Mod: HCNC,CPTII,S$GLB, | Performed by: NURSE PRACTITIONER

## 2023-04-18 NOTE — PROGRESS NOTES
Encounter Date:  2023    Patient ID: Sami Zhang  Age:  67 y.o. :  1955    Chief Complaint:  followup of stomach GIST     2022: c/o epigastric pain, imaging found cystic gastric mass (9x8 cm) with central necrosis  2022: EUS with bx - GIST;  would favor neoadj gleevac given size/location.  There are two indeterminate liver lesion that need attention on f/u.   10/8/2022: started neoadjuvant imatinib     Interval History:  Mr. Zhang returns to clinic from [facility] with his wife. He was last seen in clinic in 2023 after 3 months of tx. Now he has completed 6 months of neoadj Gleevec per Dr. Mcnair. Mr. Zhang is tolerating tx well. Restaging CT scan shows good response to tx, stomach mass decreased in size, down to 5.4cm. Last month he was seen in ED with otalgia attributed to impacted cerumen. He also underwent FNA of R thyroid nodule and pathology was benign.     Overall he is feeling fine, no specific complaints. Reports a good appetite, eats 3 meals daily without N/V. + diarrhea that is relieved with OTC Immodium. Weight is down 10#. Remains afebrile. Denies CP, SOB. He walks 2 dogs QAM, enjoys riding his bike and yard work with a push . Retired from [employer]. Sleeping well at night.      No prior abd sx  No anticoagulation  Never smoked    New Data:  Imaging:  Dr. López and I personally reviewed the following images:   2023: CT C/A/P:  Stomach/Duodenum: Gastric wall mass measuring 5.4 x 3.0 cm (axial series 3, image 47), previously 6.5 x 2.5 cm.  1. Exam limited by motion artifact.  2. Slight decreased size of gastric wall mass.  3. Stable mildly enlarged lymph node in the lindsay hepatis measuring 1.2 cm, stable compared to CT from 2022.  4. Few subcentimeter hepatic hypodensities, too small to characterize.  5. Thyroid nodules better characterized on CT from 2023.    2023: CT C/A/P:   Gastric mass has decreased in size now measuring approximately 6.5 x 2.5 cm  compared to 9.3 x 8.0 cm on the prior examination dated 09/12/2022.     Respiratory motion on today's examination is noted.  Previously noted 3 mm right lower lobe pulmonary nodule is not appreciated but this could be related to the respiratory motion.     Subcentimeter hepatic hypodensities, too small to adequately characterize.  These appear stable when compared to the prior examination.  No new liver lesions are identified.     Multiple thyroid nodules measuring up to 1.2 cm.  Further evaluation with thyroid ultrasound examination should be considered.     Fat containing right inguinal hernia.     1.3 cm sclerotic focus within the left iliac bone, likely a benign bone island and stable.     Mild prostatomegaly.    Labs:      Chemistry        Component Value Date/Time     04/14/2023 1152    K 3.0 (L) 04/14/2023 1152     04/14/2023 1152    CO2 27 04/14/2023 1152    BUN 13 04/14/2023 1152    CREATININE 1.0 04/14/2023 1152    GLU 84 04/14/2023 1152        Component Value Date/Time    CALCIUM 9.4 04/14/2023 1152    ALKPHOS 72 04/14/2023 1152    AST 31 04/14/2023 1152    ALT 26 04/14/2023 1152    BILITOT 0.9 04/14/2023 1152    ESTGFRAFRICA >60 07/01/2016 1205    EGFRNONAA >60 07/01/2016 1205        Lab Results   Component Value Date    WBC 7.53 04/14/2023    HGB 11.4 (L) 04/14/2023    HCT 36.4 (L) 04/14/2023    MCV 88 04/14/2023     04/14/2023 12/202: Endoscopy:  colonoscopy per Dr. Bran  The perianal and digital rectal examinations were normal.        Seven sessile polyps were found in the transverse colon and ascending colon. The polyps were 1 to 7 mm in size. These polyps were removed with a cold snare. Resection and retrieval were complete. Estimated blood loss was minimal.        The exam was otherwise normal throughout the examined colon.   Impression:    - Seven small colon polyps were resected and retrieved.     3/6/23: Pathology:    Thyroid gland, right nodule, ultrasound-guided  fine-needle aspiration (FNA) biopsy with pathologist adequacy: Benign follicular nodule (The [employer] System for Reporting Thyroid Cytopathology) Adequately cellular specimen consisting of benign follicular cell groups arranged primarily as macro follicles, with some microfollicles, and scant colloid    Past Medical History:   Diagnosis Date    Arthritis     GIST (gastrointestinal stromal tumor), malignant 09/09/2022    Gout 10/08/2015    Hyperlipidemia     Hypertension      Past Surgical History:   Procedure Laterality Date    COLONOSCOPY N/A 12/15/2022    Procedure: COLONOSCOPY;  Surgeon: Sean Bran MD;  Location: [facility] ENDO (4TH FLR);  Service: Endoscopy;  Laterality: N/A;  [facility] handed-t  12/8/22- precall completed-    ENDOSCOPIC ULTRASOUND OF UPPER GASTROINTESTINAL TRACT N/A 9/9/2022    Procedure: ULTRASOUND, UPPER GI TRACT, ENDOSCOPIC;  Surgeon: Yosvany Paula MD;  Location: [facility] ENDO (2ND FLR);  Service: Endoscopy;  Laterality: N/A;  The patient need an EGD/EUS (radial) for gastric mass. Main. Urgent. 60 minutes. Referring:   MD Estefani Campos,   Evan Farr MD    ESOPHAGOGASTRODUODENOSCOPY N/A 9/9/2022    Procedure: EGD (ESOPHAGOGASTRODUODENOSCOPY);  Surgeon: Yosvany Paula MD;  Location: [facility] (2ND FLR);  Service: Endoscopy;  Laterality: N/A;  The patient need an EGD/EUS (radial) for gastric mass. Main. Urgent. 60 minutes. Referring:   MD Estefani Campos Ricardo Romero, MD    Pt is fully vaccinated-DS  9/7/22-Instructions written down by pt's wife and read back.-DS    NONE N/A 10/8/2015     Current Outpatient Medications on File Prior to Visit   Medication Sig Dispense Refill    allopurinol (ZYLOPRIM) 100 MG tablet Take 100 mg by mouth 2 (two) times daily.      CALCIUM CARBONATE/VITAMIN D3 (VITAMIN D-3 ORAL) Take 10,000 Units by mouth once a week.      carbamide peroxide (DEBROX) 6.5 % otic solution Place 5 drops into the left ear 2 (two) times daily. 15 mL 0     "hydroCHLOROthiazide (HYDRODIURIL) 25 MG tablet Take 1 tablet (25 mg total) by mouth once daily. 30 tablet 11    icosapent ethyL (VASCEPA) 0.5 gram Cap Take 2 capsules by mouth once daily.      imatinib (GLEEVEC) 400 MG Tab Take 1 tablet (400 mg total) by mouth once daily. 30 tablet 11    loperamide (IMODIUM A-D) 2 mg Tab Take two tabs at the onset of loose stools.  Then take one tab after every subsequent loose stool. 90 tablet 11    multivitamin capsule Take 1 capsule by mouth once daily.      nebivolol (BYSTOLIC) 10 MG Tab Take 10 mg by mouth once daily.      potassium chloride SA (K-DUR,KLOR-CON) 20 MEQ tablet Take 2 tablets (40 mEq total) by mouth once daily. 60 tablet 0    rosuvastatin (CRESTOR) 20 MG tablet Take 20 mg by mouth once daily.      valsartan (DIOVAN) 160 MG tablet Take 1 tablet (160 mg total) by mouth once daily. 30 tablet 11    vitamin D 1000 units Tab Take 10,000 Units by mouth once a week.       No current facility-administered medications on file prior to visit.     Review of patient's allergies indicates:  No Known Allergies    Family History:  His family history includes Other in an other family member; Stroke (age of onset: 60) in his sister.     Social History:   reports that he has never smoked. He has never used smokeless tobacco. He reports that he does not currently use alcohol after a past usage of about 2.0 standard drinks per week. He reports that he does not use drugs.     ROS:    Pertinent positive/negatives detailed in HPI, all other systems negative.     Physical Exam:  BP (!) 155/73   Pulse (!) 57   Ht 5' 11" (1.803 m)   Wt 85.1 kg (187 lb 9.8 oz)   SpO2 100%   BMI 26.17 kg/m²     Constitutional:  Non-toxic, no acute distress.    Eyes:  Sclerae anicteric, gaze symmetrical  Neck:  Trachea midline,  FROM  Resp:  Easy work of breathing  Abd:  Soft, non-tender, no masses, no ascites  Musculoskeletal:  Ambulatory, normal gait, no muscle wasting.  Extremities are symmetrical " without lymphedema.  Neuro:  No gross deficits  Psych:  Awake, alert, oriented.  Answers and asks questions appropriately      ICD-10-CM ICD-9-CM    1. Gastrointestinal stromal tumor (GIST) of stomach  C49.A2 238.1 Case Request Operating Room: EGD (ESOPHAGOGASTRODUODENOSCOPY)      Plan   This 66 y/o gentleman diagnosed with large 9x8 cm stomach GIST in 9/2022. He completed 6 months of neoadj Gleevec. CT scan revealed good response to tx without progression of disease. Small indeterminate liver lesions appear stable on updated imaging - will continue to follow.   Schedule EGD for surgical planning (subtotal gastrectomy vs. Total gastrectomy).  Tentatively scheduled for Friday, 4/21/23. Questions were asked and answered to patient's satisfaction.  Dr. López obtained consent and preop instructions provided.   Will plan for robotic surgery in May. He will need to stop Gleevec approximately 1 week prior to surgery.   RTC after EGD to finalize surgical plan.     Pt seen in conjunction with Dr. López today.           Vero Crawford NP  Upper GI / Hepatobiliary Surgical Oncology  [facility] [place], [place]  Office: [phone]  Fax: [phone]

## 2023-04-18 NOTE — H&P (VIEW-ONLY)
Encounter Date:  2023    Patient ID: Sami Zhang  Age:  67 y.o. :  1955    Chief Complaint:  followup of stomach GIST     2022: c/o epigastric pain, imaging found cystic gastric mass (9x8 cm) with central necrosis  2022: EUS with bx - GIST;  would favor neoadj gleevac given size/location.  There are two indeterminate liver lesion that need attention on f/u.   10/8/2022: started neoadjuvant imatinib     Interval History:  Mr. Zhang returns to clinic from [facility] with his wife. He was last seen in clinic in 2023 after 3 months of tx. Now he has completed 6 months of neoadj Gleevec per Dr. Mcnair. Mr. Zhang is tolerating tx well. Restaging CT scan shows good response to tx, stomach mass decreased in size, down to 5.4cm. Last month he was seen in ED with otalgia attributed to impacted cerumen. He also underwent FNA of R thyroid nodule and pathology was benign.     Overall he is feeling fine, no specific complaints. Reports a good appetite, eats 3 meals daily without N/V. + diarrhea that is relieved with OTC Immodium. Weight is down 10#. Remains afebrile. Denies CP, SOB. He walks 2 dogs QAM, enjoys riding his bike and yard work with a push . Retired from [employer]. Sleeping well at night.      No prior abd sx  No anticoagulation  Never smoked    New Data:  Imaging:  Dr. López and I personally reviewed the following images:   2023: CT C/A/P:  Stomach/Duodenum: Gastric wall mass measuring 5.4 x 3.0 cm (axial series 3, image 47), previously 6.5 x 2.5 cm.  1. Exam limited by motion artifact.  2. Slight decreased size of gastric wall mass.  3. Stable mildly enlarged lymph node in the lindsay hepatis measuring 1.2 cm, stable compared to CT from 2022.  4. Few subcentimeter hepatic hypodensities, too small to characterize.  5. Thyroid nodules better characterized on CT from 2023.    2023: CT C/A/P:   Gastric mass has decreased in size now measuring approximately 6.5 x 2.5 cm  compared to 9.3 x 8.0 cm on the prior examination dated 09/12/2022.     Respiratory motion on today's examination is noted.  Previously noted 3 mm right lower lobe pulmonary nodule is not appreciated but this could be related to the respiratory motion.     Subcentimeter hepatic hypodensities, too small to adequately characterize.  These appear stable when compared to the prior examination.  No new liver lesions are identified.     Multiple thyroid nodules measuring up to 1.2 cm.  Further evaluation with thyroid ultrasound examination should be considered.     Fat containing right inguinal hernia.     1.3 cm sclerotic focus within the left iliac bone, likely a benign bone island and stable.     Mild prostatomegaly.    Labs:      Chemistry        Component Value Date/Time     04/14/2023 1152    K 3.0 (L) 04/14/2023 1152     04/14/2023 1152    CO2 27 04/14/2023 1152    BUN 13 04/14/2023 1152    CREATININE 1.0 04/14/2023 1152    GLU 84 04/14/2023 1152        Component Value Date/Time    CALCIUM 9.4 04/14/2023 1152    ALKPHOS 72 04/14/2023 1152    AST 31 04/14/2023 1152    ALT 26 04/14/2023 1152    BILITOT 0.9 04/14/2023 1152    ESTGFRAFRICA >60 07/01/2016 1205    EGFRNONAA >60 07/01/2016 1205        Lab Results   Component Value Date    WBC 7.53 04/14/2023    HGB 11.4 (L) 04/14/2023    HCT 36.4 (L) 04/14/2023    MCV 88 04/14/2023     04/14/2023 12/202: Endoscopy:  colonoscopy per Dr. Bran  The perianal and digital rectal examinations were normal.        Seven sessile polyps were found in the transverse colon and ascending colon. The polyps were 1 to 7 mm in size. These polyps were removed with a cold snare. Resection and retrieval were complete. Estimated blood loss was minimal.        The exam was otherwise normal throughout the examined colon.   Impression:    - Seven small colon polyps were resected and retrieved.     3/6/23: Pathology:    Thyroid gland, right nodule, ultrasound-guided  fine-needle aspiration (FNA) biopsy with pathologist adequacy: Benign follicular nodule (The [place] System for Reporting Thyroid Cytopathology) Adequately cellular specimen consisting of benign follicular cell groups arranged primarily as macro follicles, with some microfollicles, and scant colloid    Past Medical History:   Diagnosis Date    Arthritis     GIST (gastrointestinal stromal tumor), malignant 09/09/2022    Gout 10/08/2015    Hyperlipidemia     Hypertension      Past Surgical History:   Procedure Laterality Date    COLONOSCOPY N/A 12/15/2022    Procedure: COLONOSCOPY;  Surgeon: Sean Bran MD;  Location: [facility] ENDO (4TH FLR);  Service: Endoscopy;  Laterality: N/A;  [facility] handed-t  12/8/22- precall completed-    ENDOSCOPIC ULTRASOUND OF UPPER GASTROINTESTINAL TRACT N/A 9/9/2022    Procedure: ULTRASOUND, UPPER GI TRACT, ENDOSCOPIC;  Surgeon: Yosvany Paula MD;  Location: [facility] ENDO (2ND FLR);  Service: Endoscopy;  Laterality: N/A;  The patient need an EGD/EUS (radial) for gastric mass. Main. Urgent. 60 minutes. Referring:   MD Estefani Campos,   Evan Farr MD    ESOPHAGOGASTRODUODENOSCOPY N/A 9/9/2022    Procedure: EGD (ESOPHAGOGASTRODUODENOSCOPY);  Surgeon: Yosvany Paula MD;  Location: [facility] (2ND FLR);  Service: Endoscopy;  Laterality: N/A;  The patient need an EGD/EUS (radial) for gastric mass. Main. Urgent. 60 minutes. Referring:   MD Estefani Campos Ricardo Romero, MD    Pt is fully vaccinated-DS  9/7/22-Instructions written down by pt's wife and read back.-DS    NONE N/A 10/8/2015     Current Outpatient Medications on File Prior to Visit   Medication Sig Dispense Refill    allopurinol (ZYLOPRIM) 100 MG tablet Take 100 mg by mouth 2 (two) times daily.      CALCIUM CARBONATE/VITAMIN D3 (VITAMIN D-3 ORAL) Take 10,000 Units by mouth once a week.      carbamide peroxide (DEBROX) 6.5 % otic solution Place 5 drops into the left ear 2 (two) times daily. 15 mL 0     "hydroCHLOROthiazide (HYDRODIURIL) 25 MG tablet Take 1 tablet (25 mg total) by mouth once daily. 30 tablet 11    icosapent ethyL (VASCEPA) 0.5 gram Cap Take 2 capsules by mouth once daily.      imatinib (GLEEVEC) 400 MG Tab Take 1 tablet (400 mg total) by mouth once daily. 30 tablet 11    loperamide (IMODIUM A-D) 2 mg Tab Take two tabs at the onset of loose stools.  Then take one tab after every subsequent loose stool. 90 tablet 11    multivitamin capsule Take 1 capsule by mouth once daily.      nebivolol (BYSTOLIC) 10 MG Tab Take 10 mg by mouth once daily.      potassium chloride SA (K-DUR,KLOR-CON) 20 MEQ tablet Take 2 tablets (40 mEq total) by mouth once daily. 60 tablet 0    rosuvastatin (CRESTOR) 20 MG tablet Take 20 mg by mouth once daily.      valsartan (DIOVAN) 160 MG tablet Take 1 tablet (160 mg total) by mouth once daily. 30 tablet 11    vitamin D 1000 units Tab Take 10,000 Units by mouth once a week.       No current facility-administered medications on file prior to visit.     Review of patient's allergies indicates:  No Known Allergies    Family History:  His family history includes Other in an other family member; Stroke (age of onset: 60) in his sister.     Social History:   reports that he has never smoked. He has never used smokeless tobacco. He reports that he does not currently use alcohol after a past usage of about 2.0 standard drinks per week. He reports that he does not use drugs.     ROS:    Pertinent positive/negatives detailed in HPI, all other systems negative.     Physical Exam:  BP (!) 155/73   Pulse (!) 57   Ht 5' 11" (1.803 m)   Wt 85.1 kg (187 lb 9.8 oz)   SpO2 100%   BMI 26.17 kg/m²     Constitutional:  Non-toxic, no acute distress.    Eyes:  Sclerae anicteric, gaze symmetrical  Neck:  Trachea midline,  FROM  Resp:  Easy work of breathing  Abd:  Soft, non-tender, no masses, no ascites  Musculoskeletal:  Ambulatory, normal gait, no muscle wasting.  Extremities are symmetrical " without lymphedema.  Neuro:  No gross deficits  Psych:  Awake, alert, oriented.  Answers and asks questions appropriately      ICD-10-CM ICD-9-CM    1. Gastrointestinal stromal tumor (GIST) of stomach  C49.A2 238.1 Case Request Operating Room: EGD (ESOPHAGOGASTRODUODENOSCOPY)      Plan   This 66 y/o gentleman diagnosed with large 9x8 cm stomach GIST in 9/2022. He completed 6 months of neoadj Gleevec. CT scan revealed good response to tx without progression of disease. Small indeterminate liver lesions appear stable on updated imaging - will continue to follow.   Schedule EGD for surgical planning (subtotal gastrectomy vs. Total gastrectomy).  Tentatively scheduled for Friday, 4/21/23. Questions were asked and answered to patient's satisfaction.  Dr. López obtained consent and preop instructions provided.   Will plan for robotic surgery in May. He will need to stop Gleevec approximately 1 week prior to surgery.   RTC after EGD to finalize surgical plan.     Pt seen in conjunction with Dr. López today.           Vero Crawford NP  Upper GI / Hepatobiliary Surgical Oncology  [facility] [place], [place]  Office: [phone]  Fax: [phone]

## 2023-04-20 ENCOUNTER — TELEPHONE (OUTPATIENT)
Dept: SURGERY | Facility: CLINIC | Age: 68
End: 2023-04-20
Payer: MEDICARE

## 2023-04-20 DIAGNOSIS — C49.A2 GASTROINTESTINAL STROMAL TUMOR (GIST) OF STOMACH: Primary | ICD-10-CM

## 2023-04-20 NOTE — PRE-PROCEDURE INSTRUCTIONS
Reviewed with patient medication/preoperative instructions.Patient's wife verbalized understanding and had no further questions at this time

## 2023-04-21 ENCOUNTER — ANESTHESIA EVENT (OUTPATIENT)
Dept: SURGERY | Facility: HOSPITAL | Age: 68
End: 2023-04-21
Payer: MEDICARE

## 2023-04-21 ENCOUNTER — ANESTHESIA (OUTPATIENT)
Dept: SURGERY | Facility: HOSPITAL | Age: 68
End: 2023-04-21
Payer: MEDICARE

## 2023-04-21 ENCOUNTER — HOSPITAL ENCOUNTER (OUTPATIENT)
Facility: HOSPITAL | Age: 68
Discharge: HOME | End: 2023-04-21
Attending: SURGERY | Admitting: SURGERY
Payer: MEDICARE

## 2023-04-21 VITALS
HEART RATE: 60 BPM | SYSTOLIC BLOOD PRESSURE: 155 MMHG | RESPIRATION RATE: 13 BRPM | HEIGHT: 71 IN | DIASTOLIC BLOOD PRESSURE: 86 MMHG | BODY MASS INDEX: 25.9 KG/M2 | WEIGHT: 185 LBS | TEMPERATURE: 98 F | OXYGEN SATURATION: 100 %

## 2023-04-21 DIAGNOSIS — C49.A2 GASTROINTESTINAL STROMAL TUMOR (GIST) OF STOMACH: Primary | ICD-10-CM

## 2023-04-21 PROCEDURE — 37000008 HC ANESTHESIA 1ST 15 MINUTES: Mod: HCNC | Performed by: SURGERY

## 2023-04-21 PROCEDURE — 25000003 PHARM REV CODE 250: Mod: HCNC | Performed by: NURSE ANESTHETIST, CERTIFIED REGISTERED

## 2023-04-21 PROCEDURE — 43235 EGD DIAGNOSTIC BRUSH WASH: CPT | Mod: HCNC,,, | Performed by: SURGERY

## 2023-04-21 PROCEDURE — 71000033 HC RECOVERY, INTIAL HOUR: Mod: HCNC | Performed by: SURGERY

## 2023-04-21 PROCEDURE — 94761 N-INVAS EAR/PLS OXIMETRY MLT: CPT | Mod: HCNC

## 2023-04-21 PROCEDURE — 71000015 HC POSTOP RECOV 1ST HR: Mod: HCNC | Performed by: SURGERY

## 2023-04-21 PROCEDURE — D9220A PRA ANESTHESIA: Mod: HCNC,ANES,, | Performed by: ANESTHESIOLOGY

## 2023-04-21 PROCEDURE — D9220A PRA ANESTHESIA: ICD-10-PCS | Mod: HCNC,ANES,, | Performed by: ANESTHESIOLOGY

## 2023-04-21 PROCEDURE — 43235 PR EGD, FLEX, DIAGNOSTIC: ICD-10-PCS | Mod: HCNC,,, | Performed by: SURGERY

## 2023-04-21 PROCEDURE — D9220A PRA ANESTHESIA: Mod: HCNC,CRNA,, | Performed by: NURSE ANESTHETIST, CERTIFIED REGISTERED

## 2023-04-21 PROCEDURE — 37000009 HC ANESTHESIA EA ADD 15 MINS: Mod: HCNC | Performed by: SURGERY

## 2023-04-21 PROCEDURE — 36000707: Mod: HCNC | Performed by: SURGERY

## 2023-04-21 PROCEDURE — 63600175 PHARM REV CODE 636 W HCPCS: Mod: HCNC | Performed by: NURSE ANESTHETIST, CERTIFIED REGISTERED

## 2023-04-21 PROCEDURE — 36000706: Mod: HCNC | Performed by: SURGERY

## 2023-04-21 PROCEDURE — D9220A PRA ANESTHESIA: ICD-10-PCS | Mod: HCNC,CRNA,, | Performed by: NURSE ANESTHETIST, CERTIFIED REGISTERED

## 2023-04-21 RX ORDER — ONDANSETRON 2 MG/ML
4 INJECTION INTRAMUSCULAR; INTRAVENOUS DAILY PRN
Status: DISCONTINUED | OUTPATIENT
Start: 2023-04-21 | End: 2023-04-21 | Stop reason: HOSPADM

## 2023-04-21 RX ORDER — MIDAZOLAM HYDROCHLORIDE 1 MG/ML
INJECTION, SOLUTION INTRAMUSCULAR; INTRAVENOUS
Status: DISCONTINUED | OUTPATIENT
Start: 2023-04-21 | End: 2023-04-21

## 2023-04-21 RX ORDER — FENTANYL CITRATE 50 UG/ML
25 INJECTION, SOLUTION INTRAMUSCULAR; INTRAVENOUS EVERY 5 MIN PRN
Status: DISCONTINUED | OUTPATIENT
Start: 2023-04-21 | End: 2023-04-21 | Stop reason: HOSPADM

## 2023-04-21 RX ORDER — FENTANYL CITRATE 50 UG/ML
INJECTION, SOLUTION INTRAMUSCULAR; INTRAVENOUS
Status: DISCONTINUED | OUTPATIENT
Start: 2023-04-21 | End: 2023-04-21

## 2023-04-21 RX ORDER — DEXAMETHASONE SODIUM PHOSPHATE 4 MG/ML
INJECTION, SOLUTION INTRA-ARTICULAR; INTRALESIONAL; INTRAMUSCULAR; INTRAVENOUS; SOFT TISSUE
Status: DISCONTINUED | OUTPATIENT
Start: 2023-04-21 | End: 2023-04-21

## 2023-04-21 RX ORDER — LIDOCAINE HYDROCHLORIDE 20 MG/ML
INJECTION INTRAVENOUS
Status: DISCONTINUED | OUTPATIENT
Start: 2023-04-21 | End: 2023-04-21

## 2023-04-21 RX ORDER — PROCHLORPERAZINE EDISYLATE 5 MG/ML
5 INJECTION INTRAMUSCULAR; INTRAVENOUS EVERY 30 MIN PRN
Status: DISCONTINUED | OUTPATIENT
Start: 2023-04-21 | End: 2023-04-21 | Stop reason: HOSPADM

## 2023-04-21 RX ORDER — PROPOFOL 10 MG/ML
VIAL (ML) INTRAVENOUS CONTINUOUS PRN
Status: DISCONTINUED | OUTPATIENT
Start: 2023-04-21 | End: 2023-04-21

## 2023-04-21 RX ORDER — ONDANSETRON 2 MG/ML
INJECTION INTRAMUSCULAR; INTRAVENOUS
Status: DISCONTINUED | OUTPATIENT
Start: 2023-04-21 | End: 2023-04-21

## 2023-04-21 RX ORDER — PROPOFOL 10 MG/ML
VIAL (ML) INTRAVENOUS
Status: DISCONTINUED | OUTPATIENT
Start: 2023-04-21 | End: 2023-04-21

## 2023-04-21 RX ADMIN — FENTANYL CITRATE 50 MCG: 50 INJECTION, SOLUTION INTRAMUSCULAR; INTRAVENOUS at 08:04

## 2023-04-21 RX ADMIN — LIDOCAINE HYDROCHLORIDE 100 MG: 20 INJECTION INTRAVENOUS at 08:04

## 2023-04-21 RX ADMIN — Medication 150 MCG/KG/MIN: at 08:04

## 2023-04-21 RX ADMIN — ONDANSETRON 4 MG: 2 INJECTION INTRAMUSCULAR; INTRAVENOUS at 09:04

## 2023-04-21 RX ADMIN — DEXAMETHASONE SODIUM PHOSPHATE 4 MG: 4 INJECTION, SOLUTION INTRAMUSCULAR; INTRAVENOUS at 09:04

## 2023-04-21 RX ADMIN — SODIUM CHLORIDE: 9 INJECTION, SOLUTION INTRAVENOUS at 08:04

## 2023-04-21 RX ADMIN — PROPOFOL 80 MG: 10 INJECTION, EMULSION INTRAVENOUS at 08:04

## 2023-04-21 RX ADMIN — MIDAZOLAM HYDROCHLORIDE 2 MG: 1 INJECTION, SOLUTION INTRAMUSCULAR; INTRAVENOUS at 08:04

## 2023-04-21 NOTE — ANESTHESIA PREPROCEDURE EVALUATION
04/21/2023  Sami Zhang is a 67 y.o., male.  Pre-operative evaluation for EGD (ESOPHAGOGASTRODUODENOSCOPY) (N/A)    Chief Complaint: GIST    PMH:  Gastric mass on neoadjuvant tx  HTN  Cough  Weight loss  Diarrhea-mild hypkalemia  Past Surgical History:   Procedure Laterality Date    COLONOSCOPY N/A 12/15/2022    Procedure: COLONOSCOPY;  Surgeon: Sean Bran MD;  Location: [facility] ENDO (4TH FLR);  Service: Endoscopy;  Laterality: N/A;  [facility] handed-t  12/8/22- precall completed-    ENDOSCOPIC ULTRASOUND OF UPPER GASTROINTESTINAL TRACT N/A 09/09/2022    Procedure: ULTRASOUND, UPPER GI TRACT, ENDOSCOPIC;  Surgeon: Yosvany Paula MD;  Location: [facility] ENDO (2ND FLR);  Service: Endoscopy;  Laterality: N/A;  The patient need an EGD/EUS (radial) for gastric mass. Main. Urgent. 60 minutes. Referring:   MD Estefani Campos,   Evan Farr MD    ESOPHAGOGASTRODUODENOSCOPY N/A 09/09/2022    Procedure: EGD (ESOPHAGOGASTRODUODENOSCOPY);  Surgeon: Yosvany Paula MD;  Location: [facility] ENDO (2ND FLR);  Service: Endoscopy;  Laterality: N/A;  The patient need an EGD/EUS (radial) for gastric mass. Main. Urgent. 60 minutes. Referring:   MD Estefani Campos,   Evan Farr MD    Pt is fully vaccinated-DS  9/7/22-Instructions written down by pt's wife and read back.-DS    KNEE SURGERY Bilateral     unsure type of knee surgery, unsure if metal was placed, completed at another hospital    NONE N/A 10/08/2015         Vital Signs Range (Last 24H):  Temp:  [36.6 °C (97.9 °F)]   Pulse:  [56]   Resp:  [20]   BP: (168)/(81)   SpO2:  [100 %]       CBC:     Recent Labs   Lab 04/14/23  1152   WBC 7.53   RBC 4.14*   HGB 11.4*   HCT 36.4*      MCV 88   MCH 27.5   MCHC 31.3*       CMP:   Recent Labs   Lab 04/14/23  1152      K 3.0*      CO2 27   BUN 13   CREATININE 1.0   GLU 84   CALCIUM 9.4   ALBUMIN  3.7   PROT 6.5   ALKPHOS 72   ALT 26   AST 31   BILITOT 0.9       INR:  No results for input(s): PT, INR, PROTIME, APTT in the last 720 hours.      Diagnostic Studies:      EKD Echo:    Pre-op Assessment    I have reviewed the Patient Summary Reports.     I have reviewed the Nursing Notes. I have reviewed the NPO Status.   I have reviewed the Medications.     Review of Systems  Anesthesia Hx:  No problems with previous Anesthesia    Social:  Non-Smoker, No Alcohol Use    Pulmonary:  Pulmonary Normal    Neurological:  Neurology Normal        Physical Exam  General: Well nourished, Cooperative, Alert and Oriented    Airway:  Mallampati: II / II  Mouth Opening: Normal  TM Distance: Normal  Tongue: Normal  Neck ROM: Normal ROM    Dental:  Intact    Chest/Lungs:  Normal Respiratory Rate        Anesthesia Plan  Type of Anesthesia, risks & benefits discussed:    Anesthesia Type: Gen Natural Airway  Intra-op Monitoring Plan: Standard ASA Monitors  Post Op Pain Control Plan: multimodal analgesia  Induction:  IV  Informed Consent: Informed consent signed with the Patient and all parties understand the risks and agree with anesthesia plan.  All questions answered.   ASA Score: 3  Day of Surgery Review of History & Physical: H&P Update referred to the surgeon/provider.    Ready For Surgery From Anesthesia Perspective.     .

## 2023-04-21 NOTE — TRANSFER OF CARE
"Anesthesia Transfer of Care Note    Patient: Sami Zhang    Procedure(s) Performed: Procedure(s) (LRB):  EGD (ESOPHAGOGASTRODUODENOSCOPY) (N/A)    Patient location: PACU    Anesthesia Type: general    Transport from OR: Transported from OR on 2-3 L/min O2 by NC with adequate spontaneous ventilation    Post pain: adequate analgesia    Post assessment: no apparent anesthetic complications    Post vital signs: stable    Level of consciousness: sedated    Nausea/Vomiting: no nausea/vomiting    Complications: none    Transfer of care protocol was followed      Last vitals:   Visit Vitals  BP (!) 168/81 (BP Location: Right arm, Patient Position: Lying)   Pulse (!) 56   Temp 36.6 °C (97.9 °F) (Temporal)   Resp 20   Ht 5' 11" (1.803 m)   Wt 83.9 kg (185 lb)   SpO2 100%   BMI 25.80 kg/m²     "

## 2023-04-21 NOTE — PROGRESS NOTES
Pt awake, VSS, denies pain or N/V. Wife at bedside, updated by MD. Discharge instructions given to patient and reviewed. No questions. Peripheral IV removed. Pt dressed and placed in wheelchair for discharge.

## 2023-04-21 NOTE — ANESTHESIA POSTPROCEDURE EVALUATION
Anesthesia Post Evaluation    Patient: Sami Zhang    Procedure(s) Performed: Procedure(s) (LRB):  EGD (ESOPHAGOGASTRODUODENOSCOPY) (N/A)    Final Anesthesia Type: general      Patient location during evaluation: PACU  Patient participation: Yes- Able to Participate  Level of consciousness: awake and alert and oriented  Post-procedure vital signs: reviewed and stable  Pain management: adequate  Airway patency: patent    PONV status at discharge: No PONV  Anesthetic complications: no      Cardiovascular status: hemodynamically stable  Respiratory status: unassisted, spontaneous ventilation and room air  Hydration status: euvolemic  Follow-up not needed.          Vitals Value Taken Time   /86 04/21/23 0950   Temp 36.4 °C (97.5 °F) 04/21/23 0950   Pulse 60 04/21/23 0955   Resp 15 04/21/23 0955   SpO2 100 % 04/21/23 0955   Vitals shown include unvalidated device data.      Event Time   Out of Recovery 09:45:00         Pain/Ayde Score: Ayde Score: 10 (4/21/2023  9:45 AM)

## 2023-04-21 NOTE — BRIEF OP NOTE
[facility]  Brief Operative Note     SUMMARY     Surgery Date: 4/21/2023     Surgeon(s) and Role:     * Kostas López MD - Primary     * Griselda Gill MD - Resident - Assisting    Pre-op Diagnosis:  Gastrointestinal stromal tumor (GIST) of stomach [C49.A2]    Post-op Diagnosis:  Post-Op Diagnosis Codes:     * Gastrointestinal stromal tumor (GIST) of stomach [C49.A2]    Procedure(s) (LRB):  EGD (ESOPHAGOGASTRODUODENOSCOPY) (N/A)    Anesthesia: Local MAC    Description of the findings of the procedure: EGD performed. Normal appearing esophagus, stomach and duodenum. No visualized mass.    Findings/Key Components: as above    Estimated Blood Loss: * No values recorded between 4/21/2023  9:00 AM and 4/21/2023  9:17 AM *         Specimens:   Specimen (24h ago, onward)      None            Discharge Note    SUMMARY     Admit Date: 4/21/2023    Discharge Date and Time: No discharge date for patient encounter.    Hospital Course (synopsis of major diagnoses, care, treatment, and services provided during the course of the hospital stay): Patient presented for the above procedure. He tolerated it well and was transferred to recovery for routine post operative care. When all criteria were met, he was discharged home in good condition.      Final Diagnosis: Post-Op Diagnosis Codes:     * Gastrointestinal stromal tumor (GIST) of stomach [C49.A2]    Disposition: Home or Self Care    Follow Up/Patient Instructions:    Follow-up Information       Kostas López MD Follow up on 4/25/2023.    Specialties: Surgical Oncology, General Surgery  Contact information:  [facility] [id number] [phone]                             Medications:  Reconciled Home Medications:      Medication List        CONTINUE taking these medications      allopurinoL 100 MG tablet  Commonly known as: ZYLOPRIM  Take 100 mg by mouth 2 (two) times daily.     carbamide peroxide 6.5 % otic solution  Commonly  known as: DEBROX  Place 5 drops into the left ear 2 (two) times daily.     hydroCHLOROthiazide 25 MG tablet  Commonly known as: HYDRODIURIL  Take 1 tablet (25 mg total) by mouth once daily.     icosapent ethyL 0.5 gram Cap  Commonly known as: VASCEPA  Take 2 capsules by mouth 2 (two) times daily.     imatinib 400 MG Tab  Commonly known as: GLEEVEC  Take 1 tablet (400 mg total) by mouth once daily.     loperamide 2 mg Tab  Commonly known as: IMODIUM A-D  Take two tabs at the onset of loose stools.  Then take one tab after every subsequent loose stool.     multivitamin capsule  Take 1 capsule by mouth once daily.     nebivoloL 10 MG Tab  Commonly known as: BYSTOLIC  Take 10 mg by mouth once daily.     potassium chloride SA 20 MEQ tablet  Commonly known as: K-DUR,KLOR-CON  Take 2 tablets (40 mEq total) by mouth once daily.     rosuvastatin 20 MG tablet  Commonly known as: CRESTOR  Take 20 mg by mouth once daily.     valsartan 160 MG tablet  Commonly known as: DIOVAN  Take 1 tablet (160 mg total) by mouth once daily.     vitamin D 1000 units Tab  Commonly known as: VITAMIN D3  Take 10,000 Units by mouth once a week.     VITAMIN D-3 ORAL  Take 10,000 Units by mouth once a week.            Discharge Procedure Orders   Activity as tolerated      Follow-up Information       Kostas López MD Follow up on 4/25/2023.    Specialties: Surgical Oncology, General Surgery  Contact information:  [address] [facility] [address]  [phone]                           Griselda Gill MD  General Surgery PGY4

## 2023-04-21 NOTE — OP NOTE
[facility] - [facility] ([facility])  Surgery Department  Operative Note       Date of Procedure: 4/21/2023     Surgeon(s):  Surgeon(s) and Role:     * Kostas López MD - Primary     * Griselda Gill MD - Resident - Assisting      Pre-Operative Diagnosis:   Gastrointestinal stromal tumor (GIST) of stomach [C49.A2]    Post-Operative Diagnosis:   2.  Same     Anesthesia: MAC    Operative Findings:   Barely visible tumor on retroflexion without mucosal involvement    Procedures:  Esophagogastroduodenoscopy (EGD)    Estimated Blood Loss (EBL): <2 mL    Specimen(s):    Specimen (24h ago, onward)      None                   Indications:  Sami Zhang presents for the above procedures.  Risks and benefits were reviewed including bleeding, infection, damage to local structures, perforation of GI tract, need for additional procedures, death, and imponderables.  He understands and gave informed consent to proceed.    Details:  The patient was transported to the operating room and satisfactory anesthesia established.  The patient was placed in the left lateral decubitus position with a bite block.    The adult gastroscope was introduced into the mouth, passed into the hypopharynx and cervical esophagus under endoscopic vision.  The scope was passed into the duodenum which was normal.  The stomach was normal and on retroflexion there was evidence of excellent response with a barely visible bulge, no mucosal involvement.      I communicated the intraoperative findings with the family following the procedure.     Condition: Good    Disposition: PACU - hemodynamically stable.    Attestation: I was present and scrubbed for the key portions of the procedure.

## 2023-04-24 DIAGNOSIS — C49.A2 GASTROINTESTINAL STROMAL TUMOR (GIST) OF STOMACH: Primary | ICD-10-CM

## 2023-04-24 NOTE — PROGRESS NOTES
"    Encounter Date:  2023    Patient ID: Sami Zhang  Age:  67 y.o. :  1955    Chief Complaint:  followup of stomach GIST     2022: c/o epigastric pain, imaging found cystic gastric mass (9x8 cm) with central necrosis  2022: EUS with bx - GIST;  would favor neoadj gleevac given size/location.  There are two indeterminate liver lesion that need attention on f/u.   10/8/2022: started neoadjuvant imatinib per Dr. Mcnair  2023: EGD per Dr. López -"Barely visible tumor on retroflexion without mucosal involvement"    Interval History:  Mr. Zhang returns to clinic from  [facility] with his wife. He was seen last week in clinic following 6 months of Gleevec for stomach GIST. He has had a good radiographic response on restaging CT scan. Mass decreased in size to 5.4cm from initially measuring 9cm. He underwent EGD per Dr. López for surgical planning this past Friday. He remains on Gleevec daily.     Overall he is feeling fine, no specific complaints. Reports a good appetite, eats 3 meals daily without N/V. Weight is down 10#. Remains afebrile. Denies CP, SOB. He walks 2 dogs QAM, enjoys riding his bike and yard work with a push . Retired from [employer]. Sleeping well at night.      No prior abd sx  No anticoagulation  Never smoked    Data:  Imaging:  Dr. López and I personally reviewed the following images:   2023: CT C/A/P:  1. Exam limited by motion artifact.  2. Slight decreased size of gastric wall mass.  3. Stable mildly enlarged lymph node in the lindsay hepatis measuring 1.2 cm, stable compared to CT from 2022.  4. Few subcentimeter hepatic hypodensities, too small to characterize.  5. Thyroid nodules better characterized on CT from 2023.    Labs:    Lab Results   Component Value Date    WBC 7.53 2023    HGB 11.4 (L) 2023    HCT 36.4 (L) 2023    MCV 88 2023     2023         Chemistry        Component Value Date/Time     2023 1152 "    K 3.0 (L) 04/14/2023 1152     04/14/2023 1152    CO2 27 04/14/2023 1152    BUN 13 04/14/2023 1152    CREATININE 1.0 04/14/2023 1152    GLU 84 04/14/2023 1152        Component Value Date/Time    CALCIUM 9.4 04/14/2023 1152    ALKPHOS 72 04/14/2023 1152    AST 31 04/14/2023 1152    ALT 26 04/14/2023 1152    BILITOT 0.9 04/14/2023 1152    ESTGFRAFRICA >60 07/01/2016 1205    EGFRNONAA >60 07/01/2016 1205        4/21/2023: Endoscopy:  EGD per Dr. López  The adult gastroscope was introduced into the mouth, passed into the hypopharynx and cervical esophagus under endoscopic vision.  The scope was passed into the duodenum which was normal.  The stomach was normal and on retroflexion there was evidence of excellent response with a barely visible bulge, no mucosal involvement.      3/2023: Pathology:    Thyroid gland, right nodule, ultrasound-guided fine-needle aspiration (FNA) biopsy with pathologist adequacy: Benign follicular nodule (The [place] System for Reporting Thyroid Cytopathology) Adequately cellular specimen consisting of benign follicular cell groups arranged primarily as macro follicles, with some microfollicles, and scant colloid    Past Medical History:   Diagnosis Date    Arthritis     GIST (gastrointestinal stromal tumor), malignant 09/09/2022    Gout 10/08/2015    Hyperlipidemia     Hypertension      Past Surgical History:   Procedure Laterality Date    COLONOSCOPY N/A 12/15/2022    Procedure: COLONOSCOPY;  Surgeon: Sean Bran MD;  Location: [facility] (4TH FLR);  Service: Endoscopy;  Laterality: N/A;  inst handed-t  12/8/22- precall completed-    ENDOSCOPIC ULTRASOUND OF UPPER GASTROINTESTINAL TRACT N/A 09/09/2022    Procedure: ULTRASOUND, UPPER GI TRACT, ENDOSCOPIC;  Surgeon: Yosvany Paula MD;  Location: [facility] (2ND FLR);  Service: Endoscopy;  Laterality: N/A;  The patient need an EGD/EUS (radial) for gastric mass. Main. Urgent. 60 minutes. Referring:   Todd Cantu MD   Thanks,    Evan Farr MD    ESOPHAGOGASTRODUODENOSCOPY N/A 09/09/2022    Procedure: EGD (ESOPHAGOGASTRODUODENOSCOPY);  Surgeon: Yosvany Paula MD;  [facility]  Service: Endoscopy;  Laterality: N/A;  The patient need an EGD/EUS (radial) for gastric mass. Main. Urgent. 60 minutes. Referring:   Todd Cantu MD   Thanks,   Evan Farr MD    Pt is fully vaccinated-DS  9/7/22-Instructions written down by pt's wife and read back.-DS    ESOPHAGOGASTRODUODENOSCOPY N/A 4/21/2023    Procedure: EGD (ESOPHAGOGASTRODUODENOSCOPY);  Surgeon: Kostas López MD;  Location: [facility] OR [address] [place] [address];  Service: General;  Laterality: N/A;    KNEE SURGERY Bilateral     unsure type of knee surgery, unsure if metal was placed, completed at another hospital    NONE N/A 10/08/2015     Current Outpatient Medications on File Prior to Visit   Medication Sig Dispense Refill    allopurinol (ZYLOPRIM) 100 MG tablet Take 100 mg by mouth 2 (two) times daily.      CALCIUM CARBONATE/VITAMIN D3 (VITAMIN D-3 ORAL) Take 10,000 Units by mouth once a week.      carbamide peroxide (DEBROX) 6.5 % otic solution Place 5 drops into the left ear 2 (two) times daily. 15 mL 0    hydroCHLOROthiazide (HYDRODIURIL) 25 MG tablet Take 1 tablet (25 mg total) by mouth once daily. 30 tablet 11    icosapent ethyL (VASCEPA) 0.5 gram Cap Take 2 capsules by mouth 2 (two) times daily.      imatinib (GLEEVEC) 400 MG Tab Take 1 tablet (400 mg total) by mouth once daily. 30 tablet 11    loperamide (IMODIUM A-D) 2 mg Tab Take two tabs at the onset of loose stools.  Then take one tab after every subsequent loose stool. 90 tablet 11    multivitamin capsule Take 1 capsule by mouth once daily.      nebivolol (BYSTOLIC) 10 MG Tab Take 10 mg by mouth once daily.      potassium chloride SA (K-DUR,KLOR-CON) 20 MEQ tablet Take 2 tablets (40 mEq total) by mouth once daily. 60 tablet 0    rosuvastatin (CRESTOR) 20 MG tablet Take 20 mg by mouth once daily.      valsartan  "(DIOVAN) 160 MG tablet Take 1 tablet (160 mg total) by mouth once daily. 30 tablet 11    vitamin D 1000 units Tab Take 10,000 Units by mouth once a week.       No current facility-administered medications on file prior to visit.     Review of patient's allergies indicates:  No Known Allergies    Family History:  His family history includes Other in an other family member; Stroke (age of onset: 60) in his sister.     Social History:   reports that he has never smoked. He has never used smokeless tobacco. He reports that he does not currently use alcohol after a past usage of about 2.0 standard drinks per week. He reports that he does not use drugs.     ROS:    Pertinent positive/negatives detailed in HPI, all other systems negative.     Physical Exam:  BP (!) 144/68   Pulse 61   Ht 5' 11" (1.803 m)   Wt 85.2 kg (187 lb 14.4 oz)   SpO2 100%   BMI 26.21 kg/m²     Constitutional:  Non-toxic, no acute distress.    Eyes:  Sclerae anicteric, gaze symmetrical  Neck:  Trachea midline,  FROM  Resp:  Easy work of breathing  Abd:  Soft, non-tender, no masses, no ascites  Musculoskeletal:  Ambulatory, normal gait, no muscle wasting.  Extremities are symmetrical without lymphedema.  Neuro:  No gross deficits  Psych:  Awake, alert, oriented.  Answers and asks questions appropriately      ICD-10-CM ICD-9-CM    1. Gastrointestinal stromal tumor (GIST) of stomach  C49.A2 238.1       Plan     This 66 y/o gentleman diagnosed with large 9x8 cm stomach GIST in 9/2022. He completed 6 months of neoadj Gleevec. CT scan revealed good response to tx without progression of disease. Small indeterminate liver lesions appear stable on updated imaging - will continue to follow.   Based on EGD last week, will plan for surgical resection with robotic wedge partial gastrectomy.   Risks and benefits including death, bleeding, infection, scar, pain/numbness, margin positivity, discovery of additional disease, damage to local structures, need for " additional procedures based on operative findings and imponderables were all reviewed. He was given the opportunity to ask questions, which were all addressed.  He voiced understanding and wishes to proceed. Dr. López obtained consent and preop instructions provided per RN.   Tentatively scheduled for 5/10/23. Stop Gleevec tomorrow.   Introduced abbreviated version of prehab program to prepare for upcoming surgery. Focus on nutrition with protein supplement, IMT, and increase in physical activity. Refer to dietitian and physical therapist today for initial evaluation and assessement.     Follow up for post operative care / hospital discharge.      Pt seen in conjunction with Dr. López today.           Vero Crawford NP  Upper GI / Hepatobiliary Surgical Oncology  [facility] [place], [place]  Office: [phone]  Fax: [phone]

## 2023-04-24 NOTE — H&P (VIEW-ONLY)
"    Encounter Date:  2023    Patient ID: Sami Zhang  Age:  67 y.o. :  1955    Chief Complaint:  followup of stomach GIST     2022: c/o epigastric pain, imaging found cystic gastric mass (9x8 cm) with central necrosis  2022: EUS with bx - GIST;  would favor neoadj gleevac given size/location.  There are two indeterminate liver lesion that need attention on f/u.   10/8/2022: started neoadjuvant imatinib per Dr. Mcnair  2023: EGD per Dr. López -"Barely visible tumor on retroflexion without mucosal involvement"    Interval History:  Mr. Zhang returns to clinic from  [facility] with his wife. He was seen last week in clinic following 6 months of Gleevec for stomach GIST. He has had a good radiographic response on restaging CT scan. Mass decreased in size to 5.4cm from initially measuring 9cm. He underwent EGD per Dr. López for surgical planning this past Friday. He remains on Gleevec daily.     Overall he is feeling fine, no specific complaints. Reports a good appetite, eats 3 meals daily without N/V. Weight is down 10#. Remains afebrile. Denies CP, SOB. He walks 2 dogs QAM, enjoys riding his bike and yard work with a push . Retired from [employer]. Sleeping well at night.      No prior abd sx  No anticoagulation  Never smoked    Data:  Imaging:  Dr. López and I personally reviewed the following images:   2023: CT C/A/P:  1. Exam limited by motion artifact.  2. Slight decreased size of gastric wall mass.  3. Stable mildly enlarged lymph node in the lindsay hepatis measuring 1.2 cm, stable compared to CT from 2022.  4. Few subcentimeter hepatic hypodensities, too small to characterize.  5. Thyroid nodules better characterized on CT from 2023.    Labs:    Lab Results   Component Value Date    WBC 7.53 2023    HGB 11.4 (L) 2023    HCT 36.4 (L) 2023    MCV 88 2023     2023         Chemistry        Component Value Date/Time     2023 1152 "    K 3.0 (L) 04/14/2023 1152     04/14/2023 1152    CO2 27 04/14/2023 1152    BUN 13 04/14/2023 1152    CREATININE 1.0 04/14/2023 1152    GLU 84 04/14/2023 1152        Component Value Date/Time    CALCIUM 9.4 04/14/2023 1152    ALKPHOS 72 04/14/2023 1152    AST 31 04/14/2023 1152    ALT 26 04/14/2023 1152    BILITOT 0.9 04/14/2023 1152    ESTGFRAFRICA >60 07/01/2016 1205    EGFRNONAA >60 07/01/2016 1205        4/21/2023: Endoscopy:  EGD per Dr. López  The adult gastroscope was introduced into the mouth, passed into the hypopharynx and cervical esophagus under endoscopic vision.  The scope was passed into the duodenum which was normal.  The stomach was normal and on retroflexion there was evidence of excellent response with a barely visible bulge, no mucosal involvement.      3/2023: Pathology:    Thyroid gland, right nodule, ultrasound-guided fine-needle aspiration (FNA) biopsy with pathologist adequacy: Benign follicular nodule (The [place] System for Reporting Thyroid Cytopathology) Adequately cellular specimen consisting of benign follicular cell groups arranged primarily as macro follicles, with some microfollicles, and scant colloid    Past Medical History:   Diagnosis Date    Arthritis     GIST (gastrointestinal stromal tumor), malignant 09/09/2022    Gout 10/08/2015    Hyperlipidemia     Hypertension      Past Surgical History:   Procedure Laterality Date    COLONOSCOPY N/A 12/15/2022    Procedure: COLONOSCOPY;  Surgeon: Sean Bran MD;  Location: [facility] (4TH FLR);  Service: Endoscopy;  Laterality: N/A;  inst handed-t  12/8/22- precall completed-    ENDOSCOPIC ULTRASOUND OF UPPER GASTROINTESTINAL TRACT N/A 09/09/2022    Procedure: ULTRASOUND, UPPER GI TRACT, ENDOSCOPIC;  Surgeon: Yosvany Paula MD;  Location: [facility] (2ND FLR);  Service: Endoscopy;  Laterality: N/A;  The patient need an EGD/EUS (radial) for gastric mass. Main. Urgent. 60 minutes. Referring:   Todd Cantu MD   Thanks,    Evan Farr MD    ESOPHAGOGASTRODUODENOSCOPY N/A 09/09/2022    Procedure: EGD (ESOPHAGOGASTRODUODENOSCOPY);  Surgeon: Yosvany Paula MD;  [facility]  Service: Endoscopy;  Laterality: N/A;  The patient need an EGD/EUS (radial) for gastric mass. Main. Urgent. 60 minutes. Referring:   Todd Cantu MD   Thanks,   Evan Farr MD    Pt is fully vaccinated-DS  9/7/22-Instructions written down by pt's wife and read back.-DS    ESOPHAGOGASTRODUODENOSCOPY N/A 4/21/2023    Procedure: EGD (ESOPHAGOGASTRODUODENOSCOPY);  Surgeon: Kostas López MD;  Location: [facility] OR [address] [place] [address];  Service: General;  Laterality: N/A;    KNEE SURGERY Bilateral     unsure type of knee surgery, unsure if metal was placed, completed at another hospital    NONE N/A 10/08/2015     Current Outpatient Medications on File Prior to Visit   Medication Sig Dispense Refill    allopurinol (ZYLOPRIM) 100 MG tablet Take 100 mg by mouth 2 (two) times daily.      CALCIUM CARBONATE/VITAMIN D3 (VITAMIN D-3 ORAL) Take 10,000 Units by mouth once a week.      carbamide peroxide (DEBROX) 6.5 % otic solution Place 5 drops into the left ear 2 (two) times daily. 15 mL 0    hydroCHLOROthiazide (HYDRODIURIL) 25 MG tablet Take 1 tablet (25 mg total) by mouth once daily. 30 tablet 11    icosapent ethyL (VASCEPA) 0.5 gram Cap Take 2 capsules by mouth 2 (two) times daily.      imatinib (GLEEVEC) 400 MG Tab Take 1 tablet (400 mg total) by mouth once daily. 30 tablet 11    loperamide (IMODIUM A-D) 2 mg Tab Take two tabs at the onset of loose stools.  Then take one tab after every subsequent loose stool. 90 tablet 11    multivitamin capsule Take 1 capsule by mouth once daily.      nebivolol (BYSTOLIC) 10 MG Tab Take 10 mg by mouth once daily.      potassium chloride SA (K-DUR,KLOR-CON) 20 MEQ tablet Take 2 tablets (40 mEq total) by mouth once daily. 60 tablet 0    rosuvastatin (CRESTOR) 20 MG tablet Take 20 mg by mouth once daily.      valsartan  "(DIOVAN) 160 MG tablet Take 1 tablet (160 mg total) by mouth once daily. 30 tablet 11    vitamin D 1000 units Tab Take 10,000 Units by mouth once a week.       No current facility-administered medications on file prior to visit.     Review of patient's allergies indicates:  No Known Allergies    Family History:  His family history includes Other in an other family member; Stroke (age of onset: 60) in his sister.     Social History:   reports that he has never smoked. He has never used smokeless tobacco. He reports that he does not currently use alcohol after a past usage of about 2.0 standard drinks per week. He reports that he does not use drugs.     ROS:    Pertinent positive/negatives detailed in HPI, all other systems negative.     Physical Exam:  BP (!) 144/68   Pulse 61   Ht 5' 11" (1.803 m)   Wt 85.2 kg (187 lb 14.4 oz)   SpO2 100%   BMI 26.21 kg/m²     Constitutional:  Non-toxic, no acute distress.    Eyes:  Sclerae anicteric, gaze symmetrical  Neck:  Trachea midline,  FROM  Resp:  Easy work of breathing  Abd:  Soft, non-tender, no masses, no ascites  Musculoskeletal:  Ambulatory, normal gait, no muscle wasting.  Extremities are symmetrical without lymphedema.  Neuro:  No gross deficits  Psych:  Awake, alert, oriented.  Answers and asks questions appropriately      ICD-10-CM ICD-9-CM    1. Gastrointestinal stromal tumor (GIST) of stomach  C49.A2 238.1       Plan     This 66 y/o gentleman diagnosed with large 9x8 cm stomach GIST in 9/2022. He completed 6 months of neoadj Gleevec. CT scan revealed good response to tx without progression of disease. Small indeterminate liver lesions appear stable on updated imaging - will continue to follow.   Based on EGD last week, will plan for surgical resection with robotic wedge partial gastrectomy.   Risks and benefits including death, bleeding, infection, scar, pain/numbness, margin positivity, discovery of additional disease, damage to local structures, need for " additional procedures based on operative findings and imponderables were all reviewed. He was given the opportunity to ask questions, which were all addressed.  He voiced understanding and wishes to proceed. Dr. López obtained consent and preop instructions provided per RN.   Tentatively scheduled for 5/10/23. Stop Gleevec tomorrow.   Introduced abbreviated version of prehab program to prepare for upcoming surgery. Focus on nutrition with protein supplement, IMT, and increase in physical activity. Refer to dietitian and physical therapist today for initial evaluation and assessement.     Follow up for post operative care / hospital discharge.      Pt seen in conjunction with Dr. López today.           Vero Crawford NP  Upper GI / Hepatobiliary Surgical Oncology  [facility] [place], [place]  Office: [phone]  Fax: [phone]

## 2023-04-25 ENCOUNTER — CLINICAL SUPPORT (OUTPATIENT)
Dept: HEMATOLOGY/ONCOLOGY | Facility: CLINIC | Age: 68
End: 2023-04-25
Payer: MEDICARE

## 2023-04-25 ENCOUNTER — OFFICE VISIT (OUTPATIENT)
Dept: SURGERY | Facility: CLINIC | Age: 68
End: 2023-04-25
Payer: MEDICARE

## 2023-04-25 ENCOUNTER — CLINICAL SUPPORT (OUTPATIENT)
Dept: REHABILITATION | Facility: HOSPITAL | Age: 68
End: 2023-04-25
Payer: MEDICARE

## 2023-04-25 VITALS
WEIGHT: 187.88 LBS | BODY MASS INDEX: 26.3 KG/M2 | SYSTOLIC BLOOD PRESSURE: 144 MMHG | HEIGHT: 71 IN | HEART RATE: 61 BPM | DIASTOLIC BLOOD PRESSURE: 68 MMHG | OXYGEN SATURATION: 100 %

## 2023-04-25 DIAGNOSIS — Z71.3 NUTRITIONAL COUNSELING: ICD-10-CM

## 2023-04-25 DIAGNOSIS — R53.1 DECREASED STRENGTH: Primary | ICD-10-CM

## 2023-04-25 DIAGNOSIS — C49.A2 GASTROINTESTINAL STROMAL TUMOR (GIST) OF STOMACH: Primary | ICD-10-CM

## 2023-04-25 DIAGNOSIS — C49.A2 GASTROINTESTINAL STROMAL TUMOR (GIST) OF STOMACH: ICD-10-CM

## 2023-04-25 PROCEDURE — 4010F PR ACE/ARB THEARPY RXD/TAKEN: ICD-10-PCS | Mod: HCNC,CPTII,S$GLB, | Performed by: NURSE PRACTITIONER

## 2023-04-25 PROCEDURE — 1101F PR PT FALLS ASSESS DOC 0-1 FALLS W/OUT INJ PAST YR: ICD-10-PCS | Mod: HCNC,CPTII,S$GLB, | Performed by: NURSE PRACTITIONER

## 2023-04-25 PROCEDURE — 1159F PR MEDICATION LIST DOCUMENTED IN MEDICAL RECORD: ICD-10-PCS | Mod: HCNC,CPTII,S$GLB, | Performed by: NURSE PRACTITIONER

## 2023-04-25 PROCEDURE — 3077F SYST BP >= 140 MM HG: CPT | Mod: HCNC,CPTII,S$GLB, | Performed by: NURSE PRACTITIONER

## 2023-04-25 PROCEDURE — 1160F PR REVIEW ALL MEDS BY PRESCRIBER/CLIN PHARMACIST DOCUMENTED: ICD-10-PCS | Mod: HCNC,CPTII,S$GLB, | Performed by: NURSE PRACTITIONER

## 2023-04-25 PROCEDURE — 3077F PR MOST RECENT SYSTOLIC BLOOD PRESSURE >= 140 MM HG: ICD-10-PCS | Mod: HCNC,CPTII,S$GLB, | Performed by: NURSE PRACTITIONER

## 2023-04-25 PROCEDURE — 99999 PR PBB SHADOW E&M-EST. PATIENT-LVL III: CPT | Mod: PBBFAC,HCNC,, | Performed by: NURSE PRACTITIONER

## 2023-04-25 PROCEDURE — 3288F FALL RISK ASSESSMENT DOCD: CPT | Mod: HCNC,CPTII,S$GLB, | Performed by: NURSE PRACTITIONER

## 2023-04-25 PROCEDURE — 97161 PT EVAL LOW COMPLEX 20 MIN: CPT | Mod: HCNC

## 2023-04-25 PROCEDURE — 1160F RVW MEDS BY RX/DR IN RCRD: CPT | Mod: HCNC,CPTII,S$GLB, | Performed by: NURSE PRACTITIONER

## 2023-04-25 PROCEDURE — 1126F AMNT PAIN NOTED NONE PRSNT: CPT | Mod: HCNC,CPTII,S$GLB, | Performed by: NURSE PRACTITIONER

## 2023-04-25 PROCEDURE — 3008F BODY MASS INDEX DOCD: CPT | Mod: HCNC,CPTII,S$GLB, | Performed by: NURSE PRACTITIONER

## 2023-04-25 PROCEDURE — 3078F PR MOST RECENT DIASTOLIC BLOOD PRESSURE < 80 MM HG: ICD-10-PCS | Mod: HCNC,CPTII,S$GLB, | Performed by: NURSE PRACTITIONER

## 2023-04-25 PROCEDURE — 97802 PR MED NUTR THER, 1ST, INDIV, EA 15 MIN: ICD-10-PCS | Mod: HCNC,S$GLB,, | Performed by: DIETITIAN, REGISTERED

## 2023-04-25 PROCEDURE — 1101F PT FALLS ASSESS-DOCD LE1/YR: CPT | Mod: HCNC,CPTII,S$GLB, | Performed by: NURSE PRACTITIONER

## 2023-04-25 PROCEDURE — 3078F DIAST BP <80 MM HG: CPT | Mod: HCNC,CPTII,S$GLB, | Performed by: NURSE PRACTITIONER

## 2023-04-25 PROCEDURE — 3288F PR FALLS RISK ASSESSMENT DOCUMENTED: ICD-10-PCS | Mod: HCNC,CPTII,S$GLB, | Performed by: NURSE PRACTITIONER

## 2023-04-25 PROCEDURE — 1159F MED LIST DOCD IN RCRD: CPT | Mod: HCNC,CPTII,S$GLB, | Performed by: NURSE PRACTITIONER

## 2023-04-25 PROCEDURE — 99214 PR OFFICE/OUTPT VISIT, EST, LEVL IV, 30-39 MIN: ICD-10-PCS | Mod: HCNC,S$GLB,, | Performed by: NURSE PRACTITIONER

## 2023-04-25 PROCEDURE — 99214 OFFICE O/P EST MOD 30 MIN: CPT | Mod: HCNC,S$GLB,, | Performed by: NURSE PRACTITIONER

## 2023-04-25 PROCEDURE — 97802 MEDICAL NUTRITION INDIV IN: CPT | Mod: HCNC,S$GLB,, | Performed by: DIETITIAN, REGISTERED

## 2023-04-25 PROCEDURE — 1126F PR PAIN SEVERITY QUANTIFIED, NO PAIN PRESENT: ICD-10-PCS | Mod: HCNC,CPTII,S$GLB, | Performed by: NURSE PRACTITIONER

## 2023-04-25 PROCEDURE — 99999 PR PBB SHADOW E&M-EST. PATIENT-LVL III: ICD-10-PCS | Mod: PBBFAC,HCNC,, | Performed by: NURSE PRACTITIONER

## 2023-04-25 PROCEDURE — 4010F ACE/ARB THERAPY RXD/TAKEN: CPT | Mod: HCNC,CPTII,S$GLB, | Performed by: NURSE PRACTITIONER

## 2023-04-25 PROCEDURE — 3008F PR BODY MASS INDEX (BMI) DOCUMENTED: ICD-10-PCS | Mod: HCNC,CPTII,S$GLB, | Performed by: NURSE PRACTITIONER

## 2023-04-25 NOTE — Clinical Note
Dr. Mcnair,  Robotic partial wedge gastrectomy per Dr. López scheduled for 5/10/23. Stopping Gleevec tomorrow in anticipation of surgery.

## 2023-04-25 NOTE — PROGRESS NOTES
"Oncology Nutrition Assessment for Medical Nutrition Therapy  Initial Prehab Visit    Sami Zhang   1955    Referring Provider: Kostas López,*      Reason for Visit: nutrition counseling and education    PMHx:   Past Medical History:   Diagnosis Date    Arthritis     GIST (gastrointestinal stromal tumor), malignant 09/09/2022    Gout 10/08/2015    Hyperlipidemia     Hypertension        Nutrition Assessment    This is a 67 y.o.male with a medical diagnosis of GIST of stomach s/p neoadjuvant Gleevec. Patient presents today as part of prehab program. He reports that his appetite is good, eating 3 meals/day. He reports that he does pretty well with protein intake at meals, mostly meat. He snacks some, usually cookies. He drinks mostly water and iced tea.    Weight: 85.2 kg (187 lb 14.4 oz)  Height: 5' 11" (1.803 m)  BMI: 26.21    Usual BW: 190lb  Weight Change: stable    Allergies: Patient has no known allergies.    Current Medications:  Current Outpatient Medications:     allopurinol (ZYLOPRIM) 100 MG tablet, Take 100 mg by mouth 2 (two) times daily., Disp: , Rfl:     CALCIUM CARBONATE/VITAMIN D3 (VITAMIN D-3 ORAL), Take 10,000 Units by mouth once a week., Disp: , Rfl:     carbamide peroxide (DEBROX) 6.5 % otic solution, Place 5 drops into the left ear 2 (two) times daily., Disp: 15 mL, Rfl: 0    hydroCHLOROthiazide (HYDRODIURIL) 25 MG tablet, Take 1 tablet (25 mg total) by mouth once daily., Disp: 30 tablet, Rfl: 11    icosapent ethyL (VASCEPA) 0.5 gram Cap, Take 2 capsules by mouth 2 (two) times daily., Disp: , Rfl:     imatinib (GLEEVEC) 400 MG Tab, Take 1 tablet (400 mg total) by mouth once daily., Disp: 30 tablet, Rfl: 11    loperamide (IMODIUM A-D) 2 mg Tab, Take two tabs at the onset of loose stools.  Then take one tab after every subsequent loose stool., Disp: 90 tablet, Rfl: 11    multivitamin capsule, Take 1 capsule by mouth once daily., Disp: , Rfl:     nebivolol (BYSTOLIC) 10 MG Tab, Take 10 " mg by mouth once daily., Disp: , Rfl:     potassium chloride SA (K-DUR,KLOR-CON) 20 MEQ tablet, Take 2 tablets (40 mEq total) by mouth once daily., Disp: 60 tablet, Rfl: 0    rosuvastatin (CRESTOR) 20 MG tablet, Take 20 mg by mouth once daily., Disp: , Rfl:     valsartan (DIOVAN) 160 MG tablet, Take 1 tablet (160 mg total) by mouth once daily., Disp: 30 tablet, Rfl: 11    vitamin D 1000 units Tab, Take 10,000 Units by mouth once a week., Disp: , Rfl:     Food/medication interactions noted: none    Vitamins/Supplements: D3    Labs: Reviewed    Nutrition Diagnosis    Problem: nutrition-related knowledge deficit  Etiology (related to): lack of prior need for nutrition education  Signs/Symptoms (as evidenced by):  GIST and plan for surgery    Nutrition Intervention    Nutrition Prescription   2130 kcals (25kcal/kg)  85g protein (1g/kg)   2130mL fluid (25mL/kg)    Recommendations:  Include protein at all meals/snacks  Consume at least 1 scoop of protein powder/day  Drink at least 64oz fluid/day    Materials Provided/Reviewed:  Prehab materials    Nutrition Monitoring and Evaluation    Monitor: diet education needs, energy intake, and weight status    Goals: weight maintenance    Follow up: Patient provided with dietitian contact information and advised to call/message with questions or to make future appointment if further intervention is needed.    Communication to referring provider/care team: note available in chart; discussed with SARA Crawford NP    Counseling time: 15 minutes    Geovanna José MS, RD, LDN  [phone]

## 2023-04-25 NOTE — PLAN OF CARE
[facility]  Physical Therapy Initial Evaluation    Name: Sami Zhang  Clinic Number: [id number]    Therapy Diagnosis:   Encounter Diagnoses   Name Primary?    Gastrointestinal stromal tumor (GIST) of stomach     Decreased strength      Physician: Vero Crawford APRN, A*    Physician Orders: PT Eval and Treat - Prehab  Medical Diagnosis from Referral: Gastrointestinal stromal tumor (GIST) of stomach   Evaluation Date: 4/25/2023  Authorization Period Expiration: 4/23/24  Plan of Care Expiration: 4/25/23  Visit # / Visits authorized: 1/ 1  Insurance: [employer]      Time In: 12:03 PM  Time Out: 12:33 PM  Total Billable Time: 30 minutes    Precautions: Standard and cancer    Subjective   Date of onset: 7/2022  History of current condition - Sami reports: Denies complaints. No limitations in his mobility or function  Cancer Related Surgery and Date: 5/10/23- Robotic gastrectomy, partial, possible total    Chemotherapy: None  Targeted therapy: started neoadjuvant Gleevec (imantinib) on 10/8/22, today is last day.  Radiation: none     Medical History:   Past Medical History:   Diagnosis Date    Arthritis     GIST (gastrointestinal stromal tumor), malignant 09/09/2022    Gout 10/08/2015    Hyperlipidemia     Hypertension        Surgical History:   Sami Zhang  has a past surgical history that includes NONE (N/A, 10/08/2015); Esophagogastroduodenoscopy (N/A, 09/09/2022); Endoscopic ultrasound of upper gastrointestinal tract (N/A, 09/09/2022); Colonoscopy (N/A, 12/15/2022); Knee surgery (Bilateral); and Esophagogastroduodenoscopy (N/A, 4/21/2023).    Medications:   Sami has a current medication list which includes the following prescription(s): allopurinol, calcium carbonate/vitamin d3, carbamide peroxide, hydrochlorothiazide, icosapent ethyl, imatinib, loperamide, multivitamin, nebivolol, potassium chloride sa, rosuvastatin, valsartan, and vitamin d.    Allergies:   Review of  patient's allergies indicates:  No Known Allergies       Prior Therapy: Yes following knee surgery  Social History: pt lives with their spouse  Occupation: Retired from [employer]  Prior Level of Function: completely independent  Current Level of Function: completely independent  Exercise Routine prior to onset: walks the dog, rides his bike  Dominant hand:  left     Pain:  Current 0/10, worst 0/10, best 0/10   Location: N/A    Functional Mobility (Bed mobility, transfers)  Bed mobility: I  Supine to sit: I  Sit to supine: I  Transfers to bed: I  Transfers to toilet: I  Sit to stand:  I  Stand pivot:  I  Car transfers: I      ADL's:  Feeding: I  Grooming: I  Hygiene: I  UB Dressing: I  LB Dressing: I  Toileting: I  Bathing: I    Pts goals: None stated      Objective     Lab Values   Hemoglobin: 11.4 (L)  Hematocrit: 36.4 (L)  Platelets: 207  ANC: 4.1    Mental status: alert, oriented to person, place, and time, normal mood, behavior, speech, dress, motor activity, and thought processes  Appearance: Casually dressed  Behavior:  calm, cooperative, and adequate rapport can be established  Attention Span and Concentration:  Normal    Postural examination/scapula alignment: Rounded shoulder      Sensation:   Light Touch UEs  Intact  Light Touch LEs  Intact             ROM:     Active/Passive ROM: (measured in degrees) WNL in bilateral upper and lower extremities        Strength: manual muscle test grades below     Upper Extremity Strength  (R) UE  (L) UE    Shoulder flexion: 5/5 Shoulder flexion: 5/5   Shoulder Abduction: 5/5 Shoulder abduction: 5/5   Shoulder ER 5/5 Shoulder ER 5/5   Shoulder IR 5/5 Shoulder IR 5/5   Elbow flexion: 5/5 Elbow flexion: 5/5   Elbow extension: 5/5 Elbow extension: 5/5   Wrist flexion: 5/5 Wrist flexion: 5/5   Wrist extension: 5/5 Wrist extension: 5/5         Lower Extremity Strength (Seated)  Right LE  Left LE    Hip Flexion: 5/5 Hip Flexion: 5/5   Hip Extension:  5/5 Hip Extension: 5/5   Hip  Abduction: 5/5 Hip Abduction: 5/5   Hip Adduction: 4+/5 Hip Adduction 4+/5   Knee Extension: 5/5 Knee Extension: 5/5   Knee Flexion: 5/5 Knee Flexion: 5/5   Ankle Dorsiflexion: 5/5 Ankle Dorsiflexion: 5/5   Ankle Plantarflexion: 5/5 Ankle Plantarflexion: 5/5       Special Tests      Strength    Left Right    Good Good     Balance Assessment:     Evaluation   Single Limb Stance R LE >30s  (<10 sec = HIGH FALL RISK)   Single Limb Stance L LE >30s  (<10 sec = HIGH FALL RISK)      Evaluation   Timed Up and Go 10 sec  < 20 sec safe for independent transfers, < 30 sec safe for dependent transfers/assist required       Table: Population Norms for TUG    Age  Average TUG    60 - 69 years  8.1 seconds    70 - 79 years  9.2 seconds    80 - 99 years  11.3 seconds        Endurance:    6 Minute Walk Test Distance in meters: 418.9m    - AD used: none  - Assistance: independent  - Distance: 418.9 m    GAIT DEVIATIONS:  Sami displays the following deviations with ambulation: none noted    Impairments contributing to deviations: N/A    Endurance Assessment:   Evaluation   30 second Chair Rise  (adults > 59 y/o) 9 completed with no arms           Education provided:   - plan of care, potential benefit of post-op PT. After session, PT provided pt with exercise handout via Epic patient instructions and email    Written Home Exercises Provided: yes.    See EMR under Patient Instructions for exercises provided 4/25/2023.    Assessment   Sami is a 67 y.o. male referred to outpatient Physical Therapy with a medical diagnosis of Gastrointestinal stromal tumor (GIST) of stomach. He was referred for Prehab PT evaluation, as he is scheduled for surgery on 5/10/23. Overall, pt demo's good strength, endurance, and balance, and he denies any functional limitations with daily activities. After session, pt was provided with some additional exercises via email and pt instructions that he can perform at home. Pt does not need formal PT at this  time. No goals set at this time. Pt may benefit from PT following surgery.    Pt prognosis is Good.       Plan of care discussed with patient: Yes  Pt's spiritual, cultural and educational needs considered and patient is agreeable to the plan of care and goals as stated below:     Anticipated Barriers for therapy: none anticipated    Medical Necessity is demonstrated by the following  History  Co-morbidities and personal factors that may impact the plan of care Co-morbidities:   history of cancer and HTN    Personal Factors:   no deficits     moderate   Examination  Body Structures and Functions, activity limitations and participation restrictions that may impact the plan of care Body Regions:   lower extremities  upper extremities  trunk    Body Systems:    strength    Participation Restrictions:   None anticipated    Activity limitations:   Learning and applying knowledge  no deficits    General Tasks and Commands  no deficits    Communication  no deficits    Mobility  no deficits    Self care  no deficits    Domestic Life  no deficits    Interactions/Relationships  no deficits    Life Areas  no deficits    Community and Social Life  no deficits         moderate   Clinical Presentation stable and uncomplicated low   Decision Making/ Complexity Score: low       Plan   Discharge from Outpatient PT.     Hallie Martinez, PT

## 2023-05-05 DIAGNOSIS — C49.A2 GASTROINTESTINAL STROMAL TUMOR (GIST) OF STOMACH: Primary | ICD-10-CM

## 2023-05-05 RX ORDER — CEFAZOLIN SODIUM 2 G/50ML
2 SOLUTION INTRAVENOUS
Status: CANCELLED | OUTPATIENT
Start: 2023-05-05

## 2023-05-05 RX ORDER — CELECOXIB 200 MG/1
400 CAPSULE ORAL
Status: CANCELLED | OUTPATIENT
Start: 2023-05-05

## 2023-05-05 RX ORDER — ENOXAPARIN SODIUM 100 MG/ML
40 INJECTION SUBCUTANEOUS
Status: CANCELLED | OUTPATIENT
Start: 2023-05-05

## 2023-05-05 RX ORDER — METRONIDAZOLE 500 MG/100ML
500 INJECTION, SOLUTION INTRAVENOUS
Status: CANCELLED | OUTPATIENT
Start: 2023-05-05

## 2023-05-05 RX ORDER — ACETAMINOPHEN 500 MG
1000 TABLET ORAL
Status: CANCELLED | OUTPATIENT
Start: 2023-05-05

## 2023-05-05 NOTE — PROGRESS NOTES
Patient Active Problem List   Diagnosis    Laryngeal nodule    Gastrointestinal stromal tumor (GIST) of stomach    Primary hypertension    Thyroid nodule    Drug-induced immunodeficiency    Decreased strength

## 2023-05-09 ENCOUNTER — ANESTHESIA EVENT (OUTPATIENT)
Dept: SURGERY | Facility: HOSPITAL | Age: 68
DRG: 328 | End: 2023-05-09
Payer: MEDICARE

## 2023-05-09 ENCOUNTER — TELEPHONE (OUTPATIENT)
Dept: SURGERY | Facility: CLINIC | Age: 68
End: 2023-05-09
Payer: MEDICARE

## 2023-05-09 RX ORDER — SODIUM CHLORIDE 0.9 % (FLUSH) 0.9 %
10 SYRINGE (ML) INJECTION
Status: DISCONTINUED | OUTPATIENT
Start: 2023-05-09 | End: 2023-05-13 | Stop reason: HOSPADM

## 2023-05-09 NOTE — ANESTHESIA PREPROCEDURE EVALUATION
[facility]  Anesthesia Pre-Operative Evaluation         Patient Name: Sami Zhang  YOB: 1955  MRN: [id number]    SUBJECTIVE:     Pre-operative evaluation for Procedure(s) (LRB):  XI ROBOTIC GASTRECTOMY, partial, possible total (N/A)  EGD (ESOPHAGOGASTRODUODENOSCOPY) (N/A)     05/09/2023    Sami Zhang is a 67 y.o. male w/ a significant PMHx of HTN and GIST diagnosed in 2022. He has completed neoadjuvant therapy with some reduction in size of tumor.     Good functional capacity. No anesthetic issues with EGD last month.               Patient Active Problem List   Diagnosis    Laryngeal nodule    Gastrointestinal stromal tumor (GIST) of stomach    Primary hypertension    Thyroid nodule    Drug-induced immunodeficiency    Decreased strength       Review of patient's allergies indicates:  No Known Allergies    Current Medications:  Current Outpatient Medications   Medication Instructions    allopurinoL (ZYLOPRIM) 100 mg, Oral, 2 times daily    CALCIUM CARBONATE/VITAMIN D3 (VITAMIN D-3 ORAL) 10,000 Units, Oral, Weekly    carbamide peroxide (DEBROX) 6.5 % otic solution 5 drops, Left Ear, 2 times daily    hydroCHLOROthiazide (HYDRODIURIL) 25 mg, Oral, Daily    icosapent ethyL (VASCEPA) 0.5 gram Cap 2 capsules, Oral, 2 times daily    imatinib (GLEEVEC) 400 mg, Oral, Daily    loperamide (IMODIUM A-D) 2 mg Tab Take two tabs at the onset of loose stools.  Then take one tab after every subsequent loose stool.    multivitamin capsule 1 capsule, Oral, Daily    nebivoloL (BYSTOLIC) 10 mg, Oral, Daily    potassium chloride SA (K-DUR,KLOR-CON) 20 MEQ tablet 40 mEq, Oral, Daily    rosuvastatin (CRESTOR) 20 mg, Oral, Daily    valsartan (DIOVAN) 160 mg, Oral, Daily    vitamin D (VITAMIN D3) 10,000 Units, Oral, Weekly       Past Surgical History:   Procedure Laterality Date    COLONOSCOPY N/A 12/15/2022    Procedure: COLONOSCOPY;  Surgeon: Sean Bran MD;  Location: [facility]  ENDO (4TH FLR);  Service: Endoscopy;  Laterality: N/A;  inst handed-t  12/8/22- precall completed-    ENDOSCOPIC ULTRASOUND OF UPPER GASTROINTESTINAL TRACT N/A 09/09/2022    Procedure: ULTRASOUND, UPPER GI TRACT, ENDOSCOPIC;  Surgeon: Yosvany Paula MD;  Location: [facility] ENDO (2ND FLR);  Service: Endoscopy;  Laterality: N/A;  The patient need an EGD/EUS (radial) for gastric mass. Main. Urgent. 60 minutes. Referring:   MD Estefani Campos Ricardo Romero, MD    ESOPHAGOGASTRODUODENOSCOPY N/A 09/09/2022    Procedure: EGD (ESOPHAGOGASTRODUODENOSCOPY);  Surgeon: Yosvany Paula MD;  Location: [facility] ENDO (2ND FLR);  Service: Endoscopy;  Laterality: N/A;  The patient need an EGD/EUS (radial) for gastric mass. Main. Urgent. 60 minutes. Referring:   MD Estefani Campos Ricardo Romero, MD    Pt is fully vaccinated-DS  9/7/22-Instructions written down by pt's wife and read back.-DS    ESOPHAGOGASTRODUODENOSCOPY N/A 4/21/2023    Procedure: EGD (ESOPHAGOGASTRODUODENOSCOPY);  Surgeon: Kostas López MD;  Location: [facility] OR 2ND FLR;  Service: General;  Laterality: N/A;    KNEE SURGERY Bilateral     unsure type of knee surgery, unsure if metal was placed, completed at another hospital    NONE N/A 10/08/2015         Social History     Substance and Sexual Activity   Drug Use No     Alcohol Use: Not on file     Tobacco Use: Low Risk     Smoking Tobacco Use: Never    Smokeless Tobacco Use: Never    Passive Exposure: Not on file       OBJECTIVE:     Vital Signs Range (Last 24H):         Significant Labs:  Lab Results   Component Value Date    WBC 7.53 04/14/2023    HGB 11.4 (L) 04/14/2023    HCT 36.4 (L) 04/14/2023     04/14/2023    INR 1.0 07/01/2016       Lab Results   Component Value Date     04/14/2023    K 3.0 (L) 04/14/2023     04/14/2023    BUN 13 04/14/2023    CO2 27 04/14/2023       No results found for: TSH    EKG:   Results for orders placed or performed during the hospital  encounter of 08/29/22   EKG 12-lead    Collection Time: 08/29/22 12:04 PM    Narrative    Test Reason : R10.9,    Vent. Rate : 054 BPM     Atrial Rate : 054 BPM     P-R Int : 208 ms          QRS Dur : 084 ms      QT Int : 418 ms       P-R-T Axes : 049 032 005 degrees     QTc Int : 396 ms    Sinus bradycardia  Nonspecific ST and T wave abnormality  Abnormal ECG  When compared with ECG of 01-JUL-2016 11:45,  Nonspecific T wave abnormality now evident in Anterior leads  Confirmed by Elías MOSQUEDA MD ([id number]) on 8/29/2022 10:43:49 PM    Referred By: MARIA LUISA   SELF           Confirmed By:Elías MOSQUEDA MD       ASSESSMENT/PLAN:       Pre-op Assessment    I have reviewed the Patient Summary Reports.     I have reviewed the Nursing Notes. I have reviewed the NPO Status.   I have reviewed the Medications.     Review of Systems      Physical Exam  General: Well nourished, Cooperative, Alert and Oriented    Airway:  Mallampati: II / II  Mouth Opening: Normal  TM Distance: Normal  Tongue: Normal  Neck ROM: Normal ROM    Dental:  Intact        Anesthesia Plan  Type of Anesthesia, risks & benefits discussed:    Anesthesia Type: Gen ETT  Intra-op Monitoring Plan: Standard ASA Monitors  Post Op Pain Control Plan: multimodal analgesia and IV/PO Opioids PRN  Induction:  IV  Airway Plan: Video and Direct  Informed Consent: Informed consent signed with the Patient and all parties understand the risks and agree with anesthesia plan.  All questions answered.   ASA Score: 3  Day of Surgery Review of History & Physical: H&P Update referred to the surgeon/provider.    Ready For Surgery From Anesthesia Perspective.     .

## 2023-05-09 NOTE — PRE-PROCEDURE INSTRUCTIONS
Reviewed with patient's wife medication/preoperative instructions.Patient's wife verbalized understanding and had no further questions at this time

## 2023-05-10 ENCOUNTER — HOSPITAL ENCOUNTER (INPATIENT)
Facility: HOSPITAL | Age: 68
LOS: 3 days | Discharge: HOME | DRG: 328 | End: 2023-05-13
Attending: SURGERY | Admitting: SURGERY
Payer: MEDICARE

## 2023-05-10 ENCOUNTER — ANESTHESIA (OUTPATIENT)
Dept: SURGERY | Facility: HOSPITAL | Age: 68
DRG: 328 | End: 2023-05-10
Payer: MEDICARE

## 2023-05-10 DIAGNOSIS — C49.A2 GASTROINTESTINAL STROMAL TUMOR (GIST) OF STOMACH: Primary | ICD-10-CM

## 2023-05-10 LAB
ABO + RH BLD: NORMAL
BLD GP AB SCN CELLS X3 SERPL QL: NORMAL
POCT GLUCOSE: 200 MG/DL (ref 70–110)

## 2023-05-10 PROCEDURE — C1729 CATH, DRAINAGE: HCPCS | Mod: HCNC | Performed by: SURGERY

## 2023-05-10 PROCEDURE — 27201423 OPTIME MED/SURG SUP & DEVICES STERILE SUPPLY: Mod: HCNC | Performed by: SURGERY

## 2023-05-10 PROCEDURE — 86900 BLOOD TYPING SEROLOGIC ABO: CPT | Mod: HCNC

## 2023-05-10 PROCEDURE — 36000712 HC OR TIME LEV V 1ST 15 MIN: Mod: HCNC | Performed by: SURGERY

## 2023-05-10 PROCEDURE — 43659 UNLISTED LAPS PX STOMACH: CPT | Mod: HCNC,,, | Performed by: SURGERY

## 2023-05-10 PROCEDURE — 63600175 PHARM REV CODE 636 W HCPCS: Mod: HCNC

## 2023-05-10 PROCEDURE — 25000003 PHARM REV CODE 250: Mod: HCNC

## 2023-05-10 PROCEDURE — 36415 COLL VENOUS BLD VENIPUNCTURE: CPT | Mod: HCNC

## 2023-05-10 PROCEDURE — 88307 TISSUE EXAM BY PATHOLOGIST: CPT | Mod: HCNC | Performed by: PATHOLOGY

## 2023-05-10 PROCEDURE — 88307 TISSUE EXAM BY PATHOLOGIST: CPT | Mod: 26,HCNC,, | Performed by: PATHOLOGY

## 2023-05-10 PROCEDURE — 88305 TISSUE EXAM BY PATHOLOGIST: ICD-10-PCS | Mod: 26,HCNC,, | Performed by: PATHOLOGY

## 2023-05-10 PROCEDURE — D9220A PRA ANESTHESIA: ICD-10-PCS | Mod: HCNC,,, | Performed by: ANESTHESIOLOGY

## 2023-05-10 PROCEDURE — 88305 TISSUE EXAM BY PATHOLOGIST: CPT | Mod: 26,HCNC,, | Performed by: PATHOLOGY

## 2023-05-10 PROCEDURE — 36000713 HC OR TIME LEV V EA ADD 15 MIN: Mod: HCNC | Performed by: SURGERY

## 2023-05-10 PROCEDURE — 71000033 HC RECOVERY, INTIAL HOUR: Mod: HCNC | Performed by: SURGERY

## 2023-05-10 PROCEDURE — S0030 INJECTION, METRONIDAZOLE: HCPCS | Mod: HCNC

## 2023-05-10 PROCEDURE — 25000003 PHARM REV CODE 250: Mod: HCNC | Performed by: STUDENT IN AN ORGANIZED HEALTH CARE EDUCATION/TRAINING PROGRAM

## 2023-05-10 PROCEDURE — 43659 R LAPROSCOPIC PARTIAL GASTRECTOMY (DISTAL, PROXIMAL OR SLEEVE): ICD-10-PCS | Mod: HCNC,,, | Performed by: SURGERY

## 2023-05-10 PROCEDURE — 71000015 HC POSTOP RECOV 1ST HR: Mod: HCNC | Performed by: SURGERY

## 2023-05-10 PROCEDURE — 88305 TISSUE EXAM BY PATHOLOGIST: CPT | Mod: HCNC | Performed by: PATHOLOGY

## 2023-05-10 PROCEDURE — 37000009 HC ANESTHESIA EA ADD 15 MINS: Mod: HCNC | Performed by: SURGERY

## 2023-05-10 PROCEDURE — 86920 COMPATIBILITY TEST SPIN: CPT | Mod: HCNC

## 2023-05-10 PROCEDURE — C9113 INJ PANTOPRAZOLE SODIUM, VIA: HCPCS | Mod: HCNC

## 2023-05-10 PROCEDURE — 88307 PR  SURG PATH,LEVEL V: ICD-10-PCS | Mod: 26,HCNC,, | Performed by: PATHOLOGY

## 2023-05-10 PROCEDURE — 20600001 HC STEP DOWN PRIVATE ROOM: Mod: HCNC

## 2023-05-10 PROCEDURE — 63600175 PHARM REV CODE 636 W HCPCS: Mod: HCNC | Performed by: STUDENT IN AN ORGANIZED HEALTH CARE EDUCATION/TRAINING PROGRAM

## 2023-05-10 PROCEDURE — D9220A PRA ANESTHESIA: Mod: HCNC,,, | Performed by: ANESTHESIOLOGY

## 2023-05-10 PROCEDURE — 94761 N-INVAS EAR/PLS OXIMETRY MLT: CPT | Mod: HCNC

## 2023-05-10 PROCEDURE — 71000016 HC POSTOP RECOV ADDL HR: Mod: HCNC | Performed by: SURGERY

## 2023-05-10 PROCEDURE — 37000008 HC ANESTHESIA 1ST 15 MINUTES: Mod: HCNC | Performed by: SURGERY

## 2023-05-10 PROCEDURE — 25000003 PHARM REV CODE 250: Mod: HCNC | Performed by: SURGERY

## 2023-05-10 RX ORDER — EPHEDRINE SULFATE 50 MG/ML
INJECTION, SOLUTION INTRAVENOUS
Status: DISCONTINUED | OUTPATIENT
Start: 2023-05-10 | End: 2023-05-10

## 2023-05-10 RX ORDER — DEXTROSE, SODIUM CHLORIDE, SODIUM LACTATE, POTASSIUM CHLORIDE, AND CALCIUM CHLORIDE 5; .6; .31; .03; .02 G/100ML; G/100ML; G/100ML; G/100ML; G/100ML
INJECTION, SOLUTION INTRAVENOUS CONTINUOUS
Status: DISCONTINUED | OUTPATIENT
Start: 2023-05-10 | End: 2023-05-12

## 2023-05-10 RX ORDER — ACETAMINOPHEN 500 MG
1000 TABLET ORAL
Status: DISCONTINUED | OUTPATIENT
Start: 2023-05-10 | End: 2023-05-10

## 2023-05-10 RX ORDER — ENOXAPARIN SODIUM 100 MG/ML
40 INJECTION SUBCUTANEOUS EVERY 24 HOURS
Status: DISCONTINUED | OUTPATIENT
Start: 2023-05-11 | End: 2023-05-13 | Stop reason: HOSPADM

## 2023-05-10 RX ORDER — LEVALBUTEROL INHALATION SOLUTION 0.63 MG/3ML
0.63 SOLUTION RESPIRATORY (INHALATION)
Status: DISCONTINUED | OUTPATIENT
Start: 2023-05-10 | End: 2023-05-10

## 2023-05-10 RX ORDER — HALOPERIDOL 5 MG/ML
0.5 INJECTION INTRAMUSCULAR EVERY 10 MIN PRN
Status: DISCONTINUED | OUTPATIENT
Start: 2023-05-10 | End: 2023-05-10 | Stop reason: HOSPADM

## 2023-05-10 RX ORDER — ROCURONIUM BROMIDE 10 MG/ML
INJECTION, SOLUTION INTRAVENOUS
Status: DISCONTINUED | OUTPATIENT
Start: 2023-05-10 | End: 2023-05-10

## 2023-05-10 RX ORDER — FENTANYL CITRATE 50 UG/ML
INJECTION, SOLUTION INTRAMUSCULAR; INTRAVENOUS
Status: DISCONTINUED | OUTPATIENT
Start: 2023-05-10 | End: 2023-05-10

## 2023-05-10 RX ORDER — ONDANSETRON 2 MG/ML
INJECTION INTRAMUSCULAR; INTRAVENOUS
Status: DISCONTINUED | OUTPATIENT
Start: 2023-05-10 | End: 2023-05-10

## 2023-05-10 RX ORDER — PANTOPRAZOLE SODIUM 40 MG/10ML
40 INJECTION, POWDER, LYOPHILIZED, FOR SOLUTION INTRAVENOUS DAILY
Status: DISCONTINUED | OUTPATIENT
Start: 2023-05-10 | End: 2023-05-13 | Stop reason: HOSPADM

## 2023-05-10 RX ORDER — ENOXAPARIN SODIUM 100 MG/ML
40 INJECTION SUBCUTANEOUS
Status: DISCONTINUED | OUTPATIENT
Start: 2023-05-10 | End: 2023-05-10

## 2023-05-10 RX ORDER — PROPOFOL 10 MG/ML
VIAL (ML) INTRAVENOUS
Status: DISCONTINUED | OUTPATIENT
Start: 2023-05-10 | End: 2023-05-10

## 2023-05-10 RX ORDER — HYDROMORPHONE HYDROCHLORIDE 1 MG/ML
0.2 INJECTION, SOLUTION INTRAMUSCULAR; INTRAVENOUS; SUBCUTANEOUS EVERY 5 MIN PRN
Status: DISCONTINUED | OUTPATIENT
Start: 2023-05-10 | End: 2023-05-10 | Stop reason: HOSPADM

## 2023-05-10 RX ORDER — LIDOCAINE HYDROCHLORIDE 20 MG/ML
INJECTION INTRAVENOUS
Status: DISCONTINUED | OUTPATIENT
Start: 2023-05-10 | End: 2023-05-10

## 2023-05-10 RX ORDER — HYDROMORPHONE HYDROCHLORIDE 1 MG/ML
0.5 INJECTION, SOLUTION INTRAMUSCULAR; INTRAVENOUS; SUBCUTANEOUS EVERY 4 HOURS PRN
Status: DISCONTINUED | OUTPATIENT
Start: 2023-05-10 | End: 2023-05-13

## 2023-05-10 RX ORDER — ACETAMINOPHEN 10 MG/ML
1000 INJECTION, SOLUTION INTRAVENOUS EVERY 8 HOURS
Status: COMPLETED | OUTPATIENT
Start: 2023-05-10 | End: 2023-05-11

## 2023-05-10 RX ORDER — VASOPRESSIN 20 [USP'U]/ML
INJECTION, SOLUTION INTRAMUSCULAR; SUBCUTANEOUS
Status: DISCONTINUED | OUTPATIENT
Start: 2023-05-10 | End: 2023-05-10

## 2023-05-10 RX ORDER — DEXAMETHASONE SODIUM PHOSPHATE 4 MG/ML
INJECTION, SOLUTION INTRA-ARTICULAR; INTRALESIONAL; INTRAMUSCULAR; INTRAVENOUS; SOFT TISSUE
Status: DISCONTINUED | OUTPATIENT
Start: 2023-05-10 | End: 2023-05-10

## 2023-05-10 RX ORDER — MIDAZOLAM HYDROCHLORIDE 1 MG/ML
INJECTION INTRAMUSCULAR; INTRAVENOUS
Status: DISCONTINUED | OUTPATIENT
Start: 2023-05-10 | End: 2023-05-10

## 2023-05-10 RX ORDER — KETOROLAC TROMETHAMINE 30 MG/ML
INJECTION, SOLUTION INTRAMUSCULAR; INTRAVENOUS
Status: DISCONTINUED | OUTPATIENT
Start: 2023-05-10 | End: 2023-05-10

## 2023-05-10 RX ORDER — METRONIDAZOLE 500 MG/100ML
500 INJECTION, SOLUTION INTRAVENOUS
Status: DISCONTINUED | OUTPATIENT
Start: 2023-05-10 | End: 2023-05-10

## 2023-05-10 RX ORDER — PHENYLEPHRINE HYDROCHLORIDE 10 MG/ML
INJECTION INTRAVENOUS
Status: DISCONTINUED | OUTPATIENT
Start: 2023-05-10 | End: 2023-05-10

## 2023-05-10 RX ORDER — BUPIVACAINE HYDROCHLORIDE 2.5 MG/ML
INJECTION, SOLUTION EPIDURAL; INFILTRATION; INTRACAUDAL
Status: DISCONTINUED | OUTPATIENT
Start: 2023-05-10 | End: 2023-05-10 | Stop reason: HOSPADM

## 2023-05-10 RX ORDER — KETOROLAC TROMETHAMINE 15 MG/ML
15 INJECTION, SOLUTION INTRAMUSCULAR; INTRAVENOUS EVERY 6 HOURS PRN
Status: ACTIVE | OUTPATIENT
Start: 2023-05-10 | End: 2023-05-13

## 2023-05-10 RX ORDER — CELECOXIB 200 MG/1
400 CAPSULE ORAL
Status: DISCONTINUED | OUTPATIENT
Start: 2023-05-10 | End: 2023-05-10

## 2023-05-10 RX ORDER — KETAMINE HCL IN 0.9 % NACL 50 MG/5 ML
SYRINGE (ML) INTRAVENOUS
Status: DISCONTINUED | OUTPATIENT
Start: 2023-05-10 | End: 2023-05-10

## 2023-05-10 RX ORDER — HYDROMORPHONE HYDROCHLORIDE 1 MG/ML
0.2 INJECTION, SOLUTION INTRAMUSCULAR; INTRAVENOUS; SUBCUTANEOUS
Status: DISCONTINUED | OUTPATIENT
Start: 2023-05-10 | End: 2023-05-11

## 2023-05-10 RX ADMIN — EPHEDRINE SULFATE 5 MG: 50 INJECTION INTRAVENOUS at 08:05

## 2023-05-10 RX ADMIN — SODIUM CHLORIDE, SODIUM GLUCONATE, SODIUM ACETATE, POTASSIUM CHLORIDE, MAGNESIUM CHLORIDE, SODIUM PHOSPHATE, DIBASIC, AND POTASSIUM PHOSPHATE: .53; .5; .37; .037; .03; .012; .00082 INJECTION, SOLUTION INTRAVENOUS at 08:05

## 2023-05-10 RX ADMIN — KETOROLAC TROMETHAMINE 15 MG: 30 INJECTION, SOLUTION INTRAMUSCULAR; INTRAVENOUS at 12:05

## 2023-05-10 RX ADMIN — CELECOXIB 400 MG: 200 CAPSULE ORAL at 06:05

## 2023-05-10 RX ADMIN — FENTANYL CITRATE 25 MCG: 50 INJECTION, SOLUTION INTRAMUSCULAR; INTRAVENOUS at 12:05

## 2023-05-10 RX ADMIN — Medication 10 MG: at 12:05

## 2023-05-10 RX ADMIN — EPHEDRINE SULFATE 10 MG: 50 INJECTION INTRAVENOUS at 08:05

## 2023-05-10 RX ADMIN — VASOPRESSIN 1 UNITS: 20 INJECTION INTRAVENOUS at 11:05

## 2023-05-10 RX ADMIN — LIDOCAINE HYDROCHLORIDE 100 MG: 20 INJECTION INTRAVENOUS at 08:05

## 2023-05-10 RX ADMIN — ROCURONIUM BROMIDE 10 MG: 10 INJECTION, SOLUTION INTRAVENOUS at 10:05

## 2023-05-10 RX ADMIN — GLYCOPYRROLATE 0.2 MG: 0.2 INJECTION, SOLUTION INTRAMUSCULAR; INTRAVENOUS at 08:05

## 2023-05-10 RX ADMIN — HALOPERIDOL LACTATE 0.5 MG: 5 INJECTION, SOLUTION INTRAMUSCULAR at 02:05

## 2023-05-10 RX ADMIN — DEXAMETHASONE SODIUM PHOSPHATE 4 MG: 4 INJECTION, SOLUTION INTRAMUSCULAR; INTRAVENOUS at 08:05

## 2023-05-10 RX ADMIN — ENOXAPARIN SODIUM 40 MG: 40 INJECTION SUBCUTANEOUS at 06:05

## 2023-05-10 RX ADMIN — SUGAMMADEX 200 MG: 100 INJECTION, SOLUTION INTRAVENOUS at 12:05

## 2023-05-10 RX ADMIN — Medication 30 MG: at 08:05

## 2023-05-10 RX ADMIN — PROPOFOL 30 MG: 10 INJECTION, EMULSION INTRAVENOUS at 11:05

## 2023-05-10 RX ADMIN — MIDAZOLAM HYDROCHLORIDE 2 MG: 1 INJECTION INTRAMUSCULAR; INTRAVENOUS at 08:05

## 2023-05-10 RX ADMIN — ROCURONIUM BROMIDE 50 MG: 10 INJECTION, SOLUTION INTRAVENOUS at 08:05

## 2023-05-10 RX ADMIN — Medication 10 MG: at 09:05

## 2023-05-10 RX ADMIN — SODIUM CHLORIDE 0.2 MCG/KG/MIN: 9 INJECTION, SOLUTION INTRAVENOUS at 09:05

## 2023-05-10 RX ADMIN — ONDANSETRON 4 MG: 2 INJECTION INTRAMUSCULAR; INTRAVENOUS at 12:05

## 2023-05-10 RX ADMIN — SODIUM CHLORIDE: 0.9 INJECTION, SOLUTION INTRAVENOUS at 06:05

## 2023-05-10 RX ADMIN — ROCURONIUM BROMIDE 10 MG: 10 INJECTION, SOLUTION INTRAVENOUS at 11:05

## 2023-05-10 RX ADMIN — ACETAMINOPHEN 1000 MG: 500 TABLET ORAL at 06:05

## 2023-05-10 RX ADMIN — Medication 10 MG: at 10:05

## 2023-05-10 RX ADMIN — ACETAMINOPHEN 1000 MG: 10 INJECTION INTRAVENOUS at 01:05

## 2023-05-10 RX ADMIN — PHENYLEPHRINE HYDROCHLORIDE 100 MCG: 10 INJECTION INTRAVENOUS at 08:05

## 2023-05-10 RX ADMIN — FENTANYL CITRATE 25 MCG: 50 INJECTION, SOLUTION INTRAMUSCULAR; INTRAVENOUS at 11:05

## 2023-05-10 RX ADMIN — PROPOFOL 50 MG: 10 INJECTION, EMULSION INTRAVENOUS at 08:05

## 2023-05-10 RX ADMIN — CEFAZOLIN 2 G: 2 INJECTION, POWDER, FOR SOLUTION INTRAMUSCULAR; INTRAVENOUS at 08:05

## 2023-05-10 RX ADMIN — SODIUM CHLORIDE, SODIUM LACTATE, POTASSIUM CHLORIDE, CALCIUM CHLORIDE AND DEXTROSE MONOHYDRATE: 5; 600; 310; 30; 20 INJECTION, SOLUTION INTRAVENOUS at 09:05

## 2023-05-10 RX ADMIN — ACETAMINOPHEN 1000 MG: 10 INJECTION INTRAVENOUS at 09:05

## 2023-05-10 RX ADMIN — PANTOPRAZOLE SODIUM 40 MG: 40 INJECTION, POWDER, FOR SOLUTION INTRAVENOUS at 01:05

## 2023-05-10 RX ADMIN — METRONIDAZOLE 500 MG: 500 INJECTION, SOLUTION INTRAVENOUS at 06:05

## 2023-05-10 RX ADMIN — HYDROMORPHONE HYDROCHLORIDE 0.5 MG: 1 INJECTION, SOLUTION INTRAMUSCULAR; INTRAVENOUS; SUBCUTANEOUS at 06:05

## 2023-05-10 RX ADMIN — SODIUM CHLORIDE, SODIUM LACTATE, POTASSIUM CHLORIDE, CALCIUM CHLORIDE AND DEXTROSE MONOHYDRATE: 5; 600; 310; 30; 20 INJECTION, SOLUTION INTRAVENOUS at 01:05

## 2023-05-10 RX ADMIN — FENTANYL CITRATE 25 MCG: 50 INJECTION, SOLUTION INTRAMUSCULAR; INTRAVENOUS at 09:05

## 2023-05-10 RX ADMIN — PROPOFOL 30 MG: 10 INJECTION, EMULSION INTRAVENOUS at 10:05

## 2023-05-10 RX ADMIN — PROPOFOL 150 MG: 10 INJECTION, EMULSION INTRAVENOUS at 08:05

## 2023-05-10 RX ADMIN — ROCURONIUM BROMIDE 30 MG: 10 INJECTION, SOLUTION INTRAVENOUS at 09:05

## 2023-05-10 RX ADMIN — FENTANYL CITRATE 100 MCG: 50 INJECTION, SOLUTION INTRAMUSCULAR; INTRAVENOUS at 08:05

## 2023-05-10 RX ADMIN — ROCURONIUM BROMIDE 10 MG: 10 INJECTION, SOLUTION INTRAVENOUS at 12:05

## 2023-05-10 RX ADMIN — Medication 10 MG: at 11:05

## 2023-05-10 NOTE — NURSING
[facility] Plan of Care Note  Dx GIST of the stomach    Shift Events none     Goals of Care: pain control and education on post op plan    Neuro: A/O x's 3    Vital Signs:   Vitals:    05/10/23 1445   BP: 134/62   Pulse: 75   Resp: 15   Temp: 98.2 °F (36.8 °C)        Respiratory: WNL    Diet: NPO    Is patient tolerating current diet? yes    GTTS: D5LR     Urine Output/Bowel Movement: none yet    Drains/Tubes/Tube Feeds (include total output/shift): None    Lines: PIV x's 2      Accuchecks:no    Skin: lap incisions    Fall Risk Score: 9    Activity level? Up with assist    Any scheduled procedures? no    Any safety concerns? no    Other: na

## 2023-05-10 NOTE — ANESTHESIA PROCEDURE NOTES
Intubation    Date/Time: 5/10/2023 8:11 AM  Performed by: Jacob Hopkins DO  Authorized by: Clemencia Conrad MD     Intubation:     Induction:  Rapid sequence induction    Intubated:  Postinduction    Mask Ventilation:  Easy with oral airway    Attempts:  1    Attempted By:  Resident anesthesiologist    Method of Intubation:  Direct    Blade:  Rosendo 3    Laryngeal View Grade: Grade IIA - cords partially seen      Difficult Airway Encountered?: No      Complications:  None    Airway Device:  Oral endotracheal tube    Airway Device Size:  7.5    Style/Cuff Inflation:  Cuffed (inflated to minimal occlusive pressure)    Tube secured:  21    Secured at:  The teeth    Placement Verified By:  Capnometry    Complicating Factors:  None    Findings Post-Intubation:  Atraumatic/condition of teeth unchanged

## 2023-05-10 NOTE — ANESTHESIA POSTPROCEDURE EVALUATION
Anesthesia Post Evaluation    Patient: Sami Zhang    Procedure(s) Performed: Procedure(s) (LRB):  XI ROBOTIC GASTRECTOMY, partial, possible total (N/A)  EGD (ESOPHAGOGASTRODUODENOSCOPY) (N/A)    Final Anesthesia Type: general      Patient location during evaluation: PACU  Patient participation: Yes- Able to Participate  Level of consciousness: awake and alert and oriented  Post-procedure vital signs: reviewed and stable  Pain management: adequate  Airway patency: patent    PONV status at discharge: No PONV  Anesthetic complications: no      Cardiovascular status: blood pressure returned to baseline, hemodynamically stable and stable  Respiratory status: unassisted, room air and spontaneous ventilation  Hydration status: euvolemic  Follow-up not needed.          Vitals Value Taken Time   /62 05/10/23 1447   Temp 36.8 °C (98.2 °F) 05/10/23 1445   Pulse 82 05/10/23 1448   Resp 17 05/10/23 1448   SpO2 99 % 05/10/23 1448   Vitals shown include unvalidated device data.      Event Time   Out of Recovery 13:45:00         Pain/Ayde Score: Pain Rating Prior to Med Admin: 4 (5/10/2023  2:10 PM)  Ayde Score: 9 (5/10/2023  1:45 PM)

## 2023-05-10 NOTE — TRANSFER OF CARE
"Anesthesia Transfer of Care Note    Patient: Sami Zhang    Procedure(s) Performed: Procedure(s) (LRB):  XI ROBOTIC GASTRECTOMY, partial, possible total (N/A)  EGD (ESOPHAGOGASTRODUODENOSCOPY) (N/A)    Patient location: PACU    Anesthesia Type: general    Transport from OR: Transported from OR on 6-10 L/min O2 by face mask with adequate spontaneous ventilation    Post pain: adequate analgesia    Post assessment: no apparent anesthetic complications    Post vital signs: stable    Level of consciousness: awake and responds to stimulation    Nausea/Vomiting: no nausea/vomiting    Complications: none    Transfer of care protocol was followed      Last vitals:   Visit Vitals  BP (!) 163/77 (BP Location: Right arm, Patient Position: Lying)   Pulse (!) 53   Temp 36.9 °C (98.5 °F) (Oral)   Resp 18   Ht 5' 11" (1.803 m)   Wt 83.9 kg (185 lb)   SpO2 100%   BMI 25.80 kg/m²     "

## 2023-05-10 NOTE — PLAN OF CARE
Vital signs stable. Afebrile. Alert, oriented and following commands. Pain controlled with scheduled meds. Nausea resolved with PRN meds. Lap sites x7 remain approximated and dry with dermabond. IVF per MD order. Due to void. POC reviewed with pt and family at bedside. Understanding verbalized.   show

## 2023-05-10 NOTE — BRIEF OP NOTE
[facility] - Surgery ([facility])  Brief Operative Note    SUMMARY     Surgery Date: 5/10/2023     Surgeon(s) and Role:     * Berenice López MD - Primary    Assisting Surgeon: LEXY Garnica MD - Resident     Pre-op Diagnosis:  Gastrointestinal stromal tumor (GIST) of stomach [C49.A2]    Post-op Diagnosis:  Post-Op Diagnosis Codes:     * Gastrointestinal stromal tumor (GIST) of stomach [C49.A2]    Procedure(s) (LRB):  XI ROBOTIC GASTRECTOMY, partial, possible total (N/A)  EGD (ESOPHAGOGASTRODUODENOSCOPY) (N/A)    Anesthesia: General    Operative Findings: There was a mass in the fundus of the stomach that was adherent to the left saeid and retroperitoneum. A wedge resection of the mass was performed and an intraoperative leak test was negative.     Estimated Blood Loss: * No values recorded between 5/10/2023  8:37 AM and 5/10/2023  1:07 PM *    Estimated Blood Loss has been documented.         Specimens:   Specimen (24h ago, onward)       Start     Ordered    05/10/23 1242  Specimen to Pathology, Surgery General Surgery  Once        Comments: Pre-op Diagnosis: Gastrointestinal stromal tumor (GIST) of stomach [C49.A2]Procedure(s):XI ROBOTIC GASTRECTOMY, partial, possible totalEGD (ESOPHAGOGASTRODUODENOSCOPY) Number of specimens: 1Name of specimens: 1. Pericardial Node- permanent2.  Proximal gastric gist--permanent     References:    Click here for ordering Quick Tip   Question Answer Comment   Procedure Type: General Surgery    Specimen Class: Known or suspected malignancy    Which provider would you like to cc? BERENICE LÓPEZ        05/10/23 1243                    [id number]

## 2023-05-11 LAB
ALBUMIN SERPL BCP-MCNC: 3.5 G/DL (ref 3.5–5.2)
ALP SERPL-CCNC: 54 U/L (ref 55–135)
ALT SERPL W/O P-5'-P-CCNC: 292 U/L (ref 10–44)
ANION GAP SERPL CALC-SCNC: 12 MMOL/L (ref 8–16)
AST SERPL-CCNC: 296 U/L (ref 10–40)
BILIRUB SERPL-MCNC: 1.1 MG/DL (ref 0.1–1)
BUN SERPL-MCNC: 16 MG/DL (ref 8–23)
CALCIUM SERPL-MCNC: 9.2 MG/DL (ref 8.7–10.5)
CHLORIDE SERPL-SCNC: 109 MMOL/L (ref 95–110)
CO2 SERPL-SCNC: 19 MMOL/L (ref 23–29)
CREAT SERPL-MCNC: 0.9 MG/DL (ref 0.5–1.4)
ERYTHROCYTE [DISTWIDTH] IN BLOOD BY AUTOMATED COUNT: 15.5 % (ref 11.5–14.5)
EST. GFR  (NO RACE VARIABLE): >60 ML/MIN/1.73 M^2
GLUCOSE SERPL-MCNC: 109 MG/DL (ref 70–110)
HCT VFR BLD AUTO: 37.5 % (ref 40–54)
HGB BLD-MCNC: 12.1 G/DL (ref 14–18)
MAGNESIUM SERPL-MCNC: 2 MG/DL (ref 1.6–2.6)
MCH RBC QN AUTO: 27.3 PG (ref 27–31)
MCHC RBC AUTO-ENTMCNC: 32.3 G/DL (ref 32–36)
MCV RBC AUTO: 85 FL (ref 82–98)
PHOSPHATE SERPL-MCNC: 1.9 MG/DL (ref 2.7–4.5)
PLATELET # BLD AUTO: 171 K/UL (ref 150–450)
PMV BLD AUTO: 12 FL (ref 9.2–12.9)
POTASSIUM SERPL-SCNC: 4.3 MMOL/L (ref 3.5–5.1)
PROT SERPL-MCNC: 6.4 G/DL (ref 6–8.4)
RBC # BLD AUTO: 4.43 M/UL (ref 4.6–6.2)
SODIUM SERPL-SCNC: 140 MMOL/L (ref 136–145)
WBC # BLD AUTO: 14.04 K/UL (ref 3.9–12.7)

## 2023-05-11 PROCEDURE — 94761 N-INVAS EAR/PLS OXIMETRY MLT: CPT | Mod: HCNC

## 2023-05-11 PROCEDURE — 25000003 PHARM REV CODE 250: Mod: HCNC | Performed by: STUDENT IN AN ORGANIZED HEALTH CARE EDUCATION/TRAINING PROGRAM

## 2023-05-11 PROCEDURE — 63600175 PHARM REV CODE 636 W HCPCS: Mod: HCNC

## 2023-05-11 PROCEDURE — 97161 PT EVAL LOW COMPLEX 20 MIN: CPT | Mod: HCNC

## 2023-05-11 PROCEDURE — 20600001 HC STEP DOWN PRIVATE ROOM: Mod: HCNC

## 2023-05-11 PROCEDURE — 99900035 HC TECH TIME PER 15 MIN (STAT): Mod: HCNC

## 2023-05-11 PROCEDURE — 27000646 HC AEROBIKA DEVICE: Mod: HCNC

## 2023-05-11 PROCEDURE — 36415 COLL VENOUS BLD VENIPUNCTURE: CPT | Mod: HCNC

## 2023-05-11 PROCEDURE — 94664 DEMO&/EVAL PT USE INHALER: CPT | Mod: HCNC

## 2023-05-11 PROCEDURE — 85027 COMPLETE CBC AUTOMATED: CPT | Mod: HCNC

## 2023-05-11 PROCEDURE — C9113 INJ PANTOPRAZOLE SODIUM, VIA: HCPCS | Mod: HCNC

## 2023-05-11 PROCEDURE — 97165 OT EVAL LOW COMPLEX 30 MIN: CPT | Mod: HCNC

## 2023-05-11 PROCEDURE — 83735 ASSAY OF MAGNESIUM: CPT | Mod: HCNC

## 2023-05-11 PROCEDURE — 80053 COMPREHEN METABOLIC PANEL: CPT | Mod: HCNC

## 2023-05-11 PROCEDURE — 63600175 PHARM REV CODE 636 W HCPCS: Mod: HCNC | Performed by: STUDENT IN AN ORGANIZED HEALTH CARE EDUCATION/TRAINING PROGRAM

## 2023-05-11 PROCEDURE — 84100 ASSAY OF PHOSPHORUS: CPT | Mod: HCNC

## 2023-05-11 PROCEDURE — 94799 UNLISTED PULMONARY SVC/PX: CPT | Mod: HCNC

## 2023-05-11 PROCEDURE — 25000003 PHARM REV CODE 250: Mod: HCNC | Performed by: NURSE PRACTITIONER

## 2023-05-11 RX ORDER — LABETALOL HCL 20 MG/4 ML
10 SYRINGE (ML) INTRAVENOUS EVERY 4 HOURS PRN
Status: DISCONTINUED | OUTPATIENT
Start: 2023-05-11 | End: 2023-05-12

## 2023-05-11 RX ORDER — ACETAMINOPHEN 10 MG/ML
1000 INJECTION, SOLUTION INTRAVENOUS EVERY 8 HOURS
Status: COMPLETED | OUTPATIENT
Start: 2023-05-11 | End: 2023-05-12

## 2023-05-11 RX ORDER — HYDROMORPHONE HYDROCHLORIDE 1 MG/ML
0.2 INJECTION, SOLUTION INTRAMUSCULAR; INTRAVENOUS; SUBCUTANEOUS EVERY 4 HOURS PRN
Status: DISCONTINUED | OUTPATIENT
Start: 2023-05-11 | End: 2023-05-13

## 2023-05-11 RX ORDER — HYDRALAZINE HYDROCHLORIDE 20 MG/ML
10 INJECTION INTRAMUSCULAR; INTRAVENOUS ONCE
Status: COMPLETED | OUTPATIENT
Start: 2023-05-11 | End: 2023-05-11

## 2023-05-11 RX ADMIN — ACETAMINOPHEN 1000 MG: 10 INJECTION INTRAVENOUS at 09:05

## 2023-05-11 RX ADMIN — SODIUM CHLORIDE, SODIUM LACTATE, POTASSIUM CHLORIDE, CALCIUM CHLORIDE AND DEXTROSE MONOHYDRATE: 5; 600; 310; 30; 20 INJECTION, SOLUTION INTRAVENOUS at 01:05

## 2023-05-11 RX ADMIN — ACETAMINOPHEN 1000 MG: 10 INJECTION INTRAVENOUS at 10:05

## 2023-05-11 RX ADMIN — HYDRALAZINE HYDROCHLORIDE 10 MG: 20 INJECTION, SOLUTION INTRAMUSCULAR; INTRAVENOUS at 08:05

## 2023-05-11 RX ADMIN — ACETAMINOPHEN 1000 MG: 10 INJECTION INTRAVENOUS at 05:05

## 2023-05-11 RX ADMIN — ENOXAPARIN SODIUM 40 MG: 40 INJECTION SUBCUTANEOUS at 05:05

## 2023-05-11 RX ADMIN — LABETALOL HYDROCHLORIDE 10 MG: 5 INJECTION, SOLUTION INTRAVENOUS at 07:05

## 2023-05-11 RX ADMIN — SODIUM CHLORIDE, SODIUM LACTATE, POTASSIUM CHLORIDE, CALCIUM CHLORIDE AND DEXTROSE MONOHYDRATE: 5; 600; 310; 30; 20 INJECTION, SOLUTION INTRAVENOUS at 05:05

## 2023-05-11 RX ADMIN — PANTOPRAZOLE SODIUM 40 MG: 40 INJECTION, POWDER, FOR SOLUTION INTRAVENOUS at 08:05

## 2023-05-11 RX ADMIN — HYDROMORPHONE HYDROCHLORIDE 0.2 MG: 1 INJECTION, SOLUTION INTRAMUSCULAR; INTRAVENOUS; SUBCUTANEOUS at 05:05

## 2023-05-11 RX ADMIN — SODIUM PHOSPHATE, MONOBASIC, MONOHYDRATE AND SODIUM PHOSPHATE, DIBASIC, ANHYDROUS 20.01 MMOL: 276; 142 INJECTION, SOLUTION INTRAVENOUS at 02:05

## 2023-05-11 NOTE — NURSING
Paged on call MD for Dr. López to notify that pt's stomach is distended and firm compared to this morning. Waiting on call back.

## 2023-05-11 NOTE — NURSING
Patient AAOx3, VSS, room air, abdominal incisions, clean dry and intact, voiding per urinal, denies pain, NPO maintained, IV site infusing fluids, free from falls or injury, bed low and locked with alarm activated, side rails upx3, call light and personal belongings in reach. Son at bedside.

## 2023-05-11 NOTE — PT/OT/SLP EVAL
Physical Therapy Evaluation and Discharge Note    Patient Name:  Sami Zhang   MRN:  [id number]    Recommendations:     Discharge Recommendations: home  Discharge Equipment Recommendations: none   Barriers to discharge: None    Assessment:     Sami Zhang is a 67 y.o. male admitted with a medical diagnosis of <principal problem not specified>. .  At this time, patient is functioning at their prior level of function and does not require further acute PT services.     Recent Surgery: Procedure(s) (LRB):  XI ROBOTIC GASTRECTOMY, partial, possible total (N/A)  EGD (ESOPHAGOGASTRODUODENOSCOPY) (N/A) 1 Day Post-Op    Plan:     During this hospitalization, patient does not require further acute PT services.  Please re-consult if situation changes.      Subjective     Chief Complaint: none  Patient/Family Comments/goals: pt. Agreeable to PT  Pain/Comfort:  Pain Rating 1: 0/10  Pain Rating Post-Intervention 1: 0/10    Patients cultural, spiritual, Jain conflicts given the current situation: no    Living Environment:  Pt. Lives with spouse in [facility] and no [place]  Prior to admission, patients level of function was indep.  Equipment used at home: cane, straight.  Upon discharge, patient will have assistance from family.    Objective:     Communicated with nursing prior to session.  Patient found ambulatory in room/arango with peripheral IV upon PT entry to room.    General Precautions: Standard, fall    Orthopedic Precautions:N/A   Braces: N/A  Respiratory Status: Room air    Exams:  RLE ROM: WFL  RLE Strength: WFL  LLE ROM: WFL  LLE Strength: WFL    Functional Mobility:  Gait: 50' with RW and 350' without AD and Supervision without LOB  Balance: fair+    AM-PAC 6 CLICK MOBILITY  Total Score:22       Treatment and Education:  Discussed therapy needs, goals, and POC.    AM-PAC 6 CLICK MOBILITY  Total Score:22     Patient left up in chair with all lines intact and call button in reach.    GOALS:   Multidisciplinary Problems        Physical Therapy Goals       Not on file              Multidisciplinary Problems (Resolved)          Problem: Physical Therapy    Goal Priority Disciplines Outcome Goal Variances Interventions   Physical Therapy Goal   (Resolved)     PT, PT/OT Met                         History:     Past Medical History:   Diagnosis Date    Arthritis     GIST (gastrointestinal stromal tumor), malignant 09/09/2022    Gout 10/08/2015    Hyperlipidemia     Hypertension        Past Surgical History:   Procedure Laterality Date    COLONOSCOPY N/A 12/15/2022    Procedure: COLONOSCOPY;  Surgeon: Sean Bran MD;  Location: [facility] ENDO (4TH FLR);  Service: Endoscopy;  Laterality: N/A;  inst handed-t  12/8/22- precall completed-    ENDOSCOPIC ULTRASOUND OF UPPER GASTROINTESTINAL TRACT N/A 09/09/2022    Procedure: ULTRASOUND, UPPER GI TRACT, ENDOSCOPIC;  Surgeon: Yosvany Paula MD;  Location: [facility] ENDO (2ND FLR);  Service: Endoscopy;  Laterality: N/A;  The patient need an EGD/EUS (radial) for gastric mass. Main. Urgent. 60 minutes. Referring:   Todd Cantu MD   Thanks,   Evan Farr MD    ESOPHAGOGASTRODUODENOSCOPY N/A 09/09/2022    Procedure: EGD (ESOPHAGOGASTRODUODENOSCOPY);  Surgeon: Yosvany Paula MD;  Location: [facility] ENDO (2ND FLR);  Service: Endoscopy;  Laterality: N/A;  The patient need an EGD/EUS (radial) for gastric mass. Main. Urgent. 60 minutes. Referring:   MD Estefani Campos Ricardo Romero, MD    Pt is fully vaccinated-DS  9/7/22-Instructions written down by pt's wife and read back.-DS    ESOPHAGOGASTRODUODENOSCOPY N/A 4/21/2023    Procedure: EGD (ESOPHAGOGASTRODUODENOSCOPY);  Surgeon: Kostas López MD;  Location: [facility] OR 2ND FLR;  Service: General;  Laterality: N/A;    ESOPHAGOGASTRODUODENOSCOPY N/A 5/10/2023    Procedure: EGD (ESOPHAGOGASTRODUODENOSCOPY);  Surgeon: Kostas López MD;  Location: [facility] OR 2ND FLR;  Service: General;  Laterality: N/A;    KNEE SURGERY Bilateral     unsure type  of knee surgery, unsure if metal was placed, completed at another hospital    NONE N/A 10/08/2015    ROBOT-ASSISTED SURGICAL REMOVAL OF STOMACH USING DA WILLIAM XI N/A 5/10/2023    Procedure: XI ROBOTIC GASTRECTOMY, partial, possible total;  Surgeon: Kostas López MD;  Location: [facility] OR [address] [place] [address];  Service: General;  Laterality: N/A;       Time Tracking:     PT Received On: 05/11/23  PT Start Time: 0915     PT Stop Time: 0927  PT Total Time (min): 12 min     Billable Minutes: Evaluation 12      05/11/2023

## 2023-05-11 NOTE — PT/OT/SLP EVAL
Occupational Therapy   Evaluation and Discharge Note    Name: Sami Zhang  MRN: [id number]  Admitting Diagnosis: <principal problem not specified>  Recent Surgery: Procedure(s) (LRB):  XI ROBOTIC GASTRECTOMY, partial, possible total (N/A)  EGD (ESOPHAGOGASTRODUODENOSCOPY) (N/A) 1 Day Post-Op    Recommendations:     Discharge Recommendations: home  Discharge Equipment Recommendations: none  Barriers to discharge:       Assessment:     Sami Zhang is a 67 y.o. male with a medical diagnosis of <principal problem not specified>. At this time, patient is functioning at their prior level of function and does not require further acute OT services.     Plan:     During this hospitalization, patient does not require further acute OT services.  Please re-consult if situation changes.    Plan of Care Reviewed with: patient, son    Subjective     Occupational Profile:  Living Environment: Pt lives with his wife in a H with no steps to enter.  Previous level of function: Independent  Equipment Used at home: cane, straight  Assistance upon Discharge: family    Pain/Comfort:  Pain Rating 1: 0/10    Patients cultural, spiritual, Restoration conflicts given the current situation:      Objective:     Communicated with: rn prior to session.  Patient found supine with peripheral IV upon OT entry to room.    General Precautions: Standard, fall  Orthopedic Precautions:    Braces:    Respiratory Status: Room air     Occupational Performance:    Bed Mobility:    Independent    Functional Mobility/Transfers:  Patient completed Sit <> Stand Transfer with supervision  with  no assistive device   Functional Mobility: Pt walked 400' with (S) / no AD.    Activities of Daily Living:  Independent    Cognitive/Visual Perceptual:  Cognitive/Psychosocial Skills:     -       Oriented to: Person, Place, Time, and Situation   -       Safety awareness/insight to disability: intact     Physical Exam:  BUE AROM/MMT: WNL    AMPAC 6 Click ADL:  AMPAC Total  Score: 24    Treatment & Education:  UE ROM/MMT  Bed mobility training / assessment  Functional mobility assessment  Sit/standing balance assessment  Educated on importance of sitting OOB in bedside chair to promote increased strength, endurance & breathing.  Discussed d/C of OT.    Patient left supine with all lines intact and call button in reach    GOALS:   Multidisciplinary Problems       Occupational Therapy Goals       Not on file                    History:     Past Medical History:   Diagnosis Date    Arthritis     GIST (gastrointestinal stromal tumor), malignant 09/09/2022    Gout 10/08/2015    Hyperlipidemia     Hypertension          Past Surgical History:   Procedure Laterality Date    COLONOSCOPY N/A 12/15/2022    Procedure: COLONOSCOPY;  Surgeon: Sean Bran MD;  Location: [facility] ENDO (4TH FLR);  Service: Endoscopy;  Laterality: N/A;  [facility] handed-t  12/8/22- precall completed-    ENDOSCOPIC ULTRASOUND OF UPPER GASTROINTESTINAL TRACT N/A 09/09/2022    Procedure: ULTRASOUND, UPPER GI TRACT, ENDOSCOPIC;  Surgeon: Yosvany Paula MD;  Location: [facility] ENDO (2ND FLR);  Service: Endoscopy;  Laterality: N/A;  The patient need an EGD/EUS (radial) for gastric mass. Main. Urgent. 60 minutes. Referring:   Todd Cantu MD   Thanks,   Evan Farr MD    ESOPHAGOGASTRODUODENOSCOPY N/A 09/09/2022    Procedure: EGD (ESOPHAGOGASTRODUODENOSCOPY);  Surgeon: Yosvany Paula MD;  Location: [facility] (2ND FLR);  Service: Endoscopy;  Laterality: N/A;  The patient need an EGD/EUS (radial) for gastric mass. Main. Urgent. 60 minutes. Referring:   Todd Cantu MD   Thanks,   Evan Farr MD    Pt is fully vaccinated-DS  9/7/22-Instructions written down by pt's wife and read back.-DS    ESOPHAGOGASTRODUODENOSCOPY N/A 4/21/2023    Procedure: EGD (ESOPHAGOGASTRODUODENOSCOPY);  Surgeon: Kostas López MD;  Location: [facility] OR 2ND FLR;  Service: General;  Laterality: N/A;    ESOPHAGOGASTRODUODENOSCOPY N/A 5/10/2023     Procedure: EGD (ESOPHAGOGASTRODUODENOSCOPY);  Surgeon: Kostas López MD;  Location: [facility] OR [address] [place] [address];  Service: General;  Laterality: N/A;    KNEE SURGERY Bilateral     unsure type of knee surgery, unsure if metal was placed, completed at another hospital    NONE N/A 10/08/2015    ROBOT-ASSISTED SURGICAL REMOVAL OF STOMACH USING DA WILLIAM XI N/A 5/10/2023    Procedure: XI ROBOTIC GASTRECTOMY, partial, possible total;  Surgeon: Kostas López MD;  Location: [facility] OR [address] [place] [address];  Service: General;  Laterality: N/A;       Time Tracking:     OT Date of Treatment: 05/11/23  OT Start Time: 0915  OT Stop Time: 0926  OT Total Time (min): 11 min    Billable Minutes:Evaluation 11    5/11/2023

## 2023-05-11 NOTE — OP NOTE
[facility]  Surgery Department  Operative Note       Date of Procedure: 5/10/2023       Surgeon(s):  Surgeon(s) and Role:     * Kostas López MD - Primary  Assisting Surgeon: None    Pre-Operative Diagnosis:   Gastrointestinal stromal tumor (GIST) of stomach [C49.A2]      Post-Operative Diagnosis:   Same  Umbilical hernia (not incarcerated, no gangrene)     Anesthesia: General    Operative Findings:   Perforation/invasion into left saeid of diaphragm  R0/R1 resection based on final path, no residual disease post resection    Procedures:  Robotic assisted laparoscopic partial gastrectomy  EGD to localize tumor and guide resection  Umbilical hernia repair     Estimated Blood Loss (EBL):  <20 mL    Specimen(s):  pericardial nodes, proximal gastric mass             Indications:  Sami Zhang presents for the above procedures.  Risks and benefits were reviewed including bleeding, infection, damage to local structures, anastomotic leakage, need for additional procedures, death, and imponderables.  He understands and gave informed consent to proceed.    Details:  The patient was transported to the operating room and satisfactory anesthesia established.  Preoperative antibiotics were administered.  The patient was placed in the supine position and extremities were padded and protected throughout.  A roche catheter  placed.  Appropriate lines were placed by anesthesia.    The operative field was prepped and draped in sterile fashion.  A timeout was performed.  The peritoneal cavity was accessed via a LUQ optiview technique at Burks's point, insufflation was easy.  Additional 8 mm trocars were placed transversely across the mid abdomen under vision.  A 12 mm assist port was placed through an umbilical hernia.  A subxiphoid Jazz retractor was placed.  The robot was docked and the remainder of the procedure completed from the console.    The lesser omentum was incised, the lesser sac entered from that side  and the right crura dissected around to encircle the esophagus with a penrose.  The tumor was not immediately visible.  The lesser sac was entered from the greater curve side and an extensive gastric mobilization ensued, freeing the stomach from the short gastrics, dividing posterior gastric adhesions and eventually localizing the tumor at the proximal fundus.  It was adherent and perforated into the left saeid, a limited muscular resection was performed to free all gross tumor from the diaphragm and retroperitoneum, bringing the perforated area onto the stomach.      With everything freed up, an EGD was performed through the duodenum which was normal, on withdrawal, the EGJ was visualized and through simultaneous manipulation from the abdomen we were able to confirm the location and that we were free of the esophagus.  A wedge resection of the fundus was planned and mapped with the ultrasound through grossly tumor free planes.  The gastroscope was left in place to protect the EGJ.   A series of linear sureform staplers, one green followed by blue cartridges were used to divide the stomach off the tumor, accepting that this was going to be a close margin in the face of a perforated/locally advanced tumor.    The specimen was placed in a bag.  The gastroscope was used to assess the staple line which was secure under an underwater air leak test, not bleeding and allowed for free passage through the EGJ.  The gastroscope was removed, the liver retractor removed, the robot undocked.  The stapler port was closed with 0-vicryl.  Remaining ports were removed under vision.    The specimen was removed in a bag intact.  The umbilical hernia was excised and closed primarily with interrupted figure 8 0-ethibond suture.  Skin was closed with absorbable suture and dermabond.      All needle, lap, and sponge counts were reported as correct.  I communicated the intraoperative findings with the family following the procedure.      Condition: Good    Disposition: PACU - hemodynamically stable.    Attestation: I was present and scrubbed for the key portions of the procedure.

## 2023-05-11 NOTE — PLAN OF CARE
[facility]  Initial Discharge Assessment       Primary Care Provider: Mitch Gatica Jr, MD    Admission Diagnosis: Gastrointestinal stromal tumor (GIST) of stomach [C49.A2]    Admission Date: 5/10/2023  Expected Discharge Date:     Discharge Barriers Identified: None    Payor: HU[employer] MANAGED MEDICARE / Plan: HUMANA TOTAL CARE ADVANTAGE / Product Type: Medicare Advantage /     Extended Emergency Contact Information  Primary Emergency Contact: Bhavesh Zhang  Address: [address] [place] [address] [facility]  Home Phone: [phone]  Mobile Phone: [phone]  Relation: Spouse    Discharge Plan A:  (TBD)  Discharge Plan B:  (TBD)      [id number] - [address] AT [address] [address]  Phone: [phone] Fax: [phone]    [facility] - [facility] [address] [facility]  [address] [facility] [address]  Phone: [phone] Fax: [phone]    [facility]  [address]  [facility] [address]  Phone: [phone] Fax: [phone]    (Inactive see NCPDP [id number]) [facility] [place] - [address]  [address]  [address] [address]  Phone: [phone] Fax: [phone]      Initial Assessment (most recent)       Adult Discharge Assessment - 05/11/23 0824          Discharge Assessment    Assessment Type Discharge Planning Assessment     Confirmed/corrected address, phone number and insurance Yes     Confirmed Demographics Correct on Facesheet     Source of Information patient;family     When was your last doctors appointment? 03/15/23     Communicated MURTAZA with patient/caregiver Date not available/Unable to determine     People in Home spouse     Do you expect to return to your current living situation? Yes     Do you have help at home or someone to help you manage your  care at home? No     Prior to hospitilization cognitive status: No Deficits     Current cognitive status: No Deficits     Home Layout Able to live on 1st floor     Equipment Currently Used at Home none     Readmission within 30 days? No     Patient currently being followed by outpatient case management? No     Do you currently have service(s) that help you manage your care at home? No     Do you take prescription medications? Yes     Do you have prescription coverage? Yes     Do you have any problems affording any of your prescribed medications? No     Is the patient taking medications as prescribed? yes     Who is going to help you get home at discharge? FAMILY     How do you get to doctors appointments? car, drives self;family or friend will provide     Are you on dialysis? No     Do you take coumadin? No     Discharge Plan A --   TBD    Discharge Plan B --   TBD    DME Needed Upon Discharge  none     Discharge Plan discussed with: Patient;Adult children     Discharge Barriers Identified None        Physical Activity    On average, how many days per week do you engage in moderate to strenuous exercise (like a brisk walk)? 0 days     On average, how many minutes do you engage in exercise at this level? 0 min        Financial Resource Strain    How hard is it for you to pay for the very basics like food, housing, medical care, and heating? Not hard at all        Housing Stability    In the last 12 months, was there a time when you were not able to pay the mortgage or rent on time? No     In the last 12 months, how many places have you lived? --   1       Transportation Needs    In the past 12 months, has lack of transportation kept you from medical appointments or from getting medications? No     In the past 12 months, has lack of transportation kept you from meetings, work, or from getting things needed for daily living? No        Food Insecurity    Within the past 12 months, you worried that your food would run  out before you got the money to buy more. Never true     Within the past 12 months, the food you bought just didn't last and you didn't have money to get more. Never true        Stress    Do you feel stress - tense, restless, nervous, or anxious, or unable to sleep at night because your mind is troubled all the time - these days? Patient refused        Social Connections    In a typical week, how many times do you talk on the phone with family, friends, or neighbors? Patient refused     How often do you get together with friends or relatives? Patient refused     How often do you attend Quaker or Mu-ism services? Never     Do you belong to any clubs or organizations such as Quaker groups, unions, fraternal or athletic groups, or school groups? No     Are you , , , , never , or living with a partner?         Alcohol Use    Q1: How often do you have a drink containing alcohol? Never     Q2: How many drinks containing alcohol do you have on a typical day when you are drinking? Patient does not drink     Q3: How often do you have six or more drinks on one occasion? Never                   Patient lives in a 1 story house with his spouse he is not on dialysis and does not take coumadin he has a ride home  POA/SON LUIGI AVELAR

## 2023-05-11 NOTE — PROGRESS NOTES
SURGICAL ONCOLOGY    HPI  66 yo M s/p neoadjuvant gleevac for gastric gist now s/p robotic wedge resection on 5/10    SUBJ  Pain controlled.  No issues with secretions.  No other complaints.    OBJ  Vitals:    05/11/23 0521   BP: (!) 160/70   Pulse: 60   Resp: 16   Temp: 98.7 °F (37.1 °C)      Physical Exam  Constitutional:       Appearance: He is well-developed.   HENT:      Head: Normocephalic and atraumatic.   Eyes:      General:         Right eye: No discharge.         Left eye: No discharge.   Cardiovascular:      Rate and Rhythm: Normal rate and regular rhythm.   Pulmonary:      Effort: Pulmonary effort is normal. No respiratory distress.   Abdominal:      Comments: Soft, non-tender, incisions c/d/I  Minimal distension   Musculoskeletal:         General: Normal range of motion.      Cervical back: Normal range of motion and neck supple.   Skin:     General: Skin is warm and dry.   Neurological:      Mental Status: He is alert and oriented to person, place, and time.   Psychiatric:         Behavior: Behavior normal.     Recent Labs   Lab 05/11/23  0246   WBC 14.04*   RBC 4.43*   HGB 12.1*   HCT 37.5*      MCV 85   MCH 27.3   MCHC 32.3      Recent Labs   Lab 05/11/23  0246      K 4.3      CO2 19*   BUN 16   CREATININE 0.9   MG 2.0      Intake/Output - Last 3 Shifts         05/09 0700  05/10 0659 05/10 0700  05/11 0659    I.V. (mL/kg)  499.4 (6)    IV Piggyback  1146.4    Total Intake(mL/kg)  1645.8 (19.6)    Urine (mL/kg/hr)  200 (0.1)    Total Output  200    Net  +1445.8          Urine Occurrence  2 x    Stool Occurrence  0 x          Imaging- None since surgery    A+P  66 yo M s/p robotic wedge resection for gastric GIST    -NPO, esophagram Friday  -iVF, pain control, PPI  -OOBTC, PT/OT    Truman Burton MD  General Surgery, PGY-5  [phone]

## 2023-05-11 NOTE — NURSING
[facility] Plan Of Care Note    Dx: Gastrointestinal stromal tumor     Shift Events:  Pt ambulated in hallway    Goals of Care: Advance diet    Neuro:  A&O x4    Vital Signs: WNL     Respiratory: RA     Diet: NPO     Is patient tolerating current diet? No; states he is hungry.      GTTS: Sodium phopsphate 20.01 D5 @ 62.5 mL/hr      Urine Output/Bowel Movement:  Passing flatus     Drains/Tubes/Tube Feeds (include total output/shift):      Lines: R FA 18     Accuchecks: NA     Skin: 7 lap sites to abdomen     Fall Risk Score: 1     Activity level? Independent     Any scheduled procedures? Esophogram 5/12/23     Any safety concerns? NA

## 2023-05-12 LAB
ALBUMIN SERPL BCP-MCNC: 3.4 G/DL (ref 3.5–5.2)
ALP SERPL-CCNC: 53 U/L (ref 55–135)
ALT SERPL W/O P-5'-P-CCNC: 195 U/L (ref 10–44)
ANION GAP SERPL CALC-SCNC: 8 MMOL/L (ref 8–16)
AST SERPL-CCNC: 127 U/L (ref 10–40)
BILIRUB SERPL-MCNC: 1.3 MG/DL (ref 0.1–1)
BUN SERPL-MCNC: 12 MG/DL (ref 8–23)
CALCIUM SERPL-MCNC: 9 MG/DL (ref 8.7–10.5)
CHLORIDE SERPL-SCNC: 108 MMOL/L (ref 95–110)
CO2 SERPL-SCNC: 25 MMOL/L (ref 23–29)
CREAT SERPL-MCNC: 0.8 MG/DL (ref 0.5–1.4)
ERYTHROCYTE [DISTWIDTH] IN BLOOD BY AUTOMATED COUNT: 15.8 % (ref 11.5–14.5)
EST. GFR  (NO RACE VARIABLE): >60 ML/MIN/1.73 M^2
GLUCOSE SERPL-MCNC: 100 MG/DL (ref 70–110)
HCT VFR BLD AUTO: 32.7 % (ref 40–54)
HGB BLD-MCNC: 10.5 G/DL (ref 14–18)
MAGNESIUM SERPL-MCNC: 2 MG/DL (ref 1.6–2.6)
MCH RBC QN AUTO: 27 PG (ref 27–31)
MCHC RBC AUTO-ENTMCNC: 32.1 G/DL (ref 32–36)
MCV RBC AUTO: 84 FL (ref 82–98)
PHOSPHATE SERPL-MCNC: 1.9 MG/DL (ref 2.7–4.5)
PLATELET # BLD AUTO: 160 K/UL (ref 150–450)
PMV BLD AUTO: 11.6 FL (ref 9.2–12.9)
POTASSIUM SERPL-SCNC: 3.5 MMOL/L (ref 3.5–5.1)
PROT SERPL-MCNC: 6 G/DL (ref 6–8.4)
RBC # BLD AUTO: 3.89 M/UL (ref 4.6–6.2)
SODIUM SERPL-SCNC: 141 MMOL/L (ref 136–145)
WBC # BLD AUTO: 10.82 K/UL (ref 3.9–12.7)

## 2023-05-12 PROCEDURE — 94664 DEMO&/EVAL PT USE INHALER: CPT | Mod: HCNC

## 2023-05-12 PROCEDURE — 84100 ASSAY OF PHOSPHORUS: CPT | Mod: HCNC

## 2023-05-12 PROCEDURE — 63600175 PHARM REV CODE 636 W HCPCS: Mod: HCNC

## 2023-05-12 PROCEDURE — C9113 INJ PANTOPRAZOLE SODIUM, VIA: HCPCS | Mod: HCNC

## 2023-05-12 PROCEDURE — 36415 COLL VENOUS BLD VENIPUNCTURE: CPT | Mod: HCNC

## 2023-05-12 PROCEDURE — 99900035 HC TECH TIME PER 15 MIN (STAT): Mod: HCNC

## 2023-05-12 PROCEDURE — 83735 ASSAY OF MAGNESIUM: CPT | Mod: HCNC

## 2023-05-12 PROCEDURE — 85027 COMPLETE CBC AUTOMATED: CPT | Mod: HCNC

## 2023-05-12 PROCEDURE — 25000003 PHARM REV CODE 250: Mod: HCNC | Performed by: NURSE PRACTITIONER

## 2023-05-12 PROCEDURE — 20600001 HC STEP DOWN PRIVATE ROOM: Mod: HCNC

## 2023-05-12 PROCEDURE — 25500020 PHARM REV CODE 255: Mod: HCNC | Performed by: SURGERY

## 2023-05-12 PROCEDURE — 80053 COMPREHEN METABOLIC PANEL: CPT | Mod: HCNC

## 2023-05-12 PROCEDURE — 63600175 PHARM REV CODE 636 W HCPCS: Mod: HCNC | Performed by: STUDENT IN AN ORGANIZED HEALTH CARE EDUCATION/TRAINING PROGRAM

## 2023-05-12 PROCEDURE — 25000003 PHARM REV CODE 250: Mod: HCNC | Performed by: STUDENT IN AN ORGANIZED HEALTH CARE EDUCATION/TRAINING PROGRAM

## 2023-05-12 RX ORDER — ENOXAPARIN SODIUM 100 MG/ML
40 INJECTION SUBCUTANEOUS DAILY
Qty: 5.6 ML | Refills: 0 | Status: SHIPPED | OUTPATIENT
Start: 2023-05-12 | End: 2023-05-27

## 2023-05-12 RX ORDER — HYDRALAZINE HYDROCHLORIDE 20 MG/ML
10 INJECTION INTRAMUSCULAR; INTRAVENOUS EVERY 4 HOURS PRN
Status: DISCONTINUED | OUTPATIENT
Start: 2023-05-12 | End: 2023-05-13 | Stop reason: HOSPADM

## 2023-05-12 RX ORDER — HYDRALAZINE HYDROCHLORIDE 20 MG/ML
10 INJECTION INTRAMUSCULAR; INTRAVENOUS EVERY 6 HOURS PRN
Status: DISCONTINUED | OUTPATIENT
Start: 2023-05-12 | End: 2023-05-12

## 2023-05-12 RX ORDER — LABETALOL HCL 20 MG/4 ML
10 SYRINGE (ML) INTRAVENOUS EVERY 4 HOURS PRN
Status: DISCONTINUED | OUTPATIENT
Start: 2023-05-12 | End: 2023-05-13 | Stop reason: HOSPADM

## 2023-05-12 RX ORDER — ATORVASTATIN CALCIUM 40 MG/1
80 TABLET, FILM COATED ORAL DAILY
Status: DISCONTINUED | OUTPATIENT
Start: 2023-05-12 | End: 2023-05-12

## 2023-05-12 RX ORDER — HYDROCHLOROTHIAZIDE 25 MG/1
25 TABLET ORAL DAILY
Status: DISCONTINUED | OUTPATIENT
Start: 2023-05-12 | End: 2023-05-13 | Stop reason: HOSPADM

## 2023-05-12 RX ORDER — METOPROLOL SUCCINATE 100 MG/1
100 TABLET, EXTENDED RELEASE ORAL DAILY
Status: DISCONTINUED | OUTPATIENT
Start: 2023-05-12 | End: 2023-05-13 | Stop reason: HOSPADM

## 2023-05-12 RX ORDER — ALLOPURINOL 100 MG/1
100 TABLET ORAL 2 TIMES DAILY
Status: DISCONTINUED | OUTPATIENT
Start: 2023-05-12 | End: 2023-05-13 | Stop reason: HOSPADM

## 2023-05-12 RX ADMIN — HYDROCHLOROTHIAZIDE 25 MG: 25 TABLET ORAL at 01:05

## 2023-05-12 RX ADMIN — LABETALOL HYDROCHLORIDE 10 MG: 5 INJECTION, SOLUTION INTRAVENOUS at 05:05

## 2023-05-12 RX ADMIN — HYDRALAZINE HYDROCHLORIDE 10 MG: 20 INJECTION, SOLUTION INTRAMUSCULAR; INTRAVENOUS at 05:05

## 2023-05-12 RX ADMIN — LABETALOL HYDROCHLORIDE 10 MG: 5 INJECTION, SOLUTION INTRAVENOUS at 02:05

## 2023-05-12 RX ADMIN — SODIUM CHLORIDE, SODIUM LACTATE, POTASSIUM CHLORIDE, CALCIUM CHLORIDE AND DEXTROSE MONOHYDRATE: 5; 600; 310; 30; 20 INJECTION, SOLUTION INTRAVENOUS at 02:05

## 2023-05-12 RX ADMIN — SODIUM PHOSPHATE, MONOBASIC, MONOHYDRATE AND SODIUM PHOSPHATE, DIBASIC, ANHYDROUS 30 MMOL: 276; 142 INJECTION, SOLUTION INTRAVENOUS at 04:05

## 2023-05-12 RX ADMIN — SODIUM CHLORIDE, SODIUM LACTATE, POTASSIUM CHLORIDE, CALCIUM CHLORIDE AND DEXTROSE MONOHYDRATE: 5; 600; 310; 30; 20 INJECTION, SOLUTION INTRAVENOUS at 11:05

## 2023-05-12 RX ADMIN — ENOXAPARIN SODIUM 40 MG: 40 INJECTION SUBCUTANEOUS at 04:05

## 2023-05-12 RX ADMIN — METOPROLOL SUCCINATE 100 MG: 100 TABLET, EXTENDED RELEASE ORAL at 04:05

## 2023-05-12 RX ADMIN — POTASSIUM BICARBONATE 60 MEQ: 391 TABLET, EFFERVESCENT ORAL at 04:05

## 2023-05-12 RX ADMIN — PANTOPRAZOLE SODIUM 40 MG: 40 INJECTION, POWDER, FOR SOLUTION INTRAVENOUS at 11:05

## 2023-05-12 RX ADMIN — IOHEXOL 150 ML: 350 INJECTION, SOLUTION INTRAVENOUS at 09:05

## 2023-05-12 RX ADMIN — ACETAMINOPHEN 1000 MG: 10 INJECTION INTRAVENOUS at 05:05

## 2023-05-12 RX ADMIN — ALLOPURINOL 100 MG: 100 TABLET ORAL at 09:05

## 2023-05-12 NOTE — CONSULTS
Food & Nutrition  Education    Diet Education: Full liquid; S/p partial gastrectomy  Time Spent: 10 minutes   Learners: Patient, 3 family members     Nutrition Education provided with handouts. All questions and concerns answered. Dietitian's contact information provided.     Pt scheduled to be discharged Sunday, 5/14.    Follow-Up: Yes    Please re-consult as needed.    Thanks!  MS Janette, RD, LDN

## 2023-05-12 NOTE — PROGRESS NOTES
SURGICAL ONCOLOGY    HPI  66 yo M s/p neoadjuvant gleevac for gastric gist now s/p robotic wedge resection on 5/10    SUBJ  Pain controlled.  No nausea.  Passing flatus.  Has ambulated.    OBJ  Vitals:    05/12/23 0504   BP: (!) 176/80   Pulse: (!) 56   Resp:    Temp:       Physical Exam  Constitutional:       Appearance: He is well-developed.   HENT:      Head: Normocephalic and atraumatic.   Eyes:      General:         Right eye: No discharge.         Left eye: No discharge.   Cardiovascular:      Rate and Rhythm: Normal rate and regular rhythm.   Pulmonary:      Effort: Pulmonary effort is normal. No respiratory distress.   Abdominal:      Comments: Soft, non-tender, incisions c/d/I  Minimal distension   Musculoskeletal:         General: Normal range of motion.      Cervical back: Normal range of motion and neck supple.   Skin:     General: Skin is warm and dry.   Neurological:      Mental Status: He is alert and oriented to person, place, and time.   Psychiatric:         Behavior: Behavior normal.     Recent Labs   Lab 05/12/23  0243   WBC 10.82   RBC 3.89*   HGB 10.5*   HCT 32.7*      MCV 84   MCH 27.0   MCHC 32.1        Recent Labs   Lab 05/12/23  0243      K 3.5      CO2 25   BUN 12   CREATININE 0.8   MG 2.0        Intake/Output - Last 3 Shifts         05/10 0700  05/11 0659 05/11 0700  05/12 0659    I.V. (mL/kg) 499.4 (6)     IV Piggyback 1146.4     Total Intake(mL/kg) 1645.8 (19.6)     Urine (mL/kg/hr) 200 (0.1)     Total Output 200     Net +1445.8           Urine Occurrence 2 x     Stool Occurrence 0 x 0 x          Imaging- None since surgery    A+P  66 yo M s/p robotic wedge resection for gastric GIST    -NPO, esophagram today  -iVF, pain control, PPI  -OOBTC, PT/OT  -Lovenox    Dispo: If esophagram normal, will advance      Truman Burton MD  General Surgery, PGY-5  [phone]

## 2023-05-12 NOTE — PLAN OF CARE
[facility]  Discharge Reassessment    Primary Care Provider: Mitch Gatica Jr, MD    Expected Discharge Date: 5/16/2023    Reassessment (most recent)       Discharge Reassessment - 05/12/23 1333          Discharge Reassessment    Assessment Type Discharge Planning Reassessment     Did the patient's condition or plan change since previous assessment? Yes     Discharge Plan discussed with: Patient     Communicated MURTAZA with patient/caregiver Yes     Discharge Plan A Home with family     Discharge Plan B Home     DME Needed Upon Discharge  none     Transition of Care Barriers None     Why the patient remains in the hospital Requires continued medical care        Post-Acute Status    Post-Acute Authorization Home Health     Home Health Status Referrals Sent     Hospital Resources/Appts/Education Provided Provided patient/caregiver with written discharge plan information     Discharge Delays None known at this time                     Pt not ready for discharge due to: Not medically ready  SW will remain available for families in [facility].  Currently pt has d/c plans in progress at this time.      Norma Suárez LCSW  Case Management/[facility]  [phone]

## 2023-05-12 NOTE — NURSING
Paged on call MD for Dr. López to notify that pt is still hypertensive despite all PRN and scheduled BP meds given.

## 2023-05-12 NOTE — NURSING
Patient's blood pressure was 198/90 with a heart rate of 62 during oncoming vitals. PRN labetalol administered at 1915. Blood pressure rechecked at 2003 and pressure was 183/85 with a heart rate of 55. Patient also has abdominal distention and reports passing a small amount of flatus. Dr. Walls with surgery notified of above information and ordered hydralazine and reported that team is aware of abdominal distention.

## 2023-05-12 NOTE — NURSING
Patient AAOx3, blood pressure elevated throughout shift, PRN labetalol x2 doses and hydralazine x1 dose administered with mild effect, Dr. Walls notified during shift about patient's blood pressure,  room air, abdominal incisions, clean dry and intact, voiding per urinal, denies pain, NPO maintained, IV site infusing fluids, free from falls or injury, bed low and locked with alarm activated, side rails upx3, call light and personal belongings in reach. Son at bedside.

## 2023-05-13 VITALS
OXYGEN SATURATION: 97 % | HEART RATE: 61 BPM | BODY MASS INDEX: 25.9 KG/M2 | RESPIRATION RATE: 18 BRPM | WEIGHT: 185 LBS | HEIGHT: 71 IN | SYSTOLIC BLOOD PRESSURE: 160 MMHG | DIASTOLIC BLOOD PRESSURE: 81 MMHG | TEMPERATURE: 98 F

## 2023-05-13 LAB
ALBUMIN SERPL BCP-MCNC: 3.4 G/DL (ref 3.5–5.2)
ALP SERPL-CCNC: 65 U/L (ref 55–135)
ALT SERPL W/O P-5'-P-CCNC: 117 U/L (ref 10–44)
ANION GAP SERPL CALC-SCNC: 12 MMOL/L (ref 8–16)
AST SERPL-CCNC: 48 U/L (ref 10–40)
BASOPHILS # BLD AUTO: 0.04 K/UL (ref 0–0.2)
BASOPHILS NFR BLD: 0.4 % (ref 0–1.9)
BILIRUB SERPL-MCNC: 1.7 MG/DL (ref 0.1–1)
BUN SERPL-MCNC: 10 MG/DL (ref 8–23)
CALCIUM SERPL-MCNC: 9.4 MG/DL (ref 8.7–10.5)
CHLORIDE SERPL-SCNC: 109 MMOL/L (ref 95–110)
CO2 SERPL-SCNC: 22 MMOL/L (ref 23–29)
CREAT SERPL-MCNC: 0.9 MG/DL (ref 0.5–1.4)
DIFFERENTIAL METHOD: ABNORMAL
EOSINOPHIL # BLD AUTO: 0.6 K/UL (ref 0–0.5)
EOSINOPHIL NFR BLD: 6.3 % (ref 0–8)
ERYTHROCYTE [DISTWIDTH] IN BLOOD BY AUTOMATED COUNT: 15.5 % (ref 11.5–14.5)
EST. GFR  (NO RACE VARIABLE): >60 ML/MIN/1.73 M^2
GLUCOSE SERPL-MCNC: 84 MG/DL (ref 70–110)
HCT VFR BLD AUTO: 36.2 % (ref 40–54)
HGB BLD-MCNC: 11.3 G/DL (ref 14–18)
IMM GRANULOCYTES # BLD AUTO: 0.04 K/UL (ref 0–0.04)
IMM GRANULOCYTES NFR BLD AUTO: 0.4 % (ref 0–0.5)
LYMPHOCYTES # BLD AUTO: 1.5 K/UL (ref 1–4.8)
LYMPHOCYTES NFR BLD: 14.7 % (ref 18–48)
MAGNESIUM SERPL-MCNC: 2 MG/DL (ref 1.6–2.6)
MCH RBC QN AUTO: 26.8 PG (ref 27–31)
MCHC RBC AUTO-ENTMCNC: 31.2 G/DL (ref 32–36)
MCV RBC AUTO: 86 FL (ref 82–98)
MONOCYTES # BLD AUTO: 1 K/UL (ref 0.3–1)
MONOCYTES NFR BLD: 10.2 % (ref 4–15)
NEUTROPHILS # BLD AUTO: 6.7 K/UL (ref 1.8–7.7)
NEUTROPHILS NFR BLD: 68 % (ref 38–73)
NRBC BLD-RTO: 0 /100 WBC
PHOSPHATE SERPL-MCNC: 2.7 MG/DL (ref 2.7–4.5)
PLATELET # BLD AUTO: 176 K/UL (ref 150–450)
PMV BLD AUTO: 12.2 FL (ref 9.2–12.9)
POTASSIUM SERPL-SCNC: 3.7 MMOL/L (ref 3.5–5.1)
PROT SERPL-MCNC: 6.3 G/DL (ref 6–8.4)
RBC # BLD AUTO: 4.21 M/UL (ref 4.6–6.2)
SODIUM SERPL-SCNC: 143 MMOL/L (ref 136–145)
WBC # BLD AUTO: 9.86 K/UL (ref 3.9–12.7)

## 2023-05-13 PROCEDURE — 63600175 PHARM REV CODE 636 W HCPCS: Mod: HCNC | Performed by: STUDENT IN AN ORGANIZED HEALTH CARE EDUCATION/TRAINING PROGRAM

## 2023-05-13 PROCEDURE — 36415 COLL VENOUS BLD VENIPUNCTURE: CPT | Mod: HCNC

## 2023-05-13 PROCEDURE — C9113 INJ PANTOPRAZOLE SODIUM, VIA: HCPCS | Mod: HCNC

## 2023-05-13 PROCEDURE — 83735 ASSAY OF MAGNESIUM: CPT | Mod: HCNC

## 2023-05-13 PROCEDURE — 80053 COMPREHEN METABOLIC PANEL: CPT | Mod: HCNC

## 2023-05-13 PROCEDURE — 63600175 PHARM REV CODE 636 W HCPCS: Mod: HCNC

## 2023-05-13 PROCEDURE — 85025 COMPLETE CBC W/AUTO DIFF WBC: CPT | Mod: HCNC | Performed by: STUDENT IN AN ORGANIZED HEALTH CARE EDUCATION/TRAINING PROGRAM

## 2023-05-13 PROCEDURE — 25000003 PHARM REV CODE 250: Mod: HCNC | Performed by: NURSE PRACTITIONER

## 2023-05-13 PROCEDURE — 84100 ASSAY OF PHOSPHORUS: CPT | Mod: HCNC

## 2023-05-13 RX ORDER — PANTOPRAZOLE SODIUM 40 MG/1
40 TABLET, DELAYED RELEASE ORAL DAILY
Qty: 30 TABLET | Refills: 11 | Status: SHIPPED | OUTPATIENT
Start: 2023-05-13 | End: 2024-05-12

## 2023-05-13 RX ORDER — OXYCODONE HYDROCHLORIDE 5 MG/1
5 TABLET ORAL EVERY 6 HOURS PRN
Status: DISCONTINUED | OUTPATIENT
Start: 2023-05-13 | End: 2023-05-13 | Stop reason: HOSPADM

## 2023-05-13 RX ORDER — OXYCODONE HYDROCHLORIDE 10 MG/1
10 TABLET ORAL EVERY 6 HOURS PRN
Status: DISCONTINUED | OUTPATIENT
Start: 2023-05-13 | End: 2023-05-13 | Stop reason: HOSPADM

## 2023-05-13 RX ORDER — HYDROCODONE BITARTRATE AND ACETAMINOPHEN 5; 325 MG/1; MG/1
1 TABLET ORAL EVERY 4 HOURS PRN
Qty: 20 TABLET | Refills: 0 | Status: SHIPPED | OUTPATIENT
Start: 2023-05-13 | End: 2023-09-22

## 2023-05-13 RX ADMIN — PANTOPRAZOLE SODIUM 40 MG: 40 INJECTION, POWDER, FOR SOLUTION INTRAVENOUS at 09:05

## 2023-05-13 RX ADMIN — METOPROLOL SUCCINATE 100 MG: 100 TABLET, EXTENDED RELEASE ORAL at 09:05

## 2023-05-13 RX ADMIN — HYDRALAZINE HYDROCHLORIDE 10 MG: 20 INJECTION, SOLUTION INTRAMUSCULAR; INTRAVENOUS at 12:05

## 2023-05-13 RX ADMIN — ALLOPURINOL 100 MG: 100 TABLET ORAL at 09:05

## 2023-05-13 RX ADMIN — HYDROCHLOROTHIAZIDE 25 MG: 25 TABLET ORAL at 09:05

## 2023-05-13 NOTE — PROGRESS NOTES
SURGICAL ONCOLOGY    HPI:  66 yo M s/p neoadjuvant gleevac for gastric gist now s/p robotic wedge resection on 5/10    SUBJ:  Doing well. Tolerating CLD. Passing flatus and having Bms. Received diet teaching yesterday.     OBJ:  Vitals:    05/13/23 0750   BP: (!) 168/75   Pulse: 60   Resp: 20   Temp: 98.6 °F (37 °C)      Physical Exam  Constitutional:       Appearance: He is well-developed.   HENT:      Head: Normocephalic and atraumatic.   Eyes:      General:         Right eye: No discharge.         Left eye: No discharge.   Cardiovascular:      Rate and Rhythm: Normal rate and regular rhythm.   Pulmonary:      Effort: Pulmonary effort is normal. No respiratory distress.   Abdominal:      Comments: Soft, non-tender, incisions c/d/I  Minimal distension   Musculoskeletal:         General: Normal range of motion.      Cervical back: Normal range of motion and neck supple.   Skin:     General: Skin is warm and dry.   Neurological:      Mental Status: He is alert and oriented to person, place, and time.   Psychiatric:         Behavior: Behavior normal.     Recent Labs   Lab 05/13/23  0520   WBC 9.86   RBC 4.21*   HGB 11.3*   HCT 36.2*      MCV 86   MCH 26.8*   MCHC 31.2*        Recent Labs   Lab 05/13/23  0520      K 3.7      CO2 22*   BUN 10   CREATININE 0.9   MG 2.0        Intake/Output - Last 3 Shifts         05/11 0700  05/12 0659 05/12 0700  05/13 0659 05/13 0700  05/14 0659    I.V. (mL/kg)       IV Piggyback       Total Intake(mL/kg)       Urine (mL/kg/hr)       Total Output       Net              Stool Occurrence 0 x 0 x           Imaging- None since surgery    A+P  66 yo M s/p robotic wedge resection for gastric GIST    - FLD. Will discharge on FLD  - D/c IVF  - Lovenox  - pain control, PPI  - OOBTC, PT/OT  - Lovenox    Dispo:: Possible d/c after lunch if doing well    Griselda Gill MD  General Surgery PGY4

## 2023-05-13 NOTE — PLAN OF CARE
[facility]  Discharge Final Note    Primary Care Provider: Mitch Gatica Jr, MD    Expected Discharge Date: 5/13/2023    Patient is medically ready to discharge home. No post-acute needs identified.    Final Discharge Note (most recent)       Final Note - 05/13/23 1415          Final Note    Assessment Type Final Discharge Note     Anticipated Discharge Disposition Home or Self Care     What phone number can be called within the next 1-3 days to see how you are doing after discharge? [phone]     Hospital Resources/Appts/Education Provided Provided patient/caregiver with written discharge plan information;Appointments scheduled by Navigator/Coordinator        Post-Acute Status    Discharge Delays None known at this time                   Important Message from Medicare         Contact Info       Kostas López MD   Specialty: Surgical Oncology, [facility]  [address]  [facility] [address]   Phone: [phone]       Next Steps: Follow up          Sheila Ronquillo RN  Weekend  - [facility]  Spectralink: [phone]

## 2023-05-13 NOTE — DISCHARGE SUMMARY
[facility]  General Surgery  Discharge Summary      Patient Name: Sami Zhang  MRN: [id number]  Admission Date: 5/10/2023  Hospital Length of Stay: 3 days  Discharge Date and Time:  05/13/2023 1:27 PM  Attending Physician: Kostas López MD   Discharging Provider: Medina Marquez MD  Primary Care Provider: Mitch Gatica Jr, MD    HPI: 68 yo M s/p neoadjuvant gleevac for gastric gist now s/p robotic wedge resection on 5/10    Procedure(s) (LRB):  XI ROBOTIC GASTRECTOMY, partial, possible total (N/A)  EGD (ESOPHAGOGASTRODUODENOSCOPY) (N/A)      Indwelling Lines/Drains at time of discharge:   Lines/Drains/Airways     None               Hospital Course: SAMI ZHANG 67 y.o.male underwent: Procedure(s) (LRB):  XI ROBOTIC GASTRECTOMY, partial, possible total (N/A)  EGD (ESOPHAGOGASTRODUODENOSCOPY) (N/A). The patient tolerated the procedure well, was transferred to recovery post-op, and then transferred to the floor for continuation of medical care. The patient's clinical condition progressively improved. Swallow study on POD 2 was performed and negative. His diet was advanced. He received diet teaching for full liquid diet at home. By the time of discharge, he was tolerating a diet without nausea or vomiting, pain was well controlled with oral medications, and he was ambulating without difficulty. On POD 3 the patient was discharged to home in good condition. On discharge, the patient's incisions were c/d/i and the surgical site was soft and appropriately tender to palpation. The patient will follow up with Dr. López in clinic as scheduled 5/23/23. He will continue on the full liquid diet until the follow up         Consults:   Consults (From admission, onward)        Status Ordering Provider     Inpatient consult to Registered Dietitian/Nutritionist  Once        Provider:  (Not yet assigned)    MIRNA Inman          Significant Diagnostic Studies: Labs:   BMP:   Recent Labs   Lab  05/12/23  0243 05/13/23  0520    84    143   K 3.5 3.7    109   CO2 25 22*   BUN 12 10   CREATININE 0.8 0.9   CALCIUM 9.0 9.4   MG 2.0 2.0    and CBC   Recent Labs   Lab 05/12/23  0243 05/13/23  0520   WBC 10.82 9.86   HGB 10.5* 11.3*   HCT 32.7* 36.2*    176       Pending Diagnostic Studies:     Procedure Component Value Units Date/Time    Specimen to Pathology, Surgery General Surgery [[id number]] Collected: 05/10/23 1244    Order Status: Sent Lab Status: In process Updated: 05/11/23 0832    Specimen: Tissue         Final Active Diagnoses:    Diagnosis Date Noted POA    PRINCIPAL PROBLEM:  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  No pushing/pulling such as vacuuming or raking.  No straining, avoid constipation and take stool softeners as described and laxatives as needed.  No driving while on narcotics and until you can react quickly without pain.     No dressing needed   Order Comments: WOUND CARE  You have skin glue over your incision(s)  This will slowly flake away in about 10 days  It is okay to shower starting the day after surgery  Can pat your incision dry  Please do not scrub hard over your incisions  Please do not pick the glue off or it will reopen the wound     Notify your health care provider if you experience any of the following:  temperature >100.4     Notify your health care provider if you experience any of the following:  severe uncontrolled pain     Notify your health care provider if you experience any of the following:  redness, tenderness, or signs of infection (pain, swelling, redness, odor or green/yellow discharge around  incision site)     Medications:  Reconciled Home Medications:      Medication List      START taking these medications    enoxaparin 40 mg/0.4 mL Syrg  Commonly known as: LOVENOX  Inject 0.4 mLs (40 mg total) into the skin Daily. for 14 days     HYDROcodone-acetaminophen 5-325 mg per tablet  Commonly known as: NORCO  Take 1 tablet by mouth every 4 (four) hours as needed for Pain.     pantoprazole 40 MG tablet  Commonly known as: PROTONIX  Take 1 tablet (40 mg total) by mouth once daily.        CONTINUE taking these medications    allopurinoL 100 MG tablet  Commonly known as: ZYLOPRIM  Take 100 mg by mouth 2 (two) times daily.     hydroCHLOROthiazide 25 MG tablet  Commonly known as: HYDRODIURIL  Take 1 tablet (25 mg total) by mouth once daily.     icosapent ethyL 0.5 gram Cap  Commonly known as: VASCEPA  Take 2 capsules by mouth 2 (two) times daily.     imatinib 400 MG Tab  Commonly known as: GLEEVEC  Take 1 tablet (400 mg total) by mouth once daily.     loperamide 2 mg Tab  Commonly known as: IMODIUM A-D  Take two tabs at the onset of loose stools.  Then take one tab after every subsequent loose stool.     multivitamin capsule  Take 1 capsule by mouth once daily.     nebivoloL 10 MG Tab  Commonly known as: BYSTOLIC  Take 10 mg by mouth once daily.     potassium chloride SA 20 MEQ tablet  Commonly known as: K-DUR,KLOR-CON  Take 2 tablets (40 mEq total) by mouth once daily.     rosuvastatin 20 MG tablet  Commonly known as: CRESTOR  Take 20 mg by mouth once daily.     valsartan 160 MG tablet  Commonly known as: DIOVAN  Take 1 tablet (160 mg total) by mouth once daily.     vitamin D 1000 units Tab  Commonly known as: VITAMIN D3  Take 10,000 Units by mouth once a week.     VITAMIN D-3 ORAL  Take 10,000 Units by mouth once a week.        STOP taking these medications    carbamide peroxide 6.5 % otic solution  Commonly known as: DEBROX          Time spent on the discharge of patient: 15 minutes    Medina Marquez  MD  General Surgery  [facility]

## 2023-05-13 NOTE — DISCHARGE INSTRUCTIONS
WOUND CARE  -- You have skin glue over your incision(s); this will slowly flake away over the next few weeks.  -- Ok to shower; however, no baths or submerging in water (I.e. swimming, submerging in water) for at least two weeks.  -- Please keep the incision clean with soap and water, pat your incision dry, do not scrub hard over your incisions.    MEDICATIONS AND PAIN CONTROL  -- Please resume all home medications as instructed and take any newly prescribed medications.  -- Most people find that over-the-counter pain medications (Tylenol combined with Ibuprofen) will be sufficient for most pain control.  -- If you're taking prescription narcotics, do not drive or operate heavy machinery. Do not drive if your pain is not controlled enough for you to react quickly safely.  -- Take a stool softener with narcotics medications to prevent constipation.    OTHER INSTRUCTIONS  -- Monitor for temp > 101 F, bleeding, redness, purulent drainage, or any sudden, new extreme pain. If any occur, please call our clinic or go to the emergency department if after normal business hours.  -- Continue on a full liquid diet until you are seen in clinic for a follow up visit  -- No heavy lifting (anything >10 lbs or = to a gallon of milk) or strenuous exercise until cleared by a physician.  -- Follow up with Dr. López on 5/23/23 in clinic for a post-op check. If no appointment is made within the week, please call the clinic to schedule.

## 2023-05-13 NOTE — PLAN OF CARE
[facility] Plan of Care Note  Dx: GI Tumor    Shift Events: Patient comfort. Recurrent hypertension. Administered Hydralazine once. SBP dropped from 183/85 to 166/79    Goals of Care: Patient comfort, pain control, and hypertension control.    Neuro: AAOx4    Vital Signs: Hypertension, otherwise VSS on RA with no signs of SOB or nausea.    Respiratory: WDL    Diet: Clear Liquids, no intake during evening shift.    Is patient tolerating current diet? No intake during evening shift.    GTTS: N/A    Urine Output/Bowel Movement: Urine output as unmeasured occurrences to BR.    Drains/Tubes/Tube Feeds (include total output/shift): NA    Lines: Right Forearm IV, saline locked    Accuchecks: N/A    Skin: Intact    Fall Risk Score: 1    Activity level? Independent, up to BR from bed.    Any scheduled procedures? No    Any safety concerns? No    Other:

## 2023-05-13 NOTE — PROGRESS NOTES
Patient midnight VS /85.  Administered Hydralazine per MAR order parameters.  Patient VS after admin hydralazine,  SpO2: 97%  HR 57  RR 20   BP (seated edge of bed) 166/79      Salbador Leach RN

## 2023-05-13 NOTE — HOSPITAL COURSE
LUIGI AVELAR 67 y.o.male underwent: Procedure(s) (LRB):  XI ROBOTIC GASTRECTOMY, partial, possible total (N/A)  EGD (ESOPHAGOGASTRODUODENOSCOPY) (N/A). The patient tolerated the procedure well, was transferred to recovery post-op, and then transferred to the floor for continuation of medical care. The patient's clinical condition progressively improved. Swallow study on POD 2 was performed and negative. His diet was advanced. He received diet teaching for full liquid diet at home. By the time of discharge, he was tolerating a diet without nausea or vomiting, pain was well controlled with oral medications, and he was ambulating without difficulty. On POD 3 the patient was discharged to home in good condition. On discharge, the patient's incisions were c/d/i and the surgical site was soft and appropriately tender to palpation. The patient will follow up with Dr. López in clinic as scheduled 5/23/23. He will continue on the full liquid diet until the follow up

## 2023-05-14 LAB
BLD PROD TYP BPU: NORMAL
BLD PROD TYP BPU: NORMAL
BLOOD UNIT EXPIRATION DATE: NORMAL
BLOOD UNIT EXPIRATION DATE: NORMAL
BLOOD UNIT TYPE CODE: 5100
BLOOD UNIT TYPE CODE: 5100
BLOOD UNIT TYPE: NORMAL
BLOOD UNIT TYPE: NORMAL
CODING SYSTEM: NORMAL
CODING SYSTEM: NORMAL
CROSSMATCH INTERPRETATION: NORMAL
CROSSMATCH INTERPRETATION: NORMAL
DISPENSE STATUS: NORMAL
DISPENSE STATUS: NORMAL
NUM UNITS TRANS PACKED RBC: NORMAL
NUM UNITS TRANS PACKED RBC: NORMAL

## 2023-05-15 ENCOUNTER — PATIENT OUTREACH (OUTPATIENT)
Dept: ADMINISTRATIVE | Facility: CLINIC | Age: 68
End: 2023-05-15
Payer: MEDICARE

## 2023-05-15 NOTE — PROGRESS NOTES
C3 nurse spoke with Sami Zhang  for a TCC post hospital discharge follow up call. The patient reports does not have a scheduled HOSFU appointment. C3 nurse was unable to schedule HOSFU appointment for Non-[facility] PCP. Scheduled NP home visit follow up with Harper Beck NP on 05/25/25. Informed pt that they would be called day before with a specific time.

## 2023-05-18 ENCOUNTER — NURSE TRIAGE (OUTPATIENT)
Dept: ADMINISTRATIVE | Facility: CLINIC | Age: 68
End: 2023-05-18
Payer: MEDICARE

## 2023-05-18 NOTE — TELEPHONE ENCOUNTER
Surgery was on the 10th and the incision is itching really bad. No other symptoms at present time. Pt wants to know what he can do for the itching. Care advice recommend pt received a call back from MD office today. Pt verbalized understanding. Please call and advise pt.   Reason for Disposition   Caller has NON-URGENT question and triager unable to answer question    Additional Information   Negative: Major abdominal surgical incision and wound gaping open with visible internal organs   Negative: Sounds like a life-threatening emergency to the triager   Negative: Bleeding from incision and won't stop after 10 minutes of direct pressure   Negative: Bleeding (more than a few drops) from incision and after blood vessel surgery (e.g., carotidendarterectomy, femoral bypass graft, kidney dialysis fistula, tracheostomy)   Negative: Bright red, wide-spread, sunburn-like rash   Negative: SEVERE pain in the incision   Negative: Incision gaping open and < 2 days (48 hours) since wound re-opened   Negative: Incision gaping open and length of opening > 2 inches (5 cm)   Negative: Patient sounds very sick or weak to the triager   Negative: Sounds like a serious complication to the triager   Negative: Fever > 100.4 F (38.0 C)   Negative: Incision looks infected (spreading redness, pain)   Negative: Red streak runs from the incision and longer than 1 inch (2.5 cm)   Negative: Pus or bad-smelling fluid draining from incision   Negative: Dressing soaked with blood or body fluid (e.g., drainage)   Negative: Raised bruise and size > 2 inches (5 cm) and expanding   Negative: Scant bleeding (e.g., few drops) from incision and after blood vessel surgery (e.g., carotid endarterectomy, femoral bypass graft, kidney dialysis fistula   Negative: Caller has URGENT question and triager unable to answer question   Negative: Incision gaping open and length of opening > 1/4 inch (6 mm) and on the face and over 2 days since wound re-opened    Negative: Incision gaping open and length of opening > 1/2 inch (1 cm) and over 2 days since wound re-opened   Negative: Clear or blood-tinged fluid draining from wound and no fever   Negative: Suture or staple removal is overdue   Negative: Patient wants to be seen   Negative: INCREASING pain in incision and over 2 days (48 hours) since surgery   Negative: Suture or staple came out early and caller wants wound checked    Protocols used: Post-Op Incision Symptoms and Dulsjjlce-Z-LC

## 2023-05-19 LAB
FINAL PATHOLOGIC DIAGNOSIS: NORMAL
GROSS: NORMAL
Lab: NORMAL

## 2023-05-23 ENCOUNTER — OFFICE VISIT (OUTPATIENT)
Dept: SURGERY | Facility: CLINIC | Age: 68
End: 2023-05-23
Payer: MEDICARE

## 2023-05-23 VITALS
HEIGHT: 71 IN | WEIGHT: 171.19 LBS | DIASTOLIC BLOOD PRESSURE: 67 MMHG | BODY MASS INDEX: 23.97 KG/M2 | SYSTOLIC BLOOD PRESSURE: 135 MMHG | HEART RATE: 59 BPM | OXYGEN SATURATION: 99 %

## 2023-05-23 DIAGNOSIS — C49.A2 GASTROINTESTINAL STROMAL TUMOR (GIST) OF STOMACH: Primary | ICD-10-CM

## 2023-05-23 PROCEDURE — 99024 POSTOP FOLLOW-UP VISIT: CPT | Mod: HCNC,S$GLB,, | Performed by: SURGERY

## 2023-05-23 PROCEDURE — 99999 PR PBB SHADOW E&M-EST. PATIENT-LVL III: ICD-10-PCS | Mod: PBBFAC,HCNC,, | Performed by: SURGERY

## 2023-05-23 PROCEDURE — 1101F PT FALLS ASSESS-DOCD LE1/YR: CPT | Mod: HCNC,CPTII,S$GLB, | Performed by: SURGERY

## 2023-05-23 PROCEDURE — 3078F DIAST BP <80 MM HG: CPT | Mod: HCNC,CPTII,S$GLB, | Performed by: SURGERY

## 2023-05-23 PROCEDURE — 1126F AMNT PAIN NOTED NONE PRSNT: CPT | Mod: HCNC,CPTII,S$GLB, | Performed by: SURGERY

## 2023-05-23 PROCEDURE — 4010F PR ACE/ARB THEARPY RXD/TAKEN: ICD-10-PCS | Mod: HCNC,CPTII,S$GLB, | Performed by: SURGERY

## 2023-05-23 PROCEDURE — 3075F SYST BP GE 130 - 139MM HG: CPT | Mod: HCNC,CPTII,S$GLB, | Performed by: SURGERY

## 2023-05-23 PROCEDURE — 4010F ACE/ARB THERAPY RXD/TAKEN: CPT | Mod: HCNC,CPTII,S$GLB, | Performed by: SURGERY

## 2023-05-23 PROCEDURE — 99499 RISK ADDL DX/OHS AUDIT: ICD-10-PCS | Mod: S$GLB,,, | Performed by: SURGERY

## 2023-05-23 PROCEDURE — 3008F PR BODY MASS INDEX (BMI) DOCUMENTED: ICD-10-PCS | Mod: HCNC,CPTII,S$GLB, | Performed by: SURGERY

## 2023-05-23 PROCEDURE — 99999 PR PBB SHADOW E&M-EST. PATIENT-LVL III: CPT | Mod: PBBFAC,HCNC,, | Performed by: SURGERY

## 2023-05-23 PROCEDURE — 3288F FALL RISK ASSESSMENT DOCD: CPT | Mod: HCNC,CPTII,S$GLB, | Performed by: SURGERY

## 2023-05-23 PROCEDURE — 1101F PR PT FALLS ASSESS DOC 0-1 FALLS W/OUT INJ PAST YR: ICD-10-PCS | Mod: HCNC,CPTII,S$GLB, | Performed by: SURGERY

## 2023-05-23 PROCEDURE — 3288F PR FALLS RISK ASSESSMENT DOCUMENTED: ICD-10-PCS | Mod: HCNC,CPTII,S$GLB, | Performed by: SURGERY

## 2023-05-23 PROCEDURE — 99024 PR POST-OP FOLLOW-UP VISIT: ICD-10-PCS | Mod: HCNC,S$GLB,, | Performed by: SURGERY

## 2023-05-23 PROCEDURE — 3075F PR MOST RECENT SYSTOLIC BLOOD PRESS GE 130-139MM HG: ICD-10-PCS | Mod: HCNC,CPTII,S$GLB, | Performed by: SURGERY

## 2023-05-23 PROCEDURE — 99499 UNLISTED E&M SERVICE: CPT | Mod: S$GLB,,, | Performed by: SURGERY

## 2023-05-23 PROCEDURE — 3078F PR MOST RECENT DIASTOLIC BLOOD PRESSURE < 80 MM HG: ICD-10-PCS | Mod: HCNC,CPTII,S$GLB, | Performed by: SURGERY

## 2023-05-23 PROCEDURE — 3008F BODY MASS INDEX DOCD: CPT | Mod: HCNC,CPTII,S$GLB, | Performed by: SURGERY

## 2023-05-23 PROCEDURE — 1126F PR PAIN SEVERITY QUANTIFIED, NO PAIN PRESENT: ICD-10-PCS | Mod: HCNC,CPTII,S$GLB, | Performed by: SURGERY

## 2023-05-24 ENCOUNTER — PATIENT OUTREACH (OUTPATIENT)
Dept: ADMINISTRATIVE | Facility: OTHER | Age: 68
End: 2023-05-24
Payer: MEDICARE

## 2023-05-24 ENCOUNTER — TELEPHONE (OUTPATIENT)
Dept: ADMINISTRATIVE | Facility: CLINIC | Age: 68
End: 2023-05-24
Payer: MEDICARE

## 2023-05-24 NOTE — PROGRESS NOTES
CHW - Initial Contact    This Community Health Worker completed OR updated the Social Determinant of Health questionnaire with caregiver via telephone today.    Pt identified barriers of most importance are:  Pt's wife states medication may get expensive. Will call me when she finds out more information.  Referrals to community agencies completed with patient/caregiver consent outside of [facility] include: no  Referrals were put through [facility] - no  Support and Services: Medication Affordability  Other information discussed the patient needs / wants help with: SDOH  Follow up required: no  No future outreach task assigned

## 2023-05-24 NOTE — PROGRESS NOTES
Pt's wife states medication may get expensive. States she will call me when she finds out more information. No need to follow.     Risks/benefits discussed with patient/surrogate

## 2023-05-25 ENCOUNTER — OFFICE VISIT (OUTPATIENT)
Dept: HOME HEALTH SERVICES | Facility: CLINIC | Age: 68
End: 2023-05-25
Payer: MEDICARE

## 2023-05-25 DIAGNOSIS — M10.9 GOUT, UNSPECIFIED CAUSE, UNSPECIFIED CHRONICITY, UNSPECIFIED SITE: ICD-10-CM

## 2023-05-25 DIAGNOSIS — C49.A2 GASTROINTESTINAL STROMAL TUMOR (GIST) OF STOMACH: ICD-10-CM

## 2023-05-25 DIAGNOSIS — I10 PRIMARY HYPERTENSION: ICD-10-CM

## 2023-05-25 PROCEDURE — 3078F PR MOST RECENT DIASTOLIC BLOOD PRESSURE < 80 MM HG: ICD-10-PCS | Mod: CPTII,S$GLB,, | Performed by: NURSE PRACTITIONER

## 2023-05-25 PROCEDURE — 99495 TCM SERVICES (MODERATE COMPLEXITY): ICD-10-PCS | Mod: S$GLB,,, | Performed by: NURSE PRACTITIONER

## 2023-05-25 PROCEDURE — 1101F PT FALLS ASSESS-DOCD LE1/YR: CPT | Mod: CPTII,S$GLB,, | Performed by: NURSE PRACTITIONER

## 2023-05-25 PROCEDURE — 4010F ACE/ARB THERAPY RXD/TAKEN: CPT | Mod: CPTII,S$GLB,, | Performed by: NURSE PRACTITIONER

## 2023-05-25 PROCEDURE — 4010F PR ACE/ARB THEARPY RXD/TAKEN: ICD-10-PCS | Mod: CPTII,S$GLB,, | Performed by: NURSE PRACTITIONER

## 2023-05-25 PROCEDURE — 99495 TRANSJ CARE MGMT MOD F2F 14D: CPT | Mod: S$GLB,,, | Performed by: NURSE PRACTITIONER

## 2023-05-25 PROCEDURE — 3288F FALL RISK ASSESSMENT DOCD: CPT | Mod: CPTII,S$GLB,, | Performed by: NURSE PRACTITIONER

## 2023-05-25 PROCEDURE — 3288F PR FALLS RISK ASSESSMENT DOCUMENTED: ICD-10-PCS | Mod: CPTII,S$GLB,, | Performed by: NURSE PRACTITIONER

## 2023-05-25 PROCEDURE — 3074F PR MOST RECENT SYSTOLIC BLOOD PRESSURE < 130 MM HG: ICD-10-PCS | Mod: CPTII,S$GLB,, | Performed by: NURSE PRACTITIONER

## 2023-05-25 PROCEDURE — 1101F PR PT FALLS ASSESS DOC 0-1 FALLS W/OUT INJ PAST YR: ICD-10-PCS | Mod: CPTII,S$GLB,, | Performed by: NURSE PRACTITIONER

## 2023-05-25 PROCEDURE — 3074F SYST BP LT 130 MM HG: CPT | Mod: CPTII,S$GLB,, | Performed by: NURSE PRACTITIONER

## 2023-05-25 PROCEDURE — 99497 ADVNCD CARE PLAN 30 MIN: CPT | Mod: S$GLB,,, | Performed by: NURSE PRACTITIONER

## 2023-05-25 PROCEDURE — 3078F DIAST BP <80 MM HG: CPT | Mod: CPTII,S$GLB,, | Performed by: NURSE PRACTITIONER

## 2023-05-25 PROCEDURE — 99497 PR ADVNCD CARE PLAN 30 MIN: ICD-10-PCS | Mod: S$GLB,,, | Performed by: NURSE PRACTITIONER

## 2023-05-25 NOTE — PROGRESS NOTES
"  Post-Op Follow-up Visit:   5/23/2023  Patient ID: Sami Zhang is a 67 y.o. male, born 1955  Chief Complaint:  postop wedge resection of proximal gastric gist    Interval History: doing well with liquids, hungry, no abd pain, no fevers    Physical Exam:  /67   Pulse (!) 59   Ht 5' 11" (1.803 m)   Wt 77.7 kg (171 lb 3.2 oz)   SpO2 99%   BMI 23.88 kg/m²     General:  Non-toxic, ambulatory  Abd:  Soft, non-tender  Incision:  healing well    Pathology:  reviewed    No diagnosis found.Plan     7/2022: c/o epigastric pain, imaging found cystic gastric mass (9x8 cm) with central necrosis  9/2022: EUS with bx - GIST;  would favor neoadj gleevac given size/location.  There are two indeterminate liver lesion that need attention on f/u.   10/8/2022: started neoadjuvant imatinib per Dr. Mcnair  4/21/2023: EGD per Dr. López -"Barely visible tumor on retroflexion without mucosal involvement"  5/10/2023: s/p wedge resection of proximal stomach with findings of necrotic perforation into left saeid, no gross residual disease.  Final path (negative margins, necrosis at margin, low grade, node neg)    Plan:  Advance diet  Would continue gleevac through adjuvant course given size and necrotic perforation  Follow-up:  2 weeks         Kostas López MD, FACS  Surgical Oncology  [facility] [place], [place]  Office: [phone]  Fax: [phone]           "

## 2023-05-26 ENCOUNTER — LAB VISIT (OUTPATIENT)
Dept: LAB | Facility: HOSPITAL | Age: 68
End: 2023-05-26
Attending: HOSPITALIST
Payer: MEDICARE

## 2023-05-26 ENCOUNTER — DOCUMENTATION ONLY (OUTPATIENT)
Dept: HEMATOLOGY/ONCOLOGY | Facility: CLINIC | Age: 68
End: 2023-05-26
Payer: MEDICARE

## 2023-05-26 ENCOUNTER — OFFICE VISIT (OUTPATIENT)
Dept: HEMATOLOGY/ONCOLOGY | Facility: CLINIC | Age: 68
End: 2023-05-26
Payer: MEDICARE

## 2023-05-26 VITALS
DIASTOLIC BLOOD PRESSURE: 63 MMHG | SYSTOLIC BLOOD PRESSURE: 133 MMHG | WEIGHT: 172.38 LBS | HEART RATE: 50 BPM | BODY MASS INDEX: 24.13 KG/M2 | TEMPERATURE: 99 F | OXYGEN SATURATION: 99 % | RESPIRATION RATE: 16 BRPM | HEIGHT: 71 IN

## 2023-05-26 DIAGNOSIS — I10 PRIMARY HYPERTENSION: ICD-10-CM

## 2023-05-26 DIAGNOSIS — C49.A2 GASTROINTESTINAL STROMAL TUMOR (GIST) OF STOMACH: ICD-10-CM

## 2023-05-26 DIAGNOSIS — L29.9 PRURITUS: ICD-10-CM

## 2023-05-26 DIAGNOSIS — C49.A2 GASTROINTESTINAL STROMAL TUMOR (GIST) OF STOMACH: Primary | ICD-10-CM

## 2023-05-26 DIAGNOSIS — E87.6 HYPOKALEMIA: ICD-10-CM

## 2023-05-26 LAB
ALBUMIN SERPL BCP-MCNC: 3.8 G/DL (ref 3.5–5.2)
ALP SERPL-CCNC: 79 U/L (ref 55–135)
ALT SERPL W/O P-5'-P-CCNC: 28 U/L (ref 10–44)
ANION GAP SERPL CALC-SCNC: 9 MMOL/L (ref 8–16)
AST SERPL-CCNC: 27 U/L (ref 10–40)
BILIRUB SERPL-MCNC: 0.7 MG/DL (ref 0.1–1)
BUN SERPL-MCNC: 13 MG/DL (ref 8–23)
CALCIUM SERPL-MCNC: 9.7 MG/DL (ref 8.7–10.5)
CHLORIDE SERPL-SCNC: 104 MMOL/L (ref 95–110)
CO2 SERPL-SCNC: 32 MMOL/L (ref 23–29)
CREAT SERPL-MCNC: 1.4 MG/DL (ref 0.5–1.4)
ERYTHROCYTE [DISTWIDTH] IN BLOOD BY AUTOMATED COUNT: 14.3 % (ref 11.5–14.5)
EST. GFR  (NO RACE VARIABLE): 54.4 ML/MIN/1.73 M^2
GLUCOSE SERPL-MCNC: 105 MG/DL (ref 70–110)
HCT VFR BLD AUTO: 40.4 % (ref 40–54)
HGB BLD-MCNC: 12.3 G/DL (ref 14–18)
IMM GRANULOCYTES # BLD AUTO: 0.02 K/UL (ref 0–0.04)
MCH RBC QN AUTO: 26.6 PG (ref 27–31)
MCHC RBC AUTO-ENTMCNC: 30.4 G/DL (ref 32–36)
MCV RBC AUTO: 87 FL (ref 82–98)
NEUTROPHILS # BLD AUTO: 4.4 K/UL (ref 1.8–7.7)
PLATELET # BLD AUTO: 315 K/UL (ref 150–450)
PMV BLD AUTO: 12 FL (ref 9.2–12.9)
POTASSIUM SERPL-SCNC: 2.6 MMOL/L (ref 3.5–5.1)
PROT SERPL-MCNC: 6.8 G/DL (ref 6–8.4)
RBC # BLD AUTO: 4.63 M/UL (ref 4.6–6.2)
SODIUM SERPL-SCNC: 145 MMOL/L (ref 136–145)
WBC # BLD AUTO: 6.65 K/UL (ref 3.9–12.7)

## 2023-05-26 PROCEDURE — 80053 COMPREHEN METABOLIC PANEL: CPT | Mod: HCNC | Performed by: HOSPITALIST

## 2023-05-26 PROCEDURE — 3078F DIAST BP <80 MM HG: CPT | Mod: HCNC,CPTII,S$GLB, | Performed by: HOSPITALIST

## 2023-05-26 PROCEDURE — 1159F MED LIST DOCD IN RCRD: CPT | Mod: HCNC,CPTII,S$GLB, | Performed by: HOSPITALIST

## 2023-05-26 PROCEDURE — 1126F AMNT PAIN NOTED NONE PRSNT: CPT | Mod: HCNC,CPTII,S$GLB, | Performed by: HOSPITALIST

## 2023-05-26 PROCEDURE — 3075F PR MOST RECENT SYSTOLIC BLOOD PRESS GE 130-139MM HG: ICD-10-PCS | Mod: HCNC,CPTII,S$GLB, | Performed by: HOSPITALIST

## 2023-05-26 PROCEDURE — 1126F PR PAIN SEVERITY QUANTIFIED, NO PAIN PRESENT: ICD-10-PCS | Mod: HCNC,CPTII,S$GLB, | Performed by: HOSPITALIST

## 2023-05-26 PROCEDURE — 99999 PR PBB SHADOW E&M-EST. PATIENT-LVL V: CPT | Mod: PBBFAC,HCNC,, | Performed by: HOSPITALIST

## 2023-05-26 PROCEDURE — 99999 PR PBB SHADOW E&M-EST. PATIENT-LVL V: ICD-10-PCS | Mod: PBBFAC,HCNC,, | Performed by: HOSPITALIST

## 2023-05-26 PROCEDURE — 1111F PR DISCHARGE MEDS RECONCILED W/ CURRENT OUTPATIENT MED LIST: ICD-10-PCS | Mod: HCNC,CPTII,S$GLB, | Performed by: HOSPITALIST

## 2023-05-26 PROCEDURE — 3075F SYST BP GE 130 - 139MM HG: CPT | Mod: HCNC,CPTII,S$GLB, | Performed by: HOSPITALIST

## 2023-05-26 PROCEDURE — 3008F BODY MASS INDEX DOCD: CPT | Mod: HCNC,CPTII,S$GLB, | Performed by: HOSPITALIST

## 2023-05-26 PROCEDURE — 1159F PR MEDICATION LIST DOCUMENTED IN MEDICAL RECORD: ICD-10-PCS | Mod: HCNC,CPTII,S$GLB, | Performed by: HOSPITALIST

## 2023-05-26 PROCEDURE — 4010F PR ACE/ARB THEARPY RXD/TAKEN: ICD-10-PCS | Mod: HCNC,CPTII,S$GLB, | Performed by: HOSPITALIST

## 2023-05-26 PROCEDURE — 3288F PR FALLS RISK ASSESSMENT DOCUMENTED: ICD-10-PCS | Mod: HCNC,CPTII,S$GLB, | Performed by: HOSPITALIST

## 2023-05-26 PROCEDURE — 36415 COLL VENOUS BLD VENIPUNCTURE: CPT | Mod: HCNC | Performed by: HOSPITALIST

## 2023-05-26 PROCEDURE — 85027 COMPLETE CBC AUTOMATED: CPT | Mod: HCNC | Performed by: HOSPITALIST

## 2023-05-26 PROCEDURE — 99215 OFFICE O/P EST HI 40 MIN: CPT | Mod: HCNC,S$GLB,, | Performed by: HOSPITALIST

## 2023-05-26 PROCEDURE — 3008F PR BODY MASS INDEX (BMI) DOCUMENTED: ICD-10-PCS | Mod: HCNC,CPTII,S$GLB, | Performed by: HOSPITALIST

## 2023-05-26 PROCEDURE — 99499 UNLISTED E&M SERVICE: CPT | Mod: S$GLB,,, | Performed by: HOSPITALIST

## 2023-05-26 PROCEDURE — 3078F PR MOST RECENT DIASTOLIC BLOOD PRESSURE < 80 MM HG: ICD-10-PCS | Mod: HCNC,CPTII,S$GLB, | Performed by: HOSPITALIST

## 2023-05-26 PROCEDURE — 1101F PT FALLS ASSESS-DOCD LE1/YR: CPT | Mod: HCNC,CPTII,S$GLB, | Performed by: HOSPITALIST

## 2023-05-26 PROCEDURE — 3288F FALL RISK ASSESSMENT DOCD: CPT | Mod: HCNC,CPTII,S$GLB, | Performed by: HOSPITALIST

## 2023-05-26 PROCEDURE — 99215 PR OFFICE/OUTPT VISIT, EST, LEVL V, 40-54 MIN: ICD-10-PCS | Mod: HCNC,S$GLB,, | Performed by: HOSPITALIST

## 2023-05-26 PROCEDURE — 1101F PR PT FALLS ASSESS DOC 0-1 FALLS W/OUT INJ PAST YR: ICD-10-PCS | Mod: HCNC,CPTII,S$GLB, | Performed by: HOSPITALIST

## 2023-05-26 PROCEDURE — 4010F ACE/ARB THERAPY RXD/TAKEN: CPT | Mod: HCNC,CPTII,S$GLB, | Performed by: HOSPITALIST

## 2023-05-26 PROCEDURE — 1111F DSCHRG MED/CURRENT MED MERGE: CPT | Mod: HCNC,CPTII,S$GLB, | Performed by: HOSPITALIST

## 2023-05-26 PROCEDURE — 99499 RISK ADDL DX/OHS AUDIT: ICD-10-PCS | Mod: S$GLB,,, | Performed by: HOSPITALIST

## 2023-05-26 RX ORDER — POTASSIUM CHLORIDE 20 MEQ/1
40 TABLET, EXTENDED RELEASE ORAL DAILY
Qty: 60 TABLET | Refills: 0 | Status: SHIPPED | OUTPATIENT
Start: 2023-05-26 | End: 2023-07-14 | Stop reason: SDUPTHER

## 2023-05-26 RX ORDER — HYDROXYZINE HYDROCHLORIDE 25 MG/1
25 TABLET, FILM COATED ORAL 3 TIMES DAILY PRN
Qty: 90 TABLET | Refills: 0 | Status: SHIPPED | OUTPATIENT
Start: 2023-05-26

## 2023-05-26 NOTE — PROGRESS NOTES
MEDICAL ONCOLOGY FOLLOW-UP VISIT.     Best Contact Phone Number(s): [phone] (home) [phone] (work)     Cancer/Stage/TNM:    Cancer Staging   Gastrointestinal stromal tumor (GIST) of stomach  Staging form: Gastrointestinal Stromal Tumor - Gastric and Omental GIST, AJCC 8th Edition  - Clinical stage from 9/9/2022: Stage IIIA (cT3, cN0, cM0, Mitotic Rate: High) - Signed by Enrique Mcnair MD on 9/26/2022         Reason for visit: Mr. 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 He started neoadjuvant imatinib on 10/8/22 and resection on 5/10/23. He presents to medical oncology clinic for routine follow up and to resume adjuvant imatinib therapy.      Interval History:     Surgery 5/10/23. Pathology with 5.5cm low grade (1 mitosis/HPF) GIST with extensive treatment effect. Margins negative albeit <1mm to necrotic tumor at inked margin. 0/8 LN involved. Staged ypT3N0.      Since surgery has ongoing left sided flank discomfort that comes and goes. Rarely used hydrocodone.  No urinary issues and no fevers or chills. Also notes ongoing irritiation and itching about the abdomen associated with the healing laparascopic sites.  Appetite is fair; reports food doesn't taste very good. No nausea or vomiting. Reports normal bowel movements. No other concerns. Has resumed imatinib.      Oncology History   Gastrointestinal stromal tumor (GIST) of stomach   7/18/2022 Imaging Significant Findings    Presented to [facility] ED with abdominal pain. CT a/p found a 6.7 x 5.6 x 7.2 cm cystic mass in the gastric fundus.     9/9/2022 Procedure    EGD/EUS fins a 6.2x8.0 cm cystic mass in the cardia. Pathology shows GIST with 'focal/frequent mitoses' although exact quantification could not be determined.     KIT exon 11 [id number]     9/12/2022 Imaging Significant Findings    CT a/p redemonstrates 9.3x8 cm gastric mass with associated necrosis. No LAD. No liver  lesions.     9/15/2022 Imaging Significant Findings    CT chest only shows incidental 3mm LLL nodule. No evidence of metastatic disease.     10/8/2022 -  Chemotherapy    Start imatinib     1/17/2023 Imaging Significant Findings    CT torso; Decreased size of gastric mass 6.5 x 2.5cm from 9.3 x 8.0cm.     1/23/2023 Tumor Conference       [facility] UGI MULTIDISCIPLINARY TUMOR BOARD  PATIENT REVIEW FORM   ____________________________________________________________    CLINIC #: [id number]  DATE: 1/23/2023    DIAGNOSIS: stomach GIST    PRESENTER: Xu    PATIENT SUMMARY:   This 68 y/o gentleman presented with epigastric abd pain in 7/2022. Imaging identified large 9cm cystic gastric mass with central necrosis. He underwent EUS with bx in 9/2022 and pathology revealed GIST. He started neoadj Gleevec per Dr. Mcnair in 10/2022. Recent restaging CT scan reviewed - good response to tx, lesion smaller, now measuring 6.7 cm.     BOARD RECOMMENDATIONS:   Continue neoadj Gleevec    CONSULT NEEDED:     [] Surgery    [] Hem/Onc    [] Rad/Onc    [] Dietary                 [] Social Service    [] Psychology       [] AES  [] Radiology     Clinical Stage: Tumor 3 Node(s) 0 Metastasis 0      GROUP STAGE:  [] O    [] 1A    [] IB    [] IIA    [] IIB     [x] IIIA     [] IIIB     [] IIIC    []IV  [] Local recurrence     [] Regional recurrence     [] Distant recurrence   Metastatic site(s): none         [x] Lizbeth'[facility] Treatment Guidelines reviewed and care planned is consistent with guidelines.         (i.e., NCCN, NCI, PD, ACO, AUA, etc.)    PRESENTATION AT CANCER CONFERENCE:         [x] Prospective    [] Retrospective     [] Follow-Up       4/12/2023 Imaging Significant Findings    CT torso:  1. Exam limited by motion artifact.  2. Slight decreased size of gastric wall mass.  3. Stable mildly enlarged lymph node in the lindsay hepatis measuring 1.2 cm, stable compared to CT from 09/12/2022.  4. Few subcentimeter hepatic  "hypodensities, too small to characterize.  5. Thyroid nodules better characterized on CT from 01/17/2023.            Physical Exam:   /63 (BP Location: Left arm, Patient Position: Sitting, BP Method: Medium (Automatic))   Pulse (!) 50   Temp 99.4 °F (37.4 °C) (Oral)   Resp 16   Ht 5' 11" (1.803 m)   Wt 78.2 kg (172 lb 6.4 oz)   SpO2 99%   BMI 24.04 kg/m²      ECOG Performance Status: (foot note - ECOG PS provided by [employer] Co[employer] Oncology Group) 1 - Symptomatic but completely ambulatory    Physical Exam  Constitutional:       General: He is not in acute distress.     Appearance: Normal appearance.   HENT:      Head: Normocephalic.      Mouth/Throat:      Mouth: Mucous membranes are moist.      Pharynx: Oropharynx is clear. No oropharyngeal exudate or posterior oropharyngeal erythema.   Eyes:      General: No scleral icterus.     Extraocular Movements: Extraocular movements intact.      Conjunctiva/sclera: Conjunctivae normal.      Pupils: Pupils are equal, round, and reactive to light.   Cardiovascular:      Rate and Rhythm: Normal rate and regular rhythm.   Pulmonary:      Effort: Pulmonary effort is normal. No respiratory distress.   Abdominal:      General: There is no distension.      Palpations: Abdomen is soft.      Tenderness: There is no abdominal tenderness.      Comments: Multiple laparoscopic port sites. Few with faint erythmea about them. No induration or signficant drainage.   Musculoskeletal:         General: No swelling. Normal range of motion.      Cervical back: Normal range of motion and neck supple.      Right lower leg: No edema.      Left lower leg: No edema.   Lymphadenopathy:      Cervical: No cervical adenopathy.   Skin:     General: Skin is warm and dry.      Coloration: Skin is not jaundiced.      Findings: No rash.   Neurological:      General: No focal deficit present.      Mental Status: He is alert and oriented to person, place, and time.      Motor: No weakness. "   Psychiatric:         Mood and Affect: Mood normal.         Behavior: Behavior normal.         Thought Content: Thought content normal.         Labs:  Lab Visit on 05/26/2023   Component Date Value    WBC 05/26/2023 6.65     RBC 05/26/2023 4.63     Hemoglobin 05/26/2023 12.3 (L)     Hematocrit 05/26/2023 40.4     MCV 05/26/2023 87     MCH 05/26/2023 26.6 (L)     MCHC 05/26/2023 30.4 (L)     RDW 05/26/2023 14.3     Platelets 05/26/2023 315     MPV 05/26/2023 12.0     Gran # (ANC) 05/26/2023 4.4     Immature Grans (Abs) 05/26/2023 0.02                Imaging:     FL Esophagram Complete  Narrative: EXAMINATION:  FL ESOPHAGRAM COMPLETE    CLINICAL HISTORY:  S/p partial gastrectomy, please evaluate for a leak at the staple line;    TECHNIQUE:  Fluoroscopic evaluation and spot images of the esophagus were obtained.  Attention was focused on the stomach.  There were technical difficulties obtaining spot images..    Contrast material: Water-soluble contrast    Fluoroscopy time: 3.0 minutes    COMPARISON:  CT chest abdomen pelvis 04/12/2023    FINDINGS:  Preliminary images: Small amount of free air under the right hemidiaphragm.    The distal esophagus is normal in contour and caliber, without mass or stricture.  Contrast readily empties into the stomach.    Postoperative changes of partial gastrectomy. Gastric mucosa is normal in appearance without evidence of mass or stricture. Gastric emptying is normal without evidence of obstruction.  There is no contrast extravasation to suggest leak.  Irregularity about the gastric fundus presumably postoperative.    Duodenal mucosa is normal in appearance without evidence of mass or stricture. The duodenal sweep is normal.  Impression: Postoperative changes of partial gastrectomy without evidence of leak.  Irregularity about the gastric fundus presumably postoperative.    Pneumoperitoneum.    Electronically signed by resident: Ainsley  Anne  Date:    05/12/2023  Time:    09:48    Electronically signed by: Wallace Oliva MD  Date:    05/12/2023  Time:    10:50              Diagnoses:       1. Gastrointestinal stromal tumor (GIST) of stomach    2. Primary hypertension    3. Pruritus                        Assessment and Plan:     1. Gastrointestinal stromal tumor (GIST) of stomach  Overview:  Patient with symptomatic gastric GIST, which progressed on imaging 7/18/22 to 9/12/22. No evidence of luis or distant metastatic disease. Mitotic rate not quantified, but given clinical behavior and noted cellularity and frequent mitosis, considered higher risk. 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 Started neoadjuvant imatinib 400mg po daily 10/08/2022 with good tolerance and clinical resposne. Underwent surgical resection 5/10/23. Plan to complete total of 3 years of perioperative therapy.    Assessment & Plan:  - Continue imatinib 400mg po daily to complete 3 years of treatment  - Plan for clinic visit q3 months; imaging q3-6 months x5 years; then annual imaging  - FU in 6 weeks with imaging for first post op  surveillance       Orders:  -     CT Chest Abdomen Pelvis With Contrast; Future; Expected date: 05/26/2023    2. Primary hypertension  Overview:  Patient on HCTZ, nebivolol, and valsartan.      3. Pruritus  -     hydrOXYzine HCL (ATARAX) 25 MG tablet; Take 1 tablet (25 mg total) by mouth 3 (three) times daily as needed for Itching.  Dispense: 90 tablet; Refill: 0          Route Chart for Scheduling    Med Onc Chart Routing  Urgent    Follow up with physician 6 weeks.   Follow up with [facility]    Infusion scheduling note    Injection scheduling note    Labs CBC and CMP   Scheduling:  Preferred lab:  Lab interval:  Plese recheck CMP and Mg 5/31; CBC and CMP with next clinic visit   Imaging CT chest abdomen pelvis      Pharmacy appointment    Other referrals                 Enrique Mcnair MD  Hematology/Oncology  [facility]

## 2023-05-26 NOTE — ASSESSMENT & PLAN NOTE
- Continue imatinib 400mg po daily to complete 3 years of treatment  - Plan for clinic visit q3 months; imaging q3-6 months x5 years; then annual imaging  - FU in 6 weeks with imaging for first post op  surveillance

## 2023-05-26 NOTE — PATIENT INSTRUCTIONS
Your surgery went well and pathology report indicates excellent response to imatinib and complete resection.    Recommend continuing imatinib 400mg daily to complete a total of 3 years of treatment.    Will monitor every 3 months in clinic and every 6 months with CT scan for 5 years; then yearly check in with imaging.    For ongoing pain, can use extra strength tylenol 2 tabs three times daily as needed.    For itching will prescribe hydroxzyine. This medication may make you drowsy.

## 2023-05-31 ENCOUNTER — OFFICE VISIT (OUTPATIENT)
Dept: OPTOMETRY | Facility: CLINIC | Age: 68
End: 2023-05-31
Payer: MEDICARE

## 2023-05-31 DIAGNOSIS — H52.203 HYPEROPIA WITH ASTIGMATISM AND PRESBYOPIA, BILATERAL: ICD-10-CM

## 2023-05-31 DIAGNOSIS — H25.13 SENILE NUCLEAR SCLEROSIS, BILATERAL: ICD-10-CM

## 2023-05-31 DIAGNOSIS — H52.03 HYPEROPIA WITH ASTIGMATISM AND PRESBYOPIA, BILATERAL: ICD-10-CM

## 2023-05-31 DIAGNOSIS — H43.393 VISUAL FLOATERS, BILATERAL: Primary | ICD-10-CM

## 2023-05-31 DIAGNOSIS — H53.021 REFRACTIVE AMBLYOPIA OF RIGHT EYE: ICD-10-CM

## 2023-05-31 DIAGNOSIS — H04.123 DRY EYE SYNDROME OF BOTH EYES: ICD-10-CM

## 2023-05-31 DIAGNOSIS — H52.4 HYPEROPIA WITH ASTIGMATISM AND PRESBYOPIA, BILATERAL: ICD-10-CM

## 2023-05-31 PROCEDURE — 1160F RVW MEDS BY RX/DR IN RCRD: CPT | Mod: HCNC,CPTII,S$GLB, | Performed by: OPTOMETRIST

## 2023-05-31 PROCEDURE — 1126F AMNT PAIN NOTED NONE PRSNT: CPT | Mod: HCNC,CPTII,S$GLB, | Performed by: OPTOMETRIST

## 2023-05-31 PROCEDURE — 3288F FALL RISK ASSESSMENT DOCD: CPT | Mod: HCNC,CPTII,S$GLB, | Performed by: OPTOMETRIST

## 2023-05-31 PROCEDURE — 1101F PT FALLS ASSESS-DOCD LE1/YR: CPT | Mod: HCNC,CPTII,S$GLB, | Performed by: OPTOMETRIST

## 2023-05-31 PROCEDURE — 1159F MED LIST DOCD IN RCRD: CPT | Mod: HCNC,CPTII,S$GLB, | Performed by: OPTOMETRIST

## 2023-05-31 PROCEDURE — 1101F PR PT FALLS ASSESS DOC 0-1 FALLS W/OUT INJ PAST YR: ICD-10-PCS | Mod: HCNC,CPTII,S$GLB, | Performed by: OPTOMETRIST

## 2023-05-31 PROCEDURE — 4010F PR ACE/ARB THEARPY RXD/TAKEN: ICD-10-PCS | Mod: HCNC,CPTII,S$GLB, | Performed by: OPTOMETRIST

## 2023-05-31 PROCEDURE — 3288F PR FALLS RISK ASSESSMENT DOCUMENTED: ICD-10-PCS | Mod: HCNC,CPTII,S$GLB, | Performed by: OPTOMETRIST

## 2023-05-31 PROCEDURE — 92015 PR REFRACTION: ICD-10-PCS | Mod: HCNC,S$GLB,, | Performed by: OPTOMETRIST

## 2023-05-31 PROCEDURE — 99999 PR PBB SHADOW E&M-EST. PATIENT-LVL III: CPT | Mod: PBBFAC,HCNC,, | Performed by: OPTOMETRIST

## 2023-05-31 PROCEDURE — 92015 DETERMINE REFRACTIVE STATE: CPT | Mod: HCNC,S$GLB,, | Performed by: OPTOMETRIST

## 2023-05-31 PROCEDURE — 92014 COMPRE OPH EXAM EST PT 1/>: CPT | Mod: HCNC,S$GLB,, | Performed by: OPTOMETRIST

## 2023-05-31 PROCEDURE — 4010F ACE/ARB THERAPY RXD/TAKEN: CPT | Mod: HCNC,CPTII,S$GLB, | Performed by: OPTOMETRIST

## 2023-05-31 PROCEDURE — 1126F PR PAIN SEVERITY QUANTIFIED, NO PAIN PRESENT: ICD-10-PCS | Mod: HCNC,CPTII,S$GLB, | Performed by: OPTOMETRIST

## 2023-05-31 PROCEDURE — 92014 PR EYE EXAM, EST PATIENT,COMPREHESV: ICD-10-PCS | Mod: HCNC,S$GLB,, | Performed by: OPTOMETRIST

## 2023-05-31 PROCEDURE — 1159F PR MEDICATION LIST DOCUMENTED IN MEDICAL RECORD: ICD-10-PCS | Mod: HCNC,CPTII,S$GLB, | Performed by: OPTOMETRIST

## 2023-05-31 PROCEDURE — 99999 PR PBB SHADOW E&M-EST. PATIENT-LVL III: ICD-10-PCS | Mod: PBBFAC,HCNC,, | Performed by: OPTOMETRIST

## 2023-05-31 PROCEDURE — 1160F PR REVIEW ALL MEDS BY PRESCRIBER/CLIN PHARMACIST DOCUMENTED: ICD-10-PCS | Mod: HCNC,CPTII,S$GLB, | Performed by: OPTOMETRIST

## 2023-05-31 RX ORDER — VALSARTAN AND HYDROCHLOROTHIAZIDE 320; 12.5 MG/1; MG/1
1 TABLET, FILM COATED ORAL
COMMUNITY
End: 2023-10-18 | Stop reason: ALTCHOICE

## 2023-05-31 RX ORDER — IOHEXOL 350 MG/ML
INJECTION, SOLUTION INTRAVENOUS
COMMUNITY
Start: 2023-04-12

## 2023-05-31 RX ORDER — LANOLIN ALCOHOL/MO/W.PET/CERES
100 CREAM (GRAM) TOPICAL
COMMUNITY

## 2023-05-31 RX ORDER — PROPOFOL 10 MG/ML
INJECTION, EMULSION INTRAVENOUS
COMMUNITY
Start: 2022-12-15 | End: 2023-09-22

## 2023-05-31 NOTE — PROGRESS NOTES
GINO    ALEXANDER: [birth year]  Chief complaint (CC): Patient is here for annual eye exam today.  Patient   hasn't noticed any vision changes since the exam. Patient had surgery   recently to remove a cancerous tumor from his stomach.  Glasses? + 1 yr. old  Contacts? -  H/o eye surgery, injections or laser: -  H/o eye injury: -  Known eye conditions? See above  Family h/o eye conditions? -  Eye gtts? Blink a couple of times a week      (-) Flashes (+)  Floaters (-) Mucous   (-)  Tearing (-) Itching (-) Burning   (-) Headaches (-) Eye Pain/discomfort (-) Irritation   (-)  Redness (-) Double vision (-) Blurry vision    Diabetic? -  A1c? -      Last edited by Jayne Knott on 5/31/2023  1:08 PM.            Assessment /Plan     For exam results, see Encounter Report.      Visual floaters, bilateral  No e/o h/b/t 360 degrees OU. Monitor for worsening of symptoms or S/Sx of RD.     Dry eye syndrome of both eyes  Recommend Systane Ultra or Refresh Optive BID-TID OU to aid with symptoms of dry eyes.    Refractive amblyopia of right eye  Hyperopia with astigmatism and presbyopia, bilateral  SRx released to patient. Patient educated on lens options. Normal ocular health. RTC 1 year for routine exam.  Reduced Rx OD per pt request for adaptation.     Senile nuclear sclerosis, bilateral  Nuclear sclerotic cataract - not visually significant. Observe.

## 2023-06-06 ENCOUNTER — OFFICE VISIT (OUTPATIENT)
Dept: SURGERY | Facility: CLINIC | Age: 68
End: 2023-06-06
Payer: MEDICARE

## 2023-06-06 VITALS
WEIGHT: 175.25 LBS | HEIGHT: 71 IN | HEART RATE: 56 BPM | SYSTOLIC BLOOD PRESSURE: 142 MMHG | DIASTOLIC BLOOD PRESSURE: 68 MMHG | OXYGEN SATURATION: 100 % | BODY MASS INDEX: 24.53 KG/M2

## 2023-06-06 DIAGNOSIS — C49.A2 GASTROINTESTINAL STROMAL TUMOR (GIST) OF STOMACH: Primary | ICD-10-CM

## 2023-06-06 PROCEDURE — 4010F ACE/ARB THERAPY RXD/TAKEN: CPT | Mod: HCNC,CPTII,S$GLB,

## 2023-06-06 PROCEDURE — 1101F PR PT FALLS ASSESS DOC 0-1 FALLS W/OUT INJ PAST YR: ICD-10-PCS | Mod: HCNC,CPTII,S$GLB,

## 2023-06-06 PROCEDURE — 3288F FALL RISK ASSESSMENT DOCD: CPT | Mod: HCNC,CPTII,S$GLB,

## 2023-06-06 PROCEDURE — 3077F SYST BP >= 140 MM HG: CPT | Mod: HCNC,CPTII,S$GLB,

## 2023-06-06 PROCEDURE — 3288F PR FALLS RISK ASSESSMENT DOCUMENTED: ICD-10-PCS | Mod: HCNC,CPTII,S$GLB,

## 2023-06-06 PROCEDURE — 4010F PR ACE/ARB THEARPY RXD/TAKEN: ICD-10-PCS | Mod: HCNC,CPTII,S$GLB,

## 2023-06-06 PROCEDURE — 1159F PR MEDICATION LIST DOCUMENTED IN MEDICAL RECORD: ICD-10-PCS | Mod: HCNC,CPTII,S$GLB,

## 2023-06-06 PROCEDURE — 3077F PR MOST RECENT SYSTOLIC BLOOD PRESSURE >= 140 MM HG: ICD-10-PCS | Mod: HCNC,CPTII,S$GLB,

## 2023-06-06 PROCEDURE — 1126F AMNT PAIN NOTED NONE PRSNT: CPT | Mod: HCNC,CPTII,S$GLB,

## 2023-06-06 PROCEDURE — 99024 POSTOP FOLLOW-UP VISIT: CPT | Mod: HCNC,S$GLB,,

## 2023-06-06 PROCEDURE — 1101F PT FALLS ASSESS-DOCD LE1/YR: CPT | Mod: HCNC,CPTII,S$GLB,

## 2023-06-06 PROCEDURE — 3008F BODY MASS INDEX DOCD: CPT | Mod: HCNC,CPTII,S$GLB,

## 2023-06-06 PROCEDURE — 1126F PR PAIN SEVERITY QUANTIFIED, NO PAIN PRESENT: ICD-10-PCS | Mod: HCNC,CPTII,S$GLB,

## 2023-06-06 PROCEDURE — 99999 PR PBB SHADOW E&M-EST. PATIENT-LVL IV: CPT | Mod: PBBFAC,HCNC,,

## 2023-06-06 PROCEDURE — 1159F MED LIST DOCD IN RCRD: CPT | Mod: HCNC,CPTII,S$GLB,

## 2023-06-06 PROCEDURE — 3078F PR MOST RECENT DIASTOLIC BLOOD PRESSURE < 80 MM HG: ICD-10-PCS | Mod: HCNC,CPTII,S$GLB,

## 2023-06-06 PROCEDURE — 3008F PR BODY MASS INDEX (BMI) DOCUMENTED: ICD-10-PCS | Mod: HCNC,CPTII,S$GLB,

## 2023-06-06 PROCEDURE — 99024 PR POST-OP FOLLOW-UP VISIT: ICD-10-PCS | Mod: HCNC,S$GLB,,

## 2023-06-06 PROCEDURE — 99999 PR PBB SHADOW E&M-EST. PATIENT-LVL IV: ICD-10-PCS | Mod: PBBFAC,HCNC,,

## 2023-06-06 PROCEDURE — 3078F DIAST BP <80 MM HG: CPT | Mod: HCNC,CPTII,S$GLB,

## 2023-06-08 ENCOUNTER — TELEPHONE (OUTPATIENT)
Dept: HEMATOLOGY/ONCOLOGY | Facility: CLINIC | Age: 68
End: 2023-06-08
Payer: MEDICARE

## 2023-06-08 NOTE — TELEPHONE ENCOUNTER
----- Message from Juana Arita sent at 6/8/2023 12:06 PM CDT -----  Regarding: Pt's Wife Laura Zhang called to speak to the nurse regarding an appt that needs to be scheduled for the pt and she would like a call back today due to not being clear as to what needs to be scheduled  Patient Advice:    Pt's Wife Laura Zhang called to speak to the nurse regarding an appt that needs to be scheduled for the pt and she would like a call back today due to not being clear as to what needs to be scheduled    Mrs Zhang can be reached at [phone]

## 2023-06-08 NOTE — TELEPHONE ENCOUNTER
I spoke to patients wife. She received a letter saying he had to schedule an appointment with a gastroenterologist before September. I looked in his chart and couldn't see where the follow up was. I told her to bring the letter when she comes to see Dr. Mcnair in July, and we will get it figured out.

## 2023-06-11 VITALS
SYSTOLIC BLOOD PRESSURE: 128 MMHG | TEMPERATURE: 98 F | RESPIRATION RATE: 20 BRPM | HEART RATE: 56 BPM | DIASTOLIC BLOOD PRESSURE: 75 MMHG | OXYGEN SATURATION: 98 %

## 2023-06-11 NOTE — PROGRESS NOTES
"[facility]  Transition of Care Home Visit    Visit Date: 5/25/23  Encounter Provider: Richard Beck NP  PCP:  Mitch Gatica Jr, MD    PRESENTING HISTORY      Patient ID: Sami Zhang 67 y.o. male.    Chief Complaint: Hospital Follow Up    Patient consent was obtained prior to treatment on this visit.    The patient is being seen at home due to a physical debility or recent acute hospitalization that presents a taxing effort to leave the home, to mitigate high risk of hospital readmission or due to the limited availability of reliable or safe options for transportation to the point of access to the provider. The visit meets the criteria for medical necessity as defined by CMS as "health-care services needed to prevent, diagnose, or treat an illness, injury, condition, disease, or its symptoms and that meet accepted standards of medicine." Prior to treatment on this visit the chart was reviewed and patient consent was obtained.    HPI: 67 year-old male with history of arthritis, gout, hyperlipidemia, hypertension, gastric gist.      Hospital Course: He was admitted for gastric surgery, hx of gastric gist.  He is s/p s/p robotic wedge resection on 5/10/23.  The patient tolerated the procedure well, was transferred to recovery post-op, and then transferred to the floor for continuation of medical care. The patient's clinical condition progressively improved. Swallow study on POD 2 was performed and negative. His diet was advanced. He received diet teaching for full liquid diet at home. By the time of discharge, he was tolerating a diet without nausea or vomiting, pain was well controlled with oral medications, and he was ambulating without difficulty. On POD 3 the patient was discharged to home in good condition. On discharge, the patient's incisions were c/d/i and the surgical site was soft and appropriately tender to palpation. The patient will follow up with Dr. López in clinic as scheduled 5/23/23. He " will continue on the full liquid diet until the follow up        Today:  Mr. Sami Zhang is a 67 y.o. male is being seen and examined at home today for transitional care visit to the home environment post-discharge from inpatient hospitalization encounter described above. Sami presents at baseline state of health as reported by patient and caregiver. VSS.  He denies any acute issues or concerns.  Denies chest pain, abdominal pain, fevers, chills, cough, congestion change in bladder habits, change in bowel habits.  He is independent with ADLs, continent of bladder and bowel.  Reports good po intake, bm pattern, sleep pattern. He is aware of upcoming appts and plan on attending. Denies any acute issues, concerns or complaints to address on today's visit. Reports taking all medications as prescribed. No other needs identified at this time.     Admission Date: 5/10/2023  Hospital Length of Stay: 3 days  Discharge Date and Time:  05/13/2023 1:27 PM   _________________________________________________________________    Review of Systems   Constitutional:  Negative for chills and fever.   HENT:  Negative for congestion, postnasal drip and rhinorrhea.    Eyes:  Negative for visual disturbance.   Respiratory:  Negative for chest tightness and shortness of breath.    Cardiovascular:  Negative for chest pain and palpitations.   Gastrointestinal:  Negative for abdominal pain, diarrhea, nausea and vomiting.   Genitourinary:  Negative for difficulty urinating.   Musculoskeletal:  Negative for arthralgias and myalgias.   Skin:  Negative for color change.   Neurological:  Negative for dizziness, weakness, light-headedness and headaches.   Hematological:  Does not bruise/bleed easily.   Psychiatric/Behavioral:  Negative for agitation.      Assessments:  Environmental: single story home, no steps to enter, adequate lighting and temperature control  Functional Status: Independent with ADL's/IADL's, incontinent of bowel and  bladder  Safety: Fall Precautions, COVID Precautions/Social Distancing/Mask Use  Nutritional: Adequate  Home Health: none  DME/Supplies: none    Ethical / Legal: Advance Care Planning   Capacity to make medical decisions:  yes, Conflict no  Surrogate decision maker:  Rich Redd, Relationship: spouse  Advance Directives:  no  HCPOA: no  [place]:  no  Code Status:  full code    Advance Care Planning/Goals of Care:  Advanced Care Directives, HCPOA and [place] forms left in the home for family review, discussion and signing with instructions to return upon their next provider encounter for inclusion to the medical record. Discussed Advance Care Planning for 20 minutes.    PAST HISTORY:     Past Medical History:   Diagnosis Date    Arthritis     GIST (gastrointestinal stromal tumor), malignant 09/09/2022    Gout 10/08/2015    Hyperlipidemia     Hypertension        Past Surgical History:   Procedure Laterality Date    COLONOSCOPY N/A 12/15/2022    Procedure: COLONOSCOPY;  Surgeon: Sean Bran MD;  Location: [facility] ENDO (4TH FLR);  Service: Endoscopy;  Laterality: N/A;  [facility] handed-t  12/8/22- precall completed-    ENDOSCOPIC ULTRASOUND OF UPPER GASTROINTESTINAL TRACT N/A 09/09/2022    Procedure: ULTRASOUND, UPPER GI TRACT, ENDOSCOPIC;  Surgeon: Yosvany Paula MD;  Location: [facility] ENDO (2ND FLR);  Service: Endoscopy;  Laterality: N/A;  The patient need an EGD/EUS (radial) for gastric mass. Main. Urgent. 60 minutes. Referring:   Todd Cantu MD   Thanks,   Evan Farr MD    ESOPHAGOGASTRODUODENOSCOPY N/A 09/09/2022    Procedure: EGD (ESOPHAGOGASTRODUODENOSCOPY);  Surgeon: Yosvany Paula MD;  Location: [facility] ENDO (2ND FLR);  Service: Endoscopy;  Laterality: N/A;  The patient need an EGD/EUS (radial) for gastric mass. Main. Urgent. 60 minutes. Referring:   Todd Cantu MD   Thanks,   Evan Farr MD    Pt is fully vaccinated-DS  9/7/22-Instructions written down by pt's wife and read back.-DS     ESOPHAGOGASTRODUODENOSCOPY N/A 4/21/2023    Procedure: EGD (ESOPHAGOGASTRODUODENOSCOPY);  Surgeon: Kostas López MD;  Location: [facility] OR [address] [place] [address];  Service: General;  Laterality: N/A;    ESOPHAGOGASTRODUODENOSCOPY N/A 5/10/2023    Procedure: EGD (ESOPHAGOGASTRODUODENOSCOPY);  Surgeon: Kostas López MD;  Location: [facility] OR [address] [place] [address];  Service: General;  Laterality: N/A;    KNEE SURGERY Bilateral     unsure type of knee surgery, unsure if metal was placed, completed at another hospital    NONE N/A 10/08/2015    ROBOT-ASSISTED SURGICAL REMOVAL OF STOMACH USING DA WILLIAM XI N/A 5/10/2023    Procedure: XI ROBOTIC GASTRECTOMY, partial, possible total;  Surgeon: Kostas López MD;  Location: [facility] OR [address] [place] [address];  Service: General;  Laterality: N/A;       Family History   Problem Relation Age of Onset    Stroke Sister 60    Other Other         brain/head tumor (about 1y ago??)    Cancer Neg Hx     Diabetes Neg Hx     Heart disease Neg Hx     Hypertension Neg Hx        Social History     Socioeconomic History    Marital status:    Occupational History    Occupation: Retired from [employer]   Tobacco Use    Smoking status: Never     Passive exposure: Past    Smokeless tobacco: Never   Substance and Sexual Activity    Alcohol use: Not Currently     Alcohol/week: 2.0 standard drinks     Types: 2 Cans of beer per week     Comment: occasionally drinks wine    Drug use: No    Sexual activity: Not Currently     Partners: Female     Birth control/protection: None     Social Determinants of Health     Financial Resource Strain: Low Risk     Difficulty of Paying Living Expenses: Not hard at all   Food Insecurity: No Food Insecurity    Worried About Running Out of Food in the Last Year: Never true    Ran Out of Food in the Last Year: Never true   Transportation Needs: No Transportation Needs    Lack of Transportation (Medical): No    Lack of Transportation (Non-Medical): No   Physical Activity: Inactive    Days of  Exercise per Week: 0 days    Minutes of Exercise per Session: 0 min   Stress: No Stress Concern Present    Feeling of Stress : Not at all   Social Connections: Moderately Integrated    Frequency of Communication with Friends and Family: More than three times a week    Frequency of Social Gatherings with Friends and Family: Once a week    Attends Holiness Services: More than 4 times per year    Active Member of Clubs or Organizations: No    Attends Club or Organization Meetings: Never    Marital Status:    Housing Stability: Low Risk     Unable to Pay for Housing in the Last Year: No    Number of Places Lived in the Last Year: 1    Unstable Housing in the Last Year: No       MEDICATIONS & ALLERGIES:     Current Outpatient Medications on File Prior to Visit   Medication Sig Dispense Refill    allopurinol (ZYLOPRIM) 100 MG tablet Take 100 mg by mouth 2 (two) times daily.      CALCIUM CARBONATE/VITAMIN D3 (VITAMIN D-3 ORAL) Take 10,000 Units by mouth once a week.      hydroCHLOROthiazide (HYDRODIURIL) 25 MG tablet Take 1 tablet (25 mg total) by mouth once daily. 30 tablet 11    HYDROcodone-acetaminophen (NORCO) 5-325 mg per tablet Take 1 tablet by mouth every 4 (four) hours as needed for Pain. 20 tablet 0    icosapent ethyL (VASCEPA) 0.5 gram Cap Take 2 capsules by mouth 2 (two) times daily.      imatinib (GLEEVEC) 400 MG Tab Take 1 tablet (400 mg total) by mouth once daily. 30 tablet 11    loperamide (IMODIUM A-D) 2 mg Tab Take two tabs at the onset of loose stools.  Then take one tab after every subsequent loose stool. 90 tablet 11    multivitamin capsule Take 1 capsule by mouth once daily.      nebivolol (BYSTOLIC) 10 MG Tab Take 10 mg by mouth once daily.      pantoprazole (PROTONIX) 40 MG tablet Take 1 tablet (40 mg total) by mouth once daily. (Patient not taking: Reported on 6/6/2023) 30 tablet 11    rosuvastatin (CRESTOR) 20 MG tablet Take 20 mg by mouth once daily.      valsartan (DIOVAN) 160 MG tablet  Take 1 tablet (160 mg total) by mouth once daily. 30 tablet 11    vitamin D 1000 units Tab Take 10,000 Units by mouth once a week.       No current facility-administered medications on file prior to visit.        Review of patient's allergies indicates:  No Known Allergies    OBJECTIVE:     Vital Signs:  Vitals:    05/25/23 1130   BP: 128/75   Pulse: (!) 56   Resp: 20   Temp: 98 °F (36.7 °C)     There is no height or weight on file to calculate BMI.       Physical Exam  HENT:      Head: Normocephalic and atraumatic.      Nose: No congestion or rhinorrhea.      Mouth/Throat:      Mouth: Mucous membranes are moist.   Cardiovascular:      Rate and Rhythm: Normal rate.   Pulmonary:      Effort: No respiratory distress.      Breath sounds: Normal breath sounds.   Abdominal:      General: Bowel sounds are normal.      Palpations: Abdomen is soft.      Tenderness: There is no abdominal tenderness.   Musculoskeletal:         General: No deformity.      Cervical back: Neck supple.   Skin:     General: Skin is warm and dry.   Neurological:      Mental Status: He is alert and oriented to person, place, and time.   Psychiatric:         Mood and Affect: Mood normal.     Laboratory  Lab Results   Component Value Date    WBC 6.65 05/26/2023    HGB 12.3 (L) 05/26/2023    HCT 40.4 05/26/2023    MCV 87 05/26/2023     05/26/2023     Lab Results   Component Value Date    INR 1.0 07/01/2016     No results found for: HGBA1C  No results for input(s): POCTGLUCOSE in the last 72 hours.    TRANSITION OF CARE:     [facility] Contact Note:5/15/23    Family and/or Caretaker present at visit?  Yes.  Diagnostic tests reviewed/disposition: No diagnosic tests pending after this hospitalization.  Disease/illness education: Importance of Compliance with all prescribed medications and treatments, COVID Precautions/Social Distancing/Mask Use  Home health/community services discussion/referrals: Patient does not have home health established  from hospital visit.  They do not need home health.  If needed, we will set up home health for the patient.   Establishment or re-establishment of referral orders for community resources: No other necessary community resources.   Discussion with other health care providers: No discussion with other health care providers necessary.     Transition of Care Visit:  I have reviewed and updated the history and problem list. I have reconciled the medication list. I have discussed the hospitalization and current medical issues, prognosis and plans with the patient/family.     Medications Reconciliation:   I have reconciled the patient's home medications and discharge medications with the patient/family. I have updated all changes. Refer to After-Visit Medication List.    Discharge plans, follow-up instructions, future appointments, provider contact information, indicators to seek emergency treatment and encouragement to call for any questions, concerns or clarification of the patient's plan of care explained to patient and/or caregiver(s), whom confirm understanding of provided information and endorse willingness to comply.     ASSESSMENT & PLAN:     HIGH RISK CONDITION(S):  Patient has a condition that poses threat to life and bodily function: s/p surgical resection on 5/10/23.      1. Gastrointestinal stromal tumor (GIST) of stomach  Overview:  Patient with symptomatic gastric GIST, which progressed on imaging 7/18/22 to 9/12/22. No evidence of luis or distant metastatic disease. Mitotic rate not quantified, but given clinical behavior and noted cellularity and frequent mitosis, considered higher risk. 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 Started neoadjuvant imatinib 400mg po daily 10/08/2022 with good tolerance and clinical resposne. Underwent surgical resection 5/10/23. Plan to complete total of 3 years of perioperative therapy.    Assessment & Plan:  S/P surgical resection on 5/10/23  Reports no current issues   Continue meds as  prescribed  Continue to hematology oncology      2. Primary hypertension  Overview:  Patient on HCTZ, nebivolol, and valsartan.    Assessment & Plan:  Controlled  Continue meds as prescribed       3. Gout, unspecified cause, unspecified chronicity, unspecified site  Assessment & Plan:  Chronic  Continue allopurinol                Instructions:  - Continue all medications, treatments and therapies as ordered.   - Follow all instructions, recommendations as discussed.  - Maintain Safety Precautions at all times.  - Attend all medical appointments as scheduled.  - For worsening symptoms: call Primary Care Physician or Nurse Practitioner.  - For emergencies, call 911 or immediately report to the nearest emergency room.  - Limit Risks of environmental exposure to coronavirus/COVID-19 as discussed including: social distancing, hand hygiene, and limiting departures from the home for necessities only.          Were controlled substances prescribed?  No      Scheduled Follow-up :  Future Appointments   Date Time Provider Department Center   7/7/2023 10:30 AM LAB, HEMON CANCER BLDG [facility] LAB HO [facility]   7/7/2023 11:30 AM [facility] BCC CT1 [facility] CT KRISHNA [facility]   7/14/2023  1:20 PM Enrique Mcnair MD [facility] HEMONC2 [facility]       After Visit Medication List :     Medication List            Accurate as of May 25, 2023 11:59 PM. If you have any questions, ask your nurse or doctor.                CONTINUE taking these medications      allopurinoL 100 MG tablet  Commonly known as: ZYLOPRIM     enoxaparin 40 mg/0.4 mL Syrg  Commonly known as: LOVENOX  Inject 0.4 mLs (40 mg total) into the skin Daily. for 14 days     hydroCHLOROthiazide 25 MG tablet  Commonly known as: HYDRODIURIL  Take 1 tablet (25 mg total) by mouth once daily.     HYDROcodone-acetaminophen 5-325 mg per tablet  Commonly known as: NORCO  Take 1 tablet by mouth every 4 (four) hours as needed for Pain.     icosapent ethyL 0.5 gram Cap  Commonly known as:  VASCEPA     imatinib 400 MG Tab  Commonly known as: GLEEVEC  Take 1 tablet (400 mg total) by mouth once daily.     loperamide 2 mg Tab  Commonly known as: IMODIUM A-D  Take two tabs at the onset of loose stools.  Then take one tab after every subsequent loose stool.     multivitamin capsule     nebivoloL 10 MG Tab  Commonly known as: BYSTOLIC     pantoprazole 40 MG tablet  Commonly known as: PROTONIX  Take 1 tablet (40 mg total) by mouth once daily.     potassium chloride SA 20 MEQ tablet  Commonly known as: K-DUR,KLOR-CON  Take 2 tablets (40 mEq total) by mouth once daily.     rosuvastatin 20 MG tablet  Commonly known as: CRESTOR     valsartan 160 MG tablet  Commonly known as: DIOVAN  Take 1 tablet (160 mg total) by mouth once daily.     vitamin D 1000 units Tab  Commonly known as: VITAMIN D3     VITAMIN D-3 ORAL              Signature:       LATOYA Rushing-C   [facility]    Total Face-to-Face Encounter: 60 minutes with >50% of time spent discussing the care with the patient/family.

## 2023-06-11 NOTE — ASSESSMENT & PLAN NOTE
S/P surgical resection on 5/10/23  Reports no current issues   Continue meds as prescribed  Continue to hematology oncology

## 2023-06-16 NOTE — PROGRESS NOTES
"  Post-Op Follow-up Visit:   6/6/2023  Patient ID: Sami Zhang is a 67 y.o. male, born 1955    Chief Complaint   Patient presents with    Post-op Evaluation     Interval History: Mr. Zhang returns to the clinic following Robotic partial gastrectomy with Dr. López on 5/10/23 for GIST. He is doing well.  He is tolerating a soft diet and maintaining his weight. Denies fever, chills, pain, N/V/D, drainage, redness, swelling, SOB, chest pain.     Physical Exam:  BP (!) 142/68   Pulse (!) 56   Ht 5' 11" (1.803 m)   Wt 79.5 kg (175 lb 4.3 oz)   SpO2 100%   BMI 24.44 kg/m²     General:  Non-toxic, ambulatory  Abd:  Soft, non-tender  Incision:  CDI without erythema or drainage     Pathology:  Final Pathologic Diagnosis   1. Lymph node, pericardial, excisional biopsy:   Four lymph nodes, negative for metastatic tumor (0/4).     2. Proximal gastric GIST, partial gastrectomy:   Gastrointestinal stromal tumor (GIST), spindle cell type, low grade, with cystic degeneration and necrosis with chondroid changes.   Viable spindle cell GIST is less than 1 mm from inked stapled margin (necrotic tumor present at inked stapled margin).   Four lymph nodes, negative for metastatic tumor (0/4).   Please see attached synoptic report for additional information.     CANCER CASE SUMMARY SYNOPTIC REPORT (GASTROINTESTINAL STROMAL TUMOR, RESECTION)     CLINICAL   Associated syndrome:  Not specified   Pre resection treatment:  Therapy performed, imatinib     SPECIMEN   Procedure:  Resection (partial gastrectomy)     TUMOR   Tumor focality:  Unifocal   Multiple primary sites:  Not applicable   Tumor site:  Stomach (gastric fundus)   Histologic type:  Gastrointestinal stromal tumor, spindle cell type   Tumor size:  5.5 cm (gross measurement)   Mitotic rate:  1 mitotic figure per 5 mm2   Histologic grade:  G1, low-grade   Necrosis:  Present   Extent of necrosis:  Approximately 80%   Treatment effect:  Present   Percentage of viable tumor:  " Approximately 20%   Risk assessment:  Low     MARGINS   All margins negative for viable GIST (necrotic tumor present at inked margin)   Closest margin to GIST:  Unoriented stapled margin   Distance from GIST to closest margin:  Less than 1 mm (necrotic tumor present at inked margin)     REGIONAL LYMPH NODES   Regional lymph node status:  All regional lymph nodes negative for tumor   Number of lymph nodes with tumor:  0   Number of lymph nodes examined:  8     DISTANT METASTASIS   Not applicable     PATHOLOGIC STAGE CLASSIFICATION (AJCC 8th Edition)   Modified classification:  y (post neoadjuvant therapy)   pT category:  pT3:  Tumor more than 5 cm but not more than 10 cm   pN category:  pN0:  No regional lymph node metastasis   pM category:  Not applicable     ADDITIONAL FINDINGS   Immunohistochemical stains were performed on the initial biopsy material (case [facility]-).  Per prior pathology report, the tumor was positive for  and CD34, while negative for SMA and S100.  Additionally, molecular genetic studies demonstrated a   KIT gene mutation in the tumor.  Please see prior pathology report for additional information ([facility]-).       ICD-10-CM ICD-9-CM    1. Gastrointestinal stromal tumor (GIST) of stomach  C49.A2 238.1       Plan   Mr. Zhang returns to the clinic following Robotic partial gastrectomy with Dr. López on 5/10/23 for GIST. He is doing well.  He is tolerating a soft diet and maintaining his weight. His incisions are healing nicely. He has af/u with his oncologist Dr. Mcnair. We discussed his path report as well as ensuring that he eats small frequent meals with emphasis on calories and protein. Questions were asked and answered to patient's satisfaction.  We discussed the need for continued clinical/radiographic/endoscopic follow-up.     Follow up if symptoms worsen or fail to improve.         LATOYA Henriquez, FNP-C  Upper GI / Hepatobiliary Surgical Oncology  [facility]  [place]  Office: [phone]  Fax: [phone]

## 2023-07-07 ENCOUNTER — HOSPITAL ENCOUNTER (OUTPATIENT)
Dept: RADIOLOGY | Facility: HOSPITAL | Age: 68
Discharge: HOME | End: 2023-07-07
Attending: HOSPITALIST
Payer: MEDICARE

## 2023-07-07 DIAGNOSIS — C49.A2 GASTROINTESTINAL STROMAL TUMOR (GIST) OF STOMACH: ICD-10-CM

## 2023-07-07 PROCEDURE — 74177 CT CHEST ABDOMEN PELVIS WITH CONTRAST (XPD): ICD-10-PCS | Mod: 26,,, | Performed by: STUDENT IN AN ORGANIZED HEALTH CARE EDUCATION/TRAINING PROGRAM

## 2023-07-07 PROCEDURE — 71260 CT CHEST ABDOMEN PELVIS WITH CONTRAST (XPD): ICD-10-PCS | Mod: 26,,, | Performed by: STUDENT IN AN ORGANIZED HEALTH CARE EDUCATION/TRAINING PROGRAM

## 2023-07-07 PROCEDURE — 71260 CT THORAX DX C+: CPT | Mod: TC

## 2023-07-07 PROCEDURE — 74177 CT ABD & PELVIS W/CONTRAST: CPT | Mod: 26,,, | Performed by: STUDENT IN AN ORGANIZED HEALTH CARE EDUCATION/TRAINING PROGRAM

## 2023-07-07 PROCEDURE — 74177 CT ABD & PELVIS W/CONTRAST: CPT | Mod: TC

## 2023-07-07 PROCEDURE — 25500020 PHARM REV CODE 255: Performed by: HOSPITALIST

## 2023-07-07 PROCEDURE — A9698 NON-RAD CONTRAST MATERIALNOC: HCPCS | Performed by: HOSPITALIST

## 2023-07-07 PROCEDURE — 71260 CT THORAX DX C+: CPT | Mod: 26,,, | Performed by: STUDENT IN AN ORGANIZED HEALTH CARE EDUCATION/TRAINING PROGRAM

## 2023-07-07 RX ADMIN — Medication 450 ML: at 11:07

## 2023-07-07 RX ADMIN — IOHEXOL 100 ML: 350 INJECTION, SOLUTION INTRAVENOUS at 11:07

## 2023-07-14 ENCOUNTER — OFFICE VISIT (OUTPATIENT)
Dept: HEMATOLOGY/ONCOLOGY | Facility: CLINIC | Age: 68
End: 2023-07-14
Payer: MEDICARE

## 2023-07-14 VITALS
HEART RATE: 70 BPM | TEMPERATURE: 98 F | BODY MASS INDEX: 25.62 KG/M2 | WEIGHT: 183 LBS | OXYGEN SATURATION: 100 % | DIASTOLIC BLOOD PRESSURE: 65 MMHG | RESPIRATION RATE: 18 BRPM | HEIGHT: 71 IN | SYSTOLIC BLOOD PRESSURE: 136 MMHG

## 2023-07-14 DIAGNOSIS — E87.6 HYPOKALEMIA: ICD-10-CM

## 2023-07-14 DIAGNOSIS — C49.A2 GASTROINTESTINAL STROMAL TUMOR (GIST) OF STOMACH: Primary | ICD-10-CM

## 2023-07-14 DIAGNOSIS — I10 PRIMARY HYPERTENSION: ICD-10-CM

## 2023-07-14 PROCEDURE — 3075F SYST BP GE 130 - 139MM HG: CPT | Mod: CPTII,S$GLB,, | Performed by: HOSPITALIST

## 2023-07-14 PROCEDURE — 3075F PR MOST RECENT SYSTOLIC BLOOD PRESS GE 130-139MM HG: ICD-10-PCS | Mod: CPTII,S$GLB,, | Performed by: HOSPITALIST

## 2023-07-14 PROCEDURE — 4010F ACE/ARB THERAPY RXD/TAKEN: CPT | Mod: CPTII,S$GLB,, | Performed by: HOSPITALIST

## 2023-07-14 PROCEDURE — 99214 PR OFFICE/OUTPT VISIT, EST, LEVL IV, 30-39 MIN: ICD-10-PCS | Mod: S$GLB,,, | Performed by: HOSPITALIST

## 2023-07-14 PROCEDURE — 99999 PR PBB SHADOW E&M-EST. PATIENT-LVL III: CPT | Mod: PBBFAC,,, | Performed by: HOSPITALIST

## 2023-07-14 PROCEDURE — 1101F PT FALLS ASSESS-DOCD LE1/YR: CPT | Mod: CPTII,S$GLB,, | Performed by: HOSPITALIST

## 2023-07-14 PROCEDURE — 3008F BODY MASS INDEX DOCD: CPT | Mod: CPTII,S$GLB,, | Performed by: HOSPITALIST

## 2023-07-14 PROCEDURE — 1101F PR PT FALLS ASSESS DOC 0-1 FALLS W/OUT INJ PAST YR: ICD-10-PCS | Mod: CPTII,S$GLB,, | Performed by: HOSPITALIST

## 2023-07-14 PROCEDURE — 3288F FALL RISK ASSESSMENT DOCD: CPT | Mod: CPTII,S$GLB,, | Performed by: HOSPITALIST

## 2023-07-14 PROCEDURE — 3078F PR MOST RECENT DIASTOLIC BLOOD PRESSURE < 80 MM HG: ICD-10-PCS | Mod: CPTII,S$GLB,, | Performed by: HOSPITALIST

## 2023-07-14 PROCEDURE — 1126F AMNT PAIN NOTED NONE PRSNT: CPT | Mod: CPTII,S$GLB,, | Performed by: HOSPITALIST

## 2023-07-14 PROCEDURE — 3288F PR FALLS RISK ASSESSMENT DOCUMENTED: ICD-10-PCS | Mod: CPTII,S$GLB,, | Performed by: HOSPITALIST

## 2023-07-14 PROCEDURE — 99214 OFFICE O/P EST MOD 30 MIN: CPT | Mod: S$GLB,,, | Performed by: HOSPITALIST

## 2023-07-14 PROCEDURE — 99999 PR PBB SHADOW E&M-EST. PATIENT-LVL III: ICD-10-PCS | Mod: PBBFAC,,, | Performed by: HOSPITALIST

## 2023-07-14 PROCEDURE — 3008F PR BODY MASS INDEX (BMI) DOCUMENTED: ICD-10-PCS | Mod: CPTII,S$GLB,, | Performed by: HOSPITALIST

## 2023-07-14 PROCEDURE — 4010F PR ACE/ARB THEARPY RXD/TAKEN: ICD-10-PCS | Mod: CPTII,S$GLB,, | Performed by: HOSPITALIST

## 2023-07-14 PROCEDURE — 1126F PR PAIN SEVERITY QUANTIFIED, NO PAIN PRESENT: ICD-10-PCS | Mod: CPTII,S$GLB,, | Performed by: HOSPITALIST

## 2023-07-14 PROCEDURE — 3078F DIAST BP <80 MM HG: CPT | Mod: CPTII,S$GLB,, | Performed by: HOSPITALIST

## 2023-07-14 RX ORDER — POTASSIUM CHLORIDE 20 MEQ/1
40 TABLET, EXTENDED RELEASE ORAL DAILY
Qty: 60 TABLET | Refills: 5 | Status: SHIPPED | OUTPATIENT
Start: 2023-07-14

## 2023-07-14 NOTE — PROGRESS NOTES
MEDICAL ONCOLOGY FOLLOW-UP VISIT.     Best Contact Phone Number(s): [phone] (home) [phone] (work)     Cancer/Stage/TNM:    Cancer Staging   Gastrointestinal stromal tumor (GIST) of stomach  Staging form: Gastrointestinal Stromal Tumor - Gastric and Omental GIST, AJCC 8th Edition  - Clinical stage from 9/9/2022: Stage IIIA (cT3, cN0, cM0, Mitotic Rate: High) - Signed by Enrique Mcnair MD on 9/26/2022         Reason for visit: Mr. 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 He started neoadjuvant imatinib on 10/8/22 and resection on 5/10/23. He presents to medical oncology clinic for routine follow on adjuvant imatinib therapy.      Interval History:     No new concerns today. Notes 'grumbling' of his stomach all day. Appetite is good and weight is up a few pounds. No N/V. Reports diarrhea up to 3x per day. No FC. No CP, SOB or cough. Resolutions of prior left flank pain.      Oncology History   Gastrointestinal stromal tumor (GIST) of stomach   7/18/2022 Imaging Significant Findings    Presented to [facility] [place] ED with abdominal pain. CT a/p found a 6.7 x 5.6 x 7.2 cm cystic mass in the gastric fundus.     9/9/2022 Procedure    EGD/EUS fins a 6.2x8.0 cm cystic mass in the cardia. Pathology shows GIST with 'focal/frequent mitoses' although exact quantification could not be determined.     KIT exon 11 [id number]     9/12/2022 Imaging Significant Findings    CT a/p redemonstrates 9.3x8 cm gastric mass with associated necrosis. No LAD. No liver lesions.     9/15/2022 Imaging Significant Findings    CT chest only shows incidental 3mm LLL nodule. No evidence of metastatic disease.     10/8/2022 -  Chemotherapy    Start imatinib     1/17/2023 Imaging Significant Findings    CT torso; Decreased size of gastric mass 6.5 x 2.5cm from 9.3 x 8.0cm.     1/23/2023 Tumor Conference       [facility] UGI MULTIDISCIPLINARY TUMOR BOARD  PATIENT  REVIEW FORM   ____________________________________________________________    CLINIC #: [id number]  DATE: 1/23/2023    DIAGNOSIS: stomach GIST    PRESENTER: Xu    PATIENT SUMMARY:   This 66 y/o gentleman presented with epigastric abd pain in 7/2022. Imaging identified large 9cm cystic gastric mass with central necrosis. He underwent EUS with bx in 9/2022 and pathology revealed GIST. He started neoadj Gleevec per Dr. Mcnari in 10/2022. Recent restaging CT scan reviewed - good response to tx, lesion smaller, now measuring 6.7 cm.     BOARD RECOMMENDATIONS:   Continue neoadj Gleevec    CONSULT NEEDED:     [] Surgery    [] Hem/Onc    [] Rad/Onc    [] Dietary                 [] Social Service    [] Psychology       [] AES  [] Radiology     Clinical Stage: Tumor 3 Node(s) 0 Metastasis 0      GROUP STAGE:  [] O    [] 1A    [] IB    [] IIA    [] IIB     [x] IIIA     [] IIIB     [] IIIC    []IV  [] Local recurrence     [] Regional recurrence     [] Distant recurrence   Metastatic site(s): none         [x] Lizbeth'l Treatment Guidelines reviewed and care planned is consistent with guidelines.         (i.e., NCCN, NCI, PD, ACO, AUA, etc.)    PRESENTATION AT CANCER CONFERENCE:         [x] Prospective    [] Retrospective     [] Follow-Up       4/12/2023 Imaging Significant Findings    CT torso:  1. Exam limited by motion artifact.  2. Slight decreased size of gastric wall mass.  3. Stable mildly enlarged lymph node in the lindsay hepatis measuring 1.2 cm, stable compared to CT from 09/12/2022.  4. Few subcentimeter hepatic hypodensities, too small to characterize.  5. Thyroid nodules better characterized on CT from 01/17/2023.     7/7/2023 Imaging Significant Findings    CT torso 7/7/23  1. Status post partial gastrectomy.  No evidence of local disease recurrence.  No new suspicious lesion.  2. Stable upper limit of normal caliber periportal lymph nodes.  3. Stable subcentimeter hepatic hypodensities too small to characterize.       "      Physical Exam:   /65 (BP Location: Left arm, Patient Position: Sitting, BP Method: Medium (Automatic))   Pulse 70   Temp 98.2 °F (36.8 °C) (Oral)   Resp 18   Ht 5' 11" (1.803 m)   Wt 83 kg (182 lb 15.7 oz)   SpO2 100%   BMI 25.52 kg/m²      ECOG Performance Status: (foot note - ECOG PS provided by Eastern Cooperative Oncology Group) 1 - Symptomatic but completely ambulatory    Physical Exam  Constitutional:       General: He is not in acute distress.     Appearance: Normal appearance.   HENT:      Head: Normocephalic.      Mouth/Throat:      Mouth: Mucous membranes are moist.      Pharynx: Oropharynx is clear. No oropharyngeal exudate or posterior oropharyngeal erythema.   Eyes:      General: No scleral icterus.     Extraocular Movements: Extraocular movements intact.      Conjunctiva/sclera: Conjunctivae normal.      Pupils: Pupils are equal, round, and reactive to light.   Cardiovascular:      Rate and Rhythm: Normal rate and regular rhythm.   Pulmonary:      Effort: Pulmonary effort is normal. No respiratory distress.   Abdominal:      General: There is no distension.      Palpations: Abdomen is soft.      Tenderness: There is no abdominal tenderness.   Musculoskeletal:         General: No swelling. Normal range of motion.      Cervical back: Normal range of motion and neck supple.      Right lower leg: No edema.      Left lower leg: No edema.   Lymphadenopathy:      Cervical: No cervical adenopathy.   Skin:     General: Skin is warm and dry.      Coloration: Skin is not jaundiced.      Findings: No rash.   Neurological:      General: No focal deficit present.      Mental Status: He is alert and oriented to person, place, and time.      Motor: No weakness.   Psychiatric:         Mood and Affect: Mood normal.         Behavior: Behavior normal.         Thought Content: Thought content normal.         Labs:  No visits with results within 2 Day(s) from this visit.   Latest known visit with results " is:   Lab Visit on 07/07/2023   Component Date Value    WBC 07/07/2023 4.92     RBC 07/07/2023 4.09 (L)     Hemoglobin 07/07/2023 11.0 (L)     Hematocrit 07/07/2023 35.4 (L)     MCV 07/07/2023 87     MCH 07/07/2023 26.9 (L)     MCHC 07/07/2023 31.1 (L)     RDW 07/07/2023 16.0 (H)     Platelets 07/07/2023 194     MPV 07/07/2023 11.8     Gran # (ANC) 07/07/2023 2.8     Immature Grans (Abs) 07/07/2023 0.00     Sodium 07/07/2023 143     Potassium 07/07/2023 3.5     Chloride 07/07/2023 110     CO2 07/07/2023 25     Glucose 07/07/2023 84     BUN 07/07/2023 9     Creatinine 07/07/2023 1.0     Calcium 07/07/2023 9.2     Total Protein 07/07/2023 6.5     Albumin 07/07/2023 3.9     Total Bilirubin 07/07/2023 1.5 (H)     Alkaline Phosphatase 07/07/2023 78     AST 07/07/2023 34     ALT 07/07/2023 47 (H)     eGFR 07/07/2023 >60.0     Anion Gap 07/07/2023 8                Imaging:     CT Chest Abdomen Pelvis With Contrast  Narrative: EXAMINATION:  CT CHEST ABDOMEN PELVIS WITH CONTRAST (XPD)    CLINICAL HISTORY:  Gastric cancer, assess treatment response;Gastrointestinal stromal tumor of stomach    TECHNIQUE:  Low dose axial images, sagittal and coronal reformations were obtained from the thoracic inlet to the pubic synthesis following the intravenous administration of 100 mL of Omnipaque 350.    COMPARISON:  CT 04/12/2023    FINDINGS:  Base of neck: Multiple thyroid nodules, similar to prior exam.    Thoracic soft tissues: Mild bilateral gynecomastia.    Aorta: Normal caliber.  Minimal calcific atherosclerosis.    Heart: Normal size.  No pericardial effusion.  Mild calcific atherosclerosis of the coronary arteries.    Ginette/Mediastinum: No pathologic lymphadenopathy.    Lungs: Well aerated, without consolidation or pleural fluid.    Liver: Normal size.  Few stable subcentimeter hypodensities too small to characterize.  No new lesion.  Portal veins are patent.    Gallbladder: Unremarkable.    Bile Ducts: No evidence of dilated  ducts.    Pancreas: No mass or ductal dilatation.    Spleen: Unremarkable.    Adrenals: Unremarkable.    Kidneys/ Ureters: Normal in size and location.  No hydronephrosis or nephrolithiasis.  No ureteral dilatation.    Bladder: No evidence of wall thickening.    Reproductive organs: Prostate is mildly enlarged.    GI Tract/Mesentery: Postoperative changes of partial gastrectomy.  No evidence of local disease recurrence at the resection site.  Small and large bowel are normal caliber.  No evidence of bowel obstruction or inflammation.  Scattered colonic diverticula.    Peritoneal Space: No ascites.  No free air.    Lymph nodes: Stable upper limit of normal caliber periportal lymph nodes.  No new pathologic lymphadenopathy.    Abdominal wall: Fat containing right inguinal hernia.    Vasculature: Abdominal aorta is normal caliber.    Bones: Degenerative changes of the spine.  Stable sclerotic lesions within the left posterior ilium possibly representing bone islands.  No new aggressive lesion.  No acute fracture.  Impression: 1. Status post partial gastrectomy.  No evidence of local disease recurrence.  No new suspicious lesion.  2. Stable upper limit of normal caliber periportal lymph nodes.  3. Stable subcentimeter hepatic hypodensities too small to characterize.  4. Additional findings as above.    Electronically signed by: Janusz Fernandez  Date:    07/07/2023  Time:    14:26              Diagnoses:       1. Gastrointestinal stromal tumor (GIST) of stomach    2. Hypokalemia    3. Primary hypertension              Assessment and Plan:     1. Gastrointestinal stromal tumor (GIST) of stomach  Overview:  Patient with symptomatic gastric GIST, which progressed on imaging 7/18/22 to 9/12/22. No evidence of luis or distant metastatic disease. Mitotic rate not quantified, but given clinical behavior and noted cellularity and frequent mitosis, considered higher risk. 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 Started neoadjuvant imatinib  400mg po daily 10/08/2022 with good tolerance and clinical resposne. Underwent surgical resection 5/10/23. Plan to complete total of 3 years of perioperative therapy.    Assessment & Plan:  Tolerating imatinib well. No evidence of recurrence on recent imaging.    - Continue imatinib through 10/2025  - FU in 3 months  - Repeat CT scan in 6 months        2. Hypokalemia  Assessment & Plan:  - Con't potassiuupplem,ent    Orders:  -     potassium chloride SA (K-DUR,KLOR-CON) 20 MEQ tablet; Take 2 tablets (40 mEq total) by mouth once daily.  Dispense: 60 tablet; Refill: 5    3. Primary hypertension  Overview:  Patient on HCTZ, nebivolol, and valsartan.              Route Chart for Scheduling    Med Onc Chart Routing      Follow up with physician 3 months.   Follow up with [facility]    Infusion scheduling note    Injection scheduling note    Labs CBC and CMP   Scheduling:  Preferred lab:  Lab interval:     Imaging    Pharmacy appointment    Other referrals                  Enrique Mcnair MD  Hematology/Oncology  [facility]

## 2023-07-14 NOTE — PATIENT INSTRUCTIONS
Continue to tolerate imatinib well. No evidence of cancer recurrence on recent CT imaging. Will follow up in clinic in 3 months. Repeat CT scan in 6 months.    Continue potassium supplements for now.

## 2023-07-14 NOTE — ASSESSMENT & PLAN NOTE
Tolerating imatinib well. No evidence of recurrence on recent imaging.    - Continue imatinib through 10/2025  - FU in 3 months  - Repeat CT scan in 6 months

## 2023-08-14 ENCOUNTER — PES CALL (OUTPATIENT)
Dept: ADMINISTRATIVE | Facility: CLINIC | Age: 68
End: 2023-08-14
Payer: MEDICARE

## 2023-08-28 ENCOUNTER — TELEPHONE (OUTPATIENT)
Dept: HEMATOLOGY/ONCOLOGY | Facility: CLINIC | Age: 68
End: 2023-08-28
Payer: MEDICARE

## 2023-08-28 DIAGNOSIS — C49.A2 GASTROINTESTINAL STROMAL TUMOR (GIST) OF STOMACH: ICD-10-CM

## 2023-08-28 RX ORDER — IMATINIB MESYLATE 400 MG/1
400 TABLET, FILM COATED ORAL DAILY
Qty: 30 TABLET | Refills: 11 | OUTPATIENT
Start: 2023-08-28 | End: 2023-08-28 | Stop reason: SDUPTHER

## 2023-08-28 RX ORDER — IMATINIB MESYLATE 400 MG/1
400 TABLET, FILM COATED ORAL DAILY
Qty: 30 TABLET | Refills: 11 | Status: SHIPPED | OUTPATIENT
Start: 2023-08-28 | End: 2024-01-02 | Stop reason: SDUPTHER

## 2023-08-28 NOTE — TELEPHONE ENCOUNTER
----- Message from Za Saleh sent at 8/28/2023  9:07 AM CDT -----  Contact: [phone]  Requesting an RX refill or new RX. new  Is this a refill or new RX:  new    RX name and strength (copy/paste from chart):  imatinib (GLEEVEC) 400 MG Tab 30 tablet 11 12/1/2022 12/1/2023  Sig: Take 1 tablet (400 mg total) by mouth once daily.      Is this a 30 day or 90 day RX: 90    Pharmacy name and phone # (copy/paste from chart):        [facility] - [place], [place] - [address]  [address] [address]  Phone: [phone] Fax: [phone]

## 2023-09-22 ENCOUNTER — OFFICE VISIT (OUTPATIENT)
Dept: PRIMARY CARE CLINIC | Facility: CLINIC | Age: 68
End: 2023-09-22
Payer: MEDICARE

## 2023-09-22 VITALS
HEART RATE: 50 BPM | DIASTOLIC BLOOD PRESSURE: 64 MMHG | OXYGEN SATURATION: 99 % | SYSTOLIC BLOOD PRESSURE: 127 MMHG | HEIGHT: 71 IN | WEIGHT: 173.19 LBS | BODY MASS INDEX: 24.25 KG/M2

## 2023-09-22 DIAGNOSIS — E04.1 THYROID NODULE: ICD-10-CM

## 2023-09-22 DIAGNOSIS — D64.9 ANEMIA, UNSPECIFIED TYPE: ICD-10-CM

## 2023-09-22 DIAGNOSIS — I70.0 AORTIC ATHEROSCLEROSIS: ICD-10-CM

## 2023-09-22 DIAGNOSIS — E87.6 HYPOKALEMIA: ICD-10-CM

## 2023-09-22 DIAGNOSIS — Z79.899 DRUG-INDUCED IMMUNODEFICIENCY: ICD-10-CM

## 2023-09-22 DIAGNOSIS — N18.31 STAGE 3A CHRONIC KIDNEY DISEASE: ICD-10-CM

## 2023-09-22 DIAGNOSIS — M10.9 GOUT, UNSPECIFIED CAUSE, UNSPECIFIED CHRONICITY, UNSPECIFIED SITE: ICD-10-CM

## 2023-09-22 DIAGNOSIS — R53.1 DECREASED STRENGTH: ICD-10-CM

## 2023-09-22 DIAGNOSIS — C49.A2 GASTROINTESTINAL STROMAL TUMOR (GIST) OF STOMACH: ICD-10-CM

## 2023-09-22 DIAGNOSIS — Z00.00 ENCOUNTER FOR PREVENTIVE HEALTH EXAMINATION: Primary | ICD-10-CM

## 2023-09-22 DIAGNOSIS — I10 PRIMARY HYPERTENSION: ICD-10-CM

## 2023-09-22 DIAGNOSIS — D84.821 DRUG-INDUCED IMMUNODEFICIENCY: ICD-10-CM

## 2023-09-22 PROCEDURE — 99999 PR PBB SHADOW E&M-EST. PATIENT-LVL V: CPT | Mod: PBBFAC,,, | Performed by: NURSE PRACTITIONER

## 2023-09-22 PROCEDURE — 1126F AMNT PAIN NOTED NONE PRSNT: CPT | Mod: CPTII,S$GLB,, | Performed by: NURSE PRACTITIONER

## 2023-09-22 PROCEDURE — G0439 PR MEDICARE ANNUAL WELLNESS SUBSEQUENT VISIT: ICD-10-PCS | Mod: S$GLB,,, | Performed by: NURSE PRACTITIONER

## 2023-09-22 PROCEDURE — 1101F PT FALLS ASSESS-DOCD LE1/YR: CPT | Mod: CPTII,S$GLB,, | Performed by: NURSE PRACTITIONER

## 2023-09-22 PROCEDURE — 3008F BODY MASS INDEX DOCD: CPT | Mod: CPTII,S$GLB,, | Performed by: NURSE PRACTITIONER

## 2023-09-22 PROCEDURE — 1101F PR PT FALLS ASSESS DOC 0-1 FALLS W/OUT INJ PAST YR: ICD-10-PCS | Mod: CPTII,S$GLB,, | Performed by: NURSE PRACTITIONER

## 2023-09-22 PROCEDURE — 3288F FALL RISK ASSESSMENT DOCD: CPT | Mod: CPTII,S$GLB,, | Performed by: NURSE PRACTITIONER

## 2023-09-22 PROCEDURE — 3074F SYST BP LT 130 MM HG: CPT | Mod: CPTII,S$GLB,, | Performed by: NURSE PRACTITIONER

## 2023-09-22 PROCEDURE — 1126F PR PAIN SEVERITY QUANTIFIED, NO PAIN PRESENT: ICD-10-PCS | Mod: CPTII,S$GLB,, | Performed by: NURSE PRACTITIONER

## 2023-09-22 PROCEDURE — 3288F PR FALLS RISK ASSESSMENT DOCUMENTED: ICD-10-PCS | Mod: CPTII,S$GLB,, | Performed by: NURSE PRACTITIONER

## 2023-09-22 PROCEDURE — 1170F FXNL STATUS ASSESSED: CPT | Mod: CPTII,S$GLB,, | Performed by: NURSE PRACTITIONER

## 2023-09-22 PROCEDURE — 3074F PR MOST RECENT SYSTOLIC BLOOD PRESSURE < 130 MM HG: ICD-10-PCS | Mod: CPTII,S$GLB,, | Performed by: NURSE PRACTITIONER

## 2023-09-22 PROCEDURE — 99999 PR PBB SHADOW E&M-EST. PATIENT-LVL V: ICD-10-PCS | Mod: PBBFAC,,, | Performed by: NURSE PRACTITIONER

## 2023-09-22 PROCEDURE — 1170F PR FUNCTIONAL STATUS ASSESSED: ICD-10-PCS | Mod: CPTII,S$GLB,, | Performed by: NURSE PRACTITIONER

## 2023-09-22 PROCEDURE — 3078F PR MOST RECENT DIASTOLIC BLOOD PRESSURE < 80 MM HG: ICD-10-PCS | Mod: CPTII,S$GLB,, | Performed by: NURSE PRACTITIONER

## 2023-09-22 PROCEDURE — 4010F ACE/ARB THERAPY RXD/TAKEN: CPT | Mod: CPTII,S$GLB,, | Performed by: NURSE PRACTITIONER

## 2023-09-22 PROCEDURE — G0439 PPPS, SUBSEQ VISIT: HCPCS | Mod: S$GLB,,, | Performed by: NURSE PRACTITIONER

## 2023-09-22 PROCEDURE — 1159F MED LIST DOCD IN RCRD: CPT | Mod: CPTII,S$GLB,, | Performed by: NURSE PRACTITIONER

## 2023-09-22 PROCEDURE — 3078F DIAST BP <80 MM HG: CPT | Mod: CPTII,S$GLB,, | Performed by: NURSE PRACTITIONER

## 2023-09-22 PROCEDURE — 4010F PR ACE/ARB THEARPY RXD/TAKEN: ICD-10-PCS | Mod: CPTII,S$GLB,, | Performed by: NURSE PRACTITIONER

## 2023-09-22 PROCEDURE — 3008F PR BODY MASS INDEX (BMI) DOCUMENTED: ICD-10-PCS | Mod: CPTII,S$GLB,, | Performed by: NURSE PRACTITIONER

## 2023-09-22 PROCEDURE — 1159F PR MEDICATION LIST DOCUMENTED IN MEDICAL RECORD: ICD-10-PCS | Mod: CPTII,S$GLB,, | Performed by: NURSE PRACTITIONER

## 2023-09-22 RX ORDER — ICOSAPENT ETHYL 1000 MG/1
2 CAPSULE ORAL 2 TIMES DAILY
COMMUNITY
Start: 2023-08-09

## 2023-09-22 RX ORDER — LIDOCAINE AND TETRACAINE 70; 70 MG/G; MG/G
CREAM TOPICAL
COMMUNITY

## 2023-09-22 RX ORDER — MIDAZOLAM HYDROCHLORIDE 1 MG/ML
INJECTION, SOLUTION INTRAMUSCULAR; INTRAVENOUS
COMMUNITY
Start: 2023-04-21

## 2023-09-22 RX ORDER — DIFLUNISAL 500 MG/1
TABLET, FILM COATED ORAL
COMMUNITY

## 2023-09-22 RX ORDER — PNEUMOCOCCAL 13-VALENT CONJUGATE VACCINE 2.2; 2.2; 2.2; 2.2; 2.2; 4.4; 2.2; 2.2; 2.2; 2.2; 2.2; 2.2; 2.2 UG/.5ML; UG/.5ML; UG/.5ML; UG/.5ML; UG/.5ML; UG/.5ML; UG/.5ML; UG/.5ML; UG/.5ML; UG/.5ML; UG/.5ML; UG/.5ML; UG/.5ML
INJECTION, SUSPENSION INTRAMUSCULAR
COMMUNITY

## 2023-09-22 RX ORDER — DEXAMETHASONE SODIUM PHOSPHATE 4 MG/ML
INJECTION, SOLUTION INTRA-ARTICULAR; INTRALESIONAL; INTRAMUSCULAR; INTRAVENOUS; SOFT TISSUE
COMMUNITY
Start: 2023-04-21

## 2023-09-22 RX ORDER — ALLOPURINOL 300 MG/1
300 TABLET ORAL
COMMUNITY
Start: 2023-07-15

## 2023-09-22 NOTE — PROGRESS NOTES
"  Sami Zhang presented for an initial Medicare AWV and Health Risk Assessment  today with his wife,Laura Zhang. The following components were reviewed and updated:    Medical history  Family History  Social history  Allergies and Current Medications  Health Risk Assessment  Health Maintenance  Care Team    See Completed Assessments for Annual Wellness visit with in the encounter summary    The following assessments were completed:  Depression Screening  Cognitive function Screening  Timed Get Up Test  Whisper Test  Nutrition  Activities of Daily Living   PAQ      Vitals:    09/22/23 1143 09/22/23 1208   BP: 127/64    BP Location: Left arm    Patient Position: Sitting    BP Method: Small (Automatic)    Pulse: (!) 44 (!) 50   SpO2: 99%    Weight: 78.5 kg (173 lb 2.7 oz)    Height: 5' 11" (1.803 m)      Body mass index is 24.15 kg/m².   ]    Review for Opioid Screening: Pt does not have Rx for Opioids.  Review for Substance Use Disorders: Patient does not use substance.    Physical Exam  Constitutional:       Appearance: Normal appearance.   HENT:      Right Ear: External ear normal.      Left Ear: External ear normal.   Eyes:      Conjunctiva/sclera: Conjunctivae normal.   Pulmonary:      Effort: Pulmonary effort is normal. No respiratory distress.   Abdominal:      General: Bowel sounds are normal.      Tenderness: There is no abdominal tenderness. There is no guarding.   Musculoskeletal:         General: Normal range of motion.      Cervical back: Normal range of motion.   Skin:     General: Skin is warm and dry.      Capillary Refill: Capillary refill takes 2 to 3 seconds.   Neurological:      Mental Status: He is alert and oriented to person, place, and time.        Review for Opioid Screening: Pt does not have Rx for Opioids.  Review for Substance Use Disorders: Patient does not use substance.    Diagnoses and health risks identified today and associated recommendations/orders:  1. Encounter for preventive " health examination  Screenings performed,  as noted above. Personal preventive testing needs reviewed.   - tetanus and diphther. tox, PF, (TENIVAC, PF,) 5-2 Lf unit/0.5 mL Syrg; Inject 0.5 mLs into the muscle once. for 1 dose  Dispense: 0.5 mL; Refill: 0  - varicella-zoster gE-AS01B, PF, (SHINGRIX) 50 mcg/0.5 mL injection; Inject 0.5 mLs into the muscle once. for 1 dose  Dispense: 1 each; Refill: 0    2. Primary hypertension  Stable, followed by PCP.     3. Aortic atherosclerosis  Stable, followed by PCP.     4. Hypokalemia  Stable, followed by PCP.     5. Stage 3a chronic kidney disease  Stable, followed by PCP.     6. Thyroid nodule  Stable, followed by PCP.     7. Anemia, unspecified type  Stable, followed by PCP.     8. Drug-induced immunodeficiency  Stable, followed by PCP.     9. Gastrointestinal stromal tumor (GIST) of stomach  Stable, followed by PCP.     10. Gout, unspecified cause, unspecified chronicity, unspecified site  Stable, followed by PCP.     11. Decreased strength  Stable, followed by PCP.       I offered to discuss advanced care planning, including how to pick a person who would make decisions for you if you were unable to make them for yourself, called a health care power of , and what kind of decisions you might make such as use of life sustaining treatments such as ventilators and tube feeding when faced with a life limiting illness recorded on a living will that they will need to know. (How you want to be cared for as you near the end of your natural life)     X Patient is interested in learning more about how to make advanced directives.  I provided them paperwork and offered to discuss this with them.I offered to discuss advanced care planning, including how to pick a person who would make decisions for you if you were unable to make them for yourself, called a health care power of , and what kind of decisions you might make such as use of life sustaining treatments such as  ventilators and tube feeding when faced with a life limiting illness recorded on a living will that they will need to know. (How you want to be cared for as you near the end of your natural life)     X Patient is interested in learning more about how to make advanced directives.  I provided them paperwork and offered to discuss this with them.    Provided Sami with a 5-10 year written screening schedule and personal prevention plan. Recommendations were developed using the USPSTF age appropriate recommendations. Education, counseling, and referrals were provided as needed.  After Visit Summary printed and given to patient which includes a list of additional screenings\tests needed.    Follow up in about 1 year (around 9/22/2024), or Annual Wellness examination.      Bernice Zhang NP

## 2023-10-09 ENCOUNTER — TELEPHONE (OUTPATIENT)
Dept: HEMATOLOGY/ONCOLOGY | Facility: CLINIC | Age: 68
End: 2023-10-09
Payer: MEDICARE

## 2023-10-09 NOTE — TELEPHONE ENCOUNTER
----- Message from Virgie Virk sent at 10/9/2023  2:00 PM CDT -----  Type:  Sooner Apoointment Request    Caller is requesting a sooner appointment.  Caller declined first available appointment listed below.  Caller will not accept being placed on the waitlist and is requesting a message be sent to doctor.  Name of Caller: Pt  When is the first available appointment?  Symptoms:  Would the patient rather a call back or a response via [facility]? Call  Best Call Back Number: [phone]  Additional Information: Pt. Wife called to reschedule appt for 10/13

## 2023-10-18 ENCOUNTER — OFFICE VISIT (OUTPATIENT)
Dept: HEMATOLOGY/ONCOLOGY | Facility: CLINIC | Age: 68
End: 2023-10-18
Payer: MEDICARE

## 2023-10-18 ENCOUNTER — LAB VISIT (OUTPATIENT)
Dept: LAB | Facility: HOSPITAL | Age: 68
End: 2023-10-18
Attending: HOSPITALIST
Payer: MEDICARE

## 2023-10-18 VITALS
DIASTOLIC BLOOD PRESSURE: 60 MMHG | HEART RATE: 52 BPM | HEIGHT: 71 IN | WEIGHT: 171.94 LBS | SYSTOLIC BLOOD PRESSURE: 132 MMHG | OXYGEN SATURATION: 100 % | BODY MASS INDEX: 24.07 KG/M2 | RESPIRATION RATE: 18 BRPM

## 2023-10-18 DIAGNOSIS — E04.1 THYROID NODULE: ICD-10-CM

## 2023-10-18 DIAGNOSIS — C49.A2 GASTROINTESTINAL STROMAL TUMOR (GIST) OF STOMACH: Primary | ICD-10-CM

## 2023-10-18 DIAGNOSIS — C49.A2 GASTROINTESTINAL STROMAL TUMOR (GIST) OF STOMACH: ICD-10-CM

## 2023-10-18 DIAGNOSIS — I10 PRIMARY HYPERTENSION: ICD-10-CM

## 2023-10-18 LAB
ALBUMIN SERPL BCP-MCNC: 4 G/DL (ref 3.5–5.2)
ALP SERPL-CCNC: 71 U/L (ref 55–135)
ALT SERPL W/O P-5'-P-CCNC: 62 U/L (ref 10–44)
ANION GAP SERPL CALC-SCNC: 9 MMOL/L (ref 8–16)
AST SERPL-CCNC: 52 U/L (ref 10–40)
BILIRUB SERPL-MCNC: 1.6 MG/DL (ref 0.1–1)
BUN SERPL-MCNC: 12 MG/DL (ref 8–23)
CALCIUM SERPL-MCNC: 9.4 MG/DL (ref 8.7–10.5)
CHLORIDE SERPL-SCNC: 109 MMOL/L (ref 95–110)
CO2 SERPL-SCNC: 26 MMOL/L (ref 23–29)
CREAT SERPL-MCNC: 1.2 MG/DL (ref 0.5–1.4)
ERYTHROCYTE [DISTWIDTH] IN BLOOD BY AUTOMATED COUNT: 15.8 % (ref 11.5–14.5)
EST. GFR  (NO RACE VARIABLE): >60 ML/MIN/1.73 M^2
GLUCOSE SERPL-MCNC: 89 MG/DL (ref 70–110)
HCT VFR BLD AUTO: 38.5 % (ref 40–54)
HGB BLD-MCNC: 12.2 G/DL (ref 14–18)
IMM GRANULOCYTES # BLD AUTO: 0.01 K/UL (ref 0–0.04)
MCH RBC QN AUTO: 27.4 PG (ref 27–31)
MCHC RBC AUTO-ENTMCNC: 31.7 G/DL (ref 32–36)
MCV RBC AUTO: 87 FL (ref 82–98)
NEUTROPHILS # BLD AUTO: 3.3 K/UL (ref 1.8–7.7)
PLATELET # BLD AUTO: 217 K/UL (ref 150–450)
PMV BLD AUTO: 10.5 FL (ref 9.2–12.9)
POTASSIUM SERPL-SCNC: 3.2 MMOL/L (ref 3.5–5.1)
PROT SERPL-MCNC: 6.5 G/DL (ref 6–8.4)
RBC # BLD AUTO: 4.45 M/UL (ref 4.6–6.2)
SODIUM SERPL-SCNC: 144 MMOL/L (ref 136–145)
WBC # BLD AUTO: 6.11 K/UL (ref 3.9–12.7)

## 2023-10-18 PROCEDURE — 99999 PR PBB SHADOW E&M-EST. PATIENT-LVL III: ICD-10-PCS | Mod: PBBFAC,,, | Performed by: HOSPITALIST

## 2023-10-18 PROCEDURE — 1101F PR PT FALLS ASSESS DOC 0-1 FALLS W/OUT INJ PAST YR: ICD-10-PCS | Mod: CPTII,S$GLB,, | Performed by: HOSPITALIST

## 2023-10-18 PROCEDURE — 1101F PT FALLS ASSESS-DOCD LE1/YR: CPT | Mod: CPTII,S$GLB,, | Performed by: HOSPITALIST

## 2023-10-18 PROCEDURE — 4010F PR ACE/ARB THEARPY RXD/TAKEN: ICD-10-PCS | Mod: CPTII,S$GLB,, | Performed by: HOSPITALIST

## 2023-10-18 PROCEDURE — 3078F DIAST BP <80 MM HG: CPT | Mod: CPTII,S$GLB,, | Performed by: HOSPITALIST

## 2023-10-18 PROCEDURE — 80053 COMPREHEN METABOLIC PANEL: CPT | Performed by: HOSPITALIST

## 2023-10-18 PROCEDURE — 99214 PR OFFICE/OUTPT VISIT, EST, LEVL IV, 30-39 MIN: ICD-10-PCS | Mod: S$GLB,,, | Performed by: HOSPITALIST

## 2023-10-18 PROCEDURE — 3288F PR FALLS RISK ASSESSMENT DOCUMENTED: ICD-10-PCS | Mod: CPTII,S$GLB,, | Performed by: HOSPITALIST

## 2023-10-18 PROCEDURE — 4010F ACE/ARB THERAPY RXD/TAKEN: CPT | Mod: CPTII,S$GLB,, | Performed by: HOSPITALIST

## 2023-10-18 PROCEDURE — 3075F PR MOST RECENT SYSTOLIC BLOOD PRESS GE 130-139MM HG: ICD-10-PCS | Mod: CPTII,S$GLB,, | Performed by: HOSPITALIST

## 2023-10-18 PROCEDURE — 3008F PR BODY MASS INDEX (BMI) DOCUMENTED: ICD-10-PCS | Mod: CPTII,S$GLB,, | Performed by: HOSPITALIST

## 2023-10-18 PROCEDURE — 3078F PR MOST RECENT DIASTOLIC BLOOD PRESSURE < 80 MM HG: ICD-10-PCS | Mod: CPTII,S$GLB,, | Performed by: HOSPITALIST

## 2023-10-18 PROCEDURE — 1126F PR PAIN SEVERITY QUANTIFIED, NO PAIN PRESENT: ICD-10-PCS | Mod: CPTII,S$GLB,, | Performed by: HOSPITALIST

## 2023-10-18 PROCEDURE — 99214 OFFICE O/P EST MOD 30 MIN: CPT | Mod: S$GLB,,, | Performed by: HOSPITALIST

## 2023-10-18 PROCEDURE — 99999 PR PBB SHADOW E&M-EST. PATIENT-LVL III: CPT | Mod: PBBFAC,,, | Performed by: HOSPITALIST

## 2023-10-18 PROCEDURE — 85027 COMPLETE CBC AUTOMATED: CPT | Performed by: HOSPITALIST

## 2023-10-18 PROCEDURE — 3075F SYST BP GE 130 - 139MM HG: CPT | Mod: CPTII,S$GLB,, | Performed by: HOSPITALIST

## 2023-10-18 PROCEDURE — 1126F AMNT PAIN NOTED NONE PRSNT: CPT | Mod: CPTII,S$GLB,, | Performed by: HOSPITALIST

## 2023-10-18 PROCEDURE — 3008F BODY MASS INDEX DOCD: CPT | Mod: CPTII,S$GLB,, | Performed by: HOSPITALIST

## 2023-10-18 PROCEDURE — 36415 COLL VENOUS BLD VENIPUNCTURE: CPT | Performed by: HOSPITALIST

## 2023-10-18 PROCEDURE — 3288F FALL RISK ASSESSMENT DOCD: CPT | Mod: CPTII,S$GLB,, | Performed by: HOSPITALIST

## 2023-10-18 NOTE — PROGRESS NOTES
MEDICAL ONCOLOGY FOLLOW-UP VISIT.     Best Contact Phone Number(s): [phone] (home) [phone] (work)     Cancer/Stage/TNM:    Cancer Staging   Gastrointestinal stromal tumor (GIST) of stomach  Staging form: Gastrointestinal Stromal Tumor - Gastric and Omental GIST, AJCC 8th Edition  - Clinical stage from 9/9/2022: Stage IIIA (cT3, cN0, cM0, Mitotic Rate: High) - Signed by nErique Mcnair MD on 9/26/2022         Reason for visit: Mr. 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 He started neoadjuvant imatinib on 10/8/22 and resection on 5/10/23. He presents to medical oncology clinic for routine follow on adjuvant imatinib therapy.      Interval History:     No new concerns today. Remains active at home doing yardwork and walkign his dog. Notes ongoing stomach 'gurgling' but no pain and no nausea. Appetite is good and weight is stable. Reports off and on diarrhea which is stable. No increaed lacrimatino or periorbital edema. No leg swelling. BP good control.      Oncology History   Gastrointestinal stromal tumor (GIST) of stomach   7/18/2022 Imaging Significant Findings    Presented to [facility] ED with abdominal pain. CT a/p found a 6.7 x 5.6 x 7.2 cm cystic mass in the gastric fundus.     9/9/2022 Procedure    EGD/EUS fins a 6.2x8.0 cm cystic mass in the cardia. Pathology shows GIST with 'focal/frequent mitoses' although exact quantification could not be determined.     KIT exon 11 [id number]     9/12/2022 Imaging Significant Findings    CT a/p redemonstrates 9.3x8 cm gastric mass with associated necrosis. No LAD. No liver lesions.     9/15/2022 Imaging Significant Findings    CT chest only shows incidental 3mm LLL nodule. No evidence of metastatic disease.     10/8/2022 -  Chemotherapy    Start imatinib     1/17/2023 Imaging Significant Findings    CT torso; Decreased size of gastric mass 6.5 x 2.5cm from 9.3 x 8.0cm.     1/23/2023 Tumor  Conference       [facility] UGI MULTIDISCIPLINARY TUMOR BOARD  PATIENT REVIEW FORM   ____________________________________________________________    CLINIC #: [id number]  DATE: 1/23/2023    DIAGNOSIS: stomach GIST    PRESENTER: Xu    PATIENT SUMMARY:   This 68 y/o gentleman presented with epigastric abd pain in 7/2022. Imaging identified large 9cm cystic gastric mass with central necrosis. He underwent EUS with bx in 9/2022 and pathology revealed GIST. He started neoadj Gleevec per Dr. Mcnair in 10/2022. Recent restaging CT scan reviewed - good response to tx, lesion smaller, now measuring 6.7 cm.     BOARD RECOMMENDATIONS:   Continue neoadj Gleevec    CONSULT NEEDED:     [] Surgery    [] Hem/Onc    [] Rad/Onc    [] Dietary                 [] Social Service    [] Psychology       [] AES  [] Radiology     Clinical Stage: Tumor 3 Node(s) 0 Metastasis 0      GROUP STAGE:  [] O    [] 1A    [] IB    [] IIA    [] IIB     [x] IIIA     [] IIIB     [] IIIC    []IV  [] Local recurrence     [] Regional recurrence     [] Distant recurrence   Metastatic site(s): none         [x] Lizbeth'l Treatment Guidelines reviewed and care planned is consistent with guidelines.         (i.e., NCCN, NCI, PD, ACO, AUA, etc.)    PRESENTATION AT CANCER CONFERENCE:         [x] Prospective    [] Retrospective     [] Follow-Up       4/12/2023 Imaging Significant Findings    CT torso:  1. Exam limited by motion artifact.  2. Slight decreased size of gastric wall mass.  3. Stable mildly enlarged lymph node in the lindsay hepatis measuring 1.2 cm, stable compared to CT from 09/12/2022.  4. Few subcentimeter hepatic hypodensities, too small to characterize.  5. Thyroid nodules better characterized on CT from 01/17/2023.     7/7/2023 Imaging Significant Findings    CT torso 7/7/23  1. Status post partial gastrectomy.  No evidence of local disease recurrence.  No new suspicious lesion.  2. Stable upper limit of normal caliber periportal lymph  "nodes.  3. Stable subcentimeter hepatic hypodensities too small to characterize.            Physical Exam:   /60 (BP Location: Left arm, Patient Position: Sitting, BP Method: Medium (Automatic))   Pulse (!) 52   Resp 18   Ht 5' 11" (1.803 m)   Wt 78 kg (171 lb 15.3 oz)   SpO2 100%   BMI 23.98 kg/m²      ECOG Performance Status: (foot note - ECOG PS provided by Eastern Cooperative Oncology Group) 1 - Symptomatic but completely ambulatory    Physical Exam  Constitutional:       General: He is not in acute distress.     Appearance: Normal appearance.   HENT:      Head: Normocephalic.      Mouth/Throat:      Mouth: Mucous membranes are moist.      Pharynx: Oropharynx is clear. No oropharyngeal exudate or posterior oropharyngeal erythema.   Eyes:      General: No scleral icterus.     Extraocular Movements: Extraocular movements intact.      Conjunctiva/sclera: Conjunctivae normal.      Pupils: Pupils are equal, round, and reactive to light.   Cardiovascular:      Rate and Rhythm: Normal rate and regular rhythm.   Pulmonary:      Effort: Pulmonary effort is normal. No respiratory distress.   Abdominal:      General: There is no distension.      Palpations: Abdomen is soft.      Tenderness: There is no abdominal tenderness.   Musculoskeletal:         General: No swelling. Normal range of motion.      Cervical back: Normal range of motion and neck supple.      Right lower leg: No edema.      Left lower leg: No edema.   Lymphadenopathy:      Cervical: No cervical adenopathy.   Skin:     General: Skin is warm and dry.      Coloration: Skin is not jaundiced.      Findings: No rash.   Neurological:      General: No focal deficit present.      Mental Status: He is alert and oriented to person, place, and time.      Motor: No weakness.   Psychiatric:         Mood and Affect: Mood normal.         Behavior: Behavior normal.         Thought Content: Thought content normal.           Labs:  Lab Visit on 10/18/2023 "   Component Date Value    WBC 10/18/2023 6.11     RBC 10/18/2023 4.45 (L)     Hemoglobin 10/18/2023 12.2 (L)     Hematocrit 10/18/2023 38.5 (L)     MCV 10/18/2023 87     MCH 10/18/2023 27.4     MCHC 10/18/2023 31.7 (L)     RDW 10/18/2023 15.8 (H)     Platelets 10/18/2023 217     MPV 10/18/2023 10.5     Gran # (ANC) 10/18/2023 3.3     Immature Grans (Abs) 10/18/2023 0.01     Sodium 10/18/2023 144     Potassium 10/18/2023 3.2 (L)     Chloride 10/18/2023 109     CO2 10/18/2023 26     Glucose 10/18/2023 89     BUN 10/18/2023 12     Creatinine 10/18/2023 1.2     Calcium 10/18/2023 9.4     Total Protein 10/18/2023 6.5     Albumin 10/18/2023 4.0     Total Bilirubin 10/18/2023 1.6 (H)     Alkaline Phosphatase 10/18/2023 71     AST 10/18/2023 52 (H)     ALT 10/18/2023 62 (H)     eGFR 10/18/2023 >60.0     Anion Gap 10/18/2023 9                Imaging:     CT Chest Abdomen Pelvis With Contrast  Narrative: EXAMINATION:  CT CHEST ABDOMEN PELVIS WITH CONTRAST (XPD)    CLINICAL HISTORY:  Gastric cancer, assess treatment response;Gastrointestinal stromal tumor of stomach    TECHNIQUE:  Low dose axial images, sagittal and coronal reformations were obtained from the thoracic inlet to the pubic synthesis following the intravenous administration of 100 mL of Omnipaque 350.    COMPARISON:  CT 04/12/2023    FINDINGS:  Base of neck: Multiple thyroid nodules, similar to prior exam.    Thoracic soft tissues: Mild bilateral gynecomastia.    Aorta: Normal caliber.  Minimal calcific atherosclerosis.    Heart: Normal size.  No pericardial effusion.  Mild calcific atherosclerosis of the coronary arteries.    Ginette/Mediastinum: No pathologic lymphadenopathy.    Lungs: Well aerated, without consolidation or pleural fluid.    Liver: Normal size.  Few stable subcentimeter hypodensities too small to characterize.  No new lesion.  Portal veins are patent.    Gallbladder: Unremarkable.    Bile Ducts: No evidence of dilated ducts.    Pancreas: No mass or  ductal dilatation.    Spleen: Unremarkable.    Adrenals: Unremarkable.    Kidneys/ Ureters: Normal in size and location.  No hydronephrosis or nephrolithiasis.  No ureteral dilatation.    Bladder: No evidence of wall thickening.    Reproductive organs: Prostate is mildly enlarged.    GI Tract/Mesentery: Postoperative changes of partial gastrectomy.  No evidence of local disease recurrence at the resection site.  Small and large bowel are normal caliber.  No evidence of bowel obstruction or inflammation.  Scattered colonic diverticula.    Peritoneal Space: No ascites.  No free air.    Lymph nodes: Stable upper limit of normal caliber periportal lymph nodes.  No new pathologic lymphadenopathy.    Abdominal wall: Fat containing right inguinal hernia.    Vasculature: Abdominal aorta is normal caliber.    Bones: Degenerative changes of the spine.  Stable sclerotic lesions within the left posterior ilium possibly representing bone islands.  No new aggressive lesion.  No acute fracture.  Impression: 1. Status post partial gastrectomy.  No evidence of local disease recurrence.  No new suspicious lesion.  2. Stable upper limit of normal caliber periportal lymph nodes.  3. Stable subcentimeter hepatic hypodensities too small to characterize.  4. Additional findings as above.    Electronically signed by: Janusz Fernandez  Date:    07/07/2023  Time:    14:26              Diagnoses:       1. Gastrointestinal stromal tumor (GIST) of stomach    2. Primary hypertension    3. Thyroid nodule                Assessment and Plan:     1. Gastrointestinal stromal tumor (GIST) of stomach  Overview:  Patient with gastric GIST and no evidence of luis or distant metastatic disease. 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 Started neoadjuvant imatinib 400mg po daily 10/08/2022 with good tolerance and clinical resposne. Underwent surgical resection 5/10/23. Plan to complete total of 3 years of perioperative therapy.    Assessment & Plan:  Tolerating threapy  well.     - Continue imatinib  - FU in 3 months with repeat CT torso    Orders:  -     CT Chest Abdomen Pelvis With Contrast; Future; Expected date: 10/18/2023    2. Primary hypertension  Overview:  Patient on HCTZ, nebivolol, and valsartan.    Assessment & Plan:  - Stop HCTZ given low potassium      3. Thyroid nodule  Overview:  Biopsy 3/6/23 showed a benign follicular nodule.                Route Chart for Scheduling    Med Onc Chart Routing      Follow up with physician 3 months.   Follow up with [facility]    Infusion scheduling note    Injection scheduling note    Labs CBC and CMP   Scheduling:  Preferred lab:  Lab interval:     Imaging CT chest abdomen pelvis      Pharmacy appointment    Other referrals                        Enrique Mcnair MD  Hematology/Oncology  [facility]

## 2023-10-18 NOTE — PATIENT INSTRUCTIONS
Continue to tolerate your imatinib very well. Continue current dose. Will repeat CT scan in 3 months.    Potassium level remains low. Continue potassium pills twice daily. Will also stop your HCTZ blood pressure medication.    Monitor pressure carefully and follow up with your primary care doctor next month

## 2023-10-19 DIAGNOSIS — C49.A2 GASTROINTESTINAL STROMAL TUMOR (GIST) OF STOMACH: Primary | ICD-10-CM

## 2024-01-02 DIAGNOSIS — C49.A2 GASTROINTESTINAL STROMAL TUMOR (GIST) OF STOMACH: ICD-10-CM

## 2024-01-02 RX ORDER — IMATINIB MESYLATE 400 MG/1
400 TABLET, FILM COATED ORAL DAILY
Qty: 30 TABLET | Refills: 11 | OUTPATIENT
Start: 2024-01-02 | End: 2024-01-04 | Stop reason: SDUPTHER

## 2024-01-02 NOTE — TELEPHONE ENCOUNTER
"----- Message from Alvaro Russo sent at 1/2/2024 12:11 PM CST -----  RX Name and Strength: imatinib (GLEEVEC) 400 MG Tab            How is the patient currently taking it?  Take 1 tablet (400 mg total) by mouth once daily. - Oral        Is this a 30 day or 90 day Rx?  30 tablet        Preferred Pharmacy with phone number:    [facility] - [place], [place] - [address]  [address] [address]  Phone: [phone] Fax: [phone]        Local or Mail Order: Mail Order        Ordering Provider: Xu        Contact Preference: [phone]         Additional Information: Also requesting to discuss why [facility] is now "Inactive"      "Thank you for all that you do for our patients"      "

## 2024-01-04 ENCOUNTER — TELEPHONE (OUTPATIENT)
Dept: HEMATOLOGY/ONCOLOGY | Facility: CLINIC | Age: 69
End: 2024-01-04
Payer: MEDICARE

## 2024-01-04 DIAGNOSIS — C49.A2 GASTROINTESTINAL STROMAL TUMOR (GIST) OF STOMACH: ICD-10-CM

## 2024-01-04 RX ORDER — IMATINIB MESYLATE 400 MG/1
400 TABLET, FILM COATED ORAL DAILY
Qty: 30 TABLET | Refills: 11 | Status: ACTIVE | OUTPATIENT
Start: 2024-01-04 | End: 2025-01-03

## 2024-01-04 NOTE — TELEPHONE ENCOUNTER
----- Message from Alyssa Estrella sent at 1/4/2024  1:29 PM CST -----  Regarding: Refill  Contact: Laura [phone]          Name Of caller:  Laura pt's wife     Name of Medication:  imatinib (GLEEVEC) 400 MG Tab    Comments/advisory:  States prescription was incorrectly will need it sent again patient have 2 pills only

## 2024-01-04 NOTE — TELEPHONE ENCOUNTER
I spoke to patients wife. Told her the pharmacy had let us know and we are trying to get it taken care of.

## 2024-01-17 ENCOUNTER — HOSPITAL ENCOUNTER (OUTPATIENT)
Dept: RADIOLOGY | Facility: HOSPITAL | Age: 69
Discharge: HOME | End: 2024-01-17
Attending: HOSPITALIST
Payer: MEDICARE

## 2024-01-17 DIAGNOSIS — C49.A2 GASTROINTESTINAL STROMAL TUMOR (GIST) OF STOMACH: ICD-10-CM

## 2024-01-17 PROCEDURE — 74177 CT ABD & PELVIS W/CONTRAST: CPT | Mod: TC

## 2024-01-17 PROCEDURE — 25500020 PHARM REV CODE 255: Performed by: HOSPITALIST

## 2024-01-17 PROCEDURE — A9698 NON-RAD CONTRAST MATERIALNOC: HCPCS | Performed by: HOSPITALIST

## 2024-01-17 PROCEDURE — 74177 CT ABD & PELVIS W/CONTRAST: CPT | Mod: 26,,, | Performed by: RADIOLOGY

## 2024-01-17 PROCEDURE — 71260 CT THORAX DX C+: CPT | Mod: 26,,, | Performed by: RADIOLOGY

## 2024-01-17 RX ADMIN — IOHEXOL 100 ML: 350 INJECTION, SOLUTION INTRAVENOUS at 03:01

## 2024-01-17 RX ADMIN — Medication 450 ML: at 03:01

## 2024-04-16 ENCOUNTER — OFFICE VISIT (OUTPATIENT)
Dept: HEMATOLOGY/ONCOLOGY | Facility: CLINIC | Age: 69
End: 2024-04-16
Payer: MEDICARE

## 2024-04-16 ENCOUNTER — LAB VISIT (OUTPATIENT)
Dept: LAB | Facility: HOSPITAL | Age: 69
End: 2024-04-16
Attending: HOSPITALIST
Payer: MEDICARE

## 2024-04-16 ENCOUNTER — TELEPHONE (OUTPATIENT)
Dept: ENDOSCOPY | Facility: HOSPITAL | Age: 69
End: 2024-04-16
Payer: MEDICARE

## 2024-04-16 VITALS
OXYGEN SATURATION: 100 % | BODY MASS INDEX: 25.03 KG/M2 | HEART RATE: 45 BPM | DIASTOLIC BLOOD PRESSURE: 70 MMHG | WEIGHT: 178.81 LBS | HEIGHT: 71 IN | SYSTOLIC BLOOD PRESSURE: 130 MMHG | TEMPERATURE: 98 F

## 2024-04-16 DIAGNOSIS — R10.9 FLANK PAIN: ICD-10-CM

## 2024-04-16 DIAGNOSIS — R10.9 FLANK PAIN: Primary | ICD-10-CM

## 2024-04-16 DIAGNOSIS — C49.A2 GASTROINTESTINAL STROMAL TUMOR (GIST) OF STOMACH: Primary | ICD-10-CM

## 2024-04-16 DIAGNOSIS — I10 PRIMARY HYPERTENSION: ICD-10-CM

## 2024-04-16 LAB
BACTERIA #/AREA URNS AUTO: ABNORMAL /HPF
BILIRUB UR QL STRIP: NEGATIVE
CAOX CRY UR QL COMP ASSIST: ABNORMAL
CLARITY UR REFRACT.AUTO: CLEAR
COLOR UR AUTO: YELLOW
GLUCOSE UR QL STRIP: NEGATIVE
HGB UR QL STRIP: NEGATIVE
HYALINE CASTS UR QL AUTO: 1 /LPF
KETONES UR QL STRIP: NEGATIVE
LEUKOCYTE ESTERASE UR QL STRIP: NEGATIVE
MICROSCOPIC COMMENT: ABNORMAL
NITRITE UR QL STRIP: NEGATIVE
PH UR STRIP: 6 [PH] (ref 5–8)
PROT UR QL STRIP: ABNORMAL
RBC #/AREA URNS AUTO: 2 /HPF (ref 0–4)
SP GR UR STRIP: 1.03 (ref 1–1.03)
URN SPEC COLLECT METH UR: ABNORMAL
WBC #/AREA URNS AUTO: 3 /HPF (ref 0–5)

## 2024-04-16 PROCEDURE — 3078F DIAST BP <80 MM HG: CPT | Mod: CPTII,S$GLB,, | Performed by: HOSPITALIST

## 2024-04-16 PROCEDURE — 3008F BODY MASS INDEX DOCD: CPT | Mod: CPTII,S$GLB,, | Performed by: HOSPITALIST

## 2024-04-16 PROCEDURE — 1101F PT FALLS ASSESS-DOCD LE1/YR: CPT | Mod: CPTII,S$GLB,, | Performed by: HOSPITALIST

## 2024-04-16 PROCEDURE — 99215 OFFICE O/P EST HI 40 MIN: CPT | Mod: S$GLB,,, | Performed by: HOSPITALIST

## 2024-04-16 PROCEDURE — 3075F SYST BP GE 130 - 139MM HG: CPT | Mod: CPTII,S$GLB,, | Performed by: HOSPITALIST

## 2024-04-16 PROCEDURE — 99999 PR PBB SHADOW E&M-EST. PATIENT-LVL IV: CPT | Mod: PBBFAC,,, | Performed by: HOSPITALIST

## 2024-04-16 PROCEDURE — 1159F MED LIST DOCD IN RCRD: CPT | Mod: CPTII,S$GLB,, | Performed by: HOSPITALIST

## 2024-04-16 PROCEDURE — 1126F AMNT PAIN NOTED NONE PRSNT: CPT | Mod: CPTII,S$GLB,, | Performed by: HOSPITALIST

## 2024-04-16 PROCEDURE — 81001 URINALYSIS AUTO W/SCOPE: CPT | Performed by: HOSPITALIST

## 2024-04-16 PROCEDURE — 3288F FALL RISK ASSESSMENT DOCD: CPT | Mod: CPTII,S$GLB,, | Performed by: HOSPITALIST

## 2024-04-16 NOTE — TELEPHONE ENCOUNTER
Telephone patient to schedule EGD/EUS with no answer. Direct contact left to call back to schedule procedure.

## 2024-04-16 NOTE — ASSESSMENT & PLAN NOTE
Tolerating treatment well with generally stable clinical findings. Will repeat CT torso now and refer for endocopy given thickening at the anastamosis. Otherwise will continue imatininb.  - Repeat CT torso now  - Schedule EGD for visulatiozation of thickened anastamosis  - Con't imatinib  - FU in 3 months or sooner prn

## 2024-04-16 NOTE — Clinical Note
Hi all - wanted to see thoughts on EGD for this patient post gastrectomy for GIST with some thickening about the anastasomis and thickening on CT scan

## 2024-04-16 NOTE — PROGRESS NOTES
MEDICAL ONCOLOGY FOLLOW-UP VISIT.     Best Contact Phone Number(s): [phone] (home) [phone] (work)     Cancer/Stage/TNM:    Cancer Staging   Gastrointestinal stromal tumor (GIST) of stomach  Staging form: Gastrointestinal Stromal Tumor - Gastric and Omental GIST, AJCC 8th Edition  - Clinical stage from 9/9/2022: Stage IIIA (cT3, cN0, cM0, Mitotic Rate: High) - Signed by Enrique Mcnair MD on 9/26/2022         Reason for visit: Mr. 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 He started neoadjuvant imatinib on 10/8/22 and resection on 5/10/23. He presents to medical oncology clinic for routine follow on adjuvant imatinib therapy.      Interval History:     Overall feels well. No new concerns. Appetite is good. Weight fluctuates between 170's and 180's. No abdominal pain or nausea. Does have some left flank discomfort on standing up with some radiation into groin. No hemautira and no dysuria. Does have 2-3 bowel movements per day, can be loose.      Oncology History   Gastrointestinal stromal tumor (GIST) of stomach   7/18/2022 Imaging Significant Findings    Presented to [facility] ED with abdominal pain. CT a/p found a 6.7 x 5.6 x 7.2 cm cystic mass in the gastric fundus.     9/9/2022 Procedure    EGD/EUS fins a 6.2x8.0 cm cystic mass in the cardia. Pathology shows GIST with 'focal/frequent mitoses' although exact quantification could not be determined.     KIT exon 11 [id number]     9/12/2022 Imaging Significant Findings    CT a/p redemonstrates 9.3x8 cm gastric mass with associated necrosis. No LAD. No liver lesions.     9/15/2022 Imaging Significant Findings    CT chest only shows incidental 3mm LLL nodule. No evidence of metastatic disease.     10/8/2022 -  Chemotherapy    Start imatinib     1/17/2023 Imaging Significant Findings    CT torso; Decreased size of gastric mass 6.5 x 2.5cm from 9.3 x 8.0cm.     1/23/2023 Tumor Conference        [facility] UGI MULTIDISCIPLINARY TUMOR BOARD  PATIENT REVIEW FORM   ____________________________________________________________    CLINIC #: [id number]  DATE: 1/23/2023    DIAGNOSIS: stomach GIST    PRESENTER: Xu    PATIENT SUMMARY:   This 66 y/o gentleman presented with epigastric abd pain in 7/2022. Imaging identified large 9cm cystic gastric mass with central necrosis. He underwent EUS with bx in 9/2022 and pathology revealed GIST. He started neoadj Gleevec per Dr. Mcnair in 10/2022. Recent restaging CT scan reviewed - good response to tx, lesion smaller, now measuring 6.7 cm.     BOARD RECOMMENDATIONS:   Continue neoadj Gleevec    CONSULT NEEDED:     [] Surgery    [] Hem/Onc    [] Rad/Onc    [] Dietary                 [] Social Service    [] Psychology       [] AES  [] Radiology     Clinical Stage: Tumor 3 Node(s) 0 Metastasis 0      GROUP STAGE:  [] O    [] 1A    [] IB    [] IIA    [] IIB     [x] IIIA     [] IIIB     [] IIIC    []IV  [] Local recurrence     [] Regional recurrence     [] Distant recurrence   Metastatic site(s): none         [x] Lizbeth'l Treatment Guidelines reviewed and care planned is consistent with guidelines.         (i.e., NCCN, NCI, PD, ACO, AUA, etc.)    PRESENTATION AT CANCER CONFERENCE:         [x] Prospective    [] Retrospective     [] Follow-Up       4/12/2023 Imaging Significant Findings    CT torso:  1. Exam limited by motion artifact.  2. Slight decreased size of gastric wall mass.  3. Stable mildly enlarged lymph node in the lindsay hepatis measuring 1.2 cm, stable compared to CT from 09/12/2022.  4. Few subcentimeter hepatic hypodensities, too small to characterize.  5. Thyroid nodules better characterized on CT from 01/17/2023.     7/7/2023 Imaging Significant Findings    CT torso 7/7/23  1. Status post partial gastrectomy.  No evidence of local disease recurrence.  No new suspicious lesion.  2. Stable upper limit of normal caliber periportal lymph nodes.  3.  "Stable subcentimeter hepatic hypodensities too small to characterize.     1/17/2024 Imaging Significant Findings    CT 1/17/24  Impression:  - Postsurgical changes of partial gastrectomy.  Thickening along the proximal stomach suture line increased compared to prior noting motion artifact on prior exam.  Findings concerning for local recurrence.  Direct visualization may be helpful.  - Stable right lung base 0.5 cm nodular opacity.  Oncological considerations will determine ongoing follow-up.  - Stable scattered hepatic hypodensities.  - Stable periportal prominent lymph nodes.            Physical Exam:   /70 (BP Location: Left arm, Patient Position: Sitting, BP Method: Medium (Automatic))   Pulse (!) 45   Temp 98.2 °F (36.8 °C) (Oral)   Ht 5' 11" (1.803 m)   Wt 81.1 kg (178 lb 12.7 oz)   SpO2 100%   BMI 24.94 kg/m²      ECOG Performance Status: (foot note - ECOG PS provided by Eastern Cooperative Oncology Group) 1 - Symptomatic but completely ambulatory    Physical Exam  Constitutional:       General: He is not in acute distress.     Appearance: Normal appearance.   HENT:      Head: Normocephalic.      Mouth/Throat:      Mouth: Mucous membranes are moist.      Pharynx: Oropharynx is clear. No oropharyngeal exudate or posterior oropharyngeal erythema.   Eyes:      General: No scleral icterus.     Extraocular Movements: Extraocular movements intact.      Conjunctiva/sclera: Conjunctivae normal.      Pupils: Pupils are equal, round, and reactive to light.   Cardiovascular:      Rate and Rhythm: Normal rate and regular rhythm.   Pulmonary:      Effort: Pulmonary effort is normal. No respiratory distress.   Abdominal:      General: There is no distension.      Palpations: Abdomen is soft.      Tenderness: There is no abdominal tenderness.   Musculoskeletal:         General: No swelling. Normal range of motion.      Cervical back: Normal range of motion and neck supple.      Right lower leg: No edema.      " Left lower leg: No edema.   Lymphadenopathy:      Cervical: No cervical adenopathy.   Skin:     General: Skin is warm and dry.      Coloration: Skin is not jaundiced.      Findings: No rash.   Neurological:      General: No focal deficit present.      Mental Status: He is alert and oriented to person, place, and time.      Motor: No weakness.   Psychiatric:         Mood and Affect: Mood normal.         Behavior: Behavior normal.         Thought Content: Thought content normal.           Labs:  No visits with results within 2 Day(s) from this visit.   Latest known visit with results is:   Lab Visit on 01/17/2024   Component Date Value    Magnesium 01/17/2024 2.1     WBC 01/17/2024 7.97     RBC 01/17/2024 4.55 (L)     Hemoglobin 01/17/2024 13.2 (L)     Hematocrit 01/17/2024 42.2     MCV 01/17/2024 93     MCH 01/17/2024 29.0     MCHC 01/17/2024 31.3 (L)     RDW 01/17/2024 15.3 (H)     Platelets 01/17/2024 174     MPV 01/17/2024 11.8     Gran # (ANC) 01/17/2024 4.8     Immature Grans (Abs) 01/17/2024 0.02     Sodium 01/17/2024 140     Potassium 01/17/2024 3.5     Chloride 01/17/2024 111 (H)     CO2 01/17/2024 22 (L)     Glucose 01/17/2024 85     BUN 01/17/2024 11     Creatinine 01/17/2024 1.1     Calcium 01/17/2024 9.4     Total Protein 01/17/2024 6.6     Albumin 01/17/2024 3.9     Total Bilirubin 01/17/2024 1.8 (H)     Alkaline Phosphatase 01/17/2024 78     AST 01/17/2024 93 (H)     ALT 01/17/2024 92 (H)     eGFR 01/17/2024 >60.0     Anion Gap 01/17/2024 7 (L)                Imaging:     CT Chest Abdomen Pelvis With IV Contrast (XPD) Routine Oral Contrast  Narrative: EXAMINATION:  CT CHEST ABDOMEN PELVIS WITH IV CONTRAST (XPD)    CLINICAL HISTORY:  Gastric cancer, assess treatment response; Gastrointestinal stromal tumor of stomach    TECHNIQUE:  Axial images of the chest, abdomen, and pelvis were acquired  after the use of 75 cc Loup223 IV contrast.  Coronal and sagittal reconstructions were also  obtained    COMPARISON:  CT chest abdomen pelvis 07/07/2023    FINDINGS:  Thoracic soft tissues: Thyroid nodules measuring up to 1.1 cm.    Aorta: Thoracic aorta is normal in caliber and contour with minimal calcific atherosclerosis.    Heart: Normal in size. No pericardial effusion. No significant calcific coronary atherosclerosis.    Ginette/Mediastinum: No significant mediastinal, hilar, or axillary lymphadenopathy    Lungs: Central airways are patent.  Pulmonary parenchymal evaluation limited by motion artifact.  Small scattered bandlike atelectasis.  Stable right lung base 0.5 cm nodular opacity likely present and unchanged dating back to 09/12/2022 (series 6, image 318).    Liver: Normal in size and contour.  Scattered hepatic hypodensities measuring up to 1.1 cm unchanged.    Gallbladder: No calcified gallstones.    Bile Ducts: No evidence of dilated ducts.    Pancreas: No mass or peripancreatic fat stranding.    Spleen: Unremarkable.    Stomach and duodenum: Postsurgical changes of partial gastrectomy.  Thickening along the proximal stomach suture lines measuring 4.3 x 2.0 cm increased compared to prior noting motion artifact on prior exam.    Adrenals: Unremarkable.    Kidneys/ Ureters: Normal in size and location. Symmetric enhancement.  No hydronephrosis or nephrolithiasis. Ureters difficult to follow.    Bladder: No evidence of wall thickening.    Reproductive organs: Prostatomegaly.    Bowel/Mesentery: Small bowel is normal in caliber with no evidence of obstruction.  No evidence of inflammation or wall thickening.  Colon demonstrates no focal wall thickening.  Diverticulosis coli.    Lymph nodes: Stable periportal lymph nodes measuring up to 1.1 cm.  No new lymph luis enlargement.    Peritoneum/Retroperitoneum: No free intraperitoneal air. No significant volume free fluid.    Abdominal wall:  Small right fat containing inguinal hernia..    Vasculature: No aneurysm. Minimal calcific  atherosclerosis.    Bones: No acute fracture. Stable left posterior ilium sclerotic lesions.  Additional scattered sclerotic foci noted similar.  Mild degenerative changes of the spine.  Impression: Postsurgical changes of partial gastrectomy.  Thickening along the proximal stomach suture line increased compared to prior noting motion artifact on prior exam.  Findings concerning for local recurrence.  Direct visualization may be helpful.    Stable right lung base 0.5 cm nodular opacity.  Oncological considerations will determine ongoing follow-up.    Stable scattered hepatic hypodensities.    Stable periportal prominent lymph nodes.    This report was flagged in Epic as abnormal.    Electronically signed by resident: Yosvany White  Date:    01/17/2024  Time:    15:24    Electronically signed by: Wallace Oliva MD  Date:    01/17/2024  Time:    16:05              Diagnoses:       1. Gastrointestinal stromal tumor (GIST) of stomach    2. Primary hypertension                  Assessment and Plan:     1. Gastrointestinal stromal tumor (GIST) of stomach  Overview:  Patient with gastric GIST and no evidence of luis or distant metastatic disease. 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 Started neoadjuvant imatinib 400mg po daily 10/08/2022 with good tolerance and clinical resposne. Underwent surgical resection 5/10/23. Plan to complete total of 3 years of perioperative therapy.    Assessment & Plan:  Tolerating treatment well with generally stable clinical findings. Will repeat CT torso now and refer for endocopy given thickening at the anastamosis. Otherwise will continue imatininb.  - Repeat CT torso now  - Schedule EGD for visulatiozation of thickened anastamosis  - Con't imatinib  - FU in 3 months or sooner prn    Orders:  -     CT Chest Abdomen Pelvis With IV Contrast (XPD) Routine Oral Contrast; Future; Expected date: 04/16/2024  -     Creatinine, serum; Future; Expected date: 04/16/2024    2. Primary  hypertension  Overview:  Patient on HCTZ, nebivolol, and valsartan.                  Route Chart for Scheduling    Med Onc Chart Routing      Follow up with physician 3 months.   Follow up with [facility]    Infusion scheduling note    Injection scheduling note    Labs CBC and CMP   Scheduling:  Preferred lab:  Lab interval:     Imaging CT chest abdomen pelvis   CT next avaialble   Pharmacy appointment    Other referrals                        Enrique Mcnair MD  Hematology/Oncology  [facility]

## 2024-04-17 ENCOUNTER — TELEPHONE (OUTPATIENT)
Dept: ENDOSCOPY | Facility: HOSPITAL | Age: 69
End: 2024-04-17
Payer: MEDICARE

## 2024-04-24 ENCOUNTER — HOSPITAL ENCOUNTER (OUTPATIENT)
Dept: RADIOLOGY | Facility: HOSPITAL | Age: 69
Discharge: HOME | End: 2024-04-24
Attending: HOSPITALIST
Payer: MEDICARE

## 2024-04-24 DIAGNOSIS — C49.A2 GASTROINTESTINAL STROMAL TUMOR (GIST) OF STOMACH: ICD-10-CM

## 2024-04-24 PROCEDURE — 74177 CT ABD & PELVIS W/CONTRAST: CPT | Mod: 26,,, | Performed by: RADIOLOGY

## 2024-04-24 PROCEDURE — A9698 NON-RAD CONTRAST MATERIALNOC: HCPCS | Performed by: HOSPITALIST

## 2024-04-24 PROCEDURE — 71260 CT THORAX DX C+: CPT | Mod: 26,,, | Performed by: RADIOLOGY

## 2024-04-24 PROCEDURE — 25500020 PHARM REV CODE 255: Performed by: HOSPITALIST

## 2024-04-24 PROCEDURE — 74177 CT ABD & PELVIS W/CONTRAST: CPT | Mod: TC

## 2024-04-24 RX ADMIN — IOHEXOL 75 ML: 350 INJECTION, SOLUTION INTRAVENOUS at 03:04

## 2024-04-24 RX ADMIN — BARIUM SULFATE 450 ML: 20 SUSPENSION ORAL at 03:04

## 2024-04-25 NOTE — PROGRESS NOTES
CT looks ago - will continue to follow indeterminate lung nodules conservatively. Needs EGD scheduled; looks like GI hasn't been able to schedule.    Farrah, can you try to get a hold of him and help him get his EGD arranged?

## 2024-04-28 ENCOUNTER — HOSPITAL ENCOUNTER (EMERGENCY)
Facility: HOSPITAL | Age: 69
Discharge: HOME | End: 2024-04-28
Attending: EMERGENCY MEDICINE
Payer: MEDICARE

## 2024-04-28 VITALS
HEIGHT: 71 IN | DIASTOLIC BLOOD PRESSURE: 63 MMHG | BODY MASS INDEX: 25.2 KG/M2 | SYSTOLIC BLOOD PRESSURE: 160 MMHG | OXYGEN SATURATION: 100 % | HEART RATE: 50 BPM | RESPIRATION RATE: 16 BRPM | WEIGHT: 180 LBS | TEMPERATURE: 98 F

## 2024-04-28 DIAGNOSIS — R10.9 LEFT FLANK PAIN: Primary | ICD-10-CM

## 2024-04-28 LAB
ALBUMIN SERPL BCP-MCNC: 3.3 G/DL (ref 3.5–5.2)
ALP SERPL-CCNC: 56 U/L (ref 55–135)
ALT SERPL W/O P-5'-P-CCNC: 36 U/L (ref 10–44)
ANION GAP SERPL CALC-SCNC: 6 MMOL/L (ref 8–16)
AST SERPL-CCNC: 36 U/L (ref 10–40)
BACTERIA #/AREA URNS AUTO: NORMAL /HPF
BASOPHILS # BLD AUTO: 0.05 K/UL (ref 0–0.2)
BASOPHILS NFR BLD: 0.6 % (ref 0–1.9)
BILIRUB SERPL-MCNC: 1.9 MG/DL (ref 0.1–1)
BILIRUB UR QL STRIP: NEGATIVE
BUN SERPL-MCNC: 12 MG/DL (ref 8–23)
CALCIUM SERPL-MCNC: 9 MG/DL (ref 8.7–10.5)
CHLORIDE SERPL-SCNC: 114 MMOL/L (ref 95–110)
CLARITY UR REFRACT.AUTO: CLEAR
CO2 SERPL-SCNC: 24 MMOL/L (ref 23–29)
COLOR UR AUTO: ABNORMAL
CREAT SERPL-MCNC: 1.3 MG/DL (ref 0.5–1.4)
DIFFERENTIAL METHOD BLD: ABNORMAL
EOSINOPHIL # BLD AUTO: 0.2 K/UL (ref 0–0.5)
EOSINOPHIL NFR BLD: 1.9 % (ref 0–8)
ERYTHROCYTE [DISTWIDTH] IN BLOOD BY AUTOMATED COUNT: 14.8 % (ref 11.5–14.5)
EST. GFR  (NO RACE VARIABLE): 59.1 ML/MIN/1.73 M^2
GLUCOSE SERPL-MCNC: 88 MG/DL (ref 70–110)
GLUCOSE UR QL STRIP: NEGATIVE
HCT VFR BLD AUTO: 39 % (ref 40–54)
HCV AB SERPL QL IA: NORMAL
HGB BLD-MCNC: 12.6 G/DL (ref 14–18)
HGB UR QL STRIP: NEGATIVE
HIV 1+2 AB+HIV1 P24 AG SERPL QL IA: NORMAL
HYALINE CASTS UR QL AUTO: 1 /LPF
IMM GRANULOCYTES # BLD AUTO: 0.01 K/UL (ref 0–0.04)
IMM GRANULOCYTES NFR BLD AUTO: 0.1 % (ref 0–0.5)
KETONES UR QL STRIP: NEGATIVE
LEUKOCYTE ESTERASE UR QL STRIP: NEGATIVE
LYMPHOCYTES # BLD AUTO: 3 K/UL (ref 1–4.8)
LYMPHOCYTES NFR BLD: 37.5 % (ref 18–48)
MCH RBC QN AUTO: 29.6 PG (ref 27–31)
MCHC RBC AUTO-ENTMCNC: 32.3 G/DL (ref 32–36)
MCV RBC AUTO: 92 FL (ref 82–98)
MICROSCOPIC COMMENT: NORMAL
MONOCYTES # BLD AUTO: 0.6 K/UL (ref 0.3–1)
MONOCYTES NFR BLD: 7.1 % (ref 4–15)
NEUTROPHILS # BLD AUTO: 4.2 K/UL (ref 1.8–7.7)
NEUTROPHILS NFR BLD: 52.8 % (ref 38–73)
NITRITE UR QL STRIP: NEGATIVE
NRBC BLD-RTO: 0 /100 WBC
PH UR STRIP: 6 [PH] (ref 5–8)
PLATELET # BLD AUTO: 208 K/UL (ref 150–450)
PMV BLD AUTO: 11.4 FL (ref 9.2–12.9)
POTASSIUM SERPL-SCNC: 4.1 MMOL/L (ref 3.5–5.1)
PROT SERPL-MCNC: 5.7 G/DL (ref 6–8.4)
PROT UR QL STRIP: ABNORMAL
RBC # BLD AUTO: 4.26 M/UL (ref 4.6–6.2)
RBC #/AREA URNS AUTO: 1 /HPF (ref 0–4)
SODIUM SERPL-SCNC: 144 MMOL/L (ref 136–145)
SP GR UR STRIP: 1.02 (ref 1–1.03)
URN SPEC COLLECT METH UR: ABNORMAL
WBC # BLD AUTO: 8.01 K/UL (ref 3.9–12.7)
WBC #/AREA URNS AUTO: 3 /HPF (ref 0–5)

## 2024-04-28 PROCEDURE — 86803 HEPATITIS C AB TEST: CPT | Performed by: PHYSICIAN ASSISTANT

## 2024-04-28 PROCEDURE — 63600175 PHARM REV CODE 636 W HCPCS

## 2024-04-28 PROCEDURE — 85025 COMPLETE CBC W/AUTO DIFF WBC: CPT

## 2024-04-28 PROCEDURE — 80053 COMPREHEN METABOLIC PANEL: CPT

## 2024-04-28 PROCEDURE — 87389 HIV-1 AG W/HIV-1&-2 AB AG IA: CPT | Performed by: PHYSICIAN ASSISTANT

## 2024-04-28 PROCEDURE — 81001 URINALYSIS AUTO W/SCOPE: CPT

## 2024-04-28 PROCEDURE — 96374 THER/PROPH/DIAG INJ IV PUSH: CPT

## 2024-04-28 PROCEDURE — 99284 EMERGENCY DEPT VISIT MOD MDM: CPT | Mod: 25

## 2024-04-28 RX ORDER — KETOROLAC TROMETHAMINE 30 MG/ML
10 INJECTION, SOLUTION INTRAMUSCULAR; INTRAVENOUS
Status: COMPLETED | OUTPATIENT
Start: 2024-04-28 | End: 2024-04-28

## 2024-04-28 RX ADMIN — KETOROLAC TROMETHAMINE 10 MG: 30 INJECTION, SOLUTION INTRAMUSCULAR; INTRAVENOUS at 11:04

## 2024-04-28 NOTE — ED NOTES
Patient identifiers for Sami Zhang 68 y.o. male checked and correct.  Chief Complaint   Patient presents with    Flank Pain     L flank pain, denies urinary symptoms, pain inc with movement     LOC: Patient is awake, alert, and aware of environment with an appropriate affect. Patient is oriented x 3 and speaking appropriately.  APPEARANCE: Patient resting comfortably and in no acute distress. Patient is clean and well groomed, patient's clothing is properly fastened.  HEENT: WDL  SKIN: The skin is warm and dry. Patient has normal skin turgor and moist mucus membranes. Skin is intact; no bruising or breakdown noted.  MUSKULOSKELETAL: Patient is moving all extremities well, no obvious deformities noted. Pulses intact.   RESPIRATORY: Airway is open and patent. Respirations are spontaneous and non-labored with normal effort and rate, BBS=clear  CARDIAC: No peripheral edema noted.   ABDOMEN: No distention noted. Bowel sounds active in all 4 quadrants. Soft and non-tender upon palpation.  NEUROLOGICAL: PERRL. Facial expression is symmetrical. Hand grasps are equal bilaterally. Normal sensation in all extremities when touched with finger.

## 2024-04-28 NOTE — ED PROVIDER NOTES
Encounter Date: 4/28/2024       History     Chief Complaint   Patient presents with    Flank Pain     L flank pain, denies urinary symptoms, pain inc with movement     68-year-old male with history of hypertension, hyperlipidemia, stomach cancer on oral chemotherapy presents to the ED regarding left flank pain onset several months.  States it has gradually worsened over the months though was not acutely worse today.  He has been taking extra-strength Tylenol with no relief.  States the pain is worse with movement and denies any urinary complaints.  No dysuria, hematuria, fever, nausea, vomiting, abdominal pain, chest pain, shortness of breath, or other complaints at this time.  Denies trauma or vigorous activity.  Of note, patient was seen in outside ED a few days ago for submandibular swelling. He was tested for mumps which he is positive for. He states the swelling has significantly improved and pretty much resolved.    The history is provided by the patient and medical records.     Review of patient's allergies indicates:  No Known Allergies  Past Medical History:   Diagnosis Date    Anemia     Arthritis     Back pain     Gastrointestinal stromal tumor (GIST) of stomach     GIST (gastrointestinal stromal tumor), malignant 09/09/2022    Gout 10/08/2015    Hyperlipidemia     Hypertension     Hypokalemia 5/26/2023    Laryngeal nodule 7/5/2016    Stomach cancer      Past Surgical History:   Procedure Laterality Date    COLONOSCOPY N/A 12/15/2022    Procedure: COLONOSCOPY;  Surgeon: Sean Bran MD;  Location: [facility] (4TH FLR);  Service: Endoscopy;  Laterality: N/A;  [facility] handed-t  12/8/22- [facility] completed-    ENDOSCOPIC ULTRASOUND OF UPPER GASTROINTESTINAL TRACT N/A 09/09/2022    Procedure: ULTRASOUND, UPPER GI TRACT, ENDOSCOPIC;  Surgeon: Yosvany Paula MD;  Location: [facility] (2ND FLR);  Service: Endoscopy;  Laterality: N/A;  The patient need an EGD/EUS (radial) for gastric mass. Main. Urgent. 60  minutes. Referring:   MD Estefani Campos Ricardo Romero, MD    ESOPHAGOGASTRODUODENOSCOPY N/A 09/09/2022    Procedure: EGD (ESOPHAGOGASTRODUODENOSCOPY);  Surgeon: Yosvany Paula MD;  [facility]  Service: Endoscopy;  Laterality: N/A;  The patient need an EGD/EUS (radial) for gastric mass. Main. Urgent. 60 minutes. Referring:   MD Estefani Campos Ricardo Romero, MD    Pt is fully vaccinated-DS  9/7/22-Instructions written down by pt's wife and read back.-DS    ESOPHAGOGASTRODUODENOSCOPY N/A 4/21/2023    Procedure: EGD (ESOPHAGOGASTRODUODENOSCOPY);  Surgeon: Kostas López MD;  Location: [facility] OR [address] [place] [address];  Service: General;  Laterality: N/A;    ESOPHAGOGASTRODUODENOSCOPY N/A 5/10/2023    Procedure: EGD (ESOPHAGOGASTRODUODENOSCOPY);  Surgeon: Kostas López MD;  Location: [facility] OR [address] [place] [address];  Service: General;  Laterality: N/A;    KNEE SURGERY Bilateral     unsure type of knee surgery, unsure if metal was placed, completed at another hospital    NONE N/A 10/08/2015    ROBOT-ASSISTED SURGICAL REMOVAL OF STOMACH USING DA WILLIAM XI N/A 5/10/2023    Procedure: XI ROBOTIC GASTRECTOMY, partial, possible total;  Surgeon: Kostas López MD;  Location: [facility] OR [address] [place] [address];  Service: General;  Laterality: N/A;     Family History   Problem Relation Name Age of Onset    Stroke Sister Stephy 60    Other Other name?         brain/head tumor (about 1y ago??)    Cancer Neg Hx      Diabetes Neg Hx      Heart disease Neg Hx      Hypertension Neg Hx       Social History     Tobacco Use    Smoking status: Never     Passive exposure: Past    Smokeless tobacco: Never   Substance Use Topics    Alcohol use: Not Currently     Alcohol/week: 2.0 standard drinks of alcohol     Types: 2 Cans of beer per week     Comment: occasionally drinks wine    Drug use: No     Review of Systems   Reason unable to perform ROS: See HPI.       Physical Exam     Initial Vitals [04/28/24 1034]   BP Pulse Resp  Temp SpO2   (!) 181/69 (!) 50 18 97.9 °F (36.6 °C) 100 %      MAP       --         Physical Exam    Vitals reviewed.  Constitutional: He appears well-developed and well-nourished. He is not diaphoretic. No distress.   HENT:   Head: Normocephalic and atraumatic.   Neck: Neck supple.   Cardiovascular:  Normal rate.           Pulmonary/Chest: No respiratory distress.   Abdominal: Abdomen is soft. He exhibits no distension. There is no abdominal tenderness.   No right CVA tenderness.  No left CVA tenderness. There is no rebound and no guarding.   Musculoskeletal:      Cervical back: Neck supple.      Thoracic back: No tenderness or bony tenderness. Normal range of motion.      Lumbar back: No tenderness or bony tenderness. Normal range of motion.      Comments: No skin changes. No rash     Neurological: He is alert and oriented to person, place, and time.   Psychiatric: He has a normal mood and affect.         ED Course   Procedures  Labs Reviewed   CBC W/ AUTO DIFFERENTIAL - Abnormal; Notable for the following components:       Result Value    RBC 4.26 (*)     Hemoglobin 12.6 (*)     Hematocrit 39.0 (*)     RDW 14.8 (*)     All other components within normal limits    Narrative:     Release to patient->Immediate   COMPREHENSIVE METABOLIC PANEL - Abnormal; Notable for the following components:    Chloride 114 (*)     Total Protein 5.7 (*)     Albumin 3.3 (*)     Total Bilirubin 1.9 (*)     eGFR 59.1 (*)     Anion Gap 6 (*)     All other components within normal limits    Narrative:     Release to patient->Immediate   URINALYSIS, REFLEX TO URINE CULTURE - Abnormal; Notable for the following components:    Color, UA Orange (*)     Protein, UA 1+ (*)     All other components within normal limits    Narrative:     Specimen Source->Urine   HIV 1 / 2 ANTIBODY    Narrative:     Release to patient->Immediate   HEPATITIS C ANTIBODY    Narrative:     Release to patient->Immediate   URINALYSIS MICROSCOPIC    Narrative:      Specimen Source->Urine          Imaging Results    None          Medications   ketorolac injection 9.999 mg (9.999 mg Intravenous Given 4/28/24 1152)     Medical Decision Making  Amount and/or Complexity of Data Reviewed  Labs: ordered. Decision-making details documented in ED Course.    Risk  Prescription drug management.         APC / Resident Notes:   Emergent evaluation of 68-year-old male presenting with left flank pain onset several months. No fever or urinary complaints. Hypertensive and mildly bradycardic with other vitals stable. Clinically well-appearing, in no acute distress. No L-spine, doubt fracture. No CVA tenderness or dysuria, doubt pyelonephritis. No rash to suggest shingles. No paraspinal tenderness though pain exacerbated with movement so likely MSK etiology. Will obtain labs, UA, and trial Toradol and reassess. Patient had CTA days ago and given this pain is not acute, do not think reimaging is warranted at this time.    My differential diagnoses include but are not limited to:   Strain, nephrolithiasis, pyelonephritis, UTI, electrolyte abnormality, SHA    See ED course.  I have reviewed the patient's records and discussed with my supervising physician.        ED Course as of 04/28/24 1502   Sun Apr 28, 2024   1159 Reviewed CTA that was obtained 4 days ago that was unremarkable for anything acute. Did show 4 mm pulmonary nodule[KB]   1204 Comprehensive metabolic panel(!)  Grossly unremarkable, relative baseline [KB]   1205 CBC auto differential(!)  No leukocytosis.  Anemia at baseline [KB]   1205 Urinalysis, Reflex to Urine Culture Urine, Clean Catch(!)  No hematuria.  Noninfectious appearing. [KB]   1316 Patient educated on findings.  He reports his flank pain has resolved.  Higher suspicion for musculoskeletal in etiology given resolution of pain after NSAID. Advised for close follow up with PCP and strict ED return precautions given with all questions answered. Patient verbalized  understanding and agreed to plan. [KB]      ED Course User Index  [KB] Lilia Rogers PA-C                           Clinical Impression:  Final diagnoses:  [R10.9] Left flank pain (Primary)          ED Disposition Condition    Discharge Stable          ED Prescriptions    None       Follow-up Information       Follow up With Specialties Details Why Contact Info    Mitch Gatica Jr., MD Cardiology Schedule an appointment as soon as possible for a visit   [address] [facility] [address]  [phone]      [facility] - Emergency Dept Emergency Medicine Go to  If symptoms worsen [address] [facility] [address]  [phone]             Lilia Rogers PA-C  04/28/24 1512

## 2024-04-30 ENCOUNTER — TELEPHONE (OUTPATIENT)
Dept: ENDOSCOPY | Facility: HOSPITAL | Age: 69
End: 2024-04-30
Payer: MEDICARE

## 2024-04-30 DIAGNOSIS — C49.A2 GASTROINTESTINAL STROMAL TUMOR (GIST) OF STOMACH: Primary | ICD-10-CM

## 2024-05-01 ENCOUNTER — TELEPHONE (OUTPATIENT)
Dept: ENDOSCOPY | Facility: HOSPITAL | Age: 69
End: 2024-05-01
Payer: MEDICARE

## 2024-05-01 ENCOUNTER — PATIENT MESSAGE (OUTPATIENT)
Dept: ENDOSCOPY | Facility: HOSPITAL | Age: 69
End: 2024-05-01
Payer: MEDICARE

## 2024-05-01 NOTE — TELEPHONE ENCOUNTER
Spoke to  [facility] schedule procedure(s) Upper Endoscopy Ultrasound (EUS)       Physician to perform procedure(s) Dr. YANI Paula  Date of Procedure (s) 5/23/24  Arrival Time 10:15 AM  Time of Procedure(s) 11:15 AM   Location of Procedure(s) [address]  Type of Rx Prep sent to patient: N/A  Instructions provided to patient via [facility]    Patient was informed on the following information and verbalized understanding. Screening questionnaire reviewed with patient and complete. If procedure requires anesthesia, a responsible adult needs to be present to accompany the patient home, patient cannot drive after receiving anesthesia. Appointment details are tentative, especially check-in time. Patient will receive a prep-op call 7 days prior to confirm check-in time for procedure. If applicable the patient should contact their pharmacy to verify Rx for procedure prep is ready for pick-up. Patient was advised to call the scheduling department at [phone] if pharmacy states no Rx is available. Patient was advised to call the endoscopy scheduling department if any questions or concerns arise.      [facility] Scheduling Department

## 2024-05-06 DIAGNOSIS — L29.9 PRURITUS: ICD-10-CM

## 2024-05-06 DIAGNOSIS — I10 PRIMARY HYPERTENSION: ICD-10-CM

## 2024-05-06 RX ORDER — HYDROXYZINE HYDROCHLORIDE 25 MG/1
25 TABLET, FILM COATED ORAL 3 TIMES DAILY PRN
Qty: 90 TABLET | Refills: 0 | Status: SHIPPED | OUTPATIENT
Start: 2024-05-06

## 2024-05-06 RX ORDER — VALSARTAN 160 MG/1
160 TABLET ORAL DAILY
Qty: 30 TABLET | Refills: 11 | Status: SHIPPED | OUTPATIENT
Start: 2024-05-06 | End: 2025-05-06

## 2024-05-07 ENCOUNTER — TELEPHONE (OUTPATIENT)
Dept: HEMATOLOGY/ONCOLOGY | Facility: CLINIC | Age: 69
End: 2024-05-07
Payer: MEDICARE

## 2024-05-07 NOTE — TELEPHONE ENCOUNTER
----- Message from Isabella Contreras sent at 5/7/2024 10:55 AM CDT -----  Regarding: rx refill  Contact: Demetrice wife  RX Name:1.valsartan (DIOVAN) 160 MG tablet                    2.hydrOXYzine HCL (ATARAX) 25 MG tablet  How is it taken:1.Sig - Route: Take 1 tablet (160 mg total) by mouth once daily. - Oral                           2.Sig - Route: Take 1 tablet (25 mg total) by mouth 3 (three) times daily as needed for Itching. - Oral  Quantity:1.30 tablet                 2. 90 tablet    Preferred Pharmacy with phone number:  [id number] [address] [address]  Phone: [phone] Fax: [phone]               Ordering Provider:Xu       Contact Preference:Demetrice wife [phone] (home) [phone] (work)      Addl info:Patients wife states  is out

## 2024-05-22 ENCOUNTER — TELEPHONE (OUTPATIENT)
Dept: ENDOSCOPY | Facility: HOSPITAL | Age: 69
End: 2024-05-22
Payer: MEDICARE

## 2024-05-22 NOTE — TELEPHONE ENCOUNTER
Returned call to patient's wife to confirm colonoscopy. Pt has instructions and confirmed ride. Verified if any GLP1's and blood thinners.   Pre-call complete, Pt does not have any further questions. Arrival time: 10:15 AM

## 2024-05-23 ENCOUNTER — ANESTHESIA EVENT (OUTPATIENT)
Dept: ENDOSCOPY | Facility: HOSPITAL | Age: 69
End: 2024-05-23
Payer: MEDICARE

## 2024-05-23 ENCOUNTER — HOSPITAL ENCOUNTER (OUTPATIENT)
Facility: HOSPITAL | Age: 69
Discharge: HOME | End: 2024-05-23
Attending: INTERNAL MEDICINE | Admitting: INTERNAL MEDICINE
Payer: MEDICARE

## 2024-05-23 ENCOUNTER — ANESTHESIA (OUTPATIENT)
Dept: ENDOSCOPY | Facility: HOSPITAL | Age: 69
End: 2024-05-23
Payer: MEDICARE

## 2024-05-23 VITALS
OXYGEN SATURATION: 100 % | TEMPERATURE: 98 F | HEART RATE: 63 BPM | HEIGHT: 71 IN | SYSTOLIC BLOOD PRESSURE: 142 MMHG | WEIGHT: 180 LBS | BODY MASS INDEX: 25.2 KG/M2 | DIASTOLIC BLOOD PRESSURE: 75 MMHG | RESPIRATION RATE: 18 BRPM

## 2024-05-23 DIAGNOSIS — D21.4 BENIGN GASTROINTESTINAL STROMAL TUMOR (GIST): ICD-10-CM

## 2024-05-23 PROCEDURE — D9220A PRA ANESTHESIA: Mod: CRNA,,, | Performed by: NURSE ANESTHETIST, CERTIFIED REGISTERED

## 2024-05-23 PROCEDURE — 37000009 HC ANESTHESIA EA ADD 15 MINS: Performed by: INTERNAL MEDICINE

## 2024-05-23 PROCEDURE — 43235 EGD DIAGNOSTIC BRUSH WASH: CPT | Performed by: INTERNAL MEDICINE

## 2024-05-23 PROCEDURE — 63600175 PHARM REV CODE 636 W HCPCS: Performed by: NURSE ANESTHETIST, CERTIFIED REGISTERED

## 2024-05-23 PROCEDURE — 25000003 PHARM REV CODE 250: Performed by: NURSE ANESTHETIST, CERTIFIED REGISTERED

## 2024-05-23 PROCEDURE — 37000008 HC ANESTHESIA 1ST 15 MINUTES: Performed by: INTERNAL MEDICINE

## 2024-05-23 PROCEDURE — D9220A PRA ANESTHESIA: Mod: ANES,,, | Performed by: ANESTHESIOLOGY

## 2024-05-23 PROCEDURE — 43235 EGD DIAGNOSTIC BRUSH WASH: CPT | Mod: ,,, | Performed by: INTERNAL MEDICINE

## 2024-05-23 RX ORDER — HYDROMORPHONE HYDROCHLORIDE 1 MG/ML
0.2 INJECTION, SOLUTION INTRAMUSCULAR; INTRAVENOUS; SUBCUTANEOUS EVERY 5 MIN PRN
Status: DISCONTINUED | OUTPATIENT
Start: 2024-05-23 | End: 2024-05-23 | Stop reason: HOSPADM

## 2024-05-23 RX ORDER — PROPOFOL 10 MG/ML
VIAL (ML) INTRAVENOUS
Status: DISCONTINUED | OUTPATIENT
Start: 2024-05-23 | End: 2024-05-23

## 2024-05-23 RX ORDER — SODIUM CHLORIDE 9 MG/ML
INJECTION, SOLUTION INTRAVENOUS CONTINUOUS
Status: DISCONTINUED | OUTPATIENT
Start: 2024-05-23 | End: 2024-05-23 | Stop reason: HOSPADM

## 2024-05-23 RX ORDER — DEXMEDETOMIDINE HYDROCHLORIDE 100 UG/ML
INJECTION, SOLUTION INTRAVENOUS
Status: DISCONTINUED | OUTPATIENT
Start: 2024-05-23 | End: 2024-05-23

## 2024-05-23 RX ORDER — ONDANSETRON HYDROCHLORIDE 2 MG/ML
4 INJECTION, SOLUTION INTRAVENOUS DAILY PRN
Status: DISCONTINUED | OUTPATIENT
Start: 2024-05-23 | End: 2024-05-23 | Stop reason: HOSPADM

## 2024-05-23 RX ORDER — LIDOCAINE HYDROCHLORIDE 20 MG/ML
INJECTION, SOLUTION EPIDURAL; INFILTRATION; INTRACAUDAL; PERINEURAL
Status: DISCONTINUED | OUTPATIENT
Start: 2024-05-23 | End: 2024-05-23

## 2024-05-23 RX ORDER — SODIUM CHLORIDE 0.9 % (FLUSH) 0.9 %
10 SYRINGE (ML) INJECTION
Status: CANCELLED | OUTPATIENT
Start: 2024-05-23

## 2024-05-23 RX ORDER — FENTANYL CITRATE 50 UG/ML
INJECTION, SOLUTION INTRAMUSCULAR; INTRAVENOUS
Status: DISCONTINUED | OUTPATIENT
Start: 2024-05-23 | End: 2024-05-23

## 2024-05-23 RX ORDER — MEPERIDINE HYDROCHLORIDE 50 MG/ML
12.5 INJECTION INTRAMUSCULAR; INTRAVENOUS; SUBCUTANEOUS ONCE AS NEEDED
Status: DISCONTINUED | OUTPATIENT
Start: 2024-05-23 | End: 2024-05-23 | Stop reason: HOSPADM

## 2024-05-23 RX ORDER — SODIUM CHLORIDE 9 MG/ML
INJECTION, SOLUTION INTRAVENOUS CONTINUOUS
Status: CANCELLED | OUTPATIENT
Start: 2024-05-23

## 2024-05-23 RX ADMIN — LIDOCAINE HYDROCHLORIDE 100 MG: 20 INJECTION, SOLUTION EPIDURAL; INFILTRATION; INTRACAUDAL at 12:05

## 2024-05-23 RX ADMIN — SODIUM CHLORIDE: 0.9 INJECTION, SOLUTION INTRAVENOUS at 10:05

## 2024-05-23 RX ADMIN — PROPOFOL 100 MG: 10 INJECTION, EMULSION INTRAVENOUS at 12:05

## 2024-05-23 RX ADMIN — DEXMEDETOMIDINE 4 MCG: 200 INJECTION, SOLUTION INTRAVENOUS at 12:05

## 2024-05-23 RX ADMIN — GLYCOPYRROLATE 0.2 MG: 0.2 INJECTION INTRAMUSCULAR; INTRAVENOUS at 12:05

## 2024-05-23 RX ADMIN — FENTANYL CITRATE 50 MCG: 50 INJECTION INTRAMUSCULAR; INTRAVENOUS at 12:05

## 2024-05-23 NOTE — H&P
Short Stay Endoscopy History and Physical    PCP - Mitch Gatica Jr., MD  Referring Physician - Enrique Mcnair MD  [address] [place] [address]    Procedure - eus  ASA - per anesthesia  Mallampati - per anesthesia  History of Anesthesia problems - no  Family history Anesthesia problems -  no   Plan of anesthesia - General    HPI:  This is a 68 y.o. male here for evaluation of: gist recurrence    Reflux - no  Dysphagia - no  Abdominal pain - no  Diarrhea - no    ROS:  Constitutional: No fevers, chills, No weight loss  CV: No chest pain  Pulm: No cough, No shortness of breath  Ophtho: No vision changes  GI: see HPI  Derm: No rash    Medical History:  has a past medical history of Anemia, Arthritis, Back pain, Gastrointestinal stromal tumor (GIST) of stomach, GIST (gastrointestinal stromal tumor), malignant (09/09/2022), Gout (10/08/2015), Hyperlipidemia, Hypertension, Hypokalemia (5/26/2023), Laryngeal nodule (7/5/2016), and Stomach cancer.    Surgical History:  has a past surgical history that includes NONE (N/A, 10/08/2015); Esophagogastroduodenoscopy (N/A, 09/09/2022); Endoscopic ultrasound of upper gastrointestinal tract (N/A, 09/09/2022); Colonoscopy (N/A, 12/15/2022); Knee surgery (Bilateral); Esophagogastroduodenoscopy (N/A, 4/21/2023); Robot-assisted surgical removal of stomach using da Freddie Xi (N/A, 5/10/2023); and Esophagogastroduodenoscopy (N/A, 5/10/2023).    Family History: family history includes Other in an other family member; Stroke (age of onset: 60) in his sister..    Social History:  reports that he has never smoked. He has been exposed to tobacco smoke. He has never used smokeless tobacco. He reports that he does not currently use alcohol after a past usage of about 2.0 standard drinks of alcohol per week. He reports that he does not use drugs.    Review of patient's allergies indicates:  No Known Allergies    Medications:   Medications Prior to Admission    Medication Sig Dispense Refill Last Dose    allopurinoL (ZYLOPRIM) 300 MG tablet Take 300 mg by mouth.   5/22/2024    CALCIUM CARBONATE/VITAMIN D3 (VITAMIN D-3 ORAL) Take 10,000 Units by mouth once a week.   5/22/2024    calcium carbonate/vitamin D3 (VITAMIN D-3 ORAL) Take 1 capsule by mouth every 7 days.   5/22/2024    diflunisaL (DOLOBID) 500 mg Tab TK 1 T PO  BID WITH FOOD   5/22/2024    hydrOXYzine HCL (ATARAX) 25 MG tablet Take 1 tablet (25 mg total) by mouth 3 (three) times daily as needed for Itching. 90 tablet 0 Past Week    icosapent ethyL (VASCEPA) 0.5 gram Cap Take 2 capsules by mouth 2 (two) times daily.   5/22/2024    imatinib (GLEEVEC) 400 MG Tab Take 1 tablet (400 mg total) by mouth once daily. 30 tablet 11 5/22/2024    multivitamin capsule Take 1 capsule by mouth once daily.   5/22/2024    nebivolol (BYSTOLIC) 10 MG Tab Take 10 mg by mouth once daily.   5/22/2024    potassium chloride SA (K-DUR,KLOR-CON) 20 MEQ tablet Take 2 tablets (40 mEq total) by mouth once daily. 60 tablet 5 5/22/2024    rosuvastatin (CRESTOR) 20 MG tablet Take 20 mg by mouth once daily.   5/22/2024    thiamine 100 MG tablet Take 100 mg by mouth.   5/22/2024    valsartan (DIOVAN) 160 MG tablet Take 1 tablet (160 mg total) by mouth once daily. 30 tablet 11 5/22/2024    VASCEPA 1 gram Cap Take 2 capsules by mouth 2 (two) times daily.   5/22/2024    vitamin D 1000 units Tab Take 10,000 Units by mouth once a week.   5/22/2024    allantoin/onion/peg/water (SCAR GEL TOP) APPLY 1/2 GRAM (1 PUMP) TO AFFECTED AREAS TWICE DAILY.       dexAMETHasone (DECADRON) 4 mg/mL injection        flu vac 2019 65up-pedDF55Q,PF, 45 mcg (15 mcg x 3)/0.5 mL Syrg ADM 0.5ML IM UTD       LIDOcaine-TETRAcaine 7-7 % Crea        loperamide (IMODIUM A-D) 2 mg Tab Take two tabs at the onset of loose stools.  Then take one tab after every subsequent loose stool. 90 tablet 11 More than a month    midazolam (VERSED) 1 mg/mL injection        OMNIPAQUE 350 350 mg  iodine/mL Soln injection        pantoprazole (PROTONIX) 40 MG tablet Take 1 tablet (40 mg total) by mouth once daily. 30 tablet 11 More than a month    pneumoc 13-kamille conj-dip cr,PF, (PREVNAR 13, PF,) 0.5 mL Syrg ADM 0.5ML IM UTD          Physical Exam:    Vital Signs:   Vitals:    05/23/24 1125   BP: (!) 174/77   Pulse: 69   Resp: 14   Temp: 97.7 °F (36.5 °C)       General Appearance: Well appearing in no acute distress    Labs:  Lab Results   Component Value Date    WBC 8.01 04/28/2024    HGB 12.6 (L) 04/28/2024    HCT 39.0 (L) 04/28/2024     04/28/2024    ALT 36 04/28/2024    AST 36 04/28/2024     04/28/2024    K 4.1 04/28/2024     (H) 04/28/2024    CREATININE 1.3 04/28/2024    BUN 12 04/28/2024    CO2 24 04/28/2024    INR 1.0 07/01/2016       I have explained the risks and benefits of this endoscopic procedure to the patient including but not limited to bleeding, inflammation, infection, perforation, and death.      Yosvany Paula MD

## 2024-05-23 NOTE — ANESTHESIA PREPROCEDURE EVALUATION
05/23/2024  Sami Zhang is a 68 y.o., male.      Pre-op Assessment    I have reviewed the Patient Summary Reports.     I have reviewed the Nursing Notes.       Review of Systems  Anesthesia Hx:  No problems with previous Anesthesia                Hematology/Oncology:  Hematology Normal   Oncology Normal                                   EENT/Dental:  EENT/Dental Normal           Cardiovascular:     Hypertension           hyperlipidemia                             Pulmonary:  Pulmonary Normal                       Renal/:  Renal/ Normal                 Hepatic/GI:  Hepatic/GI Normal       Gastrointestinal stromal tumor (GIST) of stomach          Musculoskeletal:  Arthritis               Neurological:  Neurology Normal                                      Endocrine:  Endocrine Normal            Dermatological:  Skin Normal    Psych:  Psychiatric Normal                    Physical Exam  General: Well nourished    Airway:  Mallampati: III / II  Mouth Opening: Normal  TM Distance: Normal  Tongue: Normal  Neck ROM: Normal ROM    Dental:  Intact        Anesthesia Plan  Type of Anesthesia, risks & benefits discussed:    Anesthesia Type: Gen Natural Airway  Intra-op Monitoring Plan: Standard ASA Monitors  Post Op Pain Control Plan: multimodal analgesia  Induction:  IV  Airway Plan: Direct  Informed Consent: Informed consent signed with the Patient and all parties understand the risks and agree with anesthesia plan.  All questions answered. Patient consented to blood products? No  ASA Score: 3  Day of Surgery Review of History & Physical: H&P Update referred to the surgeon/provider.    Ready For Surgery From Anesthesia Perspective.     .

## 2024-05-23 NOTE — TRANSFER OF CARE
"Anesthesia Transfer of Care Note    Patient: Sami Zhang    Procedure(s) Performed: Procedure(s) (LRB):  EGD (ESOPHAGOGASTRODUODENOSCOPY) (N/A)    Patient location: [facility]    Anesthesia Type: general    Transport from OR: Transported from OR on 2-3 L/min O2 by NC with adequate spontaneous ventilation    Post pain: adequate analgesia    Post assessment: no apparent anesthetic complications    Post vital signs: stable    Level of consciousness: awake, alert and oriented    Nausea/Vomiting: no nausea/vomiting    Complications: none    Transfer of care protocol was followed    Last vitals: Visit Vitals  BP (!) 174/77 (BP Location: Left arm, Patient Position: Lying)   Pulse 69   Temp 36.5 °C (97.7 °F) (Skin)   Resp 14   Ht 5' 11" (1.803 m)   Wt 81.6 kg (180 lb)   SpO2 98%   BMI 25.10 kg/m²     "

## 2024-05-23 NOTE — PROVATION PATIENT INSTRUCTIONS
Discharge Summary/Instructions after an Endoscopic Procedure  Patient Name: Sami Zhang  Patient MRN: [id number]  Patient YOB: 1955  Thursday, May 23, 2024  Yosvany Paula MD  Dear patient,  As a result of recent federal legislation (The Federal Cures Act), you may   receive lab or pathology results from your procedure in your [facility]   account before your physician is able to contact you. Your physician or   their representative will relay the results to you with their   recommendations at their soonest availability.  Thank you,  RESTRICTIONS:  During your procedure today, you received medications for sedation.  These   medications may affect your judgment, balance and coordination.  Therefore,   for 24 hours, you have the following restrictions:   - DO NOT drive a car, operate machinery, make legal/financial decisions,   sign important papers or drink alcohol.    ACTIVITY:  Today: no heavy lifting, straining or running due to procedural   sedation/anesthesia.  The following day: return to full activity including work.  DIET:  Eat and drink normally unless instructed otherwise.     TREATMENT FOR COMMON SIDE EFFECTS:  - Mild abdominal pain, nausea, belching, bloating or excessive gas:  rest,   eat lightly and use a heating pad.  - Sore Throat: treat with throat lozenges and/or gargle with warm salt   water.  - Because air was used during the procedure, expelling large amounts of air   from your rectum or belching is normal.  - If a bowel prep was taken, you may not have a bowel movement for 1-3 days.    This is normal.  SYMPTOMS TO WATCH FOR AND REPORT TO YOUR PHYSICIAN:  1. Abdominal pain or bloating, other than gas cramps.  2. Chest pain.  3. Back pain.  4. Signs of infection such as: chills or fever occurring within 24 hours   after the procedure.  5. Rectal bleeding, which would show as bright red, maroon, or black stools.   (A tablespoon of blood from the rectum is not serious, especially if    hemorrhoids are present.)  6. Vomiting.  7. Weakness or dizziness.  GO DIRECTLY TO THE NEAREST EMERGENCY ROOM IF YOU HAVE ANY OF THE FOLLOWING:      Difficulty breathing              Chills and/or fever over 101 F   Persistent vomiting and/or vomiting blood   Severe abdominal pain   Severe chest pain   Black, tarry stools   Bleeding- more than one tablespoon   Any other symptom or condition that you feel may need urgent attention  Your doctor recommends these additional instructions:  If any biopsies were taken, your doctors clinic will contact you in 1 to 2   weeks with any results.  - Discharge patient to home.   - Resume previous diet.   - Continue present medications.   - Return to referring physician as previously scheduled.  For questions, problems or results please call your physician - Yosvany Paula MD at Work:  [phone].  [facility], EMERGENCY ROOM PHONE NUMBER: [phone]  IF A COMPLICATION OR EMERGENCY SITUATION ARISES AND YOU ARE UNABLE TO REACH   YOUR PHYSICIAN - GO DIRECTLY TO THE EMERGENCY ROOM.  Yosvany Paula MD  5/23/2024 12:51:00 PM  This report has been verified and signed electronically.  Dear patient,  As a result of recent federal legislation (The Federal Cures Act), you may   receive lab or pathology results from your procedure in your [facility]   account before your physician is able to contact you. Your physician or   their representative will relay the results to you with their   recommendations at their soonest availability.  Thank you,  PROVATION

## 2024-05-23 NOTE — ANESTHESIA POSTPROCEDURE EVALUATION
Anesthesia Post Evaluation    Patient: Sami Zhang    Procedure(s) Performed: Procedure(s) (LRB):  EGD (ESOPHAGOGASTRODUODENOSCOPY) (N/A)    Final Anesthesia Type: general      Patient location during evaluation: PACU  Patient participation: No - Unable to Participate, Sedation  Level of consciousness: sedated  Post-procedure vital signs: reviewed and stable  Pain management: adequate  Airway patency: patent    PONV status at discharge: No PONV  Anesthetic complications: no      Cardiovascular status: hemodynamically stable  Respiratory status: unassisted and spontaneous ventilation  Hydration status: euvolemic  Follow-up not needed.            Vitals Value Taken Time   /59 05/23/24 1232   Pulse 63 05/23/24 1235   Resp 12 05/23/24 1235   SpO2 100 % 05/23/24 1235   Vitals shown include unfiled device data.      No case tracking events are documented in the log.      Pain/Ayde Score: No data recorded

## 2024-05-23 NOTE — TRANSFER OF CARE
"Anesthesia Transfer of Care Note    Patient: Sami Zhang    Procedure(s) Performed: Procedure(s) (LRB):  EGD (ESOPHAGOGASTRODUODENOSCOPY) (N/A)    Patient location: [facility]    Anesthesia Type: general    Transport from OR: Transported from OR on 2-3 L/min O2 by NC with adequate spontaneous ventilation    Post pain: adequate analgesia    Post assessment: no apparent anesthetic complications    Post vital signs: stable    Level of consciousness: awake, alert and oriented    Nausea/Vomiting: no nausea/vomiting    Complications: none    Transfer of care protocol was followed    Last vitals: Visit Vitals  BP (!) 174/77 (BP Location: Left arm, Patient Position: Lying)   Pulse 69   Temp 36.5 °C (97.7 °F) (Skin)   Resp 14   Ht 5' 11" (1.803 m)   Wt 81.6 kg (180 lb)   SpO2 98%   BMI 25.10 kg/m²     "

## 2024-06-05 DIAGNOSIS — E87.6 HYPOKALEMIA: ICD-10-CM

## 2024-06-05 RX ORDER — POTASSIUM CHLORIDE 20 MEQ/1
40 TABLET, EXTENDED RELEASE ORAL DAILY
Qty: 60 TABLET | Refills: 5 | Status: SHIPPED | OUTPATIENT
Start: 2024-06-05

## 2024-06-26 DIAGNOSIS — E87.6 HYPOKALEMIA: ICD-10-CM

## 2024-06-27 RX ORDER — POTASSIUM CHLORIDE 20 MEQ/1
40 TABLET, EXTENDED RELEASE ORAL DAILY
Qty: 60 TABLET | Refills: 5 | Status: SHIPPED | OUTPATIENT
Start: 2024-06-27

## 2024-07-24 ENCOUNTER — OFFICE VISIT (OUTPATIENT)
Dept: HEMATOLOGY/ONCOLOGY | Facility: CLINIC | Age: 69
End: 2024-07-24
Payer: MEDICARE

## 2024-07-24 ENCOUNTER — LAB VISIT (OUTPATIENT)
Dept: LAB | Facility: HOSPITAL | Age: 69
End: 2024-07-24
Attending: HOSPITALIST
Payer: MEDICARE

## 2024-07-24 VITALS
SYSTOLIC BLOOD PRESSURE: 144 MMHG | OXYGEN SATURATION: 99 % | DIASTOLIC BLOOD PRESSURE: 67 MMHG | WEIGHT: 176.38 LBS | TEMPERATURE: 99 F | HEART RATE: 49 BPM | BODY MASS INDEX: 24.6 KG/M2

## 2024-07-24 DIAGNOSIS — C49.A2 GASTROINTESTINAL STROMAL TUMOR (GIST) OF STOMACH: ICD-10-CM

## 2024-07-24 DIAGNOSIS — C49.A2 GASTROINTESTINAL STROMAL TUMOR (GIST) OF STOMACH: Primary | ICD-10-CM

## 2024-07-24 LAB
ALBUMIN SERPL BCP-MCNC: 3.3 G/DL (ref 3.5–5.2)
ALP SERPL-CCNC: 69 U/L (ref 55–135)
ALT SERPL W/O P-5'-P-CCNC: 75 U/L (ref 10–44)
ANION GAP SERPL CALC-SCNC: 4 MMOL/L (ref 8–16)
AST SERPL-CCNC: 56 U/L (ref 10–40)
BILIRUB SERPL-MCNC: 1.2 MG/DL (ref 0.1–1)
BUN SERPL-MCNC: 10 MG/DL (ref 8–23)
CALCIUM SERPL-MCNC: 9 MG/DL (ref 8.7–10.5)
CHLORIDE SERPL-SCNC: 116 MMOL/L (ref 95–110)
CO2 SERPL-SCNC: 22 MMOL/L (ref 23–29)
CREAT SERPL-MCNC: 1.3 MG/DL (ref 0.5–1.4)
ERYTHROCYTE [DISTWIDTH] IN BLOOD BY AUTOMATED COUNT: 15.8 % (ref 11.5–14.5)
EST. GFR  (NO RACE VARIABLE): 59.1 ML/MIN/1.73 M^2
GLUCOSE SERPL-MCNC: 87 MG/DL (ref 70–110)
HCT VFR BLD AUTO: 37.9 % (ref 40–54)
HGB BLD-MCNC: 11.6 G/DL (ref 14–18)
IMM GRANULOCYTES # BLD AUTO: 0.02 K/UL (ref 0–0.04)
MCH RBC QN AUTO: 28.9 PG (ref 27–31)
MCHC RBC AUTO-ENTMCNC: 30.6 G/DL (ref 32–36)
MCV RBC AUTO: 94 FL (ref 82–98)
NEUTROPHILS # BLD AUTO: 3.3 K/UL (ref 1.8–7.7)
PLATELET # BLD AUTO: 170 K/UL (ref 150–450)
PMV BLD AUTO: 11.5 FL (ref 9.2–12.9)
POTASSIUM SERPL-SCNC: 4 MMOL/L (ref 3.5–5.1)
PROT SERPL-MCNC: 5.7 G/DL (ref 6–8.4)
RBC # BLD AUTO: 4.02 M/UL (ref 4.6–6.2)
SODIUM SERPL-SCNC: 142 MMOL/L (ref 136–145)
WBC # BLD AUTO: 5.84 K/UL (ref 3.9–12.7)

## 2024-07-24 PROCEDURE — 80053 COMPREHEN METABOLIC PANEL: CPT | Performed by: HOSPITALIST

## 2024-07-24 PROCEDURE — 1101F PT FALLS ASSESS-DOCD LE1/YR: CPT | Mod: CPTII,S$GLB,, | Performed by: HOSPITALIST

## 2024-07-24 PROCEDURE — 3008F BODY MASS INDEX DOCD: CPT | Mod: CPTII,S$GLB,, | Performed by: HOSPITALIST

## 2024-07-24 PROCEDURE — 99214 OFFICE O/P EST MOD 30 MIN: CPT | Mod: S$GLB,,, | Performed by: HOSPITALIST

## 2024-07-24 PROCEDURE — 1159F MED LIST DOCD IN RCRD: CPT | Mod: CPTII,S$GLB,, | Performed by: HOSPITALIST

## 2024-07-24 PROCEDURE — 3078F DIAST BP <80 MM HG: CPT | Mod: CPTII,S$GLB,, | Performed by: HOSPITALIST

## 2024-07-24 PROCEDURE — 36415 COLL VENOUS BLD VENIPUNCTURE: CPT | Performed by: HOSPITALIST

## 2024-07-24 PROCEDURE — 4010F ACE/ARB THERAPY RXD/TAKEN: CPT | Mod: CPTII,S$GLB,, | Performed by: HOSPITALIST

## 2024-07-24 PROCEDURE — 85027 COMPLETE CBC AUTOMATED: CPT | Performed by: HOSPITALIST

## 2024-07-24 PROCEDURE — 3077F SYST BP >= 140 MM HG: CPT | Mod: CPTII,S$GLB,, | Performed by: HOSPITALIST

## 2024-07-24 PROCEDURE — 1126F AMNT PAIN NOTED NONE PRSNT: CPT | Mod: CPTII,S$GLB,, | Performed by: HOSPITALIST

## 2024-07-24 PROCEDURE — 99999 PR PBB SHADOW E&M-EST. PATIENT-LVL IV: CPT | Mod: PBBFAC,,, | Performed by: HOSPITALIST

## 2024-07-24 PROCEDURE — 3288F FALL RISK ASSESSMENT DOCD: CPT | Mod: CPTII,S$GLB,, | Performed by: HOSPITALIST

## 2024-07-24 NOTE — ASSESSMENT & PLAN NOTE
Tolerating treatment generally well. Monitoring indeterminate pulmonary nodule; will repeat CT torso in 3 months.  - Cont' imatinib 400mg po daily  - FU 3 months CT torso

## 2024-07-24 NOTE — PROGRESS NOTES
MEDICAL ONCOLOGY FOLLOW-UP VISIT.     Best Contact Phone Number(s): [phone] (home) [phone] (work)     Cancer/Stage/TNM:    Cancer Staging   Gastrointestinal stromal tumor (GIST) of stomach  Staging form: Gastrointestinal Stromal Tumor - Gastric and Omental GIST, AJCC 8th Edition  - Clinical stage from 9/9/2022: Stage IIIA (cT3, cN0, cM0, Mitotic Rate: High) - Signed by Enrique Mcnair MD on 9/26/2022         Reason for visit: Mr. 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 He started neoadjuvant imatinib on 10/8/22 and resection on 5/10/23. He presents to medical oncology clinic for routine follow on adjuvant imatinib therapy.      Interval History:     Had some transient abdominal pain in May which has resolved. Appetite is low but stable. Weight is generally stable. No nausea. No CP, SOB or cough. Continues on imatinib 400mg daily without issue. No edema; no SOB or cough. No increased lacrimation.        Oncology History   Gastrointestinal stromal tumor (GIST) of stomach   7/18/2022 Imaging Significant Findings    [place] [facility] [place] with [place] pain. CT a/p found a 6.7 x 5.6 x 7.2 cm cystic mass in the gastric fundus.     9/9/2022 Procedure    EGD/EUS fins a 6.2x8.0 cm cystic mass in the cardia. Pathology shows GIST with 'focal/frequent mitoses' although exact quantification could not be determined.     KIT exon 11 [id number]     9/12/2022 Imaging Significant Findings    CT a/p redemonstrates 9.3x8 cm gastric mass with associated necrosis. No LAD. No liver lesions.     9/15/2022 Imaging Significant Findings    CT chest only shows incidental 3mm LLL nodule. No evidence of metastatic disease.     10/8/2022 -  Chemotherapy    Start imatinib     1/17/2023 Imaging Significant Findings    CT torso; Decreased size of gastric mass 6.5 x 2.5cm from 9.3 x 8.0cm.     1/23/2023 Tumor Conference       [facility]  MULTIDISCIPLINARY TUMOR BOARD  PATIENT REVIEW FORM   ____________________________________________________________    CLINIC #: [id number]  DATE: 1/23/2023    DIAGNOSIS: stomach GIST    PRESENTER: Xu    PATIENT SUMMARY:   This 66 y/o gentleman presented with epigastric abd pain in 7/2022. Imaging identified large 9cm cystic gastric mass with central necrosis. He underwent EUS with bx in 9/2022 and pathology revealed GIST. He started neoadj Gleevec per Dr. Mcnair in 10/2022. Recent restaging CT scan reviewed - good response to tx, lesion smaller, now measuring 6.7 cm.     BOARD RECOMMENDATIONS:   Continue neoadj Gleevec    CONSULT NEEDED:     [] Surgery    [] Hem/Onc    [] Rad/Onc    [] Dietary                 [] Social Service    [] Psychology       [] AES  [] Radiology     Clinical Stage: Tumor 3 Node(s) 0 Metastasis 0      GROUP STAGE:  [] O    [] 1A    [] IB    [] IIA    [] IIB     [x] IIIA     [] IIIB     [] IIIC    []IV  [] Local recurrence     [] Regional recurrence     [] Distant recurrence   Metastatic site(s): none         [x] Lizbeth'l Treatment Guidelines reviewed and care planned is consistent with guidelines.         (i.e., NCCN, NCI, PD, ACO, AUA, etc.)    PRESENTATION AT CANCER CONFERENCE:         [x] Prospective    [] Retrospective     [] Follow-Up       4/12/2023 Imaging Significant Findings    CT torso:  1. Exam limited by motion artifact.  2. Slight decreased size of gastric wall mass.  3. Stable mildly enlarged lymph node in the lindsay hepatis measuring 1.2 cm, stable compared to CT from 09/12/2022.  4. Few subcentimeter hepatic hypodensities, too small to characterize.  5. Thyroid nodules better characterized on CT from 01/17/2023.     7/7/2023 Imaging Significant Findings    CT torso 7/7/23  1. Status post partial gastrectomy.  No evidence of local disease recurrence.  No new suspicious lesion.  2. Stable upper limit of normal caliber periportal lymph nodes.  3. Stable subcentimeter hepatic  hypodensities too small to characterize.     1/17/2024 Imaging Significant Findings    CT 1/17/24  Impression:  - Postsurgical changes of partial gastrectomy.  Thickening along the proximal stomach suture line increased compared to prior noting motion artifact on prior exam.  Findings concerning for local recurrence.  Direct visualization may be helpful.  - Stable right lung base 0.5 cm nodular opacity.  Oncological considerations will determine ongoing follow-up.  - Stable scattered hepatic hypodensities.  - Stable periportal prominent lymph nodes.            Physical Exam:   BP (!) 144/67 (BP Location: Left arm, Patient Position: Sitting, BP Method: Large (Automatic))   Pulse (!) 49   Temp 98.6 °F (37 °C) (Temporal)   Wt 80 kg (176 lb 5.9 oz)   SpO2 99%   BMI 24.60 kg/m²      ECOG Performance Status: (foot note - ECOG PS provided by Eastern Cooperative Oncology Group) 1 - Symptomatic but completely ambulatory    Physical Exam  Constitutional:       General: He is not in acute distress.     Appearance: Normal appearance.   HENT:      Head: Normocephalic.      Mouth/Throat:      Mouth: Mucous membranes are moist.      Pharynx: Oropharynx is clear. No oropharyngeal exudate or posterior oropharyngeal erythema.   Eyes:      General: No scleral icterus.     Extraocular Movements: Extraocular movements intact.      Conjunctiva/sclera: Conjunctivae normal.      Pupils: Pupils are equal, round, and reactive to light.   Cardiovascular:      Rate and Rhythm: Normal rate and regular rhythm.   Pulmonary:      Effort: Pulmonary effort is normal. No respiratory distress.   Abdominal:      General: There is no distension.      Palpations: Abdomen is soft.      Tenderness: There is no abdominal tenderness.   Musculoskeletal:         General: No swelling. Normal range of motion.      Cervical back: Normal range of motion and neck supple.      Right lower leg: No edema.      Left lower leg: No edema.   Lymphadenopathy:       Cervical: No cervical adenopathy.   Skin:     General: Skin is warm and dry.      Coloration: Skin is not jaundiced.      Findings: No rash.   Neurological:      General: No focal deficit present.      Mental Status: He is alert and oriented to person, place, and time.      Motor: No weakness.   Psychiatric:         Mood and Affect: Mood normal.         Behavior: Behavior normal.         Thought Content: Thought content normal.           Labs:  Lab Visit on 07/24/2024   Component Date Value    WBC 07/24/2024 5.84     RBC 07/24/2024 4.02 (L)     Hemoglobin 07/24/2024 11.6 (L)     Hematocrit 07/24/2024 37.9 (L)     MCV 07/24/2024 94     MCH 07/24/2024 28.9     MCHC 07/24/2024 30.6 (L)     RDW 07/24/2024 15.8 (H)     Platelets 07/24/2024 170     MPV 07/24/2024 11.5     Gran # (ANC) 07/24/2024 3.3     Immature Grans (Abs) 07/24/2024 0.02     Sodium 07/24/2024 142     Potassium 07/24/2024 4.0     Chloride 07/24/2024 116 (H)     CO2 07/24/2024 22 (L)     Glucose 07/24/2024 87     BUN 07/24/2024 10     Creatinine 07/24/2024 1.3     Calcium 07/24/2024 9.0     Total Protein 07/24/2024 5.7 (L)     Albumin 07/24/2024 3.3 (L)     Total Bilirubin 07/24/2024 1.2 (H)     Alkaline Phosphatase 07/24/2024 69     AST 07/24/2024 56 (H)     ALT 07/24/2024 75 (H)     eGFR 07/24/2024 59.1 (A)     Anion Gap 07/24/2024 4 (L)                Imaging:     CT Chest Abdomen Pelvis With IV Contrast (XPD) Routine Oral Contrast  Narrative: EXAMINATION:  CT CHEST ABDOMEN PELVIS WITH IV CONTRAST (XPD)    CLINICAL HISTORY:  Metastatic disease evaluation; Gastrointestinal stromal tumor of stomach    TECHNIQUE:  Axial images of the chest, abdomen, and pelvis were acquired  after the use of 75 cc [id number] IV contrast. 450 mL of Readi-Cat was administered orally.  Coronal and sagittal reconstructions were also obtained    COMPARISON:  Comparison made with multiple prior studies, the most recent CT from 01/17/2024.    FINDINGS:  Thoracic soft tissues:  Stable small bilateral thyroid nodules.  Mild symmetric bilateral gynecomastia.    Aorta: Thoracic aorta is normal in caliber and contour with minimal calcific atherosclerosis.    Heart: Normal in size. No pericardial effusion. Scattered calcific coronary atherosclerosis.    Ginette/Mediastinum: No significant mediastinal, hilar, or axillary lymphadenopathy    Lungs: Trachea and bronchi are patent.  Lungs are well aerated, without consolidation or pleural fluid. Respiratory motion limits evaluation of lung bases.  Allowing for this, there is a new 4 mm nodule in the posterior basal segment of the right lower lobe (axial series 6, image 330).  Additional stable subcentimeter pulmonary nodule in the medial basal segment of the right lower lobe (axial series 6, image 331).    Liver: Normal in size and contour.  Stable small hepatic hypodensities, too small to characterize.  No enhancing liver lesion.    Gallbladder: No calcified gallstones.    Bile Ducts: No evidence of dilated ducts.    Pancreas: No mass or peripancreatic fat stranding.    Spleen: Unremarkable.    Stomach and duodenum: Postsurgical changes of partial gastrectomy.  Thickening along the suture margin appears decreased/less conspicuous as compared to prior.    Adrenals: No obvious nodules.    Kidneys/ Ureters: Normal in size and location. No solid renal mass.  Nonobstructing left renal calculus.  No hydronephrosis.  No ureteral dilatation.    Bladder: No evidence of wall thickening.    Reproductive organs: Prostate is mildly enlarged.    Bowel/Mesentery: Small bowel is normal in caliber with no evidence of obstruction.  No evidence of inflammation or wall thickening.  The appendix is normal.  Colon demonstrates no focal wall thickening.    Lymph nodes: Stable periportal lymph nodes measuring up to 1.1 cm.  No new or worsening lymph node enlargement.    Vasculature: No aneurysm. No significant calcific atherosclerosis.    Bones: Degenerative changes of the  spine.  Few stable sclerotic foci, largest within the left posterior ilium.  No aggressive osseous lesions.  Impression: Status post partial gastrectomy with decreased/less conspicuous appearing thickening along the suture margin.    New 4 mm pulmonary nodule in the right lower lobe, indeterminate.  Other stable subcentimeter right lower lobe pulmonary nodule.  Clinical and oncologic considerations will determine the role and schedule for continued surveillance.    Stable subcentimeter hepatic hypodensities.    Stable borderline enlarged periportal lymph nodes.    Electronically signed by resident: Emory Mcdaniel  Date:    04/24/2024  Time:    16:06    Electronically signed by: Yuri Frost MD  Date:    04/25/2024  Time:    09:24              Diagnoses:       1. Gastrointestinal stromal tumor (GIST) of stomach                    Assessment and Plan:     1. Gastrointestinal stromal tumor (GIST) of stomach  Overview:  Patient with gastric GIST and no evidence of luis or distant metastatic disease. 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 Started neoadjuvant imatinib 400mg po daily 10/08/2022 with good tolerance and clinical resposne. Underwent surgical resection 5/10/23. Plan to complete total of 3 years of perioperative therapy.    Assessment & Plan:  Tolerating treatment generally well. Monitoring indeterminate pulmonary nodule; will repeat CT torso in 3 months.  - Cont' imatinib 400mg po daily  - FU 3 months CT torso              Route Chart for Scheduling    Med Onc Chart Routing      Follow up with physician 3 months.   Follow up with CONRADO    Infusion scheduling note    Injection scheduling note    Labs CBC and CMP   Scheduling:  Preferred lab:  Lab interval:     Imaging CT chest abdomen pelvis      Pharmacy appointment    Other referrals                        Enrique Mcnair MD  Hematology/Oncology  [facility]

## 2024-10-16 ENCOUNTER — HOSPITAL ENCOUNTER (OUTPATIENT)
Dept: RADIOLOGY | Facility: HOSPITAL | Age: 69
Discharge: HOME | End: 2024-10-16
Attending: HOSPITALIST
Payer: MEDICARE

## 2024-10-16 DIAGNOSIS — C49.A2 GASTROINTESTINAL STROMAL TUMOR (GIST) OF STOMACH: ICD-10-CM

## 2024-10-16 PROCEDURE — 74177 CT ABD & PELVIS W/CONTRAST: CPT | Mod: 26,,, | Performed by: RADIOLOGY

## 2024-10-16 PROCEDURE — A9698 NON-RAD CONTRAST MATERIALNOC: HCPCS | Performed by: HOSPITALIST

## 2024-10-16 PROCEDURE — 71260 CT THORAX DX C+: CPT | Mod: 26,,, | Performed by: RADIOLOGY

## 2024-10-16 PROCEDURE — 25500020 PHARM REV CODE 255: Performed by: HOSPITALIST

## 2024-10-16 PROCEDURE — 71260 CT THORAX DX C+: CPT | Mod: TC

## 2024-10-16 PROCEDURE — 74177 CT ABD & PELVIS W/CONTRAST: CPT | Mod: TC

## 2024-10-16 RX ADMIN — BARIUM SULFATE 450 ML: 20 SUSPENSION ORAL at 12:10

## 2024-10-16 RX ADMIN — IOHEXOL 100 ML: 350 INJECTION, SOLUTION INTRAVENOUS at 12:10

## 2024-10-21 ENCOUNTER — OFFICE VISIT (OUTPATIENT)
Dept: HEMATOLOGY/ONCOLOGY | Facility: CLINIC | Age: 69
End: 2024-10-21
Payer: MEDICARE

## 2024-10-21 VITALS
DIASTOLIC BLOOD PRESSURE: 69 MMHG | SYSTOLIC BLOOD PRESSURE: 148 MMHG | TEMPERATURE: 98 F | BODY MASS INDEX: 24.71 KG/M2 | WEIGHT: 177.13 LBS | OXYGEN SATURATION: 100 % | HEART RATE: 53 BPM

## 2024-10-21 DIAGNOSIS — Z79.899 DRUG-INDUCED IMMUNODEFICIENCY: ICD-10-CM

## 2024-10-21 DIAGNOSIS — D84.821 DRUG-INDUCED IMMUNODEFICIENCY: ICD-10-CM

## 2024-10-21 DIAGNOSIS — I10 PRIMARY HYPERTENSION: ICD-10-CM

## 2024-10-21 DIAGNOSIS — C49.A2 GASTROINTESTINAL STROMAL TUMOR (GIST) OF STOMACH: Primary | ICD-10-CM

## 2024-10-21 PROCEDURE — 99214 OFFICE O/P EST MOD 30 MIN: CPT | Mod: S$GLB,,, | Performed by: HOSPITALIST

## 2024-10-21 PROCEDURE — 1126F AMNT PAIN NOTED NONE PRSNT: CPT | Mod: CPTII,S$GLB,, | Performed by: HOSPITALIST

## 2024-10-21 PROCEDURE — 1159F MED LIST DOCD IN RCRD: CPT | Mod: CPTII,S$GLB,, | Performed by: HOSPITALIST

## 2024-10-21 PROCEDURE — 1101F PT FALLS ASSESS-DOCD LE1/YR: CPT | Mod: CPTII,S$GLB,, | Performed by: HOSPITALIST

## 2024-10-21 PROCEDURE — 3078F DIAST BP <80 MM HG: CPT | Mod: CPTII,S$GLB,, | Performed by: HOSPITALIST

## 2024-10-21 PROCEDURE — 4010F ACE/ARB THERAPY RXD/TAKEN: CPT | Mod: CPTII,S$GLB,, | Performed by: HOSPITALIST

## 2024-10-21 PROCEDURE — 3288F FALL RISK ASSESSMENT DOCD: CPT | Mod: CPTII,S$GLB,, | Performed by: HOSPITALIST

## 2024-10-21 PROCEDURE — 99999 PR PBB SHADOW E&M-EST. PATIENT-LVL IV: CPT | Mod: PBBFAC,,, | Performed by: HOSPITALIST

## 2024-10-21 PROCEDURE — 3077F SYST BP >= 140 MM HG: CPT | Mod: CPTII,S$GLB,, | Performed by: HOSPITALIST

## 2024-10-21 PROCEDURE — 3008F BODY MASS INDEX DOCD: CPT | Mod: CPTII,S$GLB,, | Performed by: HOSPITALIST

## 2024-10-21 NOTE — ASSESSMENT & PLAN NOTE
Remains JENNA on most recent imaging. Notable for stable and indeterminate pulmonary nodule and hepatic hypodensities. Continue imatainib to complete 3 years adjuvant treatment.  - FU 3 months symptom check  - FU 6 months imaging

## 2024-10-21 NOTE — PROGRESS NOTES
MEDICAL ONCOLOGY FOLLOW-UP VISIT.     Best Contact Phone Number(s): [phone] (home) [phone] (work)     Cancer/Stage/TNM:    Cancer Staging   Gastrointestinal stromal tumor (GIST) of stomach  Staging form: Gastrointestinal Stromal Tumor - Gastric and Omental GIST, AJCC 8th Edition  - Clinical stage from 9/9/2022: Stage IIIA (cT3, cN0, cM0, Mitotic Rate: High) - Signed by Enrique Mcnair MD on 9/26/2022         Reason for visit: GIST; adjuvant imatinib    Treatment:  10/2022 -    Imatinib  05/10/23    Resection     HPI: Mr. 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 He started neoadjuvant imatinib on 10/8/22 and resection on 5/10/23. He presents to medical oncology clinic for routine follow on adjuvant imatinib therapy.    Interval History:     Feels generally well. Denies current issues. Appetite waxes and wanes. No nausea and no abdominal pain. Breathing is good. No CP, SOB or cough. No increased lacrimation or vision problems. No leg swelling. Does reports watery stool about 3x per day.          Oncology History   Gastrointestinal stromal tumor (GIST) of stomach   7/18/2022 Imaging Significant Findings    Presented to [facility] [place] ED with abdominal pain. CT a/p found a 6.7 x 5.6 x 7.2 cm cystic mass in the gastric fundus.     9/9/2022 Procedure    EGD/EUS fins a 6.2x8.0 cm cystic mass in the cardia. Pathology shows GIST with 'focal/frequent mitoses' although exact quantification could not be determined.     KIT exon 11 [id number]     9/12/2022 Imaging Significant Findings    CT a/p redemonstrates 9.3x8 cm gastric mass with associated necrosis. No LAD. No liver lesions.     9/15/2022 Imaging Significant Findings    CT chest only shows incidental 3mm LLL nodule. No evidence of metastatic disease.     10/8/2022 -  Chemotherapy    Start imatinib     1/17/2023 Imaging Significant Findings    CT torso; Decreased size of gastric mass 6.5 x 2.5cm  from 9.3 x 8.0cm.     1/23/2023 Tumor Conference       [facility]GI MULTIDISCIPLINARY TUMOR BOARD  PATIENT REVIEW FORM   ____________________________________________________________    CLINIC #: [id number]  DATE: 1/23/2023    DIAGNOSIS: stomach GIST    PRESENTER: Xu    PATIENT SUMMARY:   This 68 y/o gentleman presented with epigastric abd pain in 7/2022. Imaging identified large 9cm cystic gastric mass with central necrosis. He underwent EUS with bx in 9/2022 and pathology revealed GIST. He started neoadj Gleevec per Dr. Mcnair in 10/2022. Recent restaging CT scan reviewed - good response to tx, lesion smaller, now measuring 6.7 cm.     BOARD RECOMMENDATIONS:   Continue neoadj Gleevec    CONSULT NEEDED:     [] Surgery    [] Hem/Onc    [] Rad/Onc    [] Dietary                 [] Social Service    [] Psychology       [] AES  [] Radiology     Clinical Stage: Tumor 3 Node(s) 0 Metastasis 0      GROUP STAGE:  [] O    [] 1A    [] IB    [] IIA    [] IIB     [x] IIIA     [] IIIB     [] IIIC    []IV  [] Local recurrence     [] Regional recurrence     [] Distant recurrence   Metastatic site(s): none         [x] Lizbeth'l Treatment Guidelines reviewed and care planned is consistent with guidelines.         (i.e., NCCN, NCI, PD, ACO, AUA, etc.)    PRESENTATION AT CANCER CONFERENCE:         [x] Prospective    [] Retrospective     [] Follow-Up       4/12/2023 Imaging Significant Findings    CT torso:  1. Exam limited by motion artifact.  2. Slight decreased size of gastric wall mass.  3. Stable mildly enlarged lymph node in the lindsay hepatis measuring 1.2 cm, stable compared to CT from 09/12/2022.  4. Few subcentimeter hepatic hypodensities, too small to characterize.  5. Thyroid nodules better characterized on CT from 01/17/2023.     7/7/2023 Imaging Significant Findings    CT torso 7/7/23  1. Status post partial gastrectomy.  No evidence of local disease recurrence.  No new suspicious lesion.  2. Stable upper  limit of normal caliber periportal lymph nodes.  3. Stable subcentimeter hepatic hypodensities too small to characterize.     1/17/2024 Imaging Significant Findings    CT 1/17/24  Impression:  - Postsurgical changes of partial gastrectomy.  Thickening along the proximal stomach suture line increased compared to prior noting motion artifact on prior exam.  Findings concerning for local recurrence.  Direct visualization may be helpful.  - Stable right lung base 0.5 cm nodular opacity.  Oncological considerations will determine ongoing follow-up.  - Stable scattered hepatic hypodensities.  - Stable periportal prominent lymph nodes.     10/16/2024 Imaging Significant Findings    10/16/24 CT Torso  Impression:  - Postoperative change of partial gastrectomy.  No evidence of local disease recurrence or new metastatic disease.  - Stable nonspecific 0.4 cm solid nodule in the right lung.  Attention on follow-up.  - Stable scattered hepatic hypodensities, unchanged from multiple priors.            Physical Exam:   BP (!) 148/69 (BP Location: Right arm, Patient Position: Sitting)   Pulse (!) 53   Temp 98 °F (36.7 °C)   Wt 80.3 kg (177 lb 2.2 oz)   SpO2 100%   BMI 24.71 kg/m²      ECOG Performance Status: (foot note - ECOG PS provided by Eastern Cooperative Oncology Group) 1 - Symptomatic but completely ambulatory    Physical Exam  Constitutional:       General: He is not in acute distress.     Appearance: Normal appearance.   HENT:      Head: Normocephalic.      Mouth/Throat:      Mouth: Mucous membranes are moist.      Pharynx: Oropharynx is clear. No oropharyngeal exudate or posterior oropharyngeal erythema.   Eyes:      General: No scleral icterus.     Extraocular Movements: Extraocular movements intact.      Conjunctiva/sclera: Conjunctivae normal.      Pupils: Pupils are equal, round, and reactive to light.   Cardiovascular:      Rate and Rhythm: Normal rate and regular rhythm.   Pulmonary:      Effort: Pulmonary  effort is normal. No respiratory distress.   Abdominal:      General: There is no distension.      Palpations: Abdomen is soft.      Tenderness: There is no abdominal tenderness.   Musculoskeletal:         General: No swelling. Normal range of motion.      Cervical back: Normal range of motion and neck supple.      Right lower leg: No edema.      Left lower leg: No edema.   Lymphadenopathy:      Cervical: No cervical adenopathy.   Skin:     General: Skin is warm and dry.      Coloration: Skin is not jaundiced.      Findings: No rash.   Neurological:      General: No focal deficit present.      Mental Status: He is alert and oriented to person, place, and time.      Motor: No weakness.   Psychiatric:         Mood and Affect: Mood normal.         Behavior: Behavior normal.         Thought Content: Thought content normal.           Labs:  No visits with results within 2 Day(s) from this visit.   Latest known visit with results is:   Lab Visit on 10/16/2024   Component Date Value    WBC 10/16/2024 6.04     RBC 10/16/2024 4.68     Hemoglobin 10/16/2024 13.6 (L)     Hematocrit 10/16/2024 44.3     MCV 10/16/2024 95     MCH 10/16/2024 29.1     MCHC 10/16/2024 30.7 (L)     RDW 10/16/2024 13.9     Platelets 10/16/2024 193     MPV 10/16/2024 11.3     Gran # (ANC) 10/16/2024 3.5     Immature Grans (Abs) 10/16/2024 0.00     Sodium 10/16/2024 143     Potassium 10/16/2024 4.2     Chloride 10/16/2024 114 (H)     CO2 10/16/2024 21 (L)     Glucose 10/16/2024 77     BUN 10/16/2024 11     Creatinine 10/16/2024 1.2     Calcium 10/16/2024 9.0     Total Protein 10/16/2024 6.5     Albumin 10/16/2024 3.8     Total Bilirubin 10/16/2024 2.0 (H)     Alkaline Phosphatase 10/16/2024 63     AST 10/16/2024 62 (H)     ALT 10/16/2024 52 (H)     eGFR 10/16/2024 >60.0     Anion Gap 10/16/2024 8     Creatinine 10/16/2024 1.2     eGFR 10/16/2024 >60.0                Imaging:     CT Chest Abdomen Pelvis With IV Contrast (XPD) Routine Oral  Contrast  Narrative: EXAMINATION:  CT CHEST ABDOMEN PELVIS WITH IV CONTRAST (XPD)    CLINICAL HISTORY:  Metastatic disease evaluation; Gastrointestinal stromal tumor of stomach    TECHNIQUE:  Axial images of the chest, abdomen, and pelvis were acquired after the use of 100 cc [id number] IV contrast. 450 cc oral barium suspension also administered.  Coronal and sagittal reconstructions were also obtained    COMPARISON:  CT 04/24/2024, 04/12/2023, 07/07/2023, 01/17/2023    FINDINGS:  Thoracic soft tissues: Subcentimeter bilateral thyroid nodules, similar to CT 04/12/2023. No axillary lymphadenopathy.    Ginette/Mediastinum: No mediastinal or hilar lymphadenopathy.    Aorta: Thoracic aorta is normal in caliber and contour with no significant calcific atherosclerosis. Calcification of the aortic valve leaflets.    Heart: Normal in size. No pericardial effusion. Mild calcific coronary atherosclerosis.    Lungs: Stable 0.4 cm solid nodule at the right lung base (6-307).  No new or enlarging pulmonary nodules.  No pleural fluid or pneumothorax.    Esophagus: Unremarkable.    Stomach and duodenum: Postoperative change of partial gastrectomy.    Liver: Normal in size and contour.  Stable subcentimeter hypodensities, too small to characterize.    Gallbladder: No calcified gallstones.    Bile Ducts: No evidence of dilated ducts.    Pancreas: No mass or peripancreatic fat stranding.    Spleen: Unremarkable.    Adrenals: Unremarkable.    Kidneys/Ureters: Normal in size and location. Normal enhancement. 0.4 cm nonobstructing stone in the left kidney.  No ureteral dilatation.    Bladder: No evidence of wall thickening.    Reproductive organs: Bilateral hydroceles.  Prostatomegaly.    Bowel/Mesentery: No obstruction or inflammation.  Appendix is normal.    Peritoneum: No intraperitoneal free air or fluid.    Lymph nodes: No lymphadenopathy.    Abdominal wall:  Small right fat and fluid containing inguinal hernia.    Vasculature: No  aneurysm. No significant calcific atherosclerosis.    Bones: No acute fracture. Stable sclerotic foci in the pelvic bones, likely bone islands, unchanged from 04/12/2023.  Impression: Postoperative change of partial gastrectomy.  No evidence of local disease recurrence or new metastatic disease.    Stable nonspecific 0.4 cm solid nodule in the right lung.  Attention on follow-up.    Stable scattered hepatic hypodensities, unchanged from multiple priors.    Electronically signed by resident: Leeanna Up  Date:    10/16/2024  Time:    13:06    Electronically signed by: Diana Lockwood MD  Date:    10/16/2024  Time:    15:00              Diagnoses:       1. Gastrointestinal stromal tumor (GIST) of stomach    2. Primary hypertension    3. Drug-induced immunodeficiency                      Assessment and Plan:     1. Gastrointestinal stromal tumor (GIST) of stomach  Overview:  Patient with gastric GIST and no evidence of luis or distant metastatic disease. 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 Started neoadjuvant imatinib 400mg po daily 10/08/2022 with good tolerance and clinical resposne. Underwent surgical resection 5/10/23. Plan to complete total of 3 years of perioperative therapy.    Assessment & Plan:  Remains JENNA on most recent imaging. Notable for stable and indeterminate pulmonary nodule and hepatic hypodensities. Continue imatainib to complete 3 years adjuvant treatment.  - FU 3 months symptom check  - FU 6 months imaging    Orders:  -     CBC Oncology; Standing  -     Comprehensive Metabolic Panel; Standing    2. Primary hypertension  Overview:  Patient on nebivolol and valsartan. Previously on HCTZ; stopped due to low K levels      3. Drug-induced immunodeficiency  Assessment & Plan:  No current evidence of infection. CTM                Route Chart for Scheduling    Med Onc Chart Routing      Follow up with physician 3 months.   Follow up with [facility]O    Infusion scheduling note    Injection scheduling note    Labs  CBC and CMP   Scheduling:  Preferred lab:  Lab interval:     Imaging    Pharmacy appointment    Other referrals                        Enrique Mcnair MD  Hematology/Oncology  [facility]

## 2025-01-07 DIAGNOSIS — C49.A2 GASTROINTESTINAL STROMAL TUMOR (GIST) OF STOMACH: ICD-10-CM

## 2025-01-07 RX ORDER — IMATINIB MESYLATE 400 MG/1
400 TABLET, FILM COATED ORAL DAILY
Qty: 30 TABLET | Refills: 11 | Status: SHIPPED | OUTPATIENT
Start: 2025-01-07 | End: 2025-01-07

## 2025-01-07 RX ORDER — IMATINIB MESYLATE 400 MG/1
400 TABLET, FILM COATED ORAL DAILY
Qty: 30 TABLET | Refills: 11 | Status: SHIPPED | OUTPATIENT
Start: 2025-01-07 | End: 2025-01-09 | Stop reason: SDUPTHER

## 2025-01-09 RX ORDER — IMATINIB MESYLATE 400 MG/1
400 TABLET, FILM COATED ORAL DAILY
Qty: 30 TABLET | Refills: 11 | Status: ACTIVE | OUTPATIENT
Start: 2025-01-09 | End: 2026-01-09

## 2025-01-14 ENCOUNTER — HOSPITAL ENCOUNTER (OUTPATIENT)
Facility: HOSPITAL | Age: 70
Discharge: HOME | End: 2025-01-15
Attending: EMERGENCY MEDICINE | Admitting: EMERGENCY MEDICINE
Payer: MEDICARE

## 2025-01-14 DIAGNOSIS — K92.2 UPPER GI BLEED: Primary | ICD-10-CM

## 2025-01-14 DIAGNOSIS — R10.9 ABDOMINAL PAIN: ICD-10-CM

## 2025-01-14 LAB
ABO + RH BLD: NORMAL
ALBUMIN SERPL BCP-MCNC: 3.6 G/DL (ref 3.5–5.2)
ALLENS TEST: ABNORMAL
ALP SERPL-CCNC: 56 U/L (ref 40–150)
ALT SERPL W/O P-5'-P-CCNC: 30 U/L (ref 10–44)
ANION GAP SERPL CALC-SCNC: 7 MMOL/L (ref 8–16)
AST SERPL-CCNC: 28 U/L (ref 10–40)
BASOPHILS # BLD AUTO: 0.04 K/UL (ref 0–0.2)
BASOPHILS NFR BLD: 0.7 % (ref 0–1.9)
BILIRUB SERPL-MCNC: 1 MG/DL (ref 0.1–1)
BLD GP AB SCN CELLS X3 SERPL QL: NORMAL
BUN SERPL-MCNC: 12 MG/DL (ref 6–30)
BUN SERPL-MCNC: 14 MG/DL (ref 8–23)
BUN SERPL-MCNC: 15 MG/DL (ref 6–30)
CALCIUM SERPL-MCNC: 9.4 MG/DL (ref 8.7–10.5)
CHLORIDE SERPL-SCNC: 101 MMOL/L (ref 95–110)
CHLORIDE SERPL-SCNC: 105 MMOL/L (ref 95–110)
CHLORIDE SERPL-SCNC: 107 MMOL/L (ref 95–110)
CO2 SERPL-SCNC: 24 MMOL/L (ref 23–29)
CREAT SERPL-MCNC: 0.9 MG/DL (ref 0.5–1.4)
CREAT SERPL-MCNC: 1.3 MG/DL (ref 0.5–1.4)
CREAT SERPL-MCNC: 1.5 MG/DL (ref 0.5–1.4)
DIFFERENTIAL METHOD BLD: ABNORMAL
EOSINOPHIL # BLD AUTO: 0.2 K/UL (ref 0–0.5)
EOSINOPHIL NFR BLD: 3.1 % (ref 0–8)
ERYTHROCYTE [DISTWIDTH] IN BLOOD BY AUTOMATED COUNT: 14.9 % (ref 11.5–14.5)
EST. GFR  (NO RACE VARIABLE): 58.7 ML/MIN/1.73 M^2
GLUCOSE SERPL-MCNC: 298 MG/DL (ref 70–110)
GLUCOSE SERPL-MCNC: 90 MG/DL (ref 70–110)
GLUCOSE SERPL-MCNC: 92 MG/DL (ref 70–110)
HCT VFR BLD AUTO: 39.6 % (ref 40–54)
HCT VFR BLD CALC: 37 %PCV (ref 36–54)
HCT VFR BLD CALC: 50 %PCV (ref 36–54)
HGB BLD-MCNC: 12.3 G/DL (ref 14–18)
IMM GRANULOCYTES # BLD AUTO: 0.01 K/UL (ref 0–0.04)
IMM GRANULOCYTES NFR BLD AUTO: 0.2 % (ref 0–0.5)
LDH SERPL L TO P-CCNC: 2.48 MMOL/L (ref 0.5–2.2)
LIPASE SERPL-CCNC: 14 U/L (ref 4–60)
LYMPHOCYTES # BLD AUTO: 1.7 K/UL (ref 1–4.8)
LYMPHOCYTES NFR BLD: 30.3 % (ref 18–48)
MCH RBC QN AUTO: 28.7 PG (ref 27–31)
MCHC RBC AUTO-ENTMCNC: 31.1 G/DL (ref 32–36)
MCV RBC AUTO: 93 FL (ref 82–98)
MONOCYTES # BLD AUTO: 0.5 K/UL (ref 0.3–1)
MONOCYTES NFR BLD: 8.2 % (ref 4–15)
NEUTROPHILS # BLD AUTO: 3.2 K/UL (ref 1.8–7.7)
NEUTROPHILS NFR BLD: 57.5 % (ref 38–73)
NRBC BLD-RTO: 0 /100 WBC
PLATELET # BLD AUTO: 123 K/UL (ref 150–450)
PMV BLD AUTO: 12.6 FL (ref 9.2–12.9)
POC IONIZED CALCIUM: 1.07 MMOL/L (ref 1.06–1.42)
POC IONIZED CALCIUM: 1.12 MMOL/L (ref 1.06–1.42)
POC TCO2 (MEASURED): 24 MMOL/L (ref 23–29)
POC TCO2 (MEASURED): 24 MMOL/L (ref 23–29)
POTASSIUM BLD-SCNC: 4.2 MMOL/L (ref 3.5–5.1)
POTASSIUM BLD-SCNC: 4.6 MMOL/L (ref 3.5–5.1)
POTASSIUM SERPL-SCNC: 4.4 MMOL/L (ref 3.5–5.1)
PROT SERPL-MCNC: 6.3 G/DL (ref 6–8.4)
RBC # BLD AUTO: 4.28 M/UL (ref 4.6–6.2)
SAMPLE: ABNORMAL
SITE: ABNORMAL
SODIUM BLD-SCNC: 137 MMOL/L (ref 136–145)
SODIUM BLD-SCNC: 138 MMOL/L (ref 136–145)
SODIUM SERPL-SCNC: 138 MMOL/L (ref 136–145)
SPECIMEN OUTDATE: NORMAL
WBC # BLD AUTO: 5.52 K/UL (ref 3.9–12.7)

## 2025-01-14 PROCEDURE — 63600175 PHARM REV CODE 636 W HCPCS: Performed by: EMERGENCY MEDICINE

## 2025-01-14 PROCEDURE — 85025 COMPLETE CBC W/AUTO DIFF WBC: CPT | Performed by: EMERGENCY MEDICINE

## 2025-01-14 PROCEDURE — 99900035 HC TECH TIME PER 15 MIN (STAT)

## 2025-01-14 PROCEDURE — 83690 ASSAY OF LIPASE: CPT | Performed by: EMERGENCY MEDICINE

## 2025-01-14 PROCEDURE — 86900 BLOOD TYPING SEROLOGIC ABO: CPT | Performed by: EMERGENCY MEDICINE

## 2025-01-14 PROCEDURE — 93005 ELECTROCARDIOGRAM TRACING: CPT

## 2025-01-14 PROCEDURE — 96374 THER/PROPH/DIAG INJ IV PUSH: CPT

## 2025-01-14 PROCEDURE — 99285 EMERGENCY DEPT VISIT HI MDM: CPT | Mod: 25

## 2025-01-14 PROCEDURE — G0378 HOSPITAL OBSERVATION PER HR: HCPCS

## 2025-01-14 PROCEDURE — 83605 ASSAY OF LACTIC ACID: CPT

## 2025-01-14 PROCEDURE — 80053 COMPREHEN METABOLIC PANEL: CPT | Performed by: EMERGENCY MEDICINE

## 2025-01-14 PROCEDURE — 93010 ELECTROCARDIOGRAM REPORT: CPT | Mod: ,,, | Performed by: INTERNAL MEDICINE

## 2025-01-14 RX ORDER — PANTOPRAZOLE SODIUM 40 MG/10ML
80 INJECTION, POWDER, LYOPHILIZED, FOR SOLUTION INTRAVENOUS
Status: COMPLETED | OUTPATIENT
Start: 2025-01-14 | End: 2025-01-14

## 2025-01-14 RX ORDER — DIPHENHYDRAMINE HCL 25 MG
25 CAPSULE ORAL EVERY 6 HOURS PRN
Status: DISCONTINUED | OUTPATIENT
Start: 2025-01-14 | End: 2025-01-15 | Stop reason: HOSPADM

## 2025-01-14 RX ORDER — VALSARTAN 160 MG/1
160 TABLET ORAL DAILY
Status: DISCONTINUED | OUTPATIENT
Start: 2025-01-15 | End: 2025-01-15

## 2025-01-14 RX ORDER — POTASSIUM CHLORIDE 20 MEQ/1
40 TABLET, EXTENDED RELEASE ORAL DAILY
Status: DISCONTINUED | OUTPATIENT
Start: 2025-01-15 | End: 2025-01-15 | Stop reason: HOSPADM

## 2025-01-14 RX ORDER — PANTOPRAZOLE SODIUM 40 MG/10ML
40 INJECTION, POWDER, LYOPHILIZED, FOR SOLUTION INTRAVENOUS 2 TIMES DAILY
Status: DISCONTINUED | OUTPATIENT
Start: 2025-01-15 | End: 2025-01-15 | Stop reason: HOSPADM

## 2025-01-14 RX ORDER — ONDANSETRON 4 MG/1
4 TABLET, ORALLY DISINTEGRATING ORAL EVERY 6 HOURS PRN
Status: DISCONTINUED | OUTPATIENT
Start: 2025-01-14 | End: 2025-01-15 | Stop reason: HOSPADM

## 2025-01-14 RX ORDER — METOPROLOL TARTRATE 50 MG/1
50 TABLET ORAL 2 TIMES DAILY
Status: DISCONTINUED | OUTPATIENT
Start: 2025-01-14 | End: 2025-01-15 | Stop reason: HOSPADM

## 2025-01-14 RX ORDER — ACETAMINOPHEN 325 MG/1
650 TABLET ORAL EVERY 6 HOURS PRN
Status: DISCONTINUED | OUTPATIENT
Start: 2025-01-14 | End: 2025-01-15 | Stop reason: HOSPADM

## 2025-01-14 RX ADMIN — PANTOPRAZOLE SODIUM 80 MG: 40 INJECTION, POWDER, FOR SOLUTION INTRAVENOUS at 06:01

## 2025-01-14 NOTE — FIRST PROVIDER EVALUATION
Medical screening examination initiated.  I have conducted a focused provider triage encounter, findings are as follows:    Brief history of present illness:  2 weeks of abdominal pain, black tarry stool. Burning feeling in stomache. Notes fatigue    There were no vitals filed for this visit.    Pertinent physical exam:  Well appearing, NAD    Brief workup plan:  Labs, protonix,GI cocktail, Hemoccult screen/rectal    Preliminary workup initiated; this workup will be continued and followed by the physician or advanced practice provider that is assigned to the patient when roomed.

## 2025-01-14 NOTE — ED PROVIDER NOTES
Encounter Date: 1/14/2025       History     Chief Complaint   Patient presents with    Abdominal Pain     Patient endorsing consistant abdominal pain for the last two weeks, black tarry stools for the last two weeks and  generalized weakness and fatigue     Sami Zhang is a 69-year-old male with a history of GIST tumor s/p resection, hypertension, hyperlipidemia presenting today for abdominal pain with black tarry stools.  Symptoms started approximately 2 weeks ago.  He reports burning like pain in the upper part of the stomach that radiates to both sides of his abdomen.  It has been constant for 2 weeks.  He states it is a 20/10.  Not associated with nausea or vomiting.  He is having up to 3 episodes of black stools daily.  He does not take iron supplements.  No fevers or chills.  Denies chest pain or shortness of breath.  No weakness or syncope with standing or exertion        Review of patient's allergies indicates:  No Known Allergies  Past Medical History:   Diagnosis Date    Anemia     Arthritis     Back pain     Gastrointestinal stromal tumor (GIST) of stomach     GIST (gastrointestinal stromal tumor), malignant 09/09/2022    Gout 10/08/2015    Hyperlipidemia     Hypertension     Hypokalemia 5/26/2023    Laryngeal nodule 7/5/2016    Stomach cancer      Past Surgical History:   Procedure Laterality Date    COLONOSCOPY N/A 12/15/2022    Procedure: COLONOSCOPY;  Surgeon: Sean Bran MD;  Location: [facility] (4TH FLR);  Service: Endoscopy;  Laterality: N/A;  inst handed-t  12/8/22- [facility]    ENDOSCOPIC ULTRASOUND OF UPPER GASTROINTESTINAL TRACT N/A 09/09/2022    Procedure: ULTRASOUND, UPPER GI TRACT, ENDOSCOPIC;  Surgeon: Yosvany Paula MD;  Location: [facility] (2ND FLR);  Service: Endoscopy;  Laterality: N/A;  The patient need an EGD/EUS (radial) for gastric mass. Main. Urgent. 60 minutes. Referring:   Todd Cantu MD   Thanks,   Evan Farr MD    ESOPHAGOGASTRODUODENOSCOPY N/A  09/09/2022    Procedure: EGD (ESOPHAGOGASTRODUODENOSCOPY);  Surgeon: Yosvany Paula MD;  Location: [facility] (2ND FLR);  Service: Endoscopy;  Laterality: N/A;  The patient need an EGD/EUS (radial) for gastric mass. Main. Urgent. 60 minutes. Referring:   Todd Cantu MD   Thanks,   Evan Farr MD    Pt is fully vaccinated-DS  9/7/22-Instructions written down by pt's wife and read back.-DS    ESOPHAGOGASTRODUODENOSCOPY N/A 4/21/2023    Procedure: EGD (ESOPHAGOGASTRODUODENOSCOPY);  Surgeon: Kostas López MD;  Location: [facility] OR 2ND FLR;  Service: General;  Laterality: N/A;    ESOPHAGOGASTRODUODENOSCOPY N/A 5/10/2023    Procedure: EGD (ESOPHAGOGASTRODUODENOSCOPY);  Surgeon: Kostas López MD;  [facility];  Service: General;  Laterality: N/A;    ESOPHAGOGASTRODUODENOSCOPY N/A 5/23/2024    Procedure: EGD (ESOPHAGOGASTRODUODENOSCOPY);  Surgeon: Yosvany Paula MD;  Location: [facility] (2ND FLR);  Service: Endoscopy;  Laterality: N/A;  5/16-lvm for pre call-tb  5/22 precall complete with wife-ml    KNEE SURGERY Bilateral     unsure type of knee surgery, unsure if metal was placed, completed at another hospital    NONE N/A 10/08/2015    ROBOT-ASSISTED SURGICAL REMOVAL OF STOMACH USING DA WILLIAM XI N/A 5/10/2023    Procedure: XI ROBOTIC GASTRECTOMY, partial, possible total;  Surgeon: Kostas López MD;  Location: [facility] OR 2ND FLR;  Service: General;  Laterality: N/A;     Family History   Problem Relation Name Age of Onset    Stroke Sister Stephy 60    Other Other name?         brain/head tumor (about 1y ago??)    Cancer Neg Hx      Diabetes Neg Hx      Heart disease Neg Hx      Hypertension Neg Hx       Social History     Tobacco Use    Smoking status: Never     Passive exposure: Past    Smokeless tobacco: Never   Substance Use Topics    Alcohol use: Not Currently     Alcohol/week: 2.0 standard drinks of alcohol     Types: 2 Cans of beer per week     Comment: occasionally drinks wine     Drug use: No     Review of Systems    Physical Exam     Initial Vitals [01/14/25 1545]   BP Pulse Resp Temp SpO2   (!) 147/73 67 16 97.5 °F (36.4 °C) 100 %      MAP       --         Physical Exam    Nursing note and vitals reviewed.  Constitutional: He appears well-developed and well-nourished. No distress.   HENT: Mouth/Throat: Oropharynx is clear and moist.   Eyes: Conjunctivae are normal.   Neck: Neck supple.   Cardiovascular:  Normal rate, regular rhythm and intact distal pulses.           Pulmonary/Chest: Breath sounds normal. He has no wheezes. He has no rales.   Abdominal: Abdomen is soft. Bowel sounds are normal. There is no abdominal tenderness.   Genitourinary:    Genitourinary Comments: + no external or internal hemorrhoids.  Black stool, occult positive     Musculoskeletal:         General: No edema.      Cervical back: Neck supple.     Lymphadenopathy:     He has no cervical adenopathy.   Neurological: He is alert and oriented to person, place, and time.   Skin: No rash noted. There is pallor.   Psychiatric: He has a normal mood and affect.         ED Course   Procedures  Labs Reviewed   CBC W/ AUTO DIFFERENTIAL - Abnormal       Result Value    WBC 5.52      RBC 4.28 (*)     Hemoglobin 12.3 (*)     Hematocrit 39.6 (*)     MCV 93      MCH 28.7      MCHC 31.1 (*)     RDW 14.9 (*)     Platelets 123 (*)     MPV 12.6      Immature Granulocytes 0.2      Gran # (ANC) 3.2      Immature Grans (Abs) 0.01      Lymph # 1.7      Mono # 0.5      Eos # 0.2      Baso # 0.04      nRBC 0      Gran % 57.5      Lymph % 30.3      Mono % 8.2      Eosinophil % 3.1      Basophil % 0.7      Differential Method Automated     COMPREHENSIVE METABOLIC PANEL - Abnormal    Sodium 138      Potassium 4.4      Chloride 107      CO2 24      Glucose 92      BUN 14      Creatinine 1.3      Calcium 9.4      Total Protein 6.3      Albumin 3.6      Total Bilirubin 1.0      Alkaline Phosphatase 56      AST 28      ALT 30      eGFR 58.7 (*)      Anion Gap 7 (*)    ISTAT LACTATE - Abnormal    POC Lactate 2.48 (*)     Sample VENOUS      Site Other      Allens Test N/A     ISTAT PROCEDURE - Abnormal    POC Glucose 298 (*)     POC BUN 12      POC Creatinine 0.9      POC Sodium 137      POC Potassium 4.2      POC Chloride 101      POC TCO2 (MEASURED) 24      POC Ionized Calcium 1.12      POC Hematocrit 50      Sample MCKAYLA     ISTAT PROCEDURE - Abnormal    POC Glucose 90      POC BUN 15      POC Creatinine 1.5 (*)     POC Sodium 138      POC Potassium 4.6      POC Chloride 105      POC TCO2 (MEASURED) 24      POC Ionized Calcium 1.07      POC Hematocrit 37      Sample MCKAYLA     LIPASE    Lipase 14     URINALYSIS, REFLEX TO URINE CULTURE   TYPE & SCREEN    Group & Rh O POS      Indirect Diana NEG      Specimen Outdate 01/17/2025 23:59     ISTAT CHEM8     EKG Readings: (Independently Interpreted)   Initial Reading: No STEMI.   EKG:  Sinus bradycardia 51 with sinus arrhythmia, first-degree AV block, normal axis, no acute ischemic changes       Imaging Results    None          Medications   pantoprazole injection 80 mg (80 mg Intravenous Given 1/14/25 1805)     Medical Decision Making  Emergent evaluation a 69-year-old male presenting today for 2 week history abdominal pain with black tarry stools.    Vital signs reviewed, hypertensive otherwise stable.  Overall well-appearing, no distress.    Differential includes but not limited to gastritis, peptic ulcer disease, AVM, gastric ulcer, mass, symptomatic anemia, electrolyte derangement.    Labs, EKG, Protonix ordered    Labs reviewed with no leukocytosis.  Hemoglobin 12.3, down from 13.6.  CMP largely unremarkable.  Protonix 80 mg IV.      Given history of GIST tumor, black stools x1 week with slight drop in hemoglobin, will place in EDOU for serial H&H, GI consult in a.m..        Amount and/or Complexity of Data Reviewed  External Data Reviewed: notes.     Details: 5/23/24- EGD reviewed with gastroenterostomy  normal-appearing mucosa, no acute bleed  12/15/2022- colonoscopy- 7 polyps retrieved  12/30/2024- CT scan that showed enlarged prostate, likely BPH  Labs: ordered. Decision-making details documented in ED Course.  ECG/medicine tests: ordered and independent interpretation performed.    Risk  Decision regarding hospitalization.                                      Clinical Impression:  Final diagnoses:  [R10.9] Abdominal pain  [K92.2] Upper GI bleed (Primary)          ED Disposition Condition    Observation Stable                Shanda Huggins MD  01/14/25 9940

## 2025-01-15 VITALS
HEIGHT: 71 IN | BODY MASS INDEX: 25.18 KG/M2 | DIASTOLIC BLOOD PRESSURE: 73 MMHG | HEART RATE: 52 BPM | WEIGHT: 179.88 LBS | TEMPERATURE: 98 F | SYSTOLIC BLOOD PRESSURE: 127 MMHG | RESPIRATION RATE: 18 BRPM | OXYGEN SATURATION: 99 %

## 2025-01-15 PROBLEM — K92.1 MELENA: Status: ACTIVE | Noted: 2025-01-15

## 2025-01-15 LAB
BASOPHILS # BLD AUTO: 0.04 K/UL (ref 0–0.2)
BASOPHILS NFR BLD: 0.7 % (ref 0–1.9)
BILIRUB UR QL STRIP: NEGATIVE
CLARITY UR REFRACT.AUTO: CLEAR
COLOR UR AUTO: YELLOW
DIFFERENTIAL METHOD BLD: ABNORMAL
EOSINOPHIL # BLD AUTO: 0.1 K/UL (ref 0–0.5)
EOSINOPHIL NFR BLD: 2.1 % (ref 0–8)
ERYTHROCYTE [DISTWIDTH] IN BLOOD BY AUTOMATED COUNT: 15.3 % (ref 11.5–14.5)
GLUCOSE UR QL STRIP: NEGATIVE
HCT VFR BLD AUTO: 39.2 % (ref 40–54)
HGB BLD-MCNC: 12.4 G/DL (ref 14–18)
HGB UR QL STRIP: NEGATIVE
IMM GRANULOCYTES # BLD AUTO: 0.01 K/UL (ref 0–0.04)
IMM GRANULOCYTES NFR BLD AUTO: 0.2 % (ref 0–0.5)
KETONES UR QL STRIP: NEGATIVE
LEUKOCYTE ESTERASE UR QL STRIP: NEGATIVE
LYMPHOCYTES # BLD AUTO: 1.3 K/UL (ref 1–4.8)
LYMPHOCYTES NFR BLD: 23.6 % (ref 18–48)
MCH RBC QN AUTO: 29.5 PG (ref 27–31)
MCHC RBC AUTO-ENTMCNC: 31.6 G/DL (ref 32–36)
MCV RBC AUTO: 93 FL (ref 82–98)
MONOCYTES # BLD AUTO: 0.5 K/UL (ref 0.3–1)
MONOCYTES NFR BLD: 9 % (ref 4–15)
NEUTROPHILS # BLD AUTO: 3.6 K/UL (ref 1.8–7.7)
NEUTROPHILS NFR BLD: 64.4 % (ref 38–73)
NITRITE UR QL STRIP: NEGATIVE
NRBC BLD-RTO: 0 /100 WBC
OHS QRS DURATION: 86 MS
OHS QTC CALCULATION: 435 MS
PH UR STRIP: 7 [PH] (ref 5–8)
PLATELET # BLD AUTO: 127 K/UL (ref 150–450)
PMV BLD AUTO: 11.9 FL (ref 9.2–12.9)
PROT UR QL STRIP: NEGATIVE
RBC # BLD AUTO: 4.2 M/UL (ref 4.6–6.2)
SP GR UR STRIP: 1.02 (ref 1–1.03)
URN SPEC COLLECT METH UR: NORMAL
WBC # BLD AUTO: 5.64 K/UL (ref 3.9–12.7)

## 2025-01-15 PROCEDURE — 25000003 PHARM REV CODE 250: Performed by: PHYSICIAN ASSISTANT

## 2025-01-15 PROCEDURE — G0378 HOSPITAL OBSERVATION PER HR: HCPCS

## 2025-01-15 PROCEDURE — 99284 EMERGENCY DEPT VISIT MOD MDM: CPT | Mod: GC,,, | Performed by: INTERNAL MEDICINE

## 2025-01-15 PROCEDURE — 96376 TX/PRO/DX INJ SAME DRUG ADON: CPT

## 2025-01-15 PROCEDURE — 81003 URINALYSIS AUTO W/O SCOPE: CPT | Performed by: EMERGENCY MEDICINE

## 2025-01-15 PROCEDURE — 63600175 PHARM REV CODE 636 W HCPCS: Performed by: PHYSICIAN ASSISTANT

## 2025-01-15 PROCEDURE — 85025 COMPLETE CBC W/AUTO DIFF WBC: CPT | Performed by: PHYSICIAN ASSISTANT

## 2025-01-15 RX ORDER — OMEPRAZOLE 20 MG/1
40 CAPSULE, DELAYED RELEASE ORAL DAILY
Qty: 180 CAPSULE | Refills: 3 | Status: SHIPPED | OUTPATIENT
Start: 2025-01-15 | End: 2026-01-15

## 2025-01-15 RX ADMIN — POTASSIUM CHLORIDE 40 MEQ: 1500 TABLET, EXTENDED RELEASE ORAL at 10:01

## 2025-01-15 RX ADMIN — METOPROLOL TARTRATE 50 MG: 50 TABLET, FILM COATED ORAL at 09:01

## 2025-01-15 RX ADMIN — PANTOPRAZOLE SODIUM 40 MG: 40 INJECTION, POWDER, LYOPHILIZED, FOR SOLUTION INTRAVENOUS at 08:01

## 2025-01-15 NOTE — DISCHARGE SUMMARY
ED Observation Unit  Discharge Summary        History of Present Illness:    Sami Zhang is a 69 y.o. 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      Reports having burning upper abdominal pain for the past 2 weeks. No N\/V. He has noticed his stools to be black in color over the last few days which prompted his ED visit. Reports 3 BMs today. No iron supplementation or blood thinner use. No lightheadedness, dizziness, syncopal episodes. No fever.      In the ED, HBG 12 (baseline 11-13), Plt 123, Lactate 2.4, Lipase 14, CMP unremarkable. ECG sinus bradycardia 51 bpm with 1st degree AVB. He received Protonix 80mg IV x1.      I reviewed the ED Provider Note dated 1/14/25 prior to my evaluation of this patient.  I reviewed all labs and imaging performed in the Main ED, prior to patient being placed in Observation. Patient was placed in the ED Observation Unit for Upper GI bleed.    Observation Course:    NAEON. AFVSS. Pt reports he is feeling well, abdominal pain resolved with PPI. No Bms reported today. Hgb stable - repeat 12.4. GI consulted, initially planning for EGD today but unfortunately patient was given a breakfast tray despite having an active NPO order. Discussed with GI who has low suspicion of active GIB and would like to see patient in clinic for outpatient EGD. Discussed with pt who is in agreement. Will start pt on daily PO ppi. On exam prior to discharge patient doing well and medically stable. Discharge plan discussed and patient/ son expressed understanding. All questions answered.         Consultants:    GI    Final Diagnosis:  Melena   Abdominal pain     Discharge Condition: Good    Disposition: Home or Self Care     Time spent on the discharge of the patient including review of hospital course with the patient. reviewing discharge medications and arranging follow-up care 35 minutes.  Patient was seen and examined on  the date of discharge and determined to be suitable for discharge.    Follow Up:  Future Appointments   Date Time Provider Department Center   1/22/2025  3:20 PM LAB[facility] EVANGELISTA Edmondson   1/22/2025  4:20 PM Enrique Mcnair MD [facility] will arrange follow up and scope.

## 2025-01-15 NOTE — HPI
Patient is a 70 yo male with PMHx of GI stromal tumor s/p resection 5/10/2023 (on imatainib), HTN, HLD, pulmonary nodule, gout, benign follicular thyroid nodule presenting to ED 1/14/25 for 1.5 weeks of abdominal pain and black tarry stools. His abdominal pain is characterized as constant burning and is across his upper abdomen, rated 20/10 in severity. He states that he has 2-3 black, watery, tarry stools/day. He denies any use of NSAIDs, Tums, iron pills, anticoagulants. He denies f/c, n/v, constipation, SOB, dizziness, lightheadedness, syncope, fatigue, weakness. Colonoscopy 12/15/2022 with 7 small colon polyps were resected, pathology with tubular adenomas. EGD 5/23/2024 with normal findings.     GI consulted for concern of GI bleed.     Hemoglobin on arrival was 12.3 with baseline ranging from 11.6-13.6.

## 2025-01-15 NOTE — ED NOTES
LOC: The patient is awake, alert, aware of environment with an appropriate affect. Oriented x4, speaking appropriately  APPEARANCE: Pt resting comfortably, in no acute distress, pt is clean and well groomed, clothing properly fastened  SKIN:The skin is warm and dry, color consistent with ethnicity, patient has normal skin turgor and moist mucus membranes, no bruising noted   RESPIRATORY:Airway is open and patent, respirations are spontaneous, patient has a normal effort and rate, no accessory muscle use noted.  CARDIAC: Normal rate and rhythm, no peripheral edema noted, capillary refill < 3 seconds, bilateral radial pulses 2+.  ABDOMEN: Soft, non tender, non distended. Bowel sounds present x 4 quadrants. +abdominal pain   NEUROLOGIC: PERRLA, facial expression is symmetrical, patient moving all extremities spontaneously, normal sensation in all extremities when touched with a finger.  Follows all commands appropriately  MUSCULOSKELETAL: Patient moving all extremities spontaneously, no obvious swelling or deformities noted.

## 2025-01-15 NOTE — PROGRESS NOTES
ED Observation Unit  Progress Note      HPI   Sami Zhang is a 69 y.o. 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      Reports having burning upper abdominal pain for the past 2 weeks. No N\/V. He has noticed his stools to be black in color over the last few days which prompted his ED visit. Reports 3 BMs today. No iron supplementation or blood thinner use. No lightheadedness, dizziness, syncopal episodes. No fever.      In the ED, HBG 12 (baseline 11-13), Plt 123, Lactate 2.4, Lipase 14, CMP unremarkable. ECG sinus bradycardia 51 bpm with 1st degree AVB. He received Protonix 80mg IV x1.      I reviewed the ED Provider Note dated 1/14/25 prior to my evaluation of this patient.  I reviewed all labs and imaging performed in the Main ED, prior to patient being placed in Observation. Patient was placed in the ED Observation Unit for Upper GI bleed.    Interval History   NAEON. AFVSS. Pt reports he is feeling well, abdominal pain resolved with PPI. No Bms reported today. Hgb stable - repeat 12.4. GI consulted, initially planning for EGD today but unfortunately patient was given a breakfast tray despite having an active NPO order. Discussed with GI who has low suspicion of active GIB and would like to see patient in clinic for outpatient EGD. Will plan to [place].     PMHx   Past Medical History:   Diagnosis Date    Anemia     Arthritis     Back pain     Gastrointestinal stromal tumor (GIST) of stomach     GIST (gastrointestinal stromal tumor), malignant 09/09/2022    Gout 10/08/2015    Hyperlipidemia     Hypertension     Hypokalemia 5/26/2023    Laryngeal nodule 7/5/2016    Stomach cancer       Past Surgical History:   Procedure Laterality Date    COLONOSCOPY N/A 12/15/2022    Procedure: COLONOSCOPY;  Surgeon: Sean Bran MD;  Location: [facility] ([address] [place] [address]);  Service: Endoscopy;  Laterality: N/A;  inst handed-t  12/8/22- precall  completed-    ENDOSCOPIC ULTRASOUND OF UPPER GASTROINTESTINAL TRACT N/A 09/09/2022    Procedure: ULTRASOUND, UPPER GI TRACT, ENDOSCOPIC;  Surgeon: Yosvany Paula MD;  Location: [facility] ENDO (2ND FLR);  Service: Endoscopy;  Laterality: N/A;  The patient need an EGD/EUS (radial) for gastric mass. Main. Urgent. 60 minutes. Referring:   MD Estefani Campos Ricardo Romero, MD    ESOPHAGOGASTRODUODENOSCOPY N/A 09/09/2022    Procedure: EGD (ESOPHAGOGASTRODUODENOSCOPY);  Surgeon: Yosvany Paula MD;  Location: [facility] ENDO (2ND FLR);  Service: Endoscopy;  Laterality: N/A;  The patient need an EGD/EUS (radial) for gastric mass. Main. Urgent. 60 minutes. Referring:   MD Estefani Campos,   Evan Farr MD    Pt is fully vaccinated-DS  9/7/22-Instructions written down by pt's wife and read back.-DS    ESOPHAGOGASTRODUODENOSCOPY N/A 4/21/2023    Procedure: EGD (ESOPHAGOGASTRODUODENOSCOPY);  Surgeon: Kostas López MD;  Location: [facility] OR [address] [place] [address];  Service: General;  Laterality: N/A;    ESOPHAGOGASTRODUODENOSCOPY N/A 5/10/2023    Procedure: EGD (ESOPHAGOGASTRODUODENOSCOPY);  Surgeon: Kostas López MD;  [facility];  Service: General;  Laterality: N/A;    ESOPHAGOGASTRODUODENOSCOPY N/A 5/23/2024    Procedure: EGD (ESOPHAGOGASTRODUODENOSCOPY);  Surgeon: Yosvany Paula MD;  Location: [facility] ENDO (2ND FLR);  Service: Endoscopy;  Laterality: N/A;  5/16-lvm for pre call-tb  5/22 precall complete with wife-ml    KNEE SURGERY Bilateral     unsure type of knee surgery, unsure if metal was placed, completed at another hospital    NONE N/A 10/08/2015    ROBOT-ASSISTED SURGICAL REMOVAL OF STOMACH USING DA WILLIAM XI N/A 5/10/2023    Procedure: XI ROBOTIC GASTRECTOMY, partial, possible total;  Surgeon: Kostas López MD;  Location: [facility] OR 2ND FLR;  Service: General;  Laterality: N/A;        Family Hx   Family History   Problem Relation Name Age of Onset    Stroke Sister Stephy 60    Other  Other name?         brain/head tumor (about 1y ago??)    Cancer Neg Hx      Diabetes Neg Hx      Heart disease Neg Hx      Hypertension Neg Hx          Social Hx   Social History     Socioeconomic History    Marital status:    Occupational History    Occupation: Retired from [employer]   Tobacco Use    Smoking status: Never     Passive exposure: Past    Smokeless tobacco: Never   Substance and Sexual Activity    Alcohol use: Not Currently     Alcohol/week: 2.0 standard drinks of alcohol     Types: 2 Cans of beer per week     Comment: occasionally drinks wine    Drug use: No    Sexual activity: Not Currently     Partners: Female     Birth control/protection: None     Social Drivers of Health     Financial Resource Strain: High Risk (1/15/2025)    Overall Financial Resource Strain (CARDIA)     Difficulty of Paying Living Expenses: Hard   Food Insecurity: No Food Insecurity (1/15/2025)    Hunger Vital Sign     Worried About Running Out of Food in the Last Year: Never true     Ran Out of Food in the Last Year: Never true   Transportation Needs: No Transportation Needs (1/15/2025)    TRANSPORTATION NEEDS     Transportation : No   Physical Activity: Sufficiently Active (1/15/2025)    Exercise Vital Sign     Days of Exercise per Week: 5 days     Minutes of Exercise per Session: 60 min   Stress: No Stress Concern Present (1/15/2025)    [employer] [place] [employer] - Occupational Stress Questionnaire     Feeling of Stress : Not at all   Housing Stability: Low Risk  (1/15/2025)    Housing Stability Vital Sign     Unable to Pay for Housing in the Last Year: No     Homeless in the Last Year: No        Vital Signs   Vitals:    01/15/25 0420 01/15/25 0841 01/15/25 0950 01/15/25 1019   BP: 104/65 127/73 127/73 (P) 127/73   BP Location:  Right arm     Patient Position:  Sitting     Pulse: (!) 54 (!) 52     Resp:  18     Temp:  98 °F (36.7 °C)     TempSrc:  Oral     SpO2: (!) 82% 99%     Weight:       Height:             Review of Systems  Review of Systems   Constitutional:  Negative for chills, fever and malaise/fatigue.   Respiratory:  Negative for cough and shortness of breath.    Cardiovascular:  Negative for chest pain.   Gastrointestinal:  Positive for melena (last episode yesterday). Negative for abdominal pain (resolved), heartburn, nausea and vomiting.   Musculoskeletal:  Negative for myalgias.   Neurological:  Negative for dizziness, weakness and headaches.   All other systems reviewed and are negative.      Brief Physical Exam/Reassessment   Physical Exam  Vitals and nursing note reviewed.   Constitutional:       General: He is not in acute distress.     Appearance: He is well-developed. He is not ill-appearing.   HENT:      Head: Normocephalic and atraumatic.   Cardiovascular:      Rate and Rhythm: Normal rate and regular rhythm.   Pulmonary:      Effort: Pulmonary effort is normal.      Breath sounds: Normal breath sounds.   Abdominal:      General: Bowel sounds are normal.      Tenderness: There is no abdominal tenderness.   Skin:     General: Skin is warm and dry.   Neurological:      Mental Status: He is alert and oriented to person, place, and time.   Psychiatric:         Mood and Affect: Mood normal.         Behavior: Behavior normal.         Labs/Imaging   Labs Reviewed   CBC W/ AUTO DIFFERENTIAL - Abnormal       Result Value    WBC 5.52      RBC 4.28 (*)     Hemoglobin 12.3 (*)     Hematocrit 39.6 (*)     MCV 93      MCH 28.7      MCHC 31.1 (*)     RDW 14.9 (*)     Platelets 123 (*)     MPV 12.6      Immature Granulocytes 0.2      Gran # (ANC) 3.2      Immature Grans (Abs) 0.01      Lymph # 1.7      Mono # 0.5      Eos # 0.2      Baso # 0.04      nRBC 0      Gran % 57.5      Lymph % 30.3      Mono % 8.2      Eosinophil % 3.1      Basophil % 0.7      Differential Method Automated     COMPREHENSIVE METABOLIC PANEL - Abnormal    Sodium 138      Potassium 4.4      Chloride 107      CO2 24      Glucose 92       BUN 14      Creatinine 1.3      Calcium 9.4      Total Protein 6.3      Albumin 3.6      Total Bilirubin 1.0      Alkaline Phosphatase 56      AST 28      ALT 30      eGFR 58.7 (*)     Anion Gap 7 (*)    CBC W/ AUTO DIFFERENTIAL - Abnormal    WBC 5.64      RBC 4.20 (*)     Hemoglobin 12.4 (*)     Hematocrit 39.2 (*)     MCV 93      MCH 29.5      MCHC 31.6 (*)     RDW 15.3 (*)     Platelets 127 (*)     MPV 11.9      Immature Granulocytes 0.2      Gran # (ANC) 3.6      Immature Grans (Abs) 0.01      Lymph # 1.3      Mono # 0.5      Eos # 0.1      Baso # 0.04      nRBC 0      Gran % 64.4      Lymph % 23.6      Mono % 9.0      Eosinophil % 2.1      Basophil % 0.7      Differential Method Automated     ISTAT LACTATE - Abnormal    POC Lactate 2.48 (*)     Sample VENOUS      Site Other      Allens Test N/A     ISTAT PROCEDURE - Abnormal    POC Glucose 298 (*)     POC BUN 12      POC Creatinine 0.9      POC Sodium 137      POC Potassium 4.2      POC Chloride 101      POC TCO2 (MEASURED) 24      POC Ionized Calcium 1.12      POC Hematocrit 50      Sample MCKAYLA     ISTAT PROCEDURE - Abnormal    POC Glucose 90      POC BUN 15      POC Creatinine 1.5 (*)     POC Sodium 138      POC Potassium 4.6      POC Chloride 105      POC TCO2 (MEASURED) 24      POC Ionized Calcium 1.07      POC Hematocrit 37      Sample MCKAYLA     URINALYSIS, REFLEX TO URINE CULTURE    Specimen UA Urine, Clean Catch      Color, UA Yellow      Appearance, UA Clear      pH, UA 7.0      Specific Gravity, UA 1.020      Protein, UA Negative      Glucose, UA Negative      Ketones, UA Negative      Bilirubin (UA) Negative      Occult Blood UA Negative      Nitrite, UA Negative      Leukocytes, UA Negative      Narrative:     Specimen Source->Urine   LIPASE    Lipase 14     BASIC METABOLIC PANEL   CBC W/ AUTO DIFFERENTIAL   BASIC METABOLIC PANEL   TYPE & SCREEN    Group & Rh O POS      Indirect Diana NEG      Specimen Outdate 01/17/2025 23:59     ISTAT CHEM8       Imaging Results    None          I reviewed all labs, imaging, EKGs.     Plan   Melena  Upper GIB  Abdominal pain   -Reports 2 weeks of upper abdominal burning pain. Reports having black colored stools over the last few days.   -History of GIST s/p resection and on hormone therapy.   -Per ED provider, digital exam showed black stool that was heme positive.   -No tachycardia or hypotension on vitals  -H/H 12/39 which appears to be his baseline -> repeat stable  -Placed in EDOU for GI consult. Dispo pending recs  -Protonix 80mg IV given in the ED. Will continue BID   -> abdominal pain resolved  -GI recommending DC home with clinic follow up and outpatient EGD. Recommend daily PO PPI at discharge.     I have discussed this case with GI, Enrique GIVENS.

## 2025-01-15 NOTE — H&P
ED Observation Unit  History and Physical      I assumed care of this patient from the Main ED at onset of observation time, 9:33pm on 01/14/2025.       History of Present Illness:    Sami Zhang is a 69 y.o. 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     Reports having burning upper abdominal pain for the past 2 weeks. No N\/V. He has noticed his stools to be black in color over the last few days which prompted his ED visit. Reports 3 BMs today. No iron supplementation or blood thinner use. No lightheadedness, dizziness, syncopal episodes. No fever.     In the ED, HBG 12 (baseline 11-13), Plt 123, Lactate 2.4, Lipase 14, CMP unremarkable. ECG sinus bradycardia 51 bpm with 1st degree AVB. He received Protonix 80mg IV x1.     I reviewed the ED Provider Note dated 1/14/25 prior to my evaluation of this patient.  I reviewed all labs and imaging performed in the Main ED, prior to patient being placed in Observation. Patient was placed in the ED Observation Unit for Upper GI bleed.    PMHx   Past Medical History:   Diagnosis Date    Anemia     Arthritis     Back pain     Gastrointestinal stromal tumor (GIST) of stomach     GIST (gastrointestinal stromal tumor), malignant 09/09/2022    Gout 10/08/2015    Hyperlipidemia     Hypertension     Hypokalemia 5/26/2023    Laryngeal nodule 7/5/2016    Stomach cancer       Past Surgical History:   Procedure Laterality Date    COLONOSCOPY N/A 12/15/2022    Procedure: COLONOSCOPY;  Surgeon: Sean Bran MD;  Location: [facility] (4TH FLR);  Service: Endoscopy;  Laterality: N/A;  inst handed-t  12/8/22- precall [facility]    ENDOSCOPIC ULTRASOUND OF UPPER GASTROINTESTINAL TRACT N/A 09/09/2022    Procedure: ULTRASOUND, UPPER GI TRACT, ENDOSCOPIC;  Surgeon: Yosvany Paula MD;  Location: [facility] (2ND FLR);  Service: Endoscopy;  Laterality: N/A;  The patient need an EGD/EUS (radial) for gastric  mass. Main. Urgent. 60 minutes. Referring:   MD Estefani Campos Ricardo Romero, MD    ESOPHAGOGASTRODUODENOSCOPY N/A 09/09/2022    Procedure: EGD (ESOPHAGOGASTRODUODENOSCOPY);  Surgeon: Yosvany Paula MD;  Location: [facility] ENDO (2ND FLR);  Service: Endoscopy;  Laterality: N/A;  The patient need an EGD/EUS (radial) for gastric mass. Main. Urgent. 60 minutes. Referring:   MD Estefani Campos Ricardo Romero, MD    Pt is fully vaccinated-DS  9/7/22-Instructions written down by pt's wife and read back.-DS    ESOPHAGOGASTRODUODENOSCOPY N/A 4/21/2023    Procedure: EGD (ESOPHAGOGASTRODUODENOSCOPY);  Surgeon: Kostas López MD;  Location: [facility] OR [address] [place] [address];  Service: General;  Laterality: N/A;    ESOPHAGOGASTRODUODENOSCOPY N/A 5/10/2023    Procedure: EGD (ESOPHAGOGASTRODUODENOSCOPY);  Surgeon: Kostas López MD;  [facility];  Service: General;  Laterality: N/A;    ESOPHAGOGASTRODUODENOSCOPY N/A 5/23/2024    Procedure: EGD (ESOPHAGOGASTRODUODENOSCOPY);  Surgeon: Yosvany Paula MD;  Location: [facility] (2ND FLR);  Service: Endoscopy;  Laterality: N/A;  5/16-lvm for pre call-tb  5/22 precall complete with wife-ml    KNEE SURGERY Bilateral     unsure type of knee surgery, unsure if metal was placed, completed at another hospital    NONE N/A 10/08/2015    ROBOT-ASSISTED SURGICAL REMOVAL OF STOMACH USING DA WILLIAM XI N/A 5/10/2023    Procedure: XI ROBOTIC GASTRECTOMY, partial, possible total;  Surgeon: Kostas López MD;  [facility];  Service: General;  Laterality: N/A;        Family Hx   Family History   Problem Relation Name Age of Onset    Stroke Sister Stephy 60    Other Other name?         brain/head tumor (about 1y ago??)    Cancer Neg Hx      Diabetes Neg Hx      Heart disease Neg Hx      Hypertension Neg Hx          Social Hx   Social History     Socioeconomic History    Marital status:    Occupational History    Occupation: Retired from [employer]  "  Tobacco Use    Smoking status: Never     Passive exposure: Past    Smokeless tobacco: Never   Substance and Sexual Activity    Alcohol use: Not Currently     Alcohol/week: 2.0 standard drinks of alcohol     Types: 2 Cans of beer per week     Comment: occasionally drinks wine    Drug use: No    Sexual activity: Not Currently     Partners: Female     Birth control/protection: None     Social Drivers of Health     Financial Resource Strain: High Risk (9/22/2023)    Overall Financial Resource Strain (CARDIA)     Difficulty of Paying Living Expenses: Very hard   Food Insecurity: Food Insecurity Present (9/22/2023)    Hunger Vital Sign     Worried About Running Out of Food in the Last Year: Sometimes true     Ran Out of Food in the Last Year: Sometimes true   Transportation Needs: No Transportation Needs (9/22/2023)    PRAPARE - Transportation     Lack of Transportation (Medical): No     Lack of Transportation (Non-Medical): No   Physical Activity: Sufficiently Active (9/22/2023)    Exercise Vital Sign     Days of Exercise per Week: 5 days     Minutes of Exercise per Session: 130 min   Stress: Stress Concern Present (9/22/2023)    [employer] [place] [employer] - Occupational Stress Questionnaire     Feeling of Stress : Rather much   Housing Stability: Low Risk  (9/22/2023)    Housing Stability Vital Sign     Unable to Pay for Housing in the Last Year: No     Number of Places Lived in the Last Year: 1     Unstable Housing in the Last Year: No        Vital Signs   Vitals:    01/14/25 1611 01/14/25 1614 01/14/25 1742 01/14/25 1930   BP:   (!) 144/72 (!) 162/72   BP Location:   Right arm Left arm   Patient Position:   Lying Sitting   Pulse:  70 72 60   Resp:   18 20   Temp:   97.6 °F (36.4 °C) 97.8 °F (36.6 °C)   TempSrc:   Oral Oral   SpO2:   100% 99%   Weight: 81.6 kg (179 lb 14.3 oz)      Height: 5' 11" (1.803 m)           Review of Systems  Review of Systems   Gastrointestinal:  Positive for abdominal pain. "       Physical Exam  Physical Exam  Constitutional:       General: He is not in acute distress.     Appearance: He is not diaphoretic.   HENT:      Head: Normocephalic and atraumatic.   Eyes:      Conjunctiva/sclera: Conjunctivae normal.      Pupils: Pupils are equal, round, and reactive to light.   Cardiovascular:      Rate and Rhythm: Normal rate and regular rhythm.      Heart sounds: Normal heart sounds.   Pulmonary:      Effort: Pulmonary effort is normal. No respiratory distress.      Breath sounds: Normal breath sounds. No wheezing or rales.   Abdominal:      General: Bowel sounds are normal. There is no distension.      Palpations: Abdomen is soft.      Tenderness: There is no abdominal tenderness.   Musculoskeletal:         General: No tenderness. Normal range of motion.      Cervical back: Normal range of motion and neck supple.   Skin:     General: Skin is warm and dry.      Findings: No erythema or rash.   Neurological:      Mental Status: He is alert and oriented to person, place, and time.      Cranial Nerves: No cranial nerve deficit.         Medications:   Scheduled Meds:   metoprolol tartrate  50 mg Oral BID    [START ON 1/15/2025] pantoprazole  40 mg Intravenous BID    [START ON 1/15/2025] potassium chloride SA  40 mEq Oral Daily    [START ON 1/15/2025] valsartan  160 mg Oral Daily     Continuous Infusions:  PRN Meds:.  Current Facility-Administered Medications:     acetaminophen, 650 mg, Oral, Q6H PRN    diphenhydrAMINE, 25 mg, Oral, Q6H PRN    ondansetron, 4 mg, Oral, Q6H PRN      Assessment/Plan:  Upper GI bleed  -Reports 2 weeks of upper abdominal burning pain. Reports having black colored stools over the last few days.   -History of GIST s/p resection and on hormone therapy.   -Per ED provider, digital exam showed black stool that was heme positive.   -No tachycardia or hypotension on vitals  -H/H 12/39 which appears to be his baseline  -Placed in EDOU for GI consult. Dispo pending  recs  -Protonix 80mg IV given in the ED. Will continue BID  -NPO midnight    Case was discussed with the ED provider, Dr. Huggins

## 2025-01-15 NOTE — CONSULTS
[facility] Emergency Dept  Gastroenterology  Consult Note    Patient Name: Sami Zhang  MRN: [id number]  Admission Date: 1/14/2025  Hospital Length of Stay: 0 days  Code Status: Full Code   Attending Provider: No att. providers found   Consulting Provider: Soraida Gregory MD  Primary Care Physician: Niki, Primary Doctor  Principal Problem:Upper GI bleed    Inpatient consult to Gastroenterology  Consult performed by: Soraida Gregory MD  Consult ordered by: Enrique Bean PA-C  Reason for consult: Melena        Subjective:     HPI:  Patient is a 68 yo male with PMHx of GI stromal tumor s/p resection 5/10/2023 (on imatainib), HTN, HLD, pulmonary nodule, gout, benign follicular thyroid nodule presenting to ED 1/14/25 for 1.5 weeks of abdominal pain and black tarry stools. His abdominal pain is characterized as constant burning and is across his upper abdomen, rated 20/10 in severity. He states that he has 2-3 black, watery, tarry stools/day. He denies any use of NSAIDs, Tums, iron pills, anticoagulants. He denies f/c, n/v, constipation, SOB, dizziness, lightheadedness, syncope, fatigue, weakness. Colonoscopy 12/15/2022 with 7 small colon polyps were resected, pathology with tubular adenomas. EGD 5/23/2024 with normal findings.     GI consulted for concern of GI bleed.     Hemoglobin on arrival was 12.3 with baseline ranging from 11.6-13.6.    Past Medical History:   Diagnosis Date    Anemia     Arthritis     Back pain     Gastrointestinal stromal tumor (GIST) of stomach     GIST (gastrointestinal stromal tumor), malignant 09/09/2022    Gout 10/08/2015    Hyperlipidemia     Hypertension     Hypokalemia 5/26/2023    Laryngeal nodule 7/5/2016    Stomach cancer        Past Surgical History:   Procedure Laterality Date    COLONOSCOPY N/A 12/15/2022    Procedure: COLONOSCOPY;  Surgeon: Sean Bran MD;  Location: [address];  Service: Endoscopy;  Laterality: N/A;  [facility]     ENDOSCOPIC ULTRASOUND OF UPPER GASTROINTESTINAL TRACT N/A 09/09/2022    Procedure: ULTRASOUND, UPPER GI TRACT, ENDOSCOPIC;  Surgeon: Yosvany Paula MD;  Location: [facility] ENDO (2ND FLR);  Service: Endoscopy;  Laterality: N/A;  The patient need an EGD/EUS (radial) for gastric mass. Main. Urgent. 60 minutes. Referring:   MD Estefani Campos Ricardo Romero, MD    ESOPHAGOGASTRODUODENOSCOPY N/A 09/09/2022    Procedure: EGD (ESOPHAGOGASTRODUODENOSCOPY);  Surgeon: Yosvany Paula MD;  Location: [facility] ENDO (2ND FLR);  Service: Endoscopy;  Laterality: N/A;  The patient need an EGD/EUS (radial) for gastric mass. Main. Urgent. 60 minutes. Referring:   MD Estefani Campos,   Evan Farr MD    Pt is fully vaccinated-DS  9/7/22-Instructions written down by pt's wife and read back.-DS    ESOPHAGOGASTRODUODENOSCOPY N/A 4/21/2023    Procedure: EGD (ESOPHAGOGASTRODUODENOSCOPY);  Surgeon: Kostas López MD;  Location: [facility] OR 2ND FLR;  Service: General;  Laterality: N/A;    ESOPHAGOGASTRODUODENOSCOPY N/A 5/10/2023    Procedure: EGD (ESOPHAGOGASTRODUODENOSCOPY);  Surgeon: Kostas López MD;  Location: [facility] OR 2ND FLR;  Service: General;  Laterality: N/A;    ESOPHAGOGASTRODUODENOSCOPY N/A 5/23/2024    Procedure: EGD (ESOPHAGOGASTRODUODENOSCOPY);  Surgeon: Yosvany Paula MD;  Location: [facility] ENDO (2ND FLR);  Service: Endoscopy;  Laterality: N/A;  5/16-lvm for pre call-tb  5/22 precall complete with wife-ml    KNEE SURGERY Bilateral     unsure type of knee surgery, unsure if metal was placed, completed at another hospital    NONE N/A 10/08/2015    ROBOT-ASSISTED SURGICAL REMOVAL OF STOMACH USING DA WILLIAM XI N/A 5/10/2023    Procedure: XI ROBOTIC GASTRECTOMY, partial, possible total;  Surgeon: Kostas López MD;  Location: [facility] OR 2ND FLR;  Service: General;  Laterality: N/A;       Review of patient's allergies indicates:  No Known Allergies  Family History       Problem Relation (Age of Onset)     Other Other    Stroke Sister (60)          Tobacco Use    Smoking status: Never     Passive exposure: Past    Smokeless tobacco: Never   Substance and Sexual Activity    Alcohol use: Not Currently     Alcohol/week: 2.0 standard drinks of alcohol     Types: 2 Cans of beer per week     Comment: occasionally drinks wine    Drug use: No    Sexual activity: Not Currently     Partners: Female     Birth control/protection: None     Review of Systems   Constitutional:  Negative for chills, fatigue, fever and unexpected weight change.   Respiratory:  Negative for shortness of breath.    Gastrointestinal:  Positive for abdominal pain, blood in stool (melena) and diarrhea. Negative for abdominal distention, constipation, nausea and vomiting.   Neurological:  Negative for dizziness, syncope, weakness and light-headedness.     Objective:     Vital Signs (Most Recent):  Temp: 98.1 °F (36.7 °C) (01/15/25 0134)  Pulse: (!) 54 (01/15/25 0420)  Resp: 18 (01/15/25 0134)  BP: 104/65 (01/15/25 0420)  SpO2: (!) 82 % (01/15/25 0420) Vital Signs (24h Range):  Temp:  [97.5 °F (36.4 °C)-98.1 °F (36.7 °C)] 98.1 °F (36.7 °C)  Pulse:  [49-72] 54  Resp:  [16-20] 18  SpO2:  [82 %-100 %] 82 %  BP: (104-162)/(65-75) 104/65     Weight: 81.6 kg (179 lb 14.3 oz) (01/14/25 1611)  Body mass index is 25.09 kg/m².    No intake or output data in the 24 hours ending 01/15/25 0739    Lines/Drains/Airways       Peripheral Intravenous Line  Duration                  Peripheral IV - Single Lumen 01/14/25 1710 22 G Right Hand <1 day                     Physical Exam  Constitutional:       General: He is not in acute distress.  HENT:      Head: Normocephalic and atraumatic.      Nose: Nose normal.   Pulmonary:      Effort: Pulmonary effort is normal. No respiratory distress.   Abdominal:      General: Abdomen is flat. A surgical scar is present. There is no distension.      Palpations: Abdomen is soft. There is no shifting dullness, fluid wave or mass.       Tenderness: There is no abdominal tenderness. There is no guarding or rebound.       Musculoskeletal:      Right lower leg: No edema.      Left lower leg: No edema.   Skin:     General: Skin is warm and dry.   Neurological:      Mental Status: He is alert and oriented to person, place, and time.   Psychiatric:         Behavior: Behavior is cooperative.          Significant Labs:  CBC:   Recent Labs   Lab 01/14/25  1711 01/14/25  1717 01/14/25  1914   WBC 5.52  --   --    HGB 12.3*  --   --    HCT 39.6* 50 37   *  --   --        Significant Imaging:  Imaging results within the past 24 hours have been reviewed.  Assessment/Plan:     GI  * Upper GI bleed  70 yo male with PMHx GI stromal tumor s/p resection 5/10/2023 (on imatainib), HTN, HLD, pulmonary nodule, gout, benign follicular thyroid nodule presenting to ED 1/14/25 for 2 weeks of abdominal pain and black tarry stools. GI consulted for concern of GI bleed.     - EGD outpatient    - Patient okay to discharge with PPI daily   - Please notify GI team if there is significant change in patient's clinical status         Thank you for your consult. I will follow-up with patient. Please contact us if you have any additional questions.    Soraida Gregory MD  Gastroenterology  [facility] - [facility] Dept

## 2025-01-15 NOTE — PLAN OF CARE
[facility] - Emergency Dept  Initial Discharge Assessment       Primary Care Provider: No, Primary Doctor    Admission Diagnosis: Upper GI bleed [K92.2]    Admission Date: 1/14/2025  Expected Discharge Date:     Transition of Care Barriers: (P) None    Payor: HU[employer] MANAGED MEDICARE / Plan: HU[employer] MEDICARE PPO / Product Type: Medicare Advantage /     Extended Emergency Contact Information  Primary Emergency Contact: VicenteSami Reddy  Mobile Phone: [phone]  Relation: Healthcare Power of   Preferred language: English   needed? No  Secondary Emergency Contact: Bhavesh Zhang  Address: [address] [place] [address]            [place] [address] [facility]  Home Phone: [phone]  Mobile Phone: [phone]  Relation: Spouse    Discharge Plan A: (P) Home, Home with family  Discharge Plan B: (P) Home, Home with family      [id number] [address] AT [address] [address]  Phone: [phone] Fax: [phone]    [facility] - [place], [place] - [address]  [address] [facility] [address]  Phone: [phone] Fax: [phone]    [facility]  [address] [facility] [address]  Phone: [phone] Fax: [phone]    (Inactive see NCPDP [id number]) [facility] - [place], [place] - [address]  [address]  [address] [address]  Phone: [phone] Fax: [phone]    [facility] - [place], [place] - [address]  [address]  [facility] [address]  Phone: [phone] Fax: [phone]      Initial Assessment (most recent)       Adult Discharge Assessment - 01/15/25 0334          Discharge Assessment    Assessment Type Discharge Planning Assessment (P)      Confirmed/corrected address, phone number and insurance Yes (P)      Confirmed  Demographics Correct on Facesheet (P)      Source of Information patient (P)      When was your last doctors appointment? -- (P)    2 months ago    Does patient/caregiver understand observation status Yes (P)      Communicated MURTAZA with patient/caregiver Yes (P)      Reason For Admission abdominal burning (P)      People in Home spouse (P)      Facility Arrived From: Home (P)      Do you expect to return to your current living situation? Yes (P)      Do you have help at home or someone to help you manage your care at home? Yes (P)      Who are your caregiver(s) and their phone number(s)? wife (P)      Prior to hospitilization cognitive status: Alert/Oriented (P)      Current cognitive status: Alert/Oriented (P)      Walking or Climbing Stairs Difficulty no (P)      Dressing/Bathing Difficulty no (P)      Home Accessibility wheelchair accessible (P)      Home Layout Able to live on 1st floor (P)      Equipment Currently Used at Home none (P)      Readmission within 30 days? No (P)      Patient currently being followed by outpatient case management? No (P)      Do you currently have service(s) that help you manage your care at home? No (P)      Do you take prescription medications? Yes (P)      Do you have prescription coverage? Yes (P)      Coverage medicare (P)      Do you have any problems affording any of your prescribed medications? No (P)      Is the patient taking medications as prescribed? yes (P)      Who is going to help you get home at discharge? son (P)      How do you get to doctors appointments? family or friend will provide (P)      Are you on dialysis? No (P)      Do you take coumadin? No (P)      Discharge Plan A Home;Home with family (P)      Discharge Plan B Home;Home with family (P)      DME Needed Upon Discharge  none (P)      Discharge Plan discussed with: Patient (P)      Transition of Care Barriers None (P)         Physical Activity    On average, how many days per week do you engage in moderate  to strenuous exercise (like a brisk walk)? 5 days (P)      On average, how many minutes do you engage in exercise at this level? 60 min (P)         Financial Resource Strain    How hard is it for you to pay for the very basics like food, housing, medical care, and heating? Hard (P)         Housing Stability    In the last 12 months, was there a time when you were not able to pay the mortgage or rent on time? No (P)      At any time in the past 12 months, were you homeless or living in a shelter (including now)? No (P)         Transportation Needs    Has the lack of transportation kept you from medical appointments, meetings, work or from getting things needed for daily living? No (P)         Food Insecurity    Within the past 12 months, you worried that your food would run out before you got the money to buy more. Never true (P)      Within the past 12 months, the food you bought just didn't last and you didn't have money to get more. Never true (P)         Stress    Do you feel stress - tense, restless, nervous, or anxious, or unable to sleep at night because your mind is troubled all the time - these days? Not at all (P)         Social Isolation    How often do you feel lonely or isolated from those around you?  Never (P)         Alcohol Use    Q1: How often do you have a drink containing alcohol? 2-3 times a week (P)      Q2: How many drinks containing alcohol do you have on a typical day when you are drinking? 1 or 2 (P)      Q3: How often do you have six or more drinks on one occasion? Never (P)         Needcheckities    In the past 12 months has the electric, gas, oil, or water company threatened to shut off services in your home? No (P)         Health Literacy    How often do you need to have someone help you when you read instructions, pamphlets, or other written material from your doctor or pharmacy? Never (P)         OTHER    Name(s) of People in Home wife (P)

## 2025-01-15 NOTE — SUBJECTIVE & OBJECTIVE
Past Medical History:   Diagnosis Date    Anemia     Arthritis     Back pain     Gastrointestinal stromal tumor (GIST) of stomach     GIST (gastrointestinal stromal tumor), malignant 09/09/2022    Gout 10/08/2015    Hyperlipidemia     Hypertension     Hypokalemia 5/26/2023    Laryngeal nodule 7/5/2016    Stomach cancer        Past Surgical History:   Procedure Laterality Date    COLONOSCOPY N/A 12/15/2022    Procedure: COLONOSCOPY;  Surgeon: Sean Bran MD;  Location: [facility] ENDO (4TH FLR);  Service: Endoscopy;  Laterality: N/A;  [facility] handed-t  12/8/22- precall completed-    ENDOSCOPIC ULTRASOUND OF UPPER GASTROINTESTINAL TRACT N/A 09/09/2022    Procedure: ULTRASOUND, UPPER GI TRACT, ENDOSCOPIC;  Surgeon: Yosvany Paula MD;  Location: [facility] ENDO (2ND FLR);  Service: Endoscopy;  Laterality: N/A;  The patient need an EGD/EUS (radial) for gastric mass. Main. Urgent. 60 minutes. Referring:   MD Estefani Campos Ricardo Romero, MD    ESOPHAGOGASTRODUODENOSCOPY N/A 09/09/2022    Procedure: EGD (ESOPHAGOGASTRODUODENOSCOPY);  Surgeon: Yosvany Paula MD;  Location: [facility] ENDO (2ND FLR);  Service: Endoscopy;  Laterality: N/A;  The patient need an EGD/EUS (radial) for gastric mass. Main. Urgent. 60 minutes. Referring:   MD Estefani Campos Ricardo Romero, MD    Pt is fully vaccinated-DS  9/7/22-Instructions written down by pt's wife and read back.-DS    ESOPHAGOGASTRODUODENOSCOPY N/A 4/21/2023    Procedure: EGD (ESOPHAGOGASTRODUODENOSCOPY);  Surgeon: Kostas López MD;  Location: [facility] OR 2ND FLR;  Service: General;  Laterality: N/A;    ESOPHAGOGASTRODUODENOSCOPY N/A 5/10/2023    Procedure: EGD (ESOPHAGOGASTRODUODENOSCOPY);  Surgeon: Kostas López MD;  Location: [facility] OR 2ND FLR;  Service: General;  Laterality: N/A;    ESOPHAGOGASTRODUODENOSCOPY N/A 5/23/2024    Procedure: EGD (ESOPHAGOGASTRODUODENOSCOPY);  Surgeon: Yosvany Paula MD;  Location: [facility] ENDO (2ND FLR);  Service: Endoscopy;   Laterality: N/A;  5/16-lvm for pre call-tb  5/22 precall complete with wife-ml    KNEE SURGERY Bilateral     unsure type of knee surgery, unsure if metal was placed, completed at another hospital    NONE N/A 10/08/2015    ROBOT-ASSISTED SURGICAL REMOVAL OF STOMACH USING DA WILLIAM XI N/A 5/10/2023    Procedure: XI ROBOTIC GASTRECTOMY, partial, possible total;  Surgeon: Kostas López MD;  Location: [facility] OR [address] [place] [address];  Service: General;  Laterality: N/A;       Review of patient's allergies indicates:  No Known Allergies  Family History       Problem Relation (Age of Onset)    Other Other    Stroke Sister (60)          Tobacco Use    Smoking status: Never     Passive exposure: Past    Smokeless tobacco: Never   Substance and Sexual Activity    Alcohol use: Not Currently     Alcohol/week: 2.0 standard drinks of alcohol     Types: 2 Cans of beer per week     Comment: occasionally drinks wine    Drug use: No    Sexual activity: Not Currently     Partners: Female     Birth control/protection: None     Review of Systems   Constitutional:  Negative for chills, fatigue, fever and unexpected weight change.   Respiratory:  Negative for shortness of breath.    Gastrointestinal:  Positive for abdominal pain, blood in stool (melena) and diarrhea. Negative for abdominal distention, constipation, nausea and vomiting.   Neurological:  Negative for dizziness, syncope, weakness and light-headedness.     Objective:     Vital Signs (Most Recent):  Temp: 98.1 °F (36.7 °C) (01/15/25 0134)  Pulse: (!) 54 (01/15/25 0420)  Resp: 18 (01/15/25 0134)  BP: 104/65 (01/15/25 0420)  SpO2: (!) 82 % (01/15/25 0420) Vital Signs (24h Range):  Temp:  [97.5 °F (36.4 °C)-98.1 °F (36.7 °C)] 98.1 °F (36.7 °C)  Pulse:  [49-72] 54  Resp:  [16-20] 18  SpO2:  [82 %-100 %] 82 %  BP: (104-162)/(65-75) 104/65     Weight: 81.6 kg (179 lb 14.3 oz) (01/14/25 1611)  Body mass index is 25.09 kg/m².    No intake or output data in the 24 hours ending 01/15/25  0739    Lines/Drains/Airways       Peripheral Intravenous Line  Duration                  Peripheral IV - Single Lumen 01/14/25 1710 22 G Right Hand <1 day                     Physical Exam  Constitutional:       General: He is not in acute distress.  HENT:      Head: Normocephalic and atraumatic.      Nose: Nose normal.   Pulmonary:      Effort: Pulmonary effort is normal. No respiratory distress.   Abdominal:      General: Abdomen is flat. A surgical scar is present. There is no distension.      Palpations: Abdomen is soft. There is no shifting dullness, fluid wave or mass.      Tenderness: There is no abdominal tenderness. There is no guarding or rebound.       Musculoskeletal:      Right lower leg: No edema.      Left lower leg: No edema.   Skin:     General: Skin is warm and dry.   Neurological:      Mental Status: He is alert and oriented to person, place, and time.   Psychiatric:         Behavior: Behavior is cooperative.          Significant Labs:  CBC:   Recent Labs   Lab 01/14/25  1711 01/14/25  1717 01/14/25  1914   WBC 5.52  --   --    HGB 12.3*  --   --    HCT 39.6* 50 37   *  --   --        Significant Imaging:  Imaging results within the past 24 hours have been reviewed.

## 2025-01-15 NOTE — ASSESSMENT & PLAN NOTE
70 yo male with PMHx GI stromal tumor s/p resection 5/10/2023 (on imatainib), HTN, HLD, pulmonary nodule, gout, benign follicular thyroid nodule presenting to ED 1/14/25 for 2 weeks of abdominal pain and black tarry stools. GI consulted for concern of GI bleed.     - Plan for EGD today   - Keep NPO   - Recommend holding antihypertensives   - Trend Hgb and transfuse for goal Hgb > 7, unless otherwise indicated  - Maintain IV access with 2 large bore IV's  - Intravascular resuscitation/support with IVF's   - Hold all NSAIDs and anticoagulants, unless contraindicated  - Bolus IV pantoprazole 80 mg followed by 40 mg BID  - Please correct any coagulopathy with platelets and FFP for goal of platelets >50K and INR <2.0  - Please notify GI team if there is significant change in patient's clinical status

## 2025-01-21 ENCOUNTER — TELEPHONE (OUTPATIENT)
Dept: HEMATOLOGY/ONCOLOGY | Facility: CLINIC | Age: 70
End: 2025-01-21
Payer: MEDICARE

## 2025-01-27 ENCOUNTER — OFFICE VISIT (OUTPATIENT)
Dept: HEMATOLOGY/ONCOLOGY | Facility: CLINIC | Age: 70
End: 2025-01-27
Payer: MEDICARE

## 2025-01-27 ENCOUNTER — LAB VISIT (OUTPATIENT)
Dept: LAB | Facility: HOSPITAL | Age: 70
End: 2025-01-27
Payer: MEDICARE

## 2025-01-27 VITALS
OXYGEN SATURATION: 100 % | SYSTOLIC BLOOD PRESSURE: 134 MMHG | WEIGHT: 175 LBS | DIASTOLIC BLOOD PRESSURE: 61 MMHG | HEART RATE: 48 BPM | TEMPERATURE: 98 F | BODY MASS INDEX: 24.41 KG/M2 | RESPIRATION RATE: 18 BRPM

## 2025-01-27 DIAGNOSIS — E04.1 THYROID NODULE: ICD-10-CM

## 2025-01-27 DIAGNOSIS — C16.9 MALIGNANT NEOPLASM OF STOMACH, UNSPECIFIED LOCATION: ICD-10-CM

## 2025-01-27 DIAGNOSIS — C49.A2 GASTROINTESTINAL STROMAL TUMOR (GIST) OF STOMACH: ICD-10-CM

## 2025-01-27 DIAGNOSIS — C49.A2 GASTROINTESTINAL STROMAL TUMOR (GIST) OF STOMACH: Primary | ICD-10-CM

## 2025-01-27 DIAGNOSIS — I10 PRIMARY HYPERTENSION: ICD-10-CM

## 2025-01-27 LAB
ALBUMIN SERPL BCP-MCNC: 3.5 G/DL (ref 3.5–5.2)
ALP SERPL-CCNC: 56 U/L (ref 40–150)
ALT SERPL W/O P-5'-P-CCNC: 18 U/L (ref 10–44)
ANION GAP SERPL CALC-SCNC: 10 MMOL/L (ref 8–16)
AST SERPL-CCNC: 24 U/L (ref 10–40)
BILIRUB SERPL-MCNC: 0.8 MG/DL (ref 0.1–1)
BUN SERPL-MCNC: 14 MG/DL (ref 8–23)
CALCIUM SERPL-MCNC: 8.7 MG/DL (ref 8.7–10.5)
CHLORIDE SERPL-SCNC: 112 MMOL/L (ref 95–110)
CO2 SERPL-SCNC: 19 MMOL/L (ref 23–29)
CREAT SERPL-MCNC: 1.4 MG/DL (ref 0.5–1.4)
ERYTHROCYTE [DISTWIDTH] IN BLOOD BY AUTOMATED COUNT: 14.6 % (ref 11.5–14.5)
EST. GFR  (NO RACE VARIABLE): 53.7 ML/MIN/1.73 M^2
GLUCOSE SERPL-MCNC: 71 MG/DL (ref 70–110)
HCT VFR BLD AUTO: 36.9 % (ref 40–54)
HGB BLD-MCNC: 11.5 G/DL (ref 14–18)
IMM GRANULOCYTES # BLD AUTO: 0.03 K/UL (ref 0–0.04)
MCH RBC QN AUTO: 28.9 PG (ref 27–31)
MCHC RBC AUTO-ENTMCNC: 31.2 G/DL (ref 32–36)
MCV RBC AUTO: 93 FL (ref 82–98)
NEUTROPHILS # BLD AUTO: 3.3 K/UL (ref 1.8–7.7)
PLATELET # BLD AUTO: 186 K/UL (ref 150–450)
PMV BLD AUTO: 11.5 FL (ref 9.2–12.9)
POTASSIUM SERPL-SCNC: 4.2 MMOL/L (ref 3.5–5.1)
PROT SERPL-MCNC: 6.2 G/DL (ref 6–8.4)
RBC # BLD AUTO: 3.98 M/UL (ref 4.6–6.2)
SODIUM SERPL-SCNC: 141 MMOL/L (ref 136–145)
WBC # BLD AUTO: 6.14 K/UL (ref 3.9–12.7)

## 2025-01-27 PROCEDURE — 1126F AMNT PAIN NOTED NONE PRSNT: CPT | Mod: CPTII,S$GLB,, | Performed by: HOSPITALIST

## 2025-01-27 PROCEDURE — 85027 COMPLETE CBC AUTOMATED: CPT | Performed by: HOSPITALIST

## 2025-01-27 PROCEDURE — 1101F PT FALLS ASSESS-DOCD LE1/YR: CPT | Mod: CPTII,S$GLB,, | Performed by: HOSPITALIST

## 2025-01-27 PROCEDURE — 4010F ACE/ARB THERAPY RXD/TAKEN: CPT | Mod: CPTII,S$GLB,, | Performed by: HOSPITALIST

## 2025-01-27 PROCEDURE — 3008F BODY MASS INDEX DOCD: CPT | Mod: CPTII,S$GLB,, | Performed by: HOSPITALIST

## 2025-01-27 PROCEDURE — 99999 PR PBB SHADOW E&M-EST. PATIENT-LVL V: CPT | Mod: PBBFAC,,, | Performed by: HOSPITALIST

## 2025-01-27 PROCEDURE — 1159F MED LIST DOCD IN RCRD: CPT | Mod: CPTII,S$GLB,, | Performed by: HOSPITALIST

## 2025-01-27 PROCEDURE — 36415 COLL VENOUS BLD VENIPUNCTURE: CPT | Performed by: HOSPITALIST

## 2025-01-27 PROCEDURE — 3075F SYST BP GE 130 - 139MM HG: CPT | Mod: CPTII,S$GLB,, | Performed by: HOSPITALIST

## 2025-01-27 PROCEDURE — G2211 COMPLEX E/M VISIT ADD ON: HCPCS | Mod: S$GLB,,, | Performed by: HOSPITALIST

## 2025-01-27 PROCEDURE — 99215 OFFICE O/P EST HI 40 MIN: CPT | Mod: S$GLB,,, | Performed by: HOSPITALIST

## 2025-01-27 PROCEDURE — 3288F FALL RISK ASSESSMENT DOCD: CPT | Mod: CPTII,S$GLB,, | Performed by: HOSPITALIST

## 2025-01-27 PROCEDURE — 3078F DIAST BP <80 MM HG: CPT | Mod: CPTII,S$GLB,, | Performed by: HOSPITALIST

## 2025-01-27 PROCEDURE — 80053 COMPREHEN METABOLIC PANEL: CPT | Performed by: HOSPITALIST

## 2025-01-27 NOTE — ASSESSMENT & PLAN NOTE
Remains JENNA on most recent imaging. Recently seen in ED with severe epigastric pain - quickly resolved with PPI therapy. Will discuss with GI about EGD. Otherwise plan to continue imatinib x3 years. Next imaging in 3 months.  - Referral to [facility] for consideration of EGD  - FU 3 months with scans  - Con't ompeprazole for abodminal pain

## 2025-01-27 NOTE — PROGRESS NOTES
MEDICAL ONCOLOGY FOLLOW-UP VISIT.     Best Contact Phone Number(s): [phone] (home) [phone] (work)     Cancer/Stage/TNM:    Cancer Staging   Gastrointestinal stromal tumor (GIST) of stomach  Staging form: Gastrointestinal Stromal Tumor - Gastric and Omental GIST, AJCC 8th Edition  - Clinical stage from 9/9/2022: Stage IIIA (cT3, cN0, cM0, Mitotic Rate: High) - Signed by Enrique Mcnair MD on 9/26/2022         Reason for visit: GIST; adjuvant imatinib    Treatment:  10/2022 -    Imatinib  05/10/23    Resection     HPI: Mr. 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 He started neoadjuvant imatinib on 10/8/22 and resection on 5/10/23. He presents to medical oncology clinic for routine follow on adjuvant imatinib therapy.    Interval History:     Admitted 1/15/24 with abdominal pain. Described as a generalized burning pain - felt like stomach was 'on fire'. Was give IV PPI and discharged on omeprazole with resolution of pain. Plan for outpatient EGD. Last CT imaging 10/2024 was normal / stable.     Reports normal bowel movements; consistency can be variable. Appetits is  OK. No N/V. No blood in stool. No other concerns.    Following with Dr. Bain in urology. Has had elevated PSA of 6 along with microscopic hematuria. Had recent CT a/p without concering pathology - had small riht sided kideny st ones. Cystoscop was normal and MR prostate PIRADS 2. Plans to follow conservatively and started a prostate supplement - Theralogix Prostate 2.4.        Oncology History   Gastrointestinal stromal tumor (GIST) of stomach   7/18/2022 Imaging Significant Findings    Presented to [facility] with abdominal pain. CT a/p found a 6.7 x 5.6 x 7.2 cm cystic mass in the gastric fundus.     9/9/2022 Procedure    EGD/EUS fins a 6.2x8.0 cm cystic mass in the cardia. Pathology shows GIST with 'focal/frequent mitoses' although exact quantification could not be  determined.     KIT exon 11 [id number]     9/12/2022 Imaging Significant Findings    CT a/p redemonstrates 9.3x8 cm gastric mass with associated necrosis. No LAD. No liver lesions.     9/15/2022 Imaging Significant Findings    CT chest only shows incidental 3mm LLL nodule. No evidence of metastatic disease.     10/8/2022 -  Chemotherapy    Start imatinib     1/17/2023 Imaging Significant Findings    CT torso; Decreased size of gastric mass 6.5 x 2.5cm from 9.3 x 8.0cm.     1/23/2023 Tumor Conference       [facility] UGI MULTIDISCIPLINARY TUMOR BOARD  PATIENT REVIEW FORM   ____________________________________________________________    CLINIC #: [id number]  DATE: 1/23/2023    DIAGNOSIS: stomach GIST    PRESENTER: Xu    PATIENT SUMMARY:   This 68 y/o gentleman presented with epigastric abd pain in 7/2022. Imaging identified large 9cm cystic gastric mass with central necrosis. He underwent EUS with bx in 9/2022 and pathology revealed GIST. He started neoadj Gleevec per Dr. Mcnair in 10/2022. Recent restaging CT scan reviewed - good response to tx, lesion smaller, now measuring 6.7 cm.     BOARD RECOMMENDATIONS:   Continue neoadj Gleevec    CONSULT NEEDED:     [] Surgery    [] Hem/Onc    [] Rad/Onc    [] Dietary                 [] Social Service    [] Psychology       [] AES  [] Radiology     Clinical Stage: Tumor 3 Node(s) 0 Metastasis 0      GROUP STAGE:  [] O    [] 1A    [] IB    [] IIA    [] IIB     [x] IIIA     [] IIIB     [] IIIC    []IV  [] Local recurrence     [] Regional recurrence     [] Distant recurrence   Metastatic site(s): none         [x] [facility]beth'[facility] Treatment Guidelines reviewed and care planned is consistent with guidelines.         (i.e., NCCN, NCI, PD, ACO, AUA, etc.)    PRESENTATION AT CANCER CONFERENCE:         [x] Prospective    [] Retrospective     [] Follow-Up       4/12/2023 Imaging Significant Findings    CT torso:  1. Exam limited by motion artifact.  2. Slight decreased size  of gastric wall mass.  3. Stable mildly enlarged lymph node in the lindsay hepatis measuring 1.2 cm, stable compared to CT from 09/12/2022.  4. Few subcentimeter hepatic hypodensities, too small to characterize.  5. Thyroid nodules better characterized on CT from 01/17/2023.     7/7/2023 Imaging Significant Findings    CT torso 7/7/23  1. Status post partial gastrectomy.  No evidence of local disease recurrence.  No new suspicious lesion.  2. Stable upper limit of normal caliber periportal lymph nodes.  3. Stable subcentimeter hepatic hypodensities too small to characterize.     1/17/2024 Imaging Significant Findings    CT 1/17/24  Impression:  - Postsurgical changes of partial gastrectomy.  Thickening along the proximal stomach suture line increased compared to prior noting motion artifact on prior exam.  Findings concerning for local recurrence.  Direct visualization may be helpful.  - Stable right lung base 0.5 cm nodular opacity.  Oncological considerations will determine ongoing follow-up.  - Stable scattered hepatic hypodensities.  - Stable periportal prominent lymph nodes.     10/16/2024 Imaging Significant Findings    10/16/24 CT Torso  Impression:  - Postoperative change of partial gastrectomy.  No evidence of local disease recurrence or new metastatic disease.  - Stable nonspecific 0.4 cm solid nodule in the right lung.  Attention on follow-up.  - Stable scattered hepatic hypodensities, unchanged from multiple priors.            Physical Exam:   /61 (BP Location: Left arm, Patient Position: Sitting)   Pulse (!) 48   Temp 98 °F (36.7 °C) (Temporal)   Resp 18   Wt 79.4 kg (175 lb)   SpO2 100%   BMI 24.41 kg/m²      ECOG Performance Status: (foot note - ECOG PS provided by Eastern Cooperative Oncology Group) 1 - Symptomatic but completely ambulatory    Physical Exam  Constitutional:       General: He is not in acute distress.     Appearance: Normal appearance.   HENT:      Head: Normocephalic.       Mouth/Throat:      Mouth: Mucous membranes are moist.      Pharynx: Oropharynx is clear. No oropharyngeal exudate or posterior oropharyngeal erythema.   Eyes:      General: No scleral icterus.     Extraocular Movements: Extraocular movements intact.      Conjunctiva/sclera: Conjunctivae normal.      Pupils: Pupils are equal, round, and reactive to light.   Cardiovascular:      Rate and Rhythm: Normal rate and regular rhythm.   Pulmonary:      Effort: Pulmonary effort is normal. No respiratory distress.   Abdominal:      General: There is no distension.      Palpations: Abdomen is soft.      Tenderness: There is no abdominal tenderness.   Musculoskeletal:         General: No swelling. Normal range of motion.      Cervical back: Normal range of motion and neck supple.      Right lower leg: No edema.      Left lower leg: No edema.   Lymphadenopathy:      Cervical: No cervical adenopathy.   Skin:     General: Skin is warm and dry.      Coloration: Skin is not jaundiced.      Findings: No rash.   Neurological:      General: No focal deficit present.      Mental Status: He is alert and oriented to person, place, and time.      Motor: No weakness.   Psychiatric:         Mood and Affect: Mood normal.         Behavior: Behavior normal.         Thought Content: Thought content normal.           Labs:  Lab Visit on 01/27/2025   Component Date Value    WBC 01/27/2025 6.14     RBC 01/27/2025 3.98 (L)     Hemoglobin 01/27/2025 11.5 (L)     Hematocrit 01/27/2025 36.9 (L)     MCV 01/27/2025 93     MCH 01/27/2025 28.9     MCHC 01/27/2025 31.2 (L)     RDW 01/27/2025 14.6 (H)     Platelets 01/27/2025 186     MPV 01/27/2025 11.5     Gran # (ANC) 01/27/2025 3.3     Immature Grans (Abs) 01/27/2025 0.03     Sodium 01/27/2025 141     Potassium 01/27/2025 4.2     Chloride 01/27/2025 112 (H)     CO2 01/27/2025 19 (L)     Glucose 01/27/2025 71     BUN 01/27/2025 14     Creatinine 01/27/2025 1.4     Calcium 01/27/2025 8.7     Total Protein  01/27/2025 6.2     Albumin 01/27/2025 3.5     Total Bilirubin 01/27/2025 0.8     Alkaline Phosphatase 01/27/2025 56     AST 01/27/2025 24     ALT 01/27/2025 18     eGFR 01/27/2025 53.7 (A)     Anion Gap 01/27/2025 10                Imaging:     CT Chest Abdomen Pelvis With IV Contrast (XPD) Routine Oral Contrast  Narrative: EXAMINATION:  CT CHEST ABDOMEN PELVIS WITH IV CONTRAST (XPD)    CLINICAL HISTORY:  Metastatic disease evaluation; Gastrointestinal stromal tumor of stomach    TECHNIQUE:  Axial images of the chest, abdomen, and pelvis were acquired after the use of 100 cc [id number] IV contrast. 450 cc oral barium suspension also administered.  Coronal and sagittal reconstructions were also obtained    COMPARISON:  CT 04/24/2024, 04/12/2023, 07/07/2023, 01/17/2023    FINDINGS:  Thoracic soft tissues: Subcentimeter bilateral thyroid nodules, similar to CT 04/12/2023. No axillary lymphadenopathy.    Ginette/Mediastinum: No mediastinal or hilar lymphadenopathy.    Aorta: Thoracic aorta is normal in caliber and contour with no significant calcific atherosclerosis. Calcification of the aortic valve leaflets.    Heart: Normal in size. No pericardial effusion. Mild calcific coronary atherosclerosis.    Lungs: Stable 0.4 cm solid nodule at the right lung base (6-307).  No new or enlarging pulmonary nodules.  No pleural fluid or pneumothorax.    Esophagus: Unremarkable.    Stomach and duodenum: Postoperative change of partial gastrectomy.    Liver: Normal in size and contour.  Stable subcentimeter hypodensities, too small to characterize.    Gallbladder: No calcified gallstones.    Bile Ducts: No evidence of dilated ducts.    Pancreas: No mass or peripancreatic fat stranding.    Spleen: Unremarkable.    Adrenals: Unremarkable.    Kidneys/Ureters: Normal in size and location. Normal enhancement. 0.4 cm nonobstructing stone in the left kidney.  No ureteral dilatation.    Bladder: No evidence of wall thickening.    Reproductive  organs: Bilateral hydroceles.  Prostatomegaly.    Bowel/Mesentery: No obstruction or inflammation.  Appendix is normal.    Peritoneum: No intraperitoneal free air or fluid.    Lymph nodes: No lymphadenopathy.    Abdominal wall:  Small right fat and fluid containing inguinal hernia.    Vasculature: No aneurysm. No significant calcific atherosclerosis.    Bones: No acute fracture. Stable sclerotic foci in the pelvic bones, likely bone islands, unchanged from 04/12/2023.  Impression: Postoperative change of partial gastrectomy.  No evidence of local disease recurrence or new metastatic disease.    Stable nonspecific 0.4 cm solid nodule in the right lung.  Attention on follow-up.    Stable scattered hepatic hypodensities, unchanged from multiple priors.    Electronically signed by resident: Leeanna Up  Date:    10/16/2024  Time:    13:06    Electronically signed by: Diana Lockwood MD  Date:    10/16/2024  Time:    15:00              Diagnoses:       1. Gastrointestinal stromal tumor (GIST) of stomach    2. Primary hypertension    3. Malignant neoplasm of stomach, unspecified location    4. Thyroid nodule                        Assessment and Plan:     1. Gastrointestinal stromal tumor (GIST) of stomach  Overview:  Patient with gastric GIST and no evidence of luis or distant metastatic disease. 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 Started neoadjuvant imatinib 400mg po daily 10/08/2022 with good tolerance and clinical resposne. Underwent surgical resection 5/10/23. Plan to complete total of 3 years of perioperative therapy.    Assessment & Plan:  Remains JENNA on most recent imaging. Recently seen in ED with severe epigastric pain - quickly resolved with PPI therapy. Will discuss with GI about EGD. Otherwise plan to continue imatinib x3 years. Next imaging in 3 months.  - Referral to [facility] for consideration of EGD  - FU 3 months with scans  - Con't ompeprazole for abodminal pain    Orders:  -     Ambulatory referral/consult to  Endo Procedure ; Future; Expected date: 01/28/2025  -     CT Chest Abdomen Pelvis With IV Contrast (XPD) Routine Oral Contrast; Future; Expected date: 01/27/2025  -     CBC Oncology; Standing  -     Comprehensive Metabolic Panel; Standing  -     TSH; Standing    2. Primary hypertension  Overview:  Patient on nebivolol and valsartan. Previously on HCTZ; stopped due to low K levels      3. Malignant neoplasm of stomach, unspecified location    4. Thyroid nodule  Overview:  Biopsy 3/6/23 showed a benign follicular nodule.    Orders:  -     TSH; Standing                Route Chart for Scheduling    Med Onc Chart Routing      Follow up with physician 3 months.   Follow up with [facility]    Infusion scheduling note    Injection scheduling note    Labs CBC, CMP and TSH   Scheduling:  Preferred lab:  Lab interval:     Imaging CT chest abdomen pelvis      Pharmacy appointment    Other referrals                        Enrique Mcnair MD  Hematology/Oncology  [facility]

## 2025-01-27 NOTE — Clinical Note
SP GIST resection 05/2023. Doing well on imatinib. Recently seen in ED with severe abominal pain. Resolved with PPI therapy. They recommended outpatient EGD. Wanted to see if you all would be interested in getting him in. Thanks!

## 2025-01-27 NOTE — PATIENT INSTRUCTIONS
You have had a GI stromal tumor (GIST) of the stomach. This was resected surgically on 05/10/23. We are continuing on 3 years of imatinib therapy. No evidence of active cancer on most recent imaging.    Recent abdominal pain improved with omeprazole. Will plan repeat EGD to evaluate for ulcer etc. Also plan for repeat imaging in 3 months.

## 2025-02-24 ENCOUNTER — TELEPHONE (OUTPATIENT)
Dept: ENDOSCOPY | Facility: HOSPITAL | Age: 70
End: 2025-02-24
Payer: MEDICARE

## 2025-02-24 NOTE — TELEPHONE ENCOUNTER
"----- Message from Kimberlee sent at 2025  2:49 PM CST -----    ----- Message -----  From: Elma Barba MD  Sent: 1/15/2025  10:11 AM CST  To: [facility] Patients    Procedure: EGD    Diagnosis: Abdominal pain    Procedure Timin-12 weeks    #If within 4 weeks selected, please marissa as high priority#    #If greater than 12 weeks, please select "5-12 weeks" and delay sending until 2 months prior to requested date#     Provider: Any GI provider    Location: Any Site    Additional Scheduling Information: No scheduling concerns    Prep Specifications:N/A    Is the patient taking a GLP-1 Agonist:no    Have you attached a patient to this message: yes  "

## 2025-02-24 NOTE — TELEPHONE ENCOUNTER
Contacted the pt to schedule procedure. Pt stated he will called back because he has to talk with his wife first

## 2025-03-14 ENCOUNTER — TELEPHONE (OUTPATIENT)
Dept: ENDOSCOPY | Facility: HOSPITAL | Age: 70
End: 2025-03-14
Payer: MEDICARE

## 2025-03-14 DIAGNOSIS — D21.4 GIST (GASTROINTESTINAL STROMAL TUMOR), NON-MALIGNANT: ICD-10-CM

## 2025-03-14 DIAGNOSIS — C49.A4 GIST (GASTROINTESTINAL STROMA TUMOR), MALIGNANT, COLON: Primary | ICD-10-CM

## 2025-03-14 NOTE — TELEPHONE ENCOUNTER
Spoke to patient wife Bhavesh to schedule procedure(s) Upper Endoscopy (EGD)       Physician to perform procedure(s) Dr. SORIN Angeles  Date of Procedure (s) 3/18/2025  Arrival Time 11:30 AM  Time of Procedure(s) 12:30 AM   Location of Procedure(s) [place] [address]nd Floor  Type of Rx Prep sent to patient: Other  Instructions provided to patient via [facility]    Patient was informed on the following information and verbalized understanding. Screening questionnaire reviewed with patient and complete. If procedure requires anesthesia, a responsible adult needs to be present to accompany the patient home, patient cannot drive after receiving anesthesia. Appointment details are tentative, especially check-in time. Patient will receive a prep-op call 7 days prior to confirm check-in time for procedure. If applicable the patient should contact their pharmacy to verify Rx for procedure prep is ready for pick-up. Patient was advised to call the scheduling department at [phone] if pharmacy states no Rx is available. Patient was advised to call the endoscopy scheduling department if any questions or concerns arise.       Endoscopy Scheduling Department

## 2025-03-17 ENCOUNTER — TELEPHONE (OUTPATIENT)
Dept: ENDOSCOPY | Facility: HOSPITAL | Age: 70
End: 2025-03-17
Payer: MEDICARE

## 2025-03-17 NOTE — TELEPHONE ENCOUNTER
Spoke with patient about arrival time @ *. 1100  Covid test =    NPO status reviewed: Patient must have nothing to eat after midnight.  Pt may have CLEAR liquids ONLY until completely NPO 3 hrs @ *800    Medications: Do not take Insulin or oral diabetic medications the day of the procedure.  Take as prescribed: heart, seizure and blood pressure medication in the morning with a sip of water (less than an ounce).  Take any breathing medications and bring inhalers to hospital with you Leave all valuables and jewelry at home.     Wear comfortable clothes to procedure to change into hospital gown You cannot drive for 24 hours after your procedure because you will receive sedation for your procedure to make you comfortable.  A ride must be provided at discharge.

## 2025-03-18 ENCOUNTER — HOSPITAL ENCOUNTER (OUTPATIENT)
Facility: HOSPITAL | Age: 70
Discharge: HOME | End: 2025-03-18
Attending: INTERNAL MEDICINE | Admitting: INTERNAL MEDICINE
Payer: MEDICARE

## 2025-03-18 ENCOUNTER — ANESTHESIA (OUTPATIENT)
Dept: ENDOSCOPY | Facility: HOSPITAL | Age: 70
End: 2025-03-18
Payer: MEDICARE

## 2025-03-18 ENCOUNTER — ANESTHESIA EVENT (OUTPATIENT)
Dept: ENDOSCOPY | Facility: HOSPITAL | Age: 70
End: 2025-03-18
Payer: MEDICARE

## 2025-03-18 VITALS
SYSTOLIC BLOOD PRESSURE: 140 MMHG | WEIGHT: 170 LBS | RESPIRATION RATE: 22 BRPM | DIASTOLIC BLOOD PRESSURE: 69 MMHG | BODY MASS INDEX: 23.8 KG/M2 | OXYGEN SATURATION: 100 % | HEART RATE: 66 BPM | TEMPERATURE: 98 F | HEIGHT: 71 IN

## 2025-03-18 DIAGNOSIS — C49.A2 GASTROINTESTINAL STROMAL TUMOR (GIST) OF STOMACH: Primary | ICD-10-CM

## 2025-03-18 DIAGNOSIS — C49.A0 GASTROINTESTINAL STROMAL TUMOR (GIST): ICD-10-CM

## 2025-03-18 PROCEDURE — 37000009 HC ANESTHESIA EA ADD 15 MINS: Performed by: INTERNAL MEDICINE

## 2025-03-18 PROCEDURE — 43239 EGD BIOPSY SINGLE/MULTIPLE: CPT | Performed by: INTERNAL MEDICINE

## 2025-03-18 PROCEDURE — 43239 EGD BIOPSY SINGLE/MULTIPLE: CPT | Mod: ,,, | Performed by: INTERNAL MEDICINE

## 2025-03-18 PROCEDURE — 88305 TISSUE EXAM BY PATHOLOGIST: CPT | Mod: 26,,, | Performed by: PATHOLOGY

## 2025-03-18 PROCEDURE — 25000003 PHARM REV CODE 250: Performed by: STUDENT IN AN ORGANIZED HEALTH CARE EDUCATION/TRAINING PROGRAM

## 2025-03-18 PROCEDURE — 27201012 HC FORCEPS, HOT/COLD, DISP: Performed by: INTERNAL MEDICINE

## 2025-03-18 PROCEDURE — 63600175 PHARM REV CODE 636 W HCPCS: Performed by: STUDENT IN AN ORGANIZED HEALTH CARE EDUCATION/TRAINING PROGRAM

## 2025-03-18 PROCEDURE — 88342 IMHCHEM/IMCYTCHM 1ST ANTB: CPT | Mod: 26,,, | Performed by: PATHOLOGY

## 2025-03-18 PROCEDURE — 37000008 HC ANESTHESIA 1ST 15 MINUTES: Performed by: INTERNAL MEDICINE

## 2025-03-18 PROCEDURE — 88342 IMHCHEM/IMCYTCHM 1ST ANTB: CPT | Performed by: PATHOLOGY

## 2025-03-18 PROCEDURE — 88305 TISSUE EXAM BY PATHOLOGIST: CPT | Performed by: PATHOLOGY

## 2025-03-18 PROCEDURE — 25000003 PHARM REV CODE 250: Performed by: INTERNAL MEDICINE

## 2025-03-18 RX ORDER — DEXTROMETHORPHAN/PSEUDOEPHED 2.5-7.5/.8
DROPS ORAL
Status: COMPLETED | OUTPATIENT
Start: 2025-03-18 | End: 2025-03-18

## 2025-03-18 RX ORDER — TOPICAL ANESTHETIC 200 MG/ML
SPRAY DENTAL; PERIODONTAL
Status: DISCONTINUED | OUTPATIENT
Start: 2025-03-18 | End: 2025-03-18

## 2025-03-18 RX ORDER — PROPOFOL 10 MG/ML
VIAL (ML) INTRAVENOUS CONTINUOUS PRN
Status: DISCONTINUED | OUTPATIENT
Start: 2025-03-18 | End: 2025-03-18

## 2025-03-18 RX ORDER — RABEPRAZOLE SODIUM 20 MG/1
20 TABLET, DELAYED RELEASE ORAL
Qty: 60 TABLET | Refills: 5 | Status: SHIPPED | OUTPATIENT
Start: 2025-03-18 | End: 2025-09-14

## 2025-03-18 RX ORDER — LIDOCAINE HYDROCHLORIDE 20 MG/ML
INJECTION INTRAVENOUS
Status: DISCONTINUED | OUTPATIENT
Start: 2025-03-18 | End: 2025-03-18

## 2025-03-18 RX ORDER — SODIUM CHLORIDE 0.9 % (FLUSH) 0.9 %
10 SYRINGE (ML) INJECTION
Status: DISCONTINUED | OUTPATIENT
Start: 2025-03-18 | End: 2025-03-18 | Stop reason: HOSPADM

## 2025-03-18 RX ORDER — PROPOFOL 10 MG/ML
VIAL (ML) INTRAVENOUS
Status: DISCONTINUED | OUTPATIENT
Start: 2025-03-18 | End: 2025-03-18

## 2025-03-18 RX ORDER — SODIUM CHLORIDE 9 MG/ML
INJECTION, SOLUTION INTRAVENOUS CONTINUOUS
Status: DISCONTINUED | OUTPATIENT
Start: 2025-03-18 | End: 2025-03-18 | Stop reason: HOSPADM

## 2025-03-18 RX ADMIN — GLYCOPYRROLATE 0.2 MG: 0.2 INJECTION, SOLUTION INTRAMUSCULAR; INTRAVITREAL at 12:03

## 2025-03-18 RX ADMIN — PROPOFOL 150 MCG/KG/MIN: 10 INJECTION, EMULSION INTRAVENOUS at 01:03

## 2025-03-18 RX ADMIN — SODIUM CHLORIDE: 0.9 INJECTION, SOLUTION INTRAVENOUS at 12:03

## 2025-03-18 RX ADMIN — PROPOFOL 80 MG: 10 INJECTION, EMULSION INTRAVENOUS at 01:03

## 2025-03-18 RX ADMIN — LIDOCAINE HYDROCHLORIDE 100 MG: 20 INJECTION, SOLUTION INTRAVENOUS at 01:03

## 2025-03-18 RX ADMIN — TOPICAL ANESTHETIC 1 EACH: 200 SPRAY DENTAL; PERIODONTAL at 01:03

## 2025-03-18 NOTE — TRANSFER OF CARE
"Anesthesia Transfer of Care Note    Patient: Sami Zhang    Procedure(s) Performed: Procedure(s) (LRB):  EGD (ESOPHAGOGASTRODUODENOSCOPY) (N/A)    Patient location: GI    Anesthesia Type: MAC    Transport from OR: Transported from OR on room air with adequate spontaneous ventilation    Post pain: adequate analgesia    Post assessment: no apparent anesthetic complications    Post vital signs: stable    Level of consciousness: responds to stimulation    Nausea/Vomiting: no nausea/vomiting    Complications: none    Transfer of care protocol was followed      Last vitals: Visit Vitals  /84   Pulse (!) 51   Temp 36.9 °C (98.4 °F)   Resp 20   Ht 5' 11" (1.803 m)   Wt 77.1 kg (170 lb)   SpO2 100%   BMI 23.71 kg/m²     "

## 2025-03-18 NOTE — ANESTHESIA PREPROCEDURE EVALUATION
03/18/2025    Sami Zhang is a 69 y.o., male.      Pre-op Assessment    I have reviewed the Patient Summary Reports.     I have reviewed the Nursing Notes.       Review of Systems  Anesthesia Hx:  No problems with previous Anesthesia                Hematology/Oncology:  Hematology Normal   Oncology Normal                                   EENT/Dental:  EENT/Dental Normal           Cardiovascular:     Hypertension           hyperlipidemia                               Pulmonary:  Pulmonary Normal                       Renal/:  Renal/ Normal                 Hepatic/GI:  Hepatic/GI Normal       Gastrointestinal stromal tumor (GIST) of stomach             Musculoskeletal:  Arthritis               Neurological:  Neurology Normal                                      Endocrine:  Endocrine Normal            Dermatological:  Skin Normal    Psych:  Psychiatric Normal                    Physical Exam  General: Well nourished    Airway:  Mallampati: III / II  Mouth Opening: Normal  TM Distance: Normal  Tongue: Normal  Neck ROM: Normal ROM    Dental:  Intact        Anesthesia Plan  Type of Anesthesia, risks & benefits discussed:    Anesthesia Type: Gen Natural Airway  Intra-op Monitoring Plan: Standard ASA Monitors  Post Op Pain Control Plan: multimodal analgesia  Induction:  IV  Airway Plan: Direct  Informed Consent: Informed consent signed with the Patient and all parties understand the risks and agree with anesthesia plan.  All questions answered. Patient consented to blood products? No  ASA Score: 3  Day of Surgery Review of History & Physical: H&P Update referred to the surgeon/provider.    Ready For Surgery From Anesthesia Perspective.     .

## 2025-03-18 NOTE — H&P
Short Stay Endoscopy History and Physical    PCP - No, Primary Doctor  Referring Physician - Enrique Mcnair MD  [address] [place] [address]    Procedure - EGD  ASA - per anesthesia  Mallampati - per anesthesia  History of Anesthesia problems - per anesthesia  Family history Anesthesia problems -  per anesthesia   Plan of anesthesia - per anesthesia    HPI:  This is a 69 y.o. male here for evaluation of: EGD for eval of abdo pains that improved in response to PPI therapy; h/o GIST s/p partial gastrectomy/resection    Reflux - no  Dysphagia - no  Abdominal pain - Improved  Diarrhea - no    ROS:  Constitutional: No fevers, chills, No weight loss  CV: No chest pain  Pulm: No cough, No shortness of breath  Ophtho: No vision changes  GI: see HPI  Derm: No rash    Medical History:  has a past medical history of Anemia, Arthritis, Back pain, Gastrointestinal stromal tumor (GIST) of stomach, GIST (gastrointestinal stromal tumor), malignant (09/09/2022), Gout (10/08/2015), Hyperlipidemia, Hypertension, Hypokalemia (5/26/2023), Laryngeal nodule (7/5/2016), and Stomach cancer.    Surgical History:  has a past surgical history that includes NONE (N/A, 10/08/2015); Esophagogastroduodenoscopy (N/A, 09/09/2022); Endoscopic ultrasound of upper gastrointestinal tract (N/A, 09/09/2022); Colonoscopy (N/A, 12/15/2022); Knee surgery (Bilateral); Esophagogastroduodenoscopy (N/A, 4/21/2023); Robot-assisted surgical removal of stomach using da Freddie Xi (N/A, 5/10/2023); Esophagogastroduodenoscopy (N/A, 5/10/2023); and Esophagogastroduodenoscopy (N/A, 5/23/2024).    Family History: family history includes Other in an other family member; Stroke (age of onset: 60) in his sister..    Social History:  reports that he has never smoked. He has been exposed to tobacco smoke. He has never used smokeless tobacco. He reports that he does not currently use alcohol after a past usage of about 2.0 standard drinks of alcohol  per week. He reports that he does not use drugs.    Review of patient's allergies indicates:  No Known Allergies    Medications:   Prescriptions Prior to Admission[1]    Physical Exam:    Vital Signs:   Vitals:    03/18/25 1213   BP: (!) 170/82   Pulse: (!) 51   Resp: 20   Temp: 98.4 °F (36.9 °C)       General Appearance: Well appearing in no acute distress    Labs:  Lab Results   Component Value Date    WBC 6.14 01/27/2025    HGB 11.5 (L) 01/27/2025    HCT 36.9 (L) 01/27/2025     01/27/2025    ALT 18 01/27/2025    AST 24 01/27/2025     01/27/2025    K 4.2 01/27/2025     (H) 01/27/2025    CREATININE 1.4 01/27/2025    BUN 14 01/27/2025    CO2 19 (L) 01/27/2025    INR 1.0 07/01/2016       I have explained the risks and benefits of this endoscopic procedure to the patient including but not limited to bleeding, inflammation, infection, perforation, missing a lesion and death.      Aquiles Angeles MD         [1]   Medications Prior to Admission   Medication Sig Dispense Refill Last Dose/Taking    allopurinoL (ZYLOPRIM) 300 MG tablet Take 300 mg by mouth.   3/17/2025    CALCIUM CARBONATE/VITAMIN D3 (VITAMIN D-3 ORAL) Take 10,000 Units by mouth once a week.   3/17/2025    calcium carbonate/vitamin D3 (VITAMIN D-3 ORAL) Take 1 capsule by mouth every 7 days.   3/17/2025    icosapent ethyL (VASCEPA) 0.5 gram Cap Take 2 capsules by mouth 2 (two) times daily.   3/17/2025    imatinib (GLEEVEC) 400 MG Tab Take 1 tablet (400 mg total) by mouth once daily. 30 tablet 11 3/17/2025    multivitamin capsule Take 1 capsule by mouth once daily.   3/17/2025    omeprazole (PRILOSEC) 20 MG capsule Take 2 capsules (40 mg total) by mouth once daily. 180 capsule 3 3/17/2025    potassium chloride SA (K-DUR,KLOR-CON) 20 MEQ tablet Take 2 tablets (40 mEq total) by mouth once daily. 60 tablet 5 3/17/2025    rosuvastatin (CRESTOR) 20 MG tablet Take 20 mg by mouth once daily.   3/17/2025    thiamine 100 MG tablet Take 100 mg by  mouth.   3/17/2025    valsartan (DIOVAN) 160 MG tablet Take 1 tablet (160 mg total) by mouth once daily. 30 tablet 11 3/17/2025    VASCEPA 1 gram Cap Take 2 capsules by mouth 2 (two) times daily.   3/17/2025    vitamin D 1000 units Tab Take 10,000 Units by mouth once a week.   3/17/2025    allantoin/onion/peg/water (SCAR GEL TOP) APPLY 1/2 GRAM (1 PUMP) TO AFFECTED AREAS TWICE DAILY.       dexAMETHasone (DECADRON) 4 mg/mL injection    Unknown    diflunisaL (DOLOBID) 500 mg Tab TK 1 T PO  BID WITH FOOD       flu vac 2019 [id number],PF, 45 mcg (15 mcg x 3)/0.5 mL Syrg ADM 0.5ML IM UTD       hydrOXYzine HCL (ATARAX) 25 MG tablet Take 1 tablet (25 mg total) by mouth 3 (three) times daily as needed for Itching. 90 tablet 0 Unknown    LIDOcaine-TETRAcaine 7-7 % Crea        loperamide (IMODIUM A-D) 2 mg Tab Take two tabs at the onset of loose stools.  Then take one tab after every subsequent loose stool. 90 tablet 11     midazolam (VERSED) 1 mg/mL injection        nebivolol (BYSTOLIC) 10 MG Tab Take 10 mg by mouth once daily.       OMNIPAQUE 350 350 mg iodine/mL Soln injection        pneumoc 13-kamille conj-dip cr,PF, (PREVNAR 13, PF,) 0.5 mL Syrg ADM 0.5ML IM UTD

## 2025-03-18 NOTE — ANESTHESIA POSTPROCEDURE EVALUATION
Anesthesia Post Evaluation    Patient: Sami Zhang    Procedure(s) Performed: Procedure(s) (LRB):  EGD (ESOPHAGOGASTRODUODENOSCOPY) (N/A)    Final Anesthesia Type: general      Patient location during evaluation: PACU  Patient participation: Yes- Able to Participate  Level of consciousness: awake and alert  Post-procedure vital signs: reviewed and stable  Pain management: adequate  Airway patency: patent    PONV status at discharge: No PONV  Anesthetic complications: no      Cardiovascular status: blood pressure returned to baseline  Respiratory status: unassisted  Hydration status: euvolemic            Vitals Value Taken Time   /70 03/18/25 13:26   Temp 36.8 °C (98.2 °F) 03/18/25 13:26   Pulse 75 03/18/25 13:26   Resp 11 03/18/25 13:26   SpO2 100 % 03/18/25 13:26         No case tracking events are documented in the log.      Pain/Ayde Score: Ayde Score: 10 (3/18/2025  1:26 PM)

## 2025-03-18 NOTE — PROVATION PATIENT INSTRUCTIONS
Discharge Summary/Instructions after an Endoscopic Procedure  Patient Name: Sami Zhang  Patient MRN: [id number]  Patient YOB: 1955 Tuesday, March 18, 2025  Aquiles Angeles MD  Dear patient,  As a result of recent federal legislation (The Federal Cures Act), you may   receive lab or pathology results from your procedure in your [facility]   account before your physician is able to contact you. Your physician or   their representative will relay the results to you with their   recommendations at their soonest availability.  Thank you,  Your health is very important to us during the Covid Crisis. Following your   procedure today, you will receive a daily text for 2 weeks asking about   signs or symptoms of Covid 19.  Please respond to this text when you   receive it so we can follow up and keep you as safe as possible.   RESTRICTIONS:  During your procedure today, you received medications for sedation.  These   medications may affect your judgment, balance and coordination.  Therefore,   for 24 hours, you have the following restrictions:   - DO NOT drive a car, operate machinery, make legal/financial decisions,   sign important papers or drink alcohol.    ACTIVITY:  Today: no heavy lifting, straining or running due to procedural   sedation/anesthesia.  The following day: return to full activity including work.  DIET:  Eat and drink normally unless instructed otherwise.     TREATMENT FOR COMMON SIDE EFFECTS:  - Mild abdominal pain, nausea, belching, bloating or excessive gas:  rest,   eat lightly and use a heating pad.  - Sore Throat: treat with throat lozenges and/or gargle with warm salt   water.  - Because air was used during the procedure, expelling large amounts of air   from your rectum or belching is normal.  - If a bowel prep was taken, you may not have a bowel movement for 1-3 days.    This is normal.  SYMPTOMS TO WATCH FOR AND REPORT TO YOUR PHYSICIAN:  1. Abdominal pain or bloating, other than gas  cramps.  2. Chest pain.  3. Back pain.  4. Signs of infection such as: chills or fever occurring within 24 hours   after the procedure.  5. Rectal bleeding, which would show as bright red, maroon, or black stools.   (A tablespoon of blood from the rectum is not serious, especially if   hemorrhoids are present.)  6. Vomiting.  7. Weakness or dizziness.  GO DIRECTLY TO THE NEAREST EMERGENCY ROOM IF YOU HAVE ANY OF THE FOLLOWING:      Difficulty breathing              Chills and/or fever over 101 F   Persistent vomiting and/or vomiting blood   Severe abdominal pain   Severe chest pain   Black, tarry stools   Bleeding- more than one tablespoon   Any other symptom or condition that you feel may need urgent attention  Your doctor recommends these additional instructions:  If any biopsies were taken, your doctors clinic will contact you in 1 to 2   weeks with any results.  - Patient has a contact number available for emergencies.  The signs and   symptoms of potential delayed complications were discussed with the   patient.  Return to normal activities tomorrow.  Written discharge   instructions were provided to the patient.   - Discharge patient to home (ambulatory).   - Resume previous diet.   - Continue present medications.   - Use Aciphex (rabeprazole) 20 mg PO BID taken twice daily 45min before   breakfast and 45min before dinner, for at least next 6 months to allow   healing of duodenal ulcer, and then back to daily. Patient is a [id number]   rapid metabolizer of PPIs per patient's pharmacogenetics panel testing, so   will switch to rabeprazole in this setting.  - Return to referring physician.   - Await pathology results. If Ashley's esophagus (intestinal metaplasia) is   diagnosed per path results, then next EGD with general GI for surveillance   biopsies can be in 12 months.  For questions, problems or results please call your physician - Aquiles Angeles MD.  EMERGENCY PHONE NUMBER: [phone],  LAB RESULTS:  [phone]  IF A COMPLICATION OR EMERGENCY SITUATION ARISES AND YOU ARE UNABLE TO REACH   YOUR PHYSICIAN - GO DIRECTLY TO THE EMERGENCY ROOM.  Aquiles Angeles MD  3/18/2025 1:37:52 PM  This report has been verified and signed electronically.  Dear patient,  As a result of recent federal legislation (The Federal Cures Act), you may   receive lab or pathology results from your procedure in your [facility]   account before your physician is able to contact you. Your physician or   their representative will relay the results to you with their   recommendations at their soonest availability.  Thank you,  PROVATION

## 2025-03-20 ENCOUNTER — TELEPHONE (OUTPATIENT)
Dept: GASTROENTEROLOGY | Facility: CLINIC | Age: 70
End: 2025-03-20
Payer: MEDICARE

## 2025-03-20 ENCOUNTER — RESULTS FOLLOW-UP (OUTPATIENT)
Dept: GASTROENTEROLOGY | Facility: HOSPITAL | Age: 70
End: 2025-03-20
Payer: MEDICARE

## 2025-03-20 LAB
COMMENT: NORMAL
FINAL PATHOLOGIC DIAGNOSIS: NORMAL
GROSS: NORMAL
Lab: NORMAL

## 2025-03-20 RX ORDER — BISMUTH SUBCITRATE POTASSIUM, METRONIDAZOLE AND TETRACYCLINE HYDROCHLORIDE 140; 125; 125 MG/1; MG/1; MG/1
3 CAPSULE ORAL
Qty: 120 CAPSULE | Refills: 0 | Status: SHIPPED | OUTPATIENT
Start: 2025-03-20 | End: 2025-03-30

## 2025-03-20 NOTE — TELEPHONE ENCOUNTER
----- Message from Med Assistant Jose sent at 3/20/2025  2:16 PM CDT -----  Aquiles Angeles MD to Enrique Mcnair MD · Me · [facility] Clinical Support Staff  (Selected Message)  3/20/25  1:29 PMResult NotePatient has H. Pylori gastritis. Needs quadruple therapy, I can order Pylera to add to the PPI regimen I started for him. Please have general GI (CONRADO or MD or fellow) see him in clinic in next 4-6 weeks to review, as he will need testing to confirm eradication in the form of either H. Pylori stool Ag or the breath test about 4 weeks after he's completed therapy. Thanks

## 2025-04-15 ENCOUNTER — HOSPITAL ENCOUNTER (OUTPATIENT)
Dept: RADIOLOGY | Facility: HOSPITAL | Age: 70
Discharge: HOME | End: 2025-04-15
Attending: HOSPITALIST
Payer: MEDICARE

## 2025-04-15 DIAGNOSIS — C49.A2 GASTROINTESTINAL STROMAL TUMOR (GIST) OF STOMACH: ICD-10-CM

## 2025-04-15 PROCEDURE — 74177 CT ABD & PELVIS W/CONTRAST: CPT | Mod: 26,,, | Performed by: INTERNAL MEDICINE

## 2025-04-15 PROCEDURE — 25500020 PHARM REV CODE 255: Performed by: HOSPITALIST

## 2025-04-15 PROCEDURE — 71260 CT THORAX DX C+: CPT | Mod: 26,,, | Performed by: INTERNAL MEDICINE

## 2025-04-15 PROCEDURE — 74177 CT ABD & PELVIS W/CONTRAST: CPT | Mod: TC

## 2025-04-15 RX ADMIN — IOHEXOL 75 ML: 350 INJECTION, SOLUTION INTRAVENOUS at 09:04

## 2025-04-16 ENCOUNTER — OFFICE VISIT (OUTPATIENT)
Dept: GASTROENTEROLOGY | Facility: CLINIC | Age: 70
End: 2025-04-16
Payer: MEDICARE

## 2025-04-16 VITALS
WEIGHT: 177.69 LBS | HEIGHT: 71 IN | HEART RATE: 58 BPM | SYSTOLIC BLOOD PRESSURE: 128 MMHG | BODY MASS INDEX: 24.88 KG/M2 | DIASTOLIC BLOOD PRESSURE: 72 MMHG

## 2025-04-16 DIAGNOSIS — A04.8 H. PYLORI INFECTION: Primary | ICD-10-CM

## 2025-04-16 PROCEDURE — 99999 PR PBB SHADOW E&M-EST. PATIENT-LVL V: CPT | Mod: PBBFAC,GC,,

## 2025-04-16 RX ORDER — HYDROCHLOROTHIAZIDE 12.5 MG/1
12.5 CAPSULE ORAL
COMMUNITY
Start: 2025-03-13

## 2025-04-16 RX ORDER — TAMSULOSIN HYDROCHLORIDE 0.4 MG/1
1 CAPSULE ORAL
COMMUNITY
Start: 2025-03-27

## 2025-04-16 RX ORDER — RABEPRAZOLE SODIUM 20 MG/1
20 TABLET, DELAYED RELEASE ORAL 2 TIMES DAILY
Qty: 28 TABLET | Refills: 0 | Status: SHIPPED | OUTPATIENT
Start: 2025-04-16 | End: 2025-05-01

## 2025-04-16 RX ORDER — METRONIDAZOLE 500 MG/1
500 TABLET ORAL 3 TIMES DAILY
Qty: 42 TABLET | Refills: 0 | Status: SHIPPED | OUTPATIENT
Start: 2025-04-16 | End: 2025-05-01

## 2025-04-16 RX ORDER — DOXYCYCLINE 100 MG/1
100 CAPSULE ORAL 2 TIMES DAILY
Qty: 28 CAPSULE | Refills: 0 | Status: SHIPPED | OUTPATIENT
Start: 2025-04-16 | End: 2025-05-01

## 2025-04-16 NOTE — PROGRESS NOTES
"GENERAL GI PATIENT INTAKE:    COVID symptoms in the last 7 days (runny nose, sore throat, congestion, cough, fever): No  PCP: No, Primary Doctor  If not PCP-  number given to establish [phone]: N/A    ALLERGIES REVIEWED:  Yes    CHIEF COMPLAINT:    Chief Complaint   Patient presents with    H.pylori follow up       VITAL SIGNS:  /72 (Patient Position: Sitting)   Pulse (!) 58   Ht 5' 11" (1.803 m)   Wt 80.6 kg (177 lb 11.1 oz)   BMI 24.78 kg/m²      Change in medical, surgical, family or social history:  Reviewed by MD      REVIEWED MEDICATION LIST RECONCILED INCLUDING ABOVE MEDS:  Yes      "

## 2025-04-16 NOTE — PATIENT INSTRUCTIONS
GI MA team- Please tell patient that their EGD pathology is positive for and H. Pylori infection and recommend Quadruple therapy treatment for H. Pylori with:    Omeprazole 20mg 12 hours for 14 days  Doxycycline 100mg every 12 hours for 14 days  Metronidazole 250mg every 6 hours for 14 days  Bismuth subsalicylate 524mg every 6 hours for 14 days.     Don't drink alcohol on these medicines. Take after meals and drink plenty of water to make sure all pills clear esophagus.      Please mail patient UpToDate patient information on H. Pylori and have patient read this prior to beginning treatment.    GI MA team - please order stool for H. Pylori Antigen in 12 weeks.    Please tell patient to check for H. Pylori eradiation we would like to check stool sample in 12 weeks which tells us if H. Pylori has been successfully eradicated with the prior treatment medications.      H. Pylori antigen test which tells us if H. Pylori has been successfully eradicated with the prior treatment medications, and please tell patient that Stool H. Pylori antigen needs to be done off the following medications for the following amount of time:    1. Off all Antibiotics for 4 (Four) weeks before stool collection.      2. Off all Proton Pump Inhibitors medications for 2 (Two) weeks before collecting stool for H. Pylori Antigen.    Nexium (esomeprazole)  Prilosec (omeprazole)   Protonix (pantoprazole)  Prevacid (lansoprazole)  Aciphex (rabeprazole)  Dexilant (dexlansoprazole)    Zegerid (omeprazole and sodium bicarbonate)    3. Off all H2 blockers medications for 2 (Two) weeks before collecting stool for H. Pylori Antigen:    Zantac (ranitidine)  Tagamet (cimetadine)  Axid (nizatidine)   Pepcid (famotidine)    4. Off Pepto-Bismol for 4 (four) weeks prior to collecting stool for H. Pylori Antigen.

## 2025-04-16 NOTE — PROGRESS NOTES
[facility] NOTE    Name: Sami Zhang  : 1955  Date of Service: 2025   PCP: No, Primary Doctor    Reason for visit:   Chief Complaint   Patient presents with    H.pylori follow up       HPI:     The patient is a 69 year old male here who presents to GI clinic for H.pylori treatment/follow up.  Patient underwent EGD for just surveillance with Dr. Angeles on 2025 (see report below).  EGD notable for non-bleeding, small duodenal ulcer in the bulb with no stigmata of bleeding. Path negative for Ashley's esophagus however positive for H. Pylori.  Patient already on AcipHex for GERD, patient contacted and sent Rx for Pylera in addition to the AcipHex for treatment of H pylori.  Patient reports having issues with obtaining pylori due to insurance/pharmacy issues.  He feels well in his usual state of health today.    Most Recent Upper Endoscopy:   EGD 3/18/25  Impression:            - [place]-colored mucosa suspicious for possible                          short-segment Ashley's esophagus and classified                          as Ashley's stage C0-M1 per [place] criteria, if                          pathology confirms intestinal metaplasia. Biopsied.                          - Esophagogastric landmarks identified.                          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                          - Erythematous mucosa in the gastric body,                          incisura and antrum. Biopsied.                          - Subtle post-surgical deformity in the gastric                          fundus region from prior resection of GIST.                          - Non-bleeding, small duodenal ulcer in the bulb                          with no stigmata of bleeding. Gastric biopsies                          performed to eval for  possible H. pylori, as above.                          - Normal second portion of the duodenum.   Recommendation:        - Patient has a contact number available for                          emergencies. The signs and symptoms of potential                          delayed complications were discussed with the                          patient. Return to normal activities tomorrow.                          Written discharge instructions were provided to                          the patient.                          - Discharge patient to home (ambulatory).                          - Resume previous diet.                          - Continue present medications.                          - Use Aciphex (rabeprazole) 20 mg PO BID taken                          twice daily 45min before breakfast and 45min                          before dinner, for at least next 6 months to allow                          healing of duodenal ulcer, and then back to daily.                          Patient is a [id number] rapid metabolizer of PPIs per                          patient's pharmacogenetics panel testing, so will                          switch to rabeprazole in this setting.                          - Return to referring physician.                          - Await pathology results. If Ashley's esophagus                          (intestinal metaplasia) is diagnosed per path                          results, then next EGD with general GI for                          surveillance biopsies can be in 12 months.     Most Recent Colonoscopy:   Colon 12/15/2022  Impression:            - Seven small colon polyps were resected and                          retrieved.   Recommendation:        - Patient has a contact number available for                          emergencies. The signs and symptoms of potential                          delayed complications were discussed with the                          patient. Return to normal activities  "tomorrow.                          Written discharge instructions were provided to                          the patient.                          - Discharge patient to home.                          - Resume previous diet.                          - Continue present medications.                          - Await pathology results.                          - Repeat colonoscopy in 3 years for surveillance.     Review of Systems:   ROS - negative except for otherwise stated in HPI      Medications: Current Medications[1]     Past medical history, past surgical history, family history, allergies, and social history reviewed and updated in EMR.     Vitals:   Vitals:    04/16/25 1524   BP: 128/72   Patient Position: Sitting   Pulse: (!) 58   Weight: 80.6 kg (177 lb 11.1 oz)   Height: 5' 11" (1.803 m)     Body mass index is 24.78 kg/m².    Physical Exam  Constitutional:  not in acute distress and well developed  HENT: Head: Normal, normocephalic.  Eyes: conjunctiva clear and sclera nonicteric  GI: soft, non-tender, nondistended   Skin: normal color on visualized skin  Neurological: alert, oriented x3  Psychiatric: mood and affect are within normal limits, pt is a good historian; no memory problems were noted      Assessment:   1. H. pylori infection  doxycycline (MONODOX) 100 MG capsule    bismuth subsalicylate (DIOTAME INSTYDOSE) 524 mg/30 mL suspension packet    metroNIDAZOLE (FLAGYL) 500 MG tablet    RABEprazole (ACIPHEX) 20 mg tablet    H. pylori antigen, stool        Discussed extensively regimen for quadruple therapy including each individual medication.  Also discussed parameters for H pylori eradication testing.  Medications to be sent down to [facility] pharmacy, patient and patient's wife expressed understanding of plan.    Plan:   - Quadruple therapy for 14 days  - H pylori stool antigen in 3 months, patient sent with collection hat    Disposition: Follow-up in 3 months after eradication testing, due " for colonoscopy later this year.    Discussed with staff attending. Attestation to follow.     Geovnana Sauceda MD  Gastroenterology Fellow PGY-IV  [facility]           [1]   Current Outpatient Medications:     allopurinoL (ZYLOPRIM) 300 MG tablet, Take 300 mg by mouth., Disp: , Rfl:     CALCIUM CARBONATE/VITAMIN D3 (VITAMIN D-3 ORAL), Take 10,000 Units by mouth once a week., Disp: , Rfl:     calcium carbonate/vitamin D3 (VITAMIN D-3 ORAL), Take 1 capsule by mouth every 7 days., Disp: , Rfl:     dexAMETHasone (DECADRON) 4 mg/mL injection, , Disp: , Rfl:     diflunisaL (DOLOBID) 500 mg Tab, TK 1 T PO  BID WITH FOOD, Disp: , Rfl:     flu vac 2019 [id number],PF, 45 mcg (15 mcg x 3)/0.5 mL Syrg, ADM 0.5ML IM UTD, Disp: , Rfl:     hydroCHLOROthiazide (MICROZIDE) 12.5 mg capsule, Take 12.5 mg by mouth., Disp: , Rfl:     hydrOXYzine HCL (ATARAX) 25 MG tablet, Take 1 tablet (25 mg total) by mouth 3 (three) times daily as needed for Itching., Disp: 90 tablet, Rfl: 0    icosapent ethyL (VASCEPA) 0.5 gram Cap, Take 2 capsules by mouth 2 (two) times daily., Disp: , Rfl:     imatinib (GLEEVEC) 400 MG Tab, Take 1 tablet (400 mg total) by mouth once daily., Disp: 30 tablet, Rfl: 11    multivitamin capsule, Take 1 capsule by mouth once daily., Disp: , Rfl:     nebivolol (BYSTOLIC) 10 MG Tab, Take 10 mg by mouth once daily., Disp: , Rfl:     potassium chloride SA (K-DUR,KLOR-CON) 20 MEQ tablet, Take 2 tablets (40 mEq total) by mouth once daily., Disp: 60 tablet, Rfl: 5    RABEprazole (ACIPHEX) 20 mg tablet, Take 1 tablet (20 mg total) by mouth 2 (two) times daily before meals. Take 45min before breakfast and 45min before dinner, Disp: 60 tablet, Rfl: 5    rosuvastatin (CRESTOR) 20 MG tablet, Take 20 mg by mouth once daily., Disp: , Rfl:     tamsulosin (FLOMAX) 0.4 mg Cap, Take 1 capsule by mouth., Disp: , Rfl:     valsartan (DIOVAN) 160 MG tablet, Take 1 tablet (160 mg total) by mouth once daily., Disp: 30 tablet,  Rfl: 11    VASCEPA 1 gram Cap, Take 2 capsules by mouth 2 (two) times daily., Disp: , Rfl:     vitamin D 1000 units Tab, Take 10,000 Units by mouth once a week., Disp: , Rfl:     allantoin/onion/peg/water (SCAR GEL TOP), APPLY 1/2 GRAM (1 PUMP) TO AFFECTED AREAS TWICE DAILY. (Patient not taking: Reported on 4/16/2025), Disp: , Rfl:     bismuth subsalicylate (DIOTAME INSTYDOSE) 524 mg/30 mL suspension packet, Take 30 mLs (524 mg total) by mouth 4 (four) times daily., Disp: 1680 mL, Rfl: 0    doxycycline (MONODOX) 100 MG capsule, Take 1 capsule (100 mg total) by mouth 2 (two) times a day. for 14 days, Disp: 28 capsule, Rfl: 0    LIDOcaine-TETRAcaine 7-7 % Crea, , Disp: , Rfl:     metroNIDAZOLE (FLAGYL) 500 MG tablet, Take 1 tablet (500 mg total) by mouth 3 (three) times daily. for 14 days, Disp: 42 tablet, Rfl: 0    midazolam (VERSED) 1 mg/mL injection, , Disp: , Rfl:     OMNIPAQUE 350 350 mg iodine/mL Soln injection, , Disp: , Rfl:     pneumoc 13-kamille conj-dip cr,PF, (PREVNAR 13, PF,) 0.5 mL Syrg, ADM 0.5ML IM UTD, Disp: , Rfl:     RABEprazole (ACIPHEX) 20 mg tablet, Take 1 tablet (20 mg total) by mouth 2 (two) times daily. for 14 days, Disp: 28 tablet, Rfl: 0    thiamine 100 MG tablet, Take 100 mg by mouth. (Patient not taking: Reported on 4/16/2025), Disp: , Rfl:

## 2025-04-17 ENCOUNTER — OFFICE VISIT (OUTPATIENT)
Dept: HEMATOLOGY/ONCOLOGY | Facility: CLINIC | Age: 70
End: 2025-04-17
Payer: MEDICARE

## 2025-04-17 VITALS
WEIGHT: 177.25 LBS | HEART RATE: 78 BPM | BODY MASS INDEX: 24.81 KG/M2 | HEIGHT: 71 IN | DIASTOLIC BLOOD PRESSURE: 65 MMHG | OXYGEN SATURATION: 98 % | RESPIRATION RATE: 18 BRPM | TEMPERATURE: 98 F | SYSTOLIC BLOOD PRESSURE: 142 MMHG

## 2025-04-17 DIAGNOSIS — C49.A2 GASTROINTESTINAL STROMAL TUMOR (GIST) OF STOMACH: Primary | ICD-10-CM

## 2025-04-17 PROCEDURE — 3077F SYST BP >= 140 MM HG: CPT | Mod: CPTII,S$GLB,, | Performed by: HOSPITALIST

## 2025-04-17 PROCEDURE — 99999 PR PBB SHADOW E&M-EST. PATIENT-LVL III: CPT | Mod: PBBFAC,,, | Performed by: HOSPITALIST

## 2025-04-17 PROCEDURE — 3288F FALL RISK ASSESSMENT DOCD: CPT | Mod: CPTII,S$GLB,, | Performed by: HOSPITALIST

## 2025-04-17 PROCEDURE — 3008F BODY MASS INDEX DOCD: CPT | Mod: CPTII,S$GLB,, | Performed by: HOSPITALIST

## 2025-04-17 PROCEDURE — 1126F AMNT PAIN NOTED NONE PRSNT: CPT | Mod: CPTII,S$GLB,, | Performed by: HOSPITALIST

## 2025-04-17 PROCEDURE — G2211 COMPLEX E/M VISIT ADD ON: HCPCS | Mod: S$GLB,,, | Performed by: HOSPITALIST

## 2025-04-17 PROCEDURE — 99214 OFFICE O/P EST MOD 30 MIN: CPT | Mod: S$GLB,,, | Performed by: HOSPITALIST

## 2025-04-17 PROCEDURE — 4010F ACE/ARB THERAPY RXD/TAKEN: CPT | Mod: CPTII,S$GLB,, | Performed by: HOSPITALIST

## 2025-04-17 PROCEDURE — 1159F MED LIST DOCD IN RCRD: CPT | Mod: CPTII,S$GLB,, | Performed by: HOSPITALIST

## 2025-04-17 PROCEDURE — 3078F DIAST BP <80 MM HG: CPT | Mod: CPTII,S$GLB,, | Performed by: HOSPITALIST

## 2025-04-17 PROCEDURE — 1101F PT FALLS ASSESS-DOCD LE1/YR: CPT | Mod: CPTII,S$GLB,, | Performed by: HOSPITALIST

## 2025-04-17 NOTE — ASSESSMENT & PLAN NOTE
Remains JENNA on imaging and recent EGD (notably with H pylori gastritis). Tolerating imatinib well. Plan to finish in October 2025.  - Con't imatinib  - FU 3 months for symptom check and labs  - Repeat imaging 10/2025  - FU with GI for management of H pylori gastritis; has yet to start quadruple therapy

## 2025-04-17 NOTE — PROGRESS NOTES
"MEDICAL ONCOLOGY FOLLOW-UP VISIT.     Best Contact Phone Number(s): [phone] (home) [phone] (work)     Cancer/Stage/TNM:    Cancer Staging   Gastrointestinal stromal tumor (GIST) of stomach  Staging form: Gastrointestinal Stromal Tumor - Gastric and Omental GIST, AJCC 8th Edition  - Clinical stage from 9/9/2022: Stage IIIA (cT3, cN0, cM0, Mitotic Rate: High) - Signed by Enrique Mcnair MD on 9/26/2022         Reason for visit: GIST; adjuvant imatinib    Treatment:  10/2022 -    Imatinib  05/10/23    Resection     HPI: Mr. 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 He started neoadjuvant imatinib on 10/8/22 and resection on 5/10/23. He presents to medical oncology clinic for routine follow on adjuvant imatinib therapy.    Interval History:     Had EGD 3/18/25. Notable Ashley's and  H pylori gastritis. Has not started quadruple therapy per GI. Denies abdominal pain. Appetite is OK. No N/V. No issues with imatinib. Has had more frequent loose stools. No hematochezia or melena. Has been taking fiber gummies. No CP, SOB or cough. Energy is good. No other concerns.    CT torso 4/15/25 was JENNA.       Physical Exam:   BP (!) 142/65 (BP Location: Right arm, Patient Position: Sitting)   Pulse 78   Temp 98 °F (36.7 °C) (Temporal)   Resp 18   Ht 5' 11" (1.803 m)   Wt 80.4 kg (177 lb 4 oz)   SpO2 98%   BMI 24.72 kg/m²      ECOG Performance Status: (foot note - ECOG PS provided by [employer]cology [employer]) 1 - Symptomatic but completely ambulatory    Physical Exam  Constitutional:       General: He is not in acute distress.     Appearance: Normal appearance.   HENT:      Head: Normocephalic.      Mouth/Throat:      Mouth: Mucous membranes are moist.      Pharynx: Oropharynx is clear. No oropharyngeal exudate or posterior oropharyngeal erythema.   Eyes:      General: No scleral icterus.     Extraocular Movements: Extraocular movements " intact.      Conjunctiva/sclera: Conjunctivae normal.      Pupils: Pupils are equal, round, and reactive to light.   Cardiovascular:      Rate and Rhythm: Normal rate and regular rhythm.   Pulmonary:      Effort: Pulmonary effort is normal. No respiratory distress.   Abdominal:      General: There is no distension.      Palpations: Abdomen is soft.      Tenderness: There is no abdominal tenderness.   Musculoskeletal:         General: No swelling. Normal range of motion.      Cervical back: Normal range of motion and neck supple.      Right lower leg: No edema.      Left lower leg: No edema.   Lymphadenopathy:      Cervical: No cervical adenopathy.   Skin:     General: Skin is warm and dry.      Coloration: Skin is not jaundiced.      Findings: No rash.   Neurological:      General: No focal deficit present.      Mental Status: He is alert and oriented to person, place, and time.      Motor: No weakness.   Psychiatric:         Mood and Affect: Mood normal.         Behavior: Behavior normal.         Thought Content: Thought content normal.           Labs:  No visits with results within 2 Day(s) from this visit.   Latest known visit with results is:   Lab Visit on 04/15/2025   Component Date Value    WBC 04/15/2025 5.03     RBC 04/15/2025 4.22 (L)     HGB 04/15/2025 12.0 (L)     HCT 04/15/2025 38.5 (L)     MCV 04/15/2025 91     MCH 04/15/2025 28.4     MCHC 04/15/2025 31.2 (L)     RDW 04/15/2025 15.0 (H)     Platelet Count 04/15/2025 196     MPV 04/15/2025 10.9     Neut # 04/15/2025 2.97     Imm Grans # 04/15/2025 0.01     WBC 04/15/2025 4.93     RBC 04/15/2025 4.32 (L)     HGB 04/15/2025 12.5 (L)     HCT 04/15/2025 39.1 (L)     MCV 04/15/2025 91     MCH 04/15/2025 28.9     MCHC 04/15/2025 32.0     RDW 04/15/2025 15.1 (H)     Platelet Count 04/15/2025 202     MPV 04/15/2025 10.9     Neut # 04/15/2025 2.92     Imm Grans # 04/15/2025 0.01     Sodium 04/15/2025 140     Potassium 04/15/2025 3.6     Chloride 04/15/2025 109      CO2 04/15/2025 26     Glucose 04/15/2025 86     BUN 04/15/2025 12     Creatinine 04/15/2025 1.3     Calcium 04/15/2025 9.1     Protein Total 04/15/2025 6.3     Albumin 04/15/2025 3.5     Bilirubin Total 04/15/2025 1.8 (H)     ALP 04/15/2025 60     AST 04/15/2025 29     ALT 04/15/2025 21     Anion Gap 04/15/2025 5 (L)     eGFR 04/15/2025 59 (L)     TSH 04/15/2025 2.022              Imaging:          Diagnoses:       1. Gastrointestinal stromal tumor (GIST) of stomach              Assessment and Plan:     1. Gastrointestinal stromal tumor (GIST) of stomach  Overview:  Patient with gastric GIST and no evidence of luis or distant metastatic disease. 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 Started neoadjuvant imatinib 400mg po daily 10/08/2022 with good tolerance and clinical resposne. Underwent surgical resection 5/10/23. Plan to complete total of 3 years of perioperative therapy.    Assessment & Plan:  Remains JENNA on imaging and recent EGD (notably with H pylori gastritis). Tolerating imatinib well. Plan to finish in October 2025.  - Con't imatinib  - FU 3 months for symptom check and labs  - Repeat imaging 10/2025  - FU with GI for management of H pylori gastritis; has yet to start quadruple therapy                Route Chart for Scheduling    Med Onc Chart Routing      Follow up with physician 3 months.   Follow up with CONRADO    Infusion scheduling note    Injection scheduling note    Labs CBC and CMP   Scheduling:  Preferred lab:  Lab interval:     Imaging    Pharmacy appointment    Other referrals                        Enrique Mcnair MD  Hematology/Oncology  [facility]

## 2025-04-19 NOTE — PROGRESS NOTES
I have seen the patient, reviewed Geovanna Sauceda MD  the GI fellow's history and physical, assessment, and plan. I have personally interviewed and examined the patient at bedside and I discussed the case with the GI fellow and I agree with the findings and except for the plan as documented in the fellow's note. Recommend.    Omeprazole 20mg 12 hours for 14 days  Doxycycline 100mg every 12 hours for 14 days  Metronidazole 250mg every 6 hours for 14 days  Bismuth subsalicylate 524mg every 6 hours for 14 days.

## 2025-04-28 DIAGNOSIS — I10 PRIMARY HYPERTENSION: ICD-10-CM

## 2025-04-28 RX ORDER — VALSARTAN 160 MG/1
160 TABLET ORAL DAILY
Qty: 30 TABLET | Refills: 11 | Status: SHIPPED | OUTPATIENT
Start: 2025-04-28 | End: 2026-04-28

## 2025-05-16 ENCOUNTER — NURSE TRIAGE (OUTPATIENT)
Dept: ADMINISTRATIVE | Facility: CLINIC | Age: 70
End: 2025-05-16
Payer: MEDICARE

## 2025-05-16 NOTE — TELEPHONE ENCOUNTER
Patient's wife, Bhavesh Zhang,  called for clarification regarding time frame for collection of patient's stool sample after the completion of his antibiotic regimen. Patient is under the care of [facility] and was diagnosed with H. Pylori Infection with antibiotic treatment beginning 4/16/25.     The following instructions (See Below) were reviewed with patient's wife regarding the collection of patient's stool sample post antibiotic treatment for H. Pylori infection. Patient's wife advised to refrigerate stool sample once collected, if collection is overnight, and deliver to the lab first thing the next morning. Patient's wife also advised to contact the [facility] for any additional questions. Patient's wife states understanding of care advice.     Instructions  GI MA team- Please tell patient that their EGD pathology is positive for and H. Pylori infection and recommend  Quadruple therapy treatment for H. Pylori with:    Omeprazole 20mg 12 hours for 14 days  Doxycycline 100mg every 12 hours for 14 days  Metronidazole 250mg every 6 hours for 14 days  Bismuth subsalicylate 524mg every 6 hours for 14 days.    Don't drink alcohol on these medicines. Take after meals and drink plenty of water to make sure all pills clear  esophagus.    Please mail patient UpToDate patient information on H. Pylori and have patient read this prior to beginning treatment.  GI MA team - please order stool for H. Pylori Antigen in 12 weeks.    Please tell patient to check for H. Pylori eradiation we would like to check stool sample in 12 weeks which tells us if H.  Pylori has been successfully eradicated with the prior treatment medications.    H. Pylori antigen test which tells us if H. Pylori has been successfully eradicated with the prior treatment medications,  and please tell patient that Stool H. Pylori antigen needs to be done off the following medications for the following  amount of time:    1. Off all  Antibiotics for 4 (Four) weeks before stool collection.    2. Off all Proton Pump Inhibitors medications for 2 (Two) weeks before collecting stool for H. Pylori Antigen.  Nexium (esomeprazole)  Prilosec (omeprazole)  Protonix (pantoprazole)  Prevacid (lansoprazole)  Aciphex (rabeprazole)  Dexilant (dexlansoprazole)  Zegerid (omeprazole and sodium bicarbonate)    3. Off all H2 blockers medications for 2 (Two) weeks before collecting stool for H. Pylori Antigen:  Zantac (ranitidine)  Tagamet (cimetadine)  Axid (nizatidine)  Pepcid (famotidine)    4. Off Pepto-Bismol for 4 (four) weeks prior to collecting stool for H. Pylori Antigen.    Reason for Disposition   Question about upcoming scheduled surgery, procedure or test, no triage required, and triager able to answer question    Additional Information   Negative: Caller is not with the adult (patient) and reporting urgent symptoms   Negative: Medicine question not related to refill or renewal   Negative: Requesting a renewal or refill of a medicine patient is currently taking   Negative: Questions or concerns about high blood pressure   Negative: Caller can't be reached by phone   Negative: Caller has already spoken to PCP (doctor or NP/PA) or another triager   Negative: Nursing judgment   Negative: Nursing judgment   Negative: Nursing judgment   Negative: Requesting lab results and adult stable (no new symptoms, not getting worse)   Negative: Requesting referral to a specialist   Negative: Questions about durable medical equipment ordered and triager unable to answer   Negative: Requesting regular office appointment and adult stable (no new symptoms, not getting worse)   Negative: Health information question, no triage required and triager able to answer question   Negative: General information question, no triage required and triager able to answer question    Protocols used: Information Only Call - No Triage-A-[place]

## 2025-05-25 ENCOUNTER — HOSPITAL ENCOUNTER (EMERGENCY)
Facility: HOSPITAL | Age: 70
Discharge: HOME | End: 2025-05-25
Attending: STUDENT IN AN ORGANIZED HEALTH CARE EDUCATION/TRAINING PROGRAM
Payer: MEDICARE

## 2025-05-25 VITALS
HEIGHT: 71 IN | RESPIRATION RATE: 16 BRPM | DIASTOLIC BLOOD PRESSURE: 74 MMHG | TEMPERATURE: 98 F | OXYGEN SATURATION: 100 % | BODY MASS INDEX: 24.5 KG/M2 | SYSTOLIC BLOOD PRESSURE: 128 MMHG | HEART RATE: 47 BPM | WEIGHT: 175 LBS

## 2025-05-25 DIAGNOSIS — M25.562 PAIN AND SWELLING OF KNEE, LEFT: Primary | ICD-10-CM

## 2025-05-25 DIAGNOSIS — M25.462 EFFUSION OF KNEE JOINT, LEFT: ICD-10-CM

## 2025-05-25 DIAGNOSIS — M25.462 PAIN AND SWELLING OF KNEE, LEFT: Primary | ICD-10-CM

## 2025-05-25 LAB
ABSOLUTE EOSINOPHIL (OHS): 0.2 K/UL
ABSOLUTE MONOCYTE (OHS): 0.39 K/UL (ref 0.3–1)
ABSOLUTE NEUTROPHIL COUNT (OHS): 2.47 K/UL (ref 1.8–7.7)
ALBUMIN SERPL BCP-MCNC: 3.5 G/DL (ref 3.5–5.2)
ALP SERPL-CCNC: 56 UNIT/L (ref 40–150)
ALT SERPL W/O P-5'-P-CCNC: 19 UNIT/L (ref 10–44)
ANION GAP (OHS): 6 MMOL/L (ref 8–16)
AST SERPL-CCNC: 25 UNIT/L (ref 11–45)
BASOPHILS # BLD AUTO: 0.04 K/UL
BASOPHILS NFR BLD AUTO: 0.9 %
BILIRUB SERPL-MCNC: 1.7 MG/DL (ref 0.1–1)
BUN SERPL-MCNC: 10 MG/DL (ref 8–23)
CALCIUM SERPL-MCNC: 8.7 MG/DL (ref 8.7–10.5)
CHLORIDE SERPL-SCNC: 114 MMOL/L (ref 95–110)
CO2 SERPL-SCNC: 23 MMOL/L (ref 23–29)
CREAT SERPL-MCNC: 1.2 MG/DL (ref 0.5–1.4)
CRP SERPL-MCNC: 0.6 MG/L
ERYTHROCYTE [DISTWIDTH] IN BLOOD BY AUTOMATED COUNT: 15 % (ref 11.5–14.5)
ERYTHROCYTE [SEDIMENTATION RATE] IN BLOOD BY PHOTOMETRIC METHOD: 10 MM/HR
GFR SERPLBLD CREATININE-BSD FMLA CKD-EPI: >60 ML/MIN/1.73/M2
GLUCOSE SERPL-MCNC: 89 MG/DL (ref 70–110)
HCT VFR BLD AUTO: 38 % (ref 40–54)
HGB BLD-MCNC: 11.6 GM/DL (ref 14–18)
IMM GRANULOCYTES # BLD AUTO: 0.01 K/UL (ref 0–0.04)
IMM GRANULOCYTES NFR BLD AUTO: 0.2 % (ref 0–0.5)
LYMPHOCYTES # BLD AUTO: 1.25 K/UL (ref 1–4.8)
MCH RBC QN AUTO: 28.1 PG (ref 27–31)
MCHC RBC AUTO-ENTMCNC: 30.5 G/DL (ref 32–36)
MCV RBC AUTO: 92 FL (ref 82–98)
NUCLEATED RBC (/100WBC) (OHS): 0 /100 WBC
PLATELET # BLD AUTO: 182 K/UL (ref 150–450)
PMV BLD AUTO: 11.2 FL (ref 9.2–12.9)
POTASSIUM SERPL-SCNC: 4.3 MMOL/L (ref 3.5–5.1)
PROT SERPL-MCNC: 6.3 GM/DL (ref 6–8.4)
RBC # BLD AUTO: 4.13 M/UL (ref 4.6–6.2)
RELATIVE EOSINOPHIL (OHS): 4.6 %
RELATIVE LYMPHOCYTE (OHS): 28.7 % (ref 18–48)
RELATIVE MONOCYTE (OHS): 8.9 % (ref 4–15)
RELATIVE NEUTROPHIL (OHS): 56.7 % (ref 38–73)
SODIUM SERPL-SCNC: 143 MMOL/L (ref 136–145)
URATE SERPL-MCNC: 5.2 MG/DL (ref 3.4–7)
WBC # BLD AUTO: 4.36 K/UL (ref 3.9–12.7)

## 2025-05-25 PROCEDURE — 86140 C-REACTIVE PROTEIN: CPT | Performed by: PHYSICIAN ASSISTANT

## 2025-05-25 PROCEDURE — 85025 COMPLETE CBC W/AUTO DIFF WBC: CPT | Performed by: PHYSICIAN ASSISTANT

## 2025-05-25 PROCEDURE — 80053 COMPREHEN METABOLIC PANEL: CPT | Performed by: PHYSICIAN ASSISTANT

## 2025-05-25 PROCEDURE — 99284 EMERGENCY DEPT VISIT MOD MDM: CPT | Mod: 25

## 2025-05-25 PROCEDURE — 84550 ASSAY OF BLOOD/URIC ACID: CPT | Performed by: PHYSICIAN ASSISTANT

## 2025-05-25 PROCEDURE — 85652 RBC SED RATE AUTOMATED: CPT | Performed by: PHYSICIAN ASSISTANT

## 2025-05-25 RX ORDER — HYDROCODONE BITARTRATE AND ACETAMINOPHEN 5; 325 MG/1; MG/1
1 TABLET ORAL EVERY 4 HOURS PRN
Qty: 15 TABLET | Refills: 0 | Status: SHIPPED | OUTPATIENT
Start: 2025-05-25 | End: 2025-05-28

## 2025-07-01 ENCOUNTER — LAB VISIT (OUTPATIENT)
Dept: LAB | Facility: HOSPITAL | Age: 70
End: 2025-07-01
Payer: MEDICARE

## 2025-07-01 DIAGNOSIS — A04.8 H. PYLORI INFECTION: ICD-10-CM

## 2025-07-01 PROCEDURE — 87338 HPYLORI STOOL AG IA: CPT

## 2025-07-02 LAB
H. PYLORI SURFACE ANTIGEN, INTERPRETATION (OHS): NEGATIVE
HELICOBACTER PYLORI SURFACE ANTIGEN (OHS): 0.13

## 2025-07-06 ENCOUNTER — RESULTS FOLLOW-UP (OUTPATIENT)
Dept: GASTROENTEROLOGY | Facility: CLINIC | Age: 70
End: 2025-07-06

## 2025-07-06 ENCOUNTER — PATIENT MESSAGE (OUTPATIENT)
Dept: GASTROENTEROLOGY | Facility: CLINIC | Age: 70
End: 2025-07-06
Payer: MEDICARE

## 2025-07-17 ENCOUNTER — OFFICE VISIT (OUTPATIENT)
Dept: HEMATOLOGY/ONCOLOGY | Facility: CLINIC | Age: 70
End: 2025-07-17
Payer: MEDICARE

## 2025-07-17 ENCOUNTER — LAB VISIT (OUTPATIENT)
Dept: LAB | Facility: HOSPITAL | Age: 70
End: 2025-07-17
Attending: HOSPITALIST
Payer: MEDICARE

## 2025-07-17 VITALS
HEART RATE: 52 BPM | SYSTOLIC BLOOD PRESSURE: 144 MMHG | RESPIRATION RATE: 17 BRPM | DIASTOLIC BLOOD PRESSURE: 66 MMHG | OXYGEN SATURATION: 99 % | TEMPERATURE: 97 F | WEIGHT: 185.5 LBS | BODY MASS INDEX: 29.11 KG/M2 | HEIGHT: 67 IN

## 2025-07-17 DIAGNOSIS — C49.A2 GASTROINTESTINAL STROMAL TUMOR (GIST) OF STOMACH: Primary | ICD-10-CM

## 2025-07-17 DIAGNOSIS — I10 PRIMARY HYPERTENSION: ICD-10-CM

## 2025-07-17 DIAGNOSIS — C49.A2 GASTROINTESTINAL STROMAL TUMOR (GIST) OF STOMACH: ICD-10-CM

## 2025-07-17 LAB
ABSOLUTE NEUTROPHIL COUNT (OHS): 2.87 K/UL (ref 1.8–7.7)
ALBUMIN SERPL BCP-MCNC: 3.7 G/DL (ref 3.5–5.2)
ALP SERPL-CCNC: 66 UNIT/L (ref 40–150)
ALT SERPL W/O P-5'-P-CCNC: 15 UNIT/L (ref 10–44)
ANION GAP (OHS): 5 MMOL/L (ref 8–16)
AST SERPL-CCNC: 24 UNIT/L (ref 11–45)
BILIRUB SERPL-MCNC: 1.3 MG/DL (ref 0.1–1)
BUN SERPL-MCNC: 11 MG/DL (ref 8–23)
CALCIUM SERPL-MCNC: 8.6 MG/DL (ref 8.7–10.5)
CHLORIDE SERPL-SCNC: 113 MMOL/L (ref 95–110)
CO2 SERPL-SCNC: 25 MMOL/L (ref 23–29)
CREAT SERPL-MCNC: 1.5 MG/DL (ref 0.5–1.4)
ERYTHROCYTE [DISTWIDTH] IN BLOOD BY AUTOMATED COUNT: 14.6 % (ref 11.5–14.5)
GFR SERPLBLD CREATININE-BSD FMLA CKD-EPI: 49 ML/MIN/1.73/M2
GLUCOSE SERPL-MCNC: 94 MG/DL (ref 70–110)
HCT VFR BLD AUTO: 37 % (ref 40–54)
HGB BLD-MCNC: 11.7 GM/DL (ref 14–18)
IMM GRANULOCYTES # BLD AUTO: 0.01 K/UL (ref 0–0.04)
MCH RBC QN AUTO: 28 PG (ref 27–31)
MCHC RBC AUTO-ENTMCNC: 31.6 G/DL (ref 32–36)
MCV RBC AUTO: 89 FL (ref 82–98)
PLATELET # BLD AUTO: 161 K/UL (ref 150–450)
PMV BLD AUTO: 12.4 FL (ref 9.2–12.9)
POTASSIUM SERPL-SCNC: 3.8 MMOL/L (ref 3.5–5.1)
PROT SERPL-MCNC: 6.1 GM/DL (ref 6–8.4)
RBC # BLD AUTO: 4.18 M/UL (ref 4.6–6.2)
SODIUM SERPL-SCNC: 143 MMOL/L (ref 136–145)
WBC # BLD AUTO: 4.67 K/UL (ref 3.9–12.7)

## 2025-07-17 PROCEDURE — 3078F DIAST BP <80 MM HG: CPT | Mod: CPTII,S$GLB,, | Performed by: HOSPITALIST

## 2025-07-17 PROCEDURE — 3077F SYST BP >= 140 MM HG: CPT | Mod: CPTII,S$GLB,, | Performed by: HOSPITALIST

## 2025-07-17 PROCEDURE — G2211 COMPLEX E/M VISIT ADD ON: HCPCS | Mod: S$GLB,,, | Performed by: HOSPITALIST

## 2025-07-17 PROCEDURE — 99999 PR PBB SHADOW E&M-EST. PATIENT-LVL III: CPT | Mod: PBBFAC,,, | Performed by: HOSPITALIST

## 2025-07-17 PROCEDURE — 4010F ACE/ARB THERAPY RXD/TAKEN: CPT | Mod: CPTII,S$GLB,, | Performed by: HOSPITALIST

## 2025-07-17 PROCEDURE — 82040 ASSAY OF SERUM ALBUMIN: CPT

## 2025-07-17 PROCEDURE — 3288F FALL RISK ASSESSMENT DOCD: CPT | Mod: CPTII,S$GLB,, | Performed by: HOSPITALIST

## 2025-07-17 PROCEDURE — 1126F AMNT PAIN NOTED NONE PRSNT: CPT | Mod: CPTII,S$GLB,, | Performed by: HOSPITALIST

## 2025-07-17 PROCEDURE — 99214 OFFICE O/P EST MOD 30 MIN: CPT | Mod: S$GLB,,, | Performed by: HOSPITALIST

## 2025-07-17 PROCEDURE — 1101F PT FALLS ASSESS-DOCD LE1/YR: CPT | Mod: CPTII,S$GLB,, | Performed by: HOSPITALIST

## 2025-07-17 PROCEDURE — 85027 COMPLETE CBC AUTOMATED: CPT

## 2025-07-17 PROCEDURE — 36415 COLL VENOUS BLD VENIPUNCTURE: CPT

## 2025-07-17 PROCEDURE — 3008F BODY MASS INDEX DOCD: CPT | Mod: CPTII,S$GLB,, | Performed by: HOSPITALIST

## 2025-07-17 RX ORDER — VALSARTAN 80 MG/1
160 TABLET ORAL DAILY
Start: 2025-07-17 | End: 2026-07-17

## 2025-07-17 NOTE — PROGRESS NOTES
"MEDICAL ONCOLOGY FOLLOW-UP VISIT.     Best Contact Phone Number(s): [phone] (home) [phone] (work)     Cancer/Stage/TNM:    Cancer Staging   Gastrointestinal stromal tumor (GIST) of stomach  Staging form: Gastrointestinal Stromal Tumor - Gastric and Omental GIST, AJCC 8th Edition  - Clinical stage from 9/9/2022: Stage IIIA (cT3, cN0, cM0, Mitotic Rate: High) - Signed by Enrique Mcnair MD on 9/26/2022         Reason for visit: GIST; adjuvant imatinib    Treatment:  10/2022 -    Imatinib  05/10/23    Resection     HPI: Mr. 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 He started neoadjuvant imatinib on 10/8/22 and resection on 5/10/23. He presents to medical oncology clinic for routine follow on adjuvant imatinib therapy.    Interval History:       Currently feels OK. Denies signficant abdominal ocmpalints. Reports good appetite. No nausea. No abdominal pain or dyspepsia. Weight is up to 185. Has a few BM per day. Usually firm in the morning and looser stool later in the day. No hematochezia. Denies signficant urinary complaint. No CP, SOB or cough. No signficant leg swelling. No ocular complaints. Completed quadruple therapy for H pylori. Repeat stool antigen 7/1/25 negative.        Physical Exam:   BP (!) 144/66   Pulse (!) 52   Temp 97 °F (36.1 °C) (Oral)   Resp 17   Ht 5' 7" (1.702 m)   Wt 84.1 kg (185 lb 8.3 oz)   SpO2 99%   BMI 29.06 kg/m²      ECOG Performance Status: (foot note - ECOG PS provided by [employer] Co[employer]cology [employer]) 1 - Symptomatic but completely ambulatory    Physical Exam  Constitutional:       General: He is not in acute distress.     Appearance: Normal appearance.   HENT:      Head: Normocephalic.      Mouth/Throat:      Mouth: Mucous membranes are moist.      Pharynx: Oropharynx is clear. No oropharyngeal exudate or posterior oropharyngeal erythema.   Eyes:      General: No scleral icterus.     Extraocular " Movements: Extraocular movements intact.      Conjunctiva/sclera: Conjunctivae normal.      Pupils: Pupils are equal, round, and reactive to light.   Cardiovascular:      Rate and Rhythm: Normal rate and regular rhythm.   Pulmonary:      Effort: Pulmonary effort is normal. No respiratory distress.   Abdominal:      General: There is no distension.      Palpations: Abdomen is soft.      Tenderness: There is no abdominal tenderness.   Musculoskeletal:         General: No swelling. Normal range of motion.      Cervical back: Normal range of motion and neck supple.      Right lower leg: No edema.      Left lower leg: No edema.   Lymphadenopathy:      Cervical: No cervical adenopathy.   Skin:     General: Skin is warm and dry.      Coloration: Skin is not jaundiced.      Findings: No rash.   Neurological:      General: No focal deficit present.      Mental Status: He is alert and oriented to person, place, and time.      Motor: No weakness.   Psychiatric:         Mood and Affect: Mood normal.         Behavior: Behavior normal.         Thought Content: Thought content normal.           Labs:  Lab Visit on 07/17/2025   Component Date Value    WBC 07/17/2025 4.67     RBC 07/17/2025 4.18 (L)     HGB 07/17/2025 11.7 (L)     HCT 07/17/2025 37.0 (L)     MCV 07/17/2025 89     MCH 07/17/2025 28.0     MCHC 07/17/2025 31.6 (L)     RDW 07/17/2025 14.6 (H)     Platelet Count 07/17/2025 161     MPV 07/17/2025 12.4     Neut # 07/17/2025 2.87     Imm Grans # 07/17/2025 0.01     Sodium 07/17/2025 143     Potassium 07/17/2025 3.8     Chloride 07/17/2025 113 (H)     CO2 07/17/2025 25     Glucose 07/17/2025 94     BUN 07/17/2025 11     Creatinine 07/17/2025 1.5 (H)     Calcium 07/17/2025 8.6 (L)     Protein Total 07/17/2025 6.1     Albumin 07/17/2025 3.7     Bilirubin Total 07/17/2025 1.3 (H)     ALP 07/17/2025 66     AST 07/17/2025 24     ALT 07/17/2025 15     Anion Gap 07/17/2025 5 (L)     eGFR 07/17/2025 49 (L)              Imaging:           Diagnoses:       1. Gastrointestinal stromal tumor (GIST) of stomach    2. Primary hypertension                Assessment and Plan:     1. Gastrointestinal stromal tumor (GIST) of stomach  Overview:  Patient with gastric GIST and no evidence of luis or distant metastatic disease. 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 Started neoadjuvant imatinib 400mg po daily 10/08/2022 with good tolerance and clinical resposne. Underwent surgical resection 5/10/23. Plan to complete total of 3 years of perioperative therapy.    Assessment & Plan:  Remains JENNA. Tolerating imatinib well. Plan to finish in October 2025.  - Con't imatinib  - FU 3 months with repeat CT imaging  - Will stop imatinib in October and start surveillance    Orders:  -     CT Chest Abdomen Pelvis With IV Contrast (XPD) Routine Oral Contrast; Future; Expected date: 07/17/2025    2. Primary hypertension  Overview:  Patient on nebivolol and valsartan. Previously on HCTZ; stopped due to low K levels    Orders:  -     valsartan (DIOVAN) 80 MG tablet; Take 2 tablets (160 mg total) by mouth once daily.                Route Chart for Scheduling    Med Onc Chart Routing      Follow up with physician 3 months.   Follow up with [facility]    Infusion scheduling note    Injection scheduling note    Labs CBC and CMP   Scheduling:  Preferred lab:  Lab interval:     Imaging    Pharmacy appointment    Other referrals                      Enrique Mcnair MD  Hematology/Oncology  [facility]

## 2025-07-17 NOTE — ASSESSMENT & PLAN NOTE
Remains JENNA. Tolerating imatinib well. Plan to finish in October 2025.  - Con't imatinib  - FU 3 months with repeat CT imaging  - Will stop imatinib in October and start surveillance

## 2025-07-29 DIAGNOSIS — E87.6 HYPOKALEMIA: ICD-10-CM

## 2025-07-29 RX ORDER — POTASSIUM CHLORIDE 20 MEQ/1
TABLET, EXTENDED RELEASE ORAL
Qty: 60 TABLET | Refills: 5 | Status: SHIPPED | OUTPATIENT
Start: 2025-07-29

## (undated) DEVICE — SUT MCRYL PLUS 4-0 PS2 27IN

## (undated) DEVICE — TUBE PENROSE DRAIN 18IN X 3/8I

## (undated) DEVICE — NDL 22GA X1 1/2 REG BEVEL

## (undated) DEVICE — PACK ECLIPSE SET-UP W/O DRAPE

## (undated) DEVICE — IRRIGATOR ENDOSCOPY DISP.

## (undated) DEVICE — COVER TIP CURVED SCISSORS XI

## (undated) DEVICE — DRAPE THREE-QTR REINF 53X77IN

## (undated) DEVICE — PACK DRAPE UNIVERSAL CONVERTOR

## (undated) DEVICE — STAPLER SUREFORM 60 SPU

## (undated) DEVICE — TUBING SUC UNIV W/CONN 12FT

## (undated) DEVICE — TROCAR ENDOPATH XCEL 12MM 10CM

## (undated) DEVICE — SOL WATER STRL IRR 1000ML

## (undated) DEVICE — STAPLER SUREFORM SGL USE 45

## (undated) DEVICE — NDL 18GA X1 1/2 REG BEVEL

## (undated) DEVICE — TUBE SET SINGLE LUMEN FILTERED

## (undated) DEVICE — Device

## (undated) DEVICE — CANNULA REDUCER 12-8MM

## (undated) DEVICE — DRAPE ARM DAVINCI XI

## (undated) DEVICE — RELOAD SUREFORM 45 3.5 BLU 6R

## (undated) DEVICE — SPONGE LAP 4X18 PREWASHED

## (undated) DEVICE — DRAPE COLUMN DAVINCI XI

## (undated) DEVICE — TROCAR ENDOPATH XCEL 5X100MM

## (undated) DEVICE — DRAIN CHANNEL ROUND 19FR

## (undated) DEVICE — LUBRICANT SURGILUBE 2 OZ

## (undated) DEVICE — SUT 0 VICRYL / UR6 (J603)

## (undated) DEVICE — SUT PROLENE 2-0 30 SH

## (undated) DEVICE — SOL ELECTROLUBE ANTI-STIC

## (undated) DEVICE — GAUZE SPONGE 4X4 12PLY

## (undated) DEVICE — COVER LIGHT HANDLE

## (undated) DEVICE — LOOP VESSEL BLUE MAXI

## (undated) DEVICE — SEAL UNIVERSAL 5MM-8MM XI

## (undated) DEVICE — STRIP MEDI WND CLSR 1X5IN

## (undated) DEVICE — SUT BLU GS-22 0 3.5M 23CM 9IN

## (undated) DEVICE — SYR 30CC LUER LOCK

## (undated) DEVICE — TRAY CATH FOL SIL URIMTR 16FR

## (undated) DEVICE — DRESSING SURGICAL 1X3

## (undated) DEVICE — TROCAR KII BLLN 12MM 10CM

## (undated) DEVICE — ADHESIVE DERMABOND ADVANCED

## (undated) DEVICE — DRAPE SCOPE PILLOW WARMER

## (undated) DEVICE — OBTURATOR BLADELESS 8MM XI CLR

## (undated) DEVICE — SYR SLIP TIP 20CC

## (undated) DEVICE — RELOAD SUREFORM 45 4.3 GRN 6R

## (undated) DEVICE — SOL NS 1000CC

## (undated) DEVICE — TRAY MINOR GEN SURG OMC

## (undated) DEVICE — CANNULA SEAL 12MM

## (undated) DEVICE — SPONGE COTTON TRAY 4X4IN

## (undated) DEVICE — SUT ETHIBOND EXCEL 0 CT2 30

## (undated) DEVICE — BAG TISS RETRV MONARCH 10MM